# Patient Record
Sex: MALE | HISPANIC OR LATINO | Employment: OTHER | URBAN - METROPOLITAN AREA
[De-identification: names, ages, dates, MRNs, and addresses within clinical notes are randomized per-mention and may not be internally consistent; named-entity substitution may affect disease eponyms.]

---

## 2017-04-28 ENCOUNTER — COMMUNICATION - HEALTHEAST (OUTPATIENT)
Dept: FAMILY MEDICINE | Facility: CLINIC | Age: 57
End: 2017-04-28

## 2017-05-01 ENCOUNTER — COMMUNICATION - HEALTHEAST (OUTPATIENT)
Dept: FAMILY MEDICINE | Facility: CLINIC | Age: 57
End: 2017-05-01

## 2017-05-01 DIAGNOSIS — I10 UNSPECIFIED ESSENTIAL HYPERTENSION: ICD-10-CM

## 2017-05-01 DIAGNOSIS — I25.10 CORONARY ATHEROSCLEROSIS: ICD-10-CM

## 2017-05-15 ENCOUNTER — OFFICE VISIT - HEALTHEAST (OUTPATIENT)
Dept: FAMILY MEDICINE | Facility: CLINIC | Age: 57
End: 2017-05-15

## 2017-05-15 ENCOUNTER — COMMUNICATION - HEALTHEAST (OUTPATIENT)
Dept: TELEHEALTH | Facility: CLINIC | Age: 57
End: 2017-05-15

## 2017-05-15 ENCOUNTER — COMMUNICATION - HEALTHEAST (OUTPATIENT)
Dept: FAMILY MEDICINE | Facility: CLINIC | Age: 57
End: 2017-05-15

## 2017-05-15 DIAGNOSIS — J45.901 ASTHMA WITH EXACERBATION: ICD-10-CM

## 2017-05-15 DIAGNOSIS — I10 ESSENTIAL HYPERTENSION: ICD-10-CM

## 2017-05-15 DIAGNOSIS — Z91.09 OTHER ALLERGY, OTHER THAN TO MEDICINAL AGENTS: ICD-10-CM

## 2017-05-15 DIAGNOSIS — R74.8 ELEVATED LIVER ENZYMES: ICD-10-CM

## 2017-05-15 DIAGNOSIS — E78.00 PURE HYPERCHOLESTEROLEMIA: ICD-10-CM

## 2017-05-18 ENCOUNTER — AMBULATORY - HEALTHEAST (OUTPATIENT)
Dept: LAB | Facility: CLINIC | Age: 57
End: 2017-05-18

## 2017-05-18 DIAGNOSIS — R74.8 ELEVATED LIVER ENZYMES: ICD-10-CM

## 2017-05-18 DIAGNOSIS — E78.00 PURE HYPERCHOLESTEROLEMIA: ICD-10-CM

## 2017-05-18 DIAGNOSIS — I10 ESSENTIAL HYPERTENSION: ICD-10-CM

## 2017-05-18 DIAGNOSIS — R97.20 ELEVATED PSA: ICD-10-CM

## 2017-05-18 LAB
CHOLEST SERPL-MCNC: 123 MG/DL
FASTING STATUS PATIENT QL REPORTED: YES
HDLC SERPL-MCNC: 38 MG/DL
LDLC SERPL CALC-MCNC: 71 MG/DL
PSA SERPL-MCNC: 1.9 NG/ML (ref 0–3.5)
TRIGL SERPL-MCNC: 70 MG/DL

## 2017-05-18 RX ORDER — MONTELUKAST SODIUM 10 MG/1
TABLET ORAL
Qty: 90 TABLET | Refills: 0 | Status: SHIPPED | OUTPATIENT
Start: 2017-05-18 | End: 2022-04-06

## 2017-05-19 LAB
ANA SER QL: 0.4 U
HAV IGM SERPL QL IA: NEGATIVE
HBV CORE IGM SERPL QL IA: NEGATIVE
HBV SURFACE AG SERPL QL IA: NEGATIVE
HCV AB SERPL QL IA: NEGATIVE

## 2017-05-21 ENCOUNTER — COMMUNICATION - HEALTHEAST (OUTPATIENT)
Dept: FAMILY MEDICINE | Facility: CLINIC | Age: 57
End: 2017-05-21

## 2017-05-21 ENCOUNTER — AMBULATORY - HEALTHEAST (OUTPATIENT)
Dept: FAMILY MEDICINE | Facility: CLINIC | Age: 57
End: 2017-05-21

## 2017-05-21 DIAGNOSIS — R74.8 ELEVATED LIVER ENZYMES: ICD-10-CM

## 2017-07-05 ENCOUNTER — COMMUNICATION - HEALTHEAST (OUTPATIENT)
Dept: FAMILY MEDICINE | Facility: CLINIC | Age: 57
End: 2017-07-05

## 2017-07-05 DIAGNOSIS — I25.10 CORONARY ATHEROSCLEROSIS: ICD-10-CM

## 2017-07-05 DIAGNOSIS — I10 ESSENTIAL HYPERTENSION: ICD-10-CM

## 2017-07-05 RX ORDER — LISINOPRIL 10 MG/1
TABLET ORAL
Qty: 90 TABLET | Refills: 2 | Status: SHIPPED | OUTPATIENT
Start: 2017-07-05 | End: 2022-04-06

## 2017-07-05 RX ORDER — ATORVASTATIN CALCIUM 20 MG/1
TABLET, FILM COATED ORAL
Qty: 90 TABLET | Refills: 3 | Status: SHIPPED | OUTPATIENT
Start: 2017-07-05 | End: 2022-04-06

## 2017-07-07 ENCOUNTER — COMMUNICATION - HEALTHEAST (OUTPATIENT)
Dept: FAMILY MEDICINE | Facility: CLINIC | Age: 57
End: 2017-07-07

## 2017-07-07 DIAGNOSIS — J45.901 ASTHMA WITH EXACERBATION: ICD-10-CM

## 2017-07-17 ENCOUNTER — COMMUNICATION - HEALTHEAST (OUTPATIENT)
Dept: FAMILY MEDICINE | Facility: CLINIC | Age: 57
End: 2017-07-17

## 2017-10-27 ENCOUNTER — COMMUNICATION - HEALTHEAST (OUTPATIENT)
Dept: FAMILY MEDICINE | Facility: CLINIC | Age: 57
End: 2017-10-27

## 2017-10-27 ENCOUNTER — AMBULATORY - HEALTHEAST (OUTPATIENT)
Dept: FAMILY MEDICINE | Facility: CLINIC | Age: 57
End: 2017-10-27

## 2017-10-27 DIAGNOSIS — J45.901 ASTHMA WITH EXACERBATION: ICD-10-CM

## 2017-10-27 RX ORDER — ALBUTEROL SULFATE 90 UG/1
2 AEROSOL, METERED RESPIRATORY (INHALATION) EVERY 6 HOURS PRN
Qty: 1 EACH | Refills: 1 | Status: SHIPPED | OUTPATIENT
Start: 2017-10-27 | End: 2023-07-14

## 2018-02-04 ENCOUNTER — COMMUNICATION - HEALTHEAST (OUTPATIENT)
Dept: FAMILY MEDICINE | Facility: CLINIC | Age: 58
End: 2018-02-04

## 2018-02-04 DIAGNOSIS — I10 ESSENTIAL HYPERTENSION: ICD-10-CM

## 2018-02-04 DIAGNOSIS — I25.10 CORONARY ATHEROSCLEROSIS: ICD-10-CM

## 2018-02-06 RX ORDER — LISINOPRIL 10 MG/1
TABLET ORAL
Qty: 90 TABLET | Refills: 1 | Status: SHIPPED | OUTPATIENT
Start: 2018-02-06 | End: 2022-04-06

## 2018-02-06 RX ORDER — ATENOLOL 25 MG/1
TABLET ORAL
Qty: 90 TABLET | Refills: 1 | Status: SHIPPED | OUTPATIENT
Start: 2018-02-06 | End: 2022-05-15

## 2018-03-05 ENCOUNTER — RECORDS - HEALTHEAST (OUTPATIENT)
Dept: GENERAL RADIOLOGY | Facility: CLINIC | Age: 58
End: 2018-03-05

## 2018-03-05 ENCOUNTER — COMMUNICATION - HEALTHEAST (OUTPATIENT)
Dept: SCHEDULING | Facility: CLINIC | Age: 58
End: 2018-03-05

## 2018-03-05 ENCOUNTER — OFFICE VISIT - HEALTHEAST (OUTPATIENT)
Dept: FAMILY MEDICINE | Facility: CLINIC | Age: 58
End: 2018-03-05

## 2018-03-05 DIAGNOSIS — R07.89 CHEST PAIN, NON-CARDIAC: ICD-10-CM

## 2018-03-05 DIAGNOSIS — R07.9 CHEST PAIN: ICD-10-CM

## 2018-03-05 DIAGNOSIS — R07.9 CHEST PAIN, UNSPECIFIED: ICD-10-CM

## 2019-12-03 ENCOUNTER — OFFICE VISIT - HEALTHEAST (OUTPATIENT)
Dept: FAMILY MEDICINE | Facility: CLINIC | Age: 59
End: 2019-12-03

## 2019-12-03 DIAGNOSIS — J06.9 VIRAL URI: ICD-10-CM

## 2019-12-03 RX ORDER — GUAIFENESIN 600 MG/1
600 TABLET, EXTENDED RELEASE ORAL
Status: SHIPPED | COMMUNITY
Start: 2010-05-23 | End: 2022-04-06

## 2019-12-03 RX ORDER — DOXYCYCLINE 100 MG/1
CAPSULE ORAL
Refills: 0 | Status: SHIPPED | COMMUNITY
Start: 2019-05-02 | End: 2022-04-06

## 2020-06-15 ENCOUNTER — OFFICE VISIT - HEALTHEAST (OUTPATIENT)
Dept: FAMILY MEDICINE | Facility: CLINIC | Age: 60
End: 2020-06-15

## 2020-06-15 DIAGNOSIS — S91.209A AVULSION OF TOENAIL, INITIAL ENCOUNTER: ICD-10-CM

## 2020-06-15 DIAGNOSIS — L03.031 CELLULITIS OF TOE OF RIGHT FOOT: ICD-10-CM

## 2020-06-15 DIAGNOSIS — M79.674 PAIN OF TOE OF RIGHT FOOT: ICD-10-CM

## 2021-05-30 ENCOUNTER — RECORDS - HEALTHEAST (OUTPATIENT)
Dept: ADMINISTRATIVE | Facility: CLINIC | Age: 61
End: 2021-05-30

## 2021-05-30 VITALS — WEIGHT: 217 LBS | BODY MASS INDEX: 30.7 KG/M2

## 2021-05-31 VITALS — BODY MASS INDEX: 31.47 KG/M2 | WEIGHT: 222.5 LBS

## 2021-06-01 ENCOUNTER — RECORDS - HEALTHEAST (OUTPATIENT)
Dept: ADMINISTRATIVE | Facility: CLINIC | Age: 61
End: 2021-06-01

## 2021-06-03 VITALS
WEIGHT: 224.1 LBS | SYSTOLIC BLOOD PRESSURE: 158 MMHG | BODY MASS INDEX: 31.7 KG/M2 | HEART RATE: 60 BPM | TEMPERATURE: 98 F | DIASTOLIC BLOOD PRESSURE: 90 MMHG | RESPIRATION RATE: 16 BRPM | OXYGEN SATURATION: 96 %

## 2021-06-03 NOTE — PATIENT INSTRUCTIONS - HE
You were seen today for a cough. This is likely due to a virus and will improve over the next 1-2 weeks on its own.    Symptom management:  - Drink plenty of non-caffeinated fluids  - Avoid smoke exposure  - May use tylenol or ibuprofen for discomfort  - Drink a warm non-caffeinated tea with honey  - Place a warm humidifier in your bedroom at night  - Suresh's VaporRub    Reasons to return for re-evaluation:  - Develop a fever 100.4 or higher, current fever worsens, or fever does not improve in 72 hours  - Difficulty breathing or shortness of breath  - Cough continues to worsen including coughing up blood or coughing up thick, colored phlegm  - Unable to tolerate fluids    Otherwise, if symptoms have not improved in 7 days, follow-up with your primary care provider.

## 2021-06-03 NOTE — PROGRESS NOTES
Assessment:       1. Viral URI       Medical Decision Making  Patient presents with cough for 2 to 3 days most consistent with a viral upper respiratory infection.  On exam he shows no signs of bacterial sinusitis, otitis media, or bacterial pneumonia.      Plan:       Honey and Vicks VapoRub as needed for cough.  Over-the-counter analgesics discussed.  Over-the-counter Flonase and oral decongestants discussed.  Discussed signs of worsening symptoms and when to follow-up with PCP if no symptom improvement.      Patient Instructions   You were seen today for a cough. This is likely due to a virus and will improve over the next 1-2 weeks on its own.    Symptom management:  - Drink plenty of non-caffeinated fluids  - Avoid smoke exposure  - May use tylenol or ibuprofen for discomfort  - Drink a warm non-caffeinated tea with honey  - Place a warm humidifier in your bedroom at night  - Suresh's VaporRub    Reasons to return for re-evaluation:  - Develop a fever 100.4 or higher, current fever worsens, or fever does not improve in 72 hours  - Difficulty breathing or shortness of breath  - Cough continues to worsen including coughing up blood or coughing up thick, colored phlegm  - Unable to tolerate fluids    Otherwise, if symptoms have not improved in 7 days, follow-up with your primary care provider.      Subjective:       Mir Treviño . is a 59 y.o. male here for evaluation of cough.  Onset of symptoms was 2 to 3 days ago.  Associated symptoms include sore throat, rhinorrhea, facial pressure, and green phlegm.  Patient denies fevers and shortness of breath.  The cough is worse at nighttime.  Patient expresses concern for his symptoms as he previously had a pneumonia in May of this year.    The following portions of the patient's history were reviewed and updated as appropriate: allergies, current medications and problem list.    Review of Systems  Pertinent items are noted in HPI.     Allergies  No Known  "Allergies    Family History   Problem Relation Age of Onset     No Medical Problems Mother      No Medical Problems Father      No Medical Problems Sister      No Medical Problems Brother      No Medical Problems Daughter      No Medical Problems Son      No Medical Problems Brother      No Medical Problems Son      No Medical Problems Son        Social History     Socioeconomic History     Marital status:      Spouse name: None     Number of children: None     Years of education: None     Highest education level: None   Occupational History     None   Social Needs     Financial resource strain: None     Food insecurity:     Worry: None     Inability: None     Transportation needs:     Medical: None     Non-medical: None   Tobacco Use     Smoking status: Never Smoker     Smokeless tobacco: Never Used   Substance and Sexual Activity     Alcohol use: Yes     Comment: \"Once a month, maybe.\"     Drug use: No     Sexual activity: None   Lifestyle     Physical activity:     Days per week: None     Minutes per session: None     Stress: None   Relationships     Social connections:     Talks on phone: None     Gets together: None     Attends [employer] service: None     Active member of club or organization: None     Attends meetings of clubs or organizations: None     Relationship status: None     Intimate partner violence:     Fear of current or ex partner: None     Emotionally abused: None     Physically abused: None     Forced sexual activity: None   Other Topics Concern     None   Social History Narrative    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    Marisa Diego         Objective:       /90   Pulse 60   Temp 98  F (36.7  C) (Oral)   Resp 16   Wt (!) 224 lb 1.6 oz (101.7 kg)   SpO2 96%   BMI 31.70 kg/m    General appearance: alert, appears stated age, cooperative, no distress and non-toxic  Head: Normocephalic, without obvious abnormality, atraumatic, " sinuses nontender to percussion  Ears: TMs with serous fluid and bulging, no erythema  Nose: no discharge  Throat: lips, mucosa, and tongue normal; teeth and gums normal  Neck: no adenopathy and supple, symmetrical, trachea midline  Lungs: clear to auscultation bilaterally and No rhonchi, rales, or wheezing  Heart: regular rate and rhythm, S1, S2 normal, no murmur, click, rub or gallop

## 2021-06-04 VITALS
DIASTOLIC BLOOD PRESSURE: 99 MMHG | RESPIRATION RATE: 18 BRPM | SYSTOLIC BLOOD PRESSURE: 166 MMHG | OXYGEN SATURATION: 97 % | WEIGHT: 222.7 LBS | TEMPERATURE: 98.1 F | BODY MASS INDEX: 31.5 KG/M2 | HEART RATE: 64 BPM

## 2021-06-10 NOTE — PROGRESS NOTES
HPI:  BP follow up.   Back on atenolol for two weeks (he had run out for 3 weeks). [employer].      Current Outpatient Prescriptions on File Prior to Visit   Medication Sig Dispense Refill     albuterol (ACCUNEB) 1.25 mg/3 mL nebulizer solution Take 1 ampule by nebulization every 6 (six) hours as needed for wheezing.       aspirin 81 MG EC tablet Take 1 tablet (81 mg total) by mouth daily. 81 MG PO DAILY 100 tablet 3     atenolol (TENORMIN) 25 MG tablet TAKE 1 TABLET BY MOUTH EVERY DAY 90 tablet 0     atorvastatin (LIPITOR) 20 MG tablet TAKE 1 TABLET BY MOUTH EVERY DAY 90 tablet 3     lisinopril (PRINIVIL,ZESTRIL) 10 MG tablet TAKE 1 TABLET BY MOUTH DAILY 90 tablet 2     montelukast (SINGULAIR) 10 mg tablet TAKE 1 TABLET BY MOUTH EVERY NIGHT AT BEDTIME 90 tablet 3     No current facility-administered medications on file prior to visit.        Pmh: reviewed  Psh: reviewed  Allergy:  reviewed      EXAM:    /82  Pulse (!) 54  Temp 98.7  F (37.1  C) (Oral)   Resp 16  Wt 217 lb (98.4 kg)  BMI 30.7 kg/m2  GEN:   ALERT, NAD, ORIENTED TIMES THREE  NECK: SUPPLE WITHOUT ADENOPATHY OR THYROMEGALY  LUNGS: CTA  COR: RRR WITHOUT MURMUR  EXT: WITHOUT EDEMA OR SWELLING    ACT:  16    No results found for this or any previous visit (from the past 168 hour(s)).    DIAGNOSIS:  1. Essential hypertension  Basic Metabolic Panel   2. Elevated liver enzymes     3. Asthma with exacerbation  albuterol (PROAIR HFA;PROVENTIL HFA;VENTOLIN HFA) 90 mcg/actuation inhaler    montelukast (SINGULAIR) 10 mg tablet   4. Allergies  montelukast (SINGULAIR) 10 mg tablet   5. Hypercholesterolemia  Lipid Profile       PLAN:  Patient Instructions   May take benadryl 25-50 mg at bedtime    Also could try loratidine 10 mg in the morning    Follow up for fasting labs this week  Make sure you have 3 days of sexual abstinence.    Albuterol inhaler as needed    Start singulair 10 mg daily for 1-2 months.

## 2021-06-16 NOTE — PROGRESS NOTES
Assessment:     1. Chest pain  XR Ribs Right W PA Chest   2. Chest pain, non-cardiac       Xray personally reviewed by me and I note the discontinuity in the outline of the seventh rib.   I will await for the official read, however the management remains for symptom care and this was discussed with the patient.  Chest pain, likely secondary to rib fracture.      Plan:     1. Chest pain  XR Ribs Right W PA Chest   2. Chest pain, non-cardiac          The diagnosis was discussed with the patient and evaluation and treatment plans outlined.  See orders for lab and imaging studies.     Subjective:      Mir Treviño Sr. is a  male who presents for evaluation of   Chief Complaint   Patient presents with     Fall     1 week ago at work     Chest Pain     with deep breaths, cough, and lifting      Fell back wards on the edge of side walk. Right shoukder took most of the brunt and right lower back.      Onset was 1 week ago. Symptoms have been unchanged. The pain is described as sharp, and is 7/10 in intensity. Pain is located in the right chest with radiation to back.  Aggravating factors: activity.  Alleviating factors: recumbency. Associated symptoms: none. The patient denies arthralagias, chills, nausea and vomiting.  This happened at work.  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    He has a history of cardiac stent but denies any left-sided chest pain.    The following portions of the patient's history were reviewed and updated as appropriate: allergies, current medications, past family history, past medical history, past social history, past surgical history and problem list.    No Known Allergies    Current Outpatient Prescriptions on File Prior to Visit   Medication Sig Dispense Refill     aspirin 81 MG EC tablet Take 1 tablet (81 mg total) by mouth daily. 81 MG PO DAILY 100 tablet 3     atenolol (TENORMIN) 25 MG tablet TAKE 1 TABLET BY MOUTH EVERY DAY 90 tablet 1      atorvastatin (LIPITOR) 20 MG tablet TAKE 1 TABLET BY MOUTH EVERY DAY 90 tablet 3     lisinopril (PRINIVIL,ZESTRIL) 10 MG tablet TAKE 1 TABLET BY MOUTH DAILY 90 tablet 2     albuterol (ACCUNEB) 1.25 mg/3 mL nebulizer solution Take 1 ampule by nebulization every 6 (six) hours as needed for wheezing.       albuterol (PROAIR HFA;PROVENTIL HFA;VENTOLIN HFA) 90 mcg/actuation inhaler Inhale 2 puffs every 6 (six) hours as needed for wheezing. 1 each 1     lisinopril (PRINIVIL,ZESTRIL) 10 MG tablet TAKE 1 TABLET BY MOUTH DAILY 90 tablet 1     montelukast (SINGULAIR) 10 mg tablet TAKE 1 TABLET BY MOUTH EVERY NIGHT AT BEDTIME 90 tablet 3     montelukast (SINGULAIR) 10 mg tablet TAKE 1 TABLET(10 MG) BY MOUTH DAILY 90 tablet 0     No current facility-administered medications on file prior to visit.        Patient Active Problem List   Diagnosis     Hypercholesterolemia     Hypertension     Coronary Artery Disease     Mild Intermittent Asthma     Tuberculin PPD Induration Positive Interpretation     Allergies     Asthma With Acute Exacerbation     Elevated liver enzymes     Obesity       Past Medical History:   Diagnosis Date     Coronary Artery Disease     Created by [facility] Annotation: Jul 16 2010  5:35PM - Nik Boyd: [place] 7/10  Replacement Utility updated for latest IMO load     Hypercholesterolemia     Created by Conversion      Hypertension     Created by Conversion  Replacement Utility updated for latest IMO load       Past Surgical History:   Procedure Laterality Date     CORONARY STENTS      Two coronary artery stents in 2010       Family History   Problem Relation Age of Onset     No Medical Problems Mother      No Medical Problems Father      No Medical Problems Sister      No Medical Problems Brother      No Medical Problems Daughter      No Medical Problems Son      No Medical Problems Brother      No Medical Problems Son      No Medical Problems Son        Social History     Social History     Marital  "status:      Spouse name: N/A     Number of children: N/A     Years of education: N/A     Social History Main Topics     Smoking status: Never Smoker     Smokeless tobacco: Never Used     Alcohol use Yes      Comment: \"Once a month, maybe.\"     Drug use: No     Sexual activity: Not Asked     Other Topics Concern     None     Social History Narrative    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    Marisa Diego           Review of Systems  Constitutional: negative  Respiratory: negative  Cardiovascular: negative  Integument/breast: negative  Hematologic/lymphatic: negative       Objective:     GENERAL APPEARANCE:  Appearing stated age, smiling, alert, cooperative, and in no acute distress.   HEENT: Pupils equal, regular, react to light and accommodation. Extraocular muscles intact, fundi benign. Ear canals and tympanic membranes are normal. Lips, mouth, and throat are unremarkable.   NECK: Neck supple without adenopathy, thyromegaly or masses.   LYMPH: No anterior cervical or supraclavicular LN enlargement   PULMONARY: Normal respiratory effort. Chest is clear.   CARDIOVASCULAR: Heart auscultation: rhythm regular, heart sounds normal S1 and S2, bruit carotid artery absent.   SKIN: Warm and well perfused..   ABDOMEN: Abdomen soft, non-tender. BS normal. No masses or organomegaly.   MUSCULOSKELETAL: No obvious joint swelling, deformity or limitation in range of motion, full range of motion of the back and neck without pain, he has tenderness on the anterior ribs mostly in the  right lateral side of 5- ribs.    EXTREMITIES: Peripheral pulses are full. Extremities with no edema.   MENTAL STATUS: Alert, oriented and thought content appropriate   NEUROLOGIC: Station and gait normal, strength and movement normal, reflexes are normal and symmetric         "

## 2021-06-18 NOTE — PATIENT INSTRUCTIONS - HE
Patient Instructions by Gila Germain CNP at 6/15/2020  1:30 PM     Author: Gila Germain CNP Service: -- Author Type: Nurse Practitioner    Filed: 6/15/2020  2:43 PM Encounter Date: 6/15/2020 Status: Addendum    : Gila Germain CNP (Nurse Practitioner)    Related Notes: Original Note by Gila Germain CNP (Nurse Practitioner) filed at 6/15/2020  2:42 PM       Soak foot and swish it around in warm, epsom salt foot bath 5-10 minutes each time twice daily until healed over.      Can continue to apply topical antibiotic over open area.    OK to trim nail back if necessary.      Cephalexin in case of early skin infection 4x daily for 7 days.      Keep open area of toenail covered with adhesive bandage/bandaid.  Change after nail soaks.      Patient Education     Detached Fingernail or Toenail  A detached nail is when the nail becomes  from the area underneath it (the nail bed). This often means losing all or part of the nail. An injury to your finger or toe is often the cause. It can also be caused by an infection around or under the nail.  Sometimes there is a cut in the nail bed or a bone fracture under the nail. In most cases, the nail will grow back from the area under the cuticle (the matrix). A fingernail takes about 4 to 6 months to grow back. A toenail takes about 12 months to grow back. If the nail bed or matrix was damaged, the nail may grow back with a rough or abnormal shape. In some cases the nail may not grow back at all.    There may be damage or a cut to the nail bed. This may need to be repaired. Often this is done with stitches. If the nail is still in good condition, it might be cleaned and trimmed, then put back in place. This is done to help and protect the new nail as it grows back. It also prevents the nail bed from drying out.  Home care    Keep the injured part raised to reduce pain and swelling. This is important, especially in the first 48 hours.    Apply an ice  pack for up to 20 minutes. Do this every 3 to 6 hours during the first 24 to 48 hours. Keep using ice packs to ease pain and swelling as needed. To make an ice pack, put ice cubes in a plastic bag that seals at the top. Wrap the bag in a clean, thin towel or cloth. Never put ice or an ice pack directly on the skin. The ice pack can be put right on a wrap, cast, or splint. As the ice melts, be careful not to get any wrap, cast, or splint wet.    The nail bed is moist, soft, and sensitive. It needs to be protected from injury for the first 7 to 10 days until it dries out and becomes hard. Keep it covered with a nonstick dressing or a bandage without adhesive.    When a dressing is placed on an exposed nail bed, it may stick and be hard to remove if left in place more than 24 hours. Unless you were told otherwise, change dressings every 24 hours. If needed, soak the dressing off while holding it under warm running water. Then lightly pat the wound dry. Apply a layer of antibiotic ointment before putting on the new dressing or bandage. This will help keep it from sticking. Use a nonstick dressing with the antibiotic ointment under the outer dressing.    If an X-ray showed that you have a fracture, it will take about 4 weeks for this to heal. The injured part should be protected with a splint or tape while it is healing.    If you were given antibiotics to prevent infection, take them as directed until they are all gone.  Medicine    You can take over-the-counter medicine for pain, unless you were given a different pain medicine to use. Talk with your provider before using these medicines if you have chronic liver or kidney disease. Also talk with your provider if you ever had a stomach ulcer or GI bleeding, or are taking blood thinner medicines.    If you were given antibiotics, take them until they are done. It is important to finish the antibiotics even if the wound looks better. This is to make sure the infection has  cleared.  Follow-up care  Follow up with your healthcare provider, or as advised. If X-rays were taken, you will be told of any new findings that may affect your care.  When to seek medical advice  Call your healthcare provider right away if any of these occur:    Pain or swelling increases    Redness around the nail    Pus (creamy white or yellow fluid) draining from the nail    Fever of 100.4 F (38 C) or higher, or as directed by your provider  Date Last Reviewed: 8/1/2016 2000-2017 [employer]. [address] [place] [id number]. All rights reserved. This information is not intended as a substitute for professional medical care. Always follow your healthcare professional's instructions.

## 2021-06-20 NOTE — LETTER
Letter by Gila Gemrain CNP at      Author: Gila Germain CNP Service: -- Author Type: --    Filed:  Encounter Date: 6/15/2020 Status: (Other)         Brigitte 15, 2020     Patient: Mir Treviño Sr.   YOB: 1960   Date of Visit: 6/15/2020       To Whom It May Concern:    It is my medical opinion that Mir Treviño may return to light duty immediately with the following restrictions: may wear open toed shoe on right foot for 1 week if can't tolerate boots.  May need to adjust work duties for safety if boots are required.  .    If you have any questions or concerns, please don't hesitate to call.    Sincerely,        Electronically signed by Gila Germain CNP

## 2021-06-29 NOTE — PROGRESS NOTES
Progress Notes by Gila Germain CNP at 6/15/2020  1:30 PM     Author: Gila Germain CNP Service: -- Author Type: Nurse Practitioner    Filed: 6/25/2020  5:05 PM Encounter Date: 6/15/2020 Status: Signed    : Gila Germain CNP (Nurse Practitioner)       Chief Complaint   Patient presents with   ? Nail Problem     Right bg toe, right second toe, and right fifth toe was injured while moving a trash can x4 days ago, right bod toe nail is lifting up       ASSESSMENT & PLAN:   Diagnoses and all orders for this visit:    Avulsion of toenail, initial encounter  -     cephalexin 500 mg tablet; Take 500 mg by mouth 4 (four) times a day for 7 days.  Dispense: 28 tablet; Refill: 0    Pain of toe of right foot  -     Cancel: XR Foot Right 3 or More VWS    Cellulitis of toe of right foot    Other orders  -     Td, Preservative Free (green label)        MDM:  Patient with lacerations and toenail avulsions on right foot.  Patient denies direct trauma to area, just cut from garbage can/.  He does not believe there are any other injuries other than cuts and the toenail avulsions.    Patient with clean appearing right fifth toe toenail avulsion.  Right great toe with tissue avulsion near nail bed.  Patient states [facility] and he actually did a good job.  We will leave in place.  Patient informed the nail will eventually grow back from the base.    Right great toe is generally mildly erythematous and more swollen compared to left.  Discussed with Dr. Pagan in the urgent care and he recommended cephalexin for the great toe as well as epsom foot soaks.  Recommended against removal of toenail to further investigate.    Patient informed of this.  Should wear open toed shoes if boots are uncomfortable.  Patient can see how he is feeling when he is transferred to [employer] base as planned for training.  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    Supportive care discussed.  See discharge instructions below for specific recommendations given.    At the end of the encounter, I discussed results, diagnosis, medications. Discussed red flags for immediate return to clinic/ER, as well as indications for follow up if no improvement. Patient and/or caregiver understood and agreed to plan. Patient was stable for discharge.    SUBJECTIVE    HPI:  HPI  Mir Treviño  presents to the walk-in clinic with   Chief Complaint   Patient presents with   ? Nail Problem     Right bg toe, right second toe, and right fifth toe was injured while moving a trash can x4 days ago, right bod toe nail is lifting up     Patient states he was sliding garbage can and accidentally slid sharp part of the bottom of garbage can over multiple  toenails.  Fifth toe toenail was injured and already fell off.  Partial nail avulsion noted of the great toe earlier at the base.  Patient states he put this back in place to protect (under eponychium).  Healing laceration at upper lateral edge of nail.  Is in  and has to wear boots when training at [employer] coming up and and doesn't know if he can tolerate this with injury.  Toe is mildly erythematous and swollen.      Symptoms started: 4 days ago     Last TD 2015.    Denies: direct trauma/crush injury potential, only cuts as noted     See ROS for additional symptoms and/or pertinent negatives.       History obtained from the patient.    Past Medical History:   Diagnosis Date   ? Coronary Artery Disease     Created by [facility] Annotation: Jul 16 2010  5:35PM - Nik Boyd: MI 7/10  Replacement Utility updated for latest IMO load   ? Hypercholesterolemia     Created by Conversion    ? Hypertension     Created by Conversion  Replacement Utility updated for latest IMO load       Active Ambulatory (Non-Hospital) Problems    Diagnosis   ? Obesity   ? Elevated liver enzymes   ? Asthma With Acute Exacerbation   ?  "Hypercholesterolemia   ? Hypertension   ? Coronary Artery Disease   ? Asthma   ? Tuberculin PPD Induration Positive Interpretation   ? Allergies       Family History   Problem Relation Age of Onset   ? No Medical Problems Mother    ? No Medical Problems Father    ? No Medical Problems Sister    ? No Medical Problems Brother    ? No Medical Problems Daughter    ? No Medical Problems Son    ? No Medical Problems Brother    ? No Medical Problems Son    ? No Medical Problems Son        Social History     Tobacco Use   ? Smoking status: Never Smoker   ? Smokeless tobacco: Never Used   Substance Use Topics   ? Alcohol use: Yes     Comment: \"Once a month, maybe.\"       Review of Systems   Constitutional: Negative for fever.   All other systems reviewed and are negative.      OBJECTIVE    Vitals:    06/15/20 1329   BP: (!) 166/99   Pulse: 64   Resp: 18   Temp: 98.1  F (36.7  C)   TempSrc: Oral   SpO2: 97%   Weight: (!) 222 lb 11.2 oz (101 kg)       Physical Exam  Constitutional:       General: He is not in acute distress.     Appearance: He is well-developed.   HENT:      Right Ear: External ear normal.      Left Ear: External ear normal.   Eyes:      General:         Right eye: No discharge.         Left eye: No discharge.      Conjunctiva/sclera: Conjunctivae normal.   Cardiovascular:      Pulses: Normal pulses.   Pulmonary:      Effort: Pulmonary effort is normal.   Musculoskeletal: Normal range of motion.   Skin:     General: Skin is warm and dry.      Capillary Refill: Capillary refill takes less than 2 seconds.      Comments: Toenail absent 5th toe rt foot.  No s/s infection/wounds.      Area of skin avulsion rt great toe at upper lateral edge.  Toenail firmly in place.  Some mild generalized toe swelling  And light pink erythema compared to opposite.     Neurological:      Mental Status: He is alert and oriented to person, place, and time.   Psychiatric:         Mood and Affect: Mood normal.         Behavior: " Behavior normal.         Thought Content: Thought content normal.         Judgment: Judgment normal.         Labs:  No results found for this or any previous visit (from the past 240 hour(s)).      Radiology:    No results found.    PATIENT INSTRUCTIONS:   Patient Instructions   Soak foot and swish it around in warm, epsom salt foot bath 5-10 minutes each time twice daily until healed over.      Can continue to apply topical antibiotic over open area.    OK to trim nail back if necessary.      Cephalexin in case of early skin infection 4x daily for 7 days.      Keep open area of toenail covered with adhesive bandage/bandaid.  Change after nail soaks.      Patient Education     Detached Fingernail or Toenail  A detached nail is when the nail becomes  from the area underneath it (the nail bed). This often means losing all or part of the nail. An injury to your finger or toe is often the cause. It can also be caused by an infection around or under the nail.  Sometimes there is a cut in the nail bed or a bone fracture under the nail. In most cases, the nail will grow back from the area under the cuticle (the matrix). A fingernail takes about 4 to 6 months to grow back. A toenail takes about 12 months to grow back. If the nail bed or matrix was damaged, the nail may grow back with a rough or abnormal shape. In some cases the nail may not grow back at all.    There may be damage or a cut to the nail bed. This may need to be repaired. Often this is done with stitches. If the nail is still in good condition, it might be cleaned and trimmed, then put back in place. This is done to help and protect the new nail as it grows back. It also prevents the nail bed from drying out.  Home care    Keep the injured part raised to reduce pain and swelling. This is important, especially in the first 48 hours.    Apply an ice pack for up to 20 minutes. Do this every 3 to 6 hours during the first 24 to 48 hours. Keep using ice  packs to ease pain and swelling as needed. To make an ice pack, put ice cubes in a plastic bag that seals at the top. Wrap the bag in a clean, thin towel or cloth. Never put ice or an ice pack directly on the skin. The ice pack can be put right on a wrap, cast, or splint. As the ice melts, be careful not to get any wrap, cast, or splint wet.    The nail bed is moist, soft, and sensitive. It needs to be protected from injury for the first 7 to 10 days until it dries out and becomes hard. Keep it covered with a nonstick dressing or a bandage without adhesive.    When a dressing is placed on an exposed nail bed, it may stick and be hard to remove if left in place more than 24 hours. Unless you were told otherwise, change dressings every 24 hours. If needed, soak the dressing off while holding it under warm running water. Then lightly pat the wound dry. Apply a layer of antibiotic ointment before putting on the new dressing or bandage. This will help keep it from sticking. Use a nonstick dressing with the antibiotic ointment under the outer dressing.    If an X-ray showed that you have a fracture, it will take about 4 weeks for this to heal. The injured part should be protected with a splint or tape while it is healing.    If you were given antibiotics to prevent infection, take them as directed until they are all gone.  Medicine    You can take over-the-counter medicine for pain, unless you were given a different pain medicine to use. Talk with your provider before using these medicines if you have chronic liver or kidney disease. Also talk with your provider if you ever had a stomach ulcer or GI bleeding, or are taking blood thinner medicines.    If you were given antibiotics, take them until they are done. It is important to finish the antibiotics even if the wound looks better. This is to make sure the infection has cleared.  Follow-up care  Follow up with your healthcare provider, or as advised. If X-rays were  taken, you will be told of any new findings that may affect your care.  When to seek medical advice  Call your healthcare provider right away if any of these occur:    Pain or swelling increases    Redness around the nail    Pus (creamy white or yellow fluid) draining from the nail    Fever of 100.4 F (38 C) or higher, or as directed by your provider  Date Last Reviewed: 8/1/2016 2000-2017 [employer]. [address] [place] [address], [place] [id number]. All rights reserved. This information is not intended as a substitute for professional medical care. Always follow your healthcare professional's instructions.

## 2021-07-24 ENCOUNTER — HEALTH MAINTENANCE LETTER (OUTPATIENT)
Age: 61
End: 2021-07-24

## 2021-09-18 ENCOUNTER — HEALTH MAINTENANCE LETTER (OUTPATIENT)
Age: 61
End: 2021-09-18

## 2022-04-06 ENCOUNTER — DOCUMENTATION ONLY (OUTPATIENT)
Dept: CARDIOLOGY | Facility: CLINIC | Age: 62
End: 2022-04-06

## 2022-04-06 ENCOUNTER — OFFICE VISIT (OUTPATIENT)
Dept: CARDIOLOGY | Facility: CLINIC | Age: 62
End: 2022-04-06
Payer: COMMERCIAL

## 2022-04-06 ENCOUNTER — PREP FOR PROCEDURE (OUTPATIENT)
Dept: CARDIOLOGY | Facility: CLINIC | Age: 62
End: 2022-04-06

## 2022-04-06 ENCOUNTER — APPOINTMENT (OUTPATIENT)
Dept: CARDIOLOGY | Facility: CLINIC | Age: 62
End: 2022-04-06
Payer: COMMERCIAL

## 2022-04-06 VITALS
HEIGHT: 70 IN | DIASTOLIC BLOOD PRESSURE: 80 MMHG | WEIGHT: 230 LBS | SYSTOLIC BLOOD PRESSURE: 132 MMHG | RESPIRATION RATE: 16 BRPM | HEART RATE: 56 BPM | BODY MASS INDEX: 32.93 KG/M2

## 2022-04-06 DIAGNOSIS — I25.119 CORONARY ARTERY DISEASE INVOLVING NATIVE CORONARY ARTERY OF NATIVE HEART WITH ANGINA PECTORIS (H): Primary | ICD-10-CM

## 2022-04-06 DIAGNOSIS — I20.0 CRESCENDO ANGINA (H): ICD-10-CM

## 2022-04-06 DIAGNOSIS — E78.5 HYPERLIPIDEMIA LDL GOAL <70: ICD-10-CM

## 2022-04-06 PROCEDURE — U0003 INFECTIOUS AGENT DETECTION BY NUCLEIC ACID (DNA OR RNA); SEVERE ACUTE RESPIRATORY SYNDROME CORONAVIRUS 2 (SARS-COV-2) (CORONAVIRUS DISEASE [COVID-19]), AMPLIFIED PROBE TECHNIQUE, MAKING USE OF HIGH THROUGHPUT TECHNOLOGIES AS DESCRIBED BY CMS-2020-01-R: HCPCS

## 2022-04-06 PROCEDURE — 99204 OFFICE O/P NEW MOD 45 MIN: CPT | Performed by: INTERNAL MEDICINE

## 2022-04-06 PROCEDURE — U0005 INFEC AGEN DETEC AMPLI PROBE: HCPCS

## 2022-04-06 RX ORDER — LISINOPRIL 40 MG/1
20 TABLET ORAL DAILY
Status: ON HOLD | COMMUNITY
Start: 2022-01-28 | End: 2022-05-15

## 2022-04-06 RX ORDER — NITROGLYCERIN 0.4 MG/1
TABLET SUBLINGUAL
Qty: 45 TABLET | Refills: 11 | Status: SHIPPED | OUTPATIENT
Start: 2022-04-06 | End: 2024-05-28

## 2022-04-06 RX ORDER — FENTANYL CITRATE 50 UG/ML
25 INJECTION, SOLUTION INTRAMUSCULAR; INTRAVENOUS
Status: CANCELLED | OUTPATIENT
Start: 2022-04-08

## 2022-04-06 RX ORDER — DEXTROSE MONOHYDRATE 25 G/50ML
25-50 INJECTION, SOLUTION INTRAVENOUS
Status: CANCELLED | OUTPATIENT
Start: 2022-04-08

## 2022-04-06 RX ORDER — DIAZEPAM 10 MG
10 TABLET ORAL
Status: CANCELLED | OUTPATIENT
Start: 2022-04-08

## 2022-04-06 RX ORDER — ROSUVASTATIN CALCIUM 20 MG/1
20 TABLET, COATED ORAL DAILY
Qty: 90 TABLET | Refills: 11 | Status: SHIPPED | OUTPATIENT
Start: 2022-04-06 | End: 2022-10-18

## 2022-04-06 RX ORDER — HYDROCHLOROTHIAZIDE 25 MG/1
12.5 TABLET ORAL DAILY
COMMUNITY
Start: 2021-07-19 | End: 2023-01-18

## 2022-04-06 RX ORDER — METFORMIN HCL 500 MG
1000 TABLET, EXTENDED RELEASE 24 HR ORAL EVERY EVENING
COMMUNITY
Start: 2022-03-10 | End: 2022-06-22

## 2022-04-06 RX ORDER — ASPIRIN 325 MG
325 TABLET ORAL ONCE
Status: CANCELLED | OUTPATIENT
Start: 2022-04-08 | End: 2022-04-06

## 2022-04-06 RX ORDER — LIDOCAINE 40 MG/G
CREAM TOPICAL
Status: CANCELLED | OUTPATIENT
Start: 2022-04-06

## 2022-04-06 RX ORDER — SODIUM CHLORIDE 9 MG/ML
INJECTION, SOLUTION INTRAVENOUS CONTINUOUS
Status: CANCELLED | OUTPATIENT
Start: 2022-04-08

## 2022-04-06 RX ORDER — ASPIRIN 81 MG/1
243 TABLET, CHEWABLE ORAL ONCE
Status: CANCELLED | OUTPATIENT
Start: 2022-04-08

## 2022-04-06 RX ORDER — UBIDECARENONE 30 MG
2 CAPSULE ORAL DAILY
Start: 2022-04-06

## 2022-04-06 RX ORDER — NICOTINE POLACRILEX 4 MG
15-30 LOZENGE BUCCAL
Status: CANCELLED | OUTPATIENT
Start: 2022-04-08

## 2022-04-06 NOTE — PROGRESS NOTES
"  Thank you, Dr. Lira, for asking the [facility] team to see Mr. Mir Treviño Sr. to evaluate       Assessment/Recommendations   Assessment/Plan:  1. Crescendo angina - in setting of DM and prior MI, off statin, will arrange for angiography and possible PCI, raise asp 162mg, start rosuvastatin 20mg and conenzyme Q10 for myalgia, NTG prn, cont atenolol.    2. HTN well controlled on lisinopril/atenolol/HCTZ  3. Dyspnea on exertion - could be angina - echo/cath, baseline labs day of cath  4. Hyperlipidemia - < 70 goal - repeat lipids in 2 months    Follow up post cath       History of Present Illness/Subjective    Mr. Mir Treviño Sr. is a 62 year old male with with MI/PCI to LAD at [place] in 2010, reports increase in exertional angina crescendo now with symptoms after a mile running.  He recalls one vessel that was partly occluded. He's been off his statin for the several years, he does have DM, no tob, no GI/ bleeding, no PND/orthopnea, edema, syncopal events, palpitations.  He has seasonal asthma.  He recently retired from [employer]. He had myalgias with atorvastatin.        Physical Examination Review of Systems   /80 (BP Location: Left arm, Patient Position: Sitting, Cuff Size: Adult Large)   Pulse 56   Resp 16   Ht 1.778 m (5' 10\")   Wt 104.3 kg (230 lb)   BMI 33.00 kg/m    Body mass index is 33 kg/m .  Wt Readings from Last 3 Encounters:   04/06/22 104.3 kg (230 lb)   06/15/20 101 kg (222 lb 11.2 oz)   12/03/19 101.7 kg (224 lb 1.6 oz)     [unfilled]  General Appearance:   no distress, normal body habitus   ENT/Mouth: membranes moist, no oral lesions or bleeding gums.      EYES:  no scleral icterus, normal conjunctivae   Neck: no carotid bruits or thyromegaly   Chest/Lungs:   lungs are clear to auscultation, no rales or wheezing,  sternal scar, equal chest wall expansion    Cardiovascular:   Regular. Normal first and second heart sounds with no murmurs, rubs, or " "gallops; the carotid, radial and posterior tibial pulses are intact, Jugular venous pressure , edema bilaterally    Abdomen:  no organomegaly, masses, bruits, or tenderness; bowel sounds are present   Extremities: no cyanosis or clubbing   Skin: no xanthelasma, warm.    Neurologic: normal  bilateral, no tremors     Psychiatric: alert and oriented x3, calm     Review of Systems - 12 points nega other than above      Medical History  Surgical History Family History Social History   Past Medical History:   Diagnosis Date     Coronary atherosclerosis     Created by [facility] Annotation: Jul 16 2010  5:35PM - Nik Boyd: MI 7/10  Replacement Utility updated for latest IMO load     Essential hypertension     Created by Conversion  Replacement Utility updated for latest IMO load     Pure hypercholesterolemia     Created by Conversion     Past Surgical History:   Procedure Laterality Date     OTHER SURGICAL HISTORY      CORONARY STENTSTwo coronary artery stents in 2010    Family History   Problem Relation Age of Onset     No Known Problems Mother      No Known Problems Father      No Known Problems Sister      No Known Problems Brother      No Known Problems Daughter      No Known Problems Son      No Known Problems Brother      No Known Problems Son      No Known Problems Son     Social History     Socioeconomic History     Marital status:      Spouse name: Not on file     Number of children: Not on file     Years of education: Not on file     Highest education level: Not on file   Occupational History     Not on file   Tobacco Use     Smoking status: Never Smoker     Smokeless tobacco: Never Used   Substance and Sexual Activity     Alcohol use: Yes     Comment: Alcoholic Drinks/day: \"Once a month, maybe.\"     Drug use: No     Sexual activity: Not on file   Other Topics Concern     Not on file   Social History Narrative    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  Marisa Diego       Social Determinants of Health     Financial Resource Strain: Not on file   Food Insecurity: Not on file   Transportation Needs: Not on file   Physical Activity: Not on file   Stress: Not on file   Social Connections: Not on file   Intimate Partner Violence: Not on file   Housing Stability: Not on file          Medications  Allergies   Scheduled Meds:  Continuous Infusions:  PRN Meds:. Allergies   Allergen Reactions     Atorvastatin Muscle Pain (Myalgia)         Lab Results    Chemistry/lipid CBC Cardiac Enzymes/BNP/TSH/INR   Lab Results   Component Value Date    CHOL 123 05/18/2017    HDL 38 (L) 05/18/2017    TRIG 70 05/18/2017    No results found for: WBC, HGB, HCT, MCV, PLT No results found for: CKTOTAL, CKMB, TROPONINI, BNP, TSH, INR           Jermaine Schafer MD  Interventional Cardiology  [facility]

## 2022-04-06 NOTE — TELEPHONE ENCOUNTER
----- Message from Carlos Nina sent at 4/6/2022  2:45 PM CDT -----  Regarding: ANGIO - [facility] ORDERING  CORS POSS PCI WITH PTK/MY    10 AM ADMIT ON 4/8    H&P 4/6 WITH [facility]    COVID 4/6 [facility]    ALERTS - PRIOR STENTS, DIABETIC    THANKS,  CARLOS

## 2022-04-06 NOTE — PROGRESS NOTES
Mir ALVAREZ Treviño .  [address] [address]  [phone] (home) [phone] (work)    PCP:  Geoff Devine  H&P completed by:  4/6/22 Dr Schafer  Admit date 4/8/22 Arrival time:  10:00 AM  Anticoagulation:  NA  Previous PCI: Yes  Bypass Grafts: No  Renal Issues: No  Diabetic?: Yes  Device?: No    Ambulation status: independent    Reason for Visit:  Patient seen for pre-procedure education in preparation for: Coronary Angiogram with Possible PCI    Procedure Prep:  EKG results obtained, dated: To be completed on day of cath lab procedure  Hemogram results obtained: To be completed on day of cath lab procedure  Basic Metabolic Panel results obtained: To be completed on day of cath lab procedure  Pertinent cardiac test results: nothing recent, 7/15/10 PCI at [facility]  COVID-19 test results obtained: Completed within [facility]     Patient Education    1. Your arrival time is 10:00.  [place] [address] - Main Entrance of the Hospital  2. Please plan on being at the hospital all day.  3. At any time, emergencies and/or urgent cases may come up which could delay the start of your procedure.    COVID Testing Instructions  *Mandatory COVID Testing:   ALL Patients will need to complete a COVID test no sooner than 4 days prior to their procedure (regardless of vaccination status).      To schedule COVID testing Please call [phone]    If you want to complete this at an outside facility please call them to find out if they will have the results within the appropriate time frame and their fax number.  You will need to provide us with that information so we can send the order.    The facility completing the test will need to fax the results to [phone]    If you are running into and issues please call us.     Pre-procedure instructions  Take your temperature in the morning prior to coming in.  If your temperature is 100 F  please call [facility] [phone] and notify them.  If you do not have access to a thermometer at home, please come in for testing.  If you are running a temperature your procedure may be rescheduled.  Patient instructed to not Eat or Drink after midnight.  Patient instructed to shower the evening before or the morning of the procedure.  Patient instructed to arrange for transportation home following procedure from a responsible family member or friend. No driving for at least 24 hours.  Patient instructed to have a responsible adult with them for 24 hours post-procedure.  Post-procedure follow up process.  Conscious sedation discussed.      Pre-procedure medication instructions.  Continue medications as scheduled, with a small amount of water on the day of the procedure unless indicated. (NO Diabetic Medications or Blood Thinners)  Pt instructed not to consume Alcohol, Tobacco, Caffeine, or Carbonated beverages 12 hours prior to procedure.  Patient instructed to take 324 mg of Aspirin the morning of procedure: Yes  Other medication: instructed to only take atenolol and lisinopril a.m. of the procedure.              Diabetic Medication Instructions  ? DO NOT take any oral diabetic medication, short-acting diabetes medications/insulin, humalog or regular insulin the morning of your test  ? Take   dose of long-acting insulin (Lantus, Levemir) the day of your test  ? Hold Oral Diabetic on the day of the procedure and for 48 hours after IV contrast given  ? Remember to  bring your glucometer and insulin with you to take after your test if needed     Patient states understanding of procedure and agrees to proceed.    *PATIENTS RECORDS AVAILABLE IN [facility] UNLESS OTHERWISE INDICATED*      Patient Active Problem List   Diagnosis     Hypercholesterolemia     Hypertension     Coronary Artery Disease     Asthma     Tuberculin PPD Induration Positive Interpretation     Allergies     Asthma With Acute Exacerbation     Elevated  liver enzymes     Obesity       Current Outpatient Medications   Medication Sig Dispense Refill     albuterol (ACCUNEB) 1.25 mg/3 mL nebulizer solution [ALBUTEROL (ACCUNEB) 1.25 MG/3 ML NEBULIZER SOLUTION] Take 1 ampule by nebulization every 6 (six) hours as needed for wheezing.       albuterol (PROAIR HFA;PROVENTIL HFA;VENTOLIN HFA) 90 mcg/actuation inhaler [ALBUTEROL (PROAIR HFA;PROVENTIL HFA;VENTOLIN HFA) 90 MCG/ACTUATION INHALER] Inhale 2 puffs every 6 (six) hours as needed for wheezing. 1 each 1     aspirin (ASA) 81 MG EC tablet Take 2 tablets (162 mg) by mouth daily 100 tablet 3     atenolol (TENORMIN) 25 MG tablet [ATENOLOL (TENORMIN) 25 MG TABLET] TAKE 1 TABLET BY MOUTH EVERY DAY 90 tablet 1     coenzyme Q-10 capsule Take 2 capsules (200 mg) by mouth daily       fluticasone propionate (FLONASE) 50 mcg/actuation nasal spray Spray 1 spray in nostril daily as needed        hydrochlorothiazide (HYDRODIURIL) 25 MG tablet Take 12.5 mg by mouth daily       lisinopril (ZESTRIL) 40 MG tablet Take 20 mg by mouth daily       metFORMIN (GLUCOPHAGE-XR) 500 MG 24 hr tablet Take 1,000 mg by mouth daily       montelukast (SINGULAIR) 10 mg tablet [MONTELUKAST (SINGULAIR) 10 MG TABLET] TAKE 1 TABLET BY MOUTH EVERY NIGHT AT BEDTIME 90 tablet 3     nitroGLYcerin (NITROSTAT) 0.4 MG sublingual tablet For chest pain place 1 tablet under the tongue every 5 minutes for 3 doses. If symptoms persist 5 minutes after 1st dose call 911. 45 tablet 11     rosuvastatin (CRESTOR) 20 MG tablet Take 1 tablet (20 mg) by mouth daily 90 tablet 11       Allergies   Allergen Reactions     Atorvastatin Muscle Pain (Myalgia)       Pavithra Mcdermott RN on 4/6/2022 at 3:53 PM

## 2022-04-06 NOTE — LETTER
"4/6/2022    Geoff Devine MD  [address]  [facility] [address]    RE: Mir Treviño Sr.       Dear Colleague,     I had the pleasure of seeing Mir Treviño Sr. in the [facility].    Thank you, Dr. Lira, for asking the [facility] team to see Mr. Mir Treviño Sr. to evaluate       Assessment/Recommendations   Assessment/Plan:  1. Crescendo angina - in setting of DM and prior MI, off statin, will arrange for angiography and possible PCI, raise asp 162mg, start rosuvastatin 20mg and conenzyme Q10 for myalgia, NTG prn, cont atenolol.    2. HTN well controlled on lisinopril/atenolol/HCTZ  3. Dyspnea on exertion - could be angina - echo/cath, baseline labs day of cath  4. Hyperlipidemia - < 70 goal - repeat lipids in 2 months    Follow up post cath       History of Present Illness/Subjective    Mr. Mir Treviño Sr. is a 62 year old male with with MI/PCI to LAD at [place] in 2010, reports increase in exertional angina crescendo now with symptoms after a mile running.  He recalls one vessel that was partly occluded. He's been off his statin for the several years, he does have DM, no tob, no GI/ bleeding, no PND/orthopnea, edema, syncopal events, palpitations.  He has seasonal asthma.  He recently retired from [employer]. He had myalgias with atorvastatin.        Physical Examination Review of Systems   /80 (BP Location: Left arm, Patient Position: Sitting, Cuff Size: Adult Large)   Pulse 56   Resp 16   Ht 1.778 m (5' 10\")   Wt 104.3 kg (230 lb)   BMI 33.00 kg/m    Body mass index is 33 kg/m .  Wt Readings from Last 3 Encounters:   04/06/22 104.3 kg (230 lb)   06/15/20 101 kg (222 lb 11.2 oz)   12/03/19 101.7 kg (224 lb 1.6 oz)     [unfilled]  General Appearance:   no distress, normal body habitus   ENT/Mouth: membranes moist, no oral lesions or bleeding gums.      EYES:  no scleral icterus, normal conjunctivae   Neck: no carotid bruits or thyromegaly " "  Chest/Lungs:   lungs are clear to auscultation, no rales or wheezing,  sternal scar, equal chest wall expansion    Cardiovascular:   Regular. Normal first and second heart sounds with no murmurs, rubs, or gallops; the carotid, radial and posterior tibial pulses are intact, Jugular venous pressure , edema bilaterally    Abdomen:  no organomegaly, masses, bruits, or tenderness; bowel sounds are present   Extremities: no cyanosis or clubbing   Skin: no xanthelasma, warm.    Neurologic: normal  bilateral, no tremors     Psychiatric: alert and oriented x3, calm     Review of Systems - 12 points nega other than above      Medical History  Surgical History Family History Social History   Past Medical History:   Diagnosis Date     Coronary atherosclerosis     Created by [facility] Annotation: Jul 16 2010  5:35PM - Nik Boyd: MI 7/10  Replacement Utility updated for latest IMO load     Essential hypertension     Created by Conversion  Replacement Utility updated for latest IMO load     Pure hypercholesterolemia     Created by Conversion     Past Surgical History:   Procedure Laterality Date     OTHER SURGICAL HISTORY      CORONARY STENTSTwo coronary artery stents in 2010    Family History   Problem Relation Age of Onset     No Known Problems Mother      No Known Problems Father      No Known Problems Sister      No Known Problems Brother      No Known Problems Daughter      No Known Problems Son      No Known Problems Brother      No Known Problems Son      No Known Problems Son     Social History     Socioeconomic History     Marital status:      Spouse name: Not on file     Number of children: Not on file     Years of education: Not on file     Highest education level: Not on file   Occupational History     Not on file   Tobacco Use     Smoking status: Never Smoker     Smokeless tobacco: Never Used   Substance and Sexual Activity     Alcohol use: Yes     Comment: Alcoholic Drinks/day: \"Once a " "month, maybe.\"     Drug use: No     Sexual activity: Not on file   Other Topics Concern     Not on file   Social History Narrative    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  Marisa Diego       Social Determinants of Health     Financial Resource Strain: Not on file   Food Insecurity: Not on file   Transportation Needs: Not on file   Physical Activity: Not on file   Stress: Not on file   Social Connections: Not on file   Intimate Partner Violence: Not on file   Housing Stability: Not on file          Medications  Allergies   Scheduled Meds:  Continuous Infusions:  PRN Meds:. Allergies   Allergen Reactions     Atorvastatin Muscle Pain (Myalgia)         Lab Results    Chemistry/lipid CBC Cardiac Enzymes/BNP/TSH/INR   Lab Results   Component Value Date    CHOL 123 05/18/2017    HDL 38 (L) 05/18/2017    TRIG 70 05/18/2017    No results found for: WBC, HGB, HCT, MCV, PLT No results found for: CKTOTAL, CKMB, TROPONINI, BNP, TSH, INR           Jermaine Schafer MD  Interventional Cardiology  [facility]    Thank you for allowing me to participate in the care of your patient.      Sincerely,     Jermaine Schafer MD     [facility]  cc:   Referred Self  "

## 2022-04-06 NOTE — H&P (VIEW-ONLY)
"  Thank you, Dr. Lira, for asking the [facility] team to see Mr. Mir Treviño Sr. to evaluate       Assessment/Recommendations   Assessment/Plan:  1. Crescendo angina - in setting of DM and prior MI, off statin, will arrange for angiography and possible PCI, raise asp 162mg, start rosuvastatin 20mg and conenzyme Q10 for myalgia, NTG prn, cont atenolol.    2. HTN well controlled on lisinopril/atenolol/HCTZ  3. Dyspnea on exertion - could be angina - echo/cath, baseline labs day of cath  4. Hyperlipidemia - < 70 goal - repeat lipids in 2 months    Follow up post cath       History of Present Illness/Subjective    Mr. Mir Treviño Sr. is a 62 year old male with with MI/PCI to LAD at [place] in 2010, reports increase in exertional angina crescendo now with symptoms after a mile running.  He recalls one vessel that was partly occluded. He's been off his statin for the several years, he does have DM, no tob, no GI/ bleeding, no PND/orthopnea, edema, syncopal events, palpitations.  He has seasonal asthma.  He recently retired from [employer]. He had myalgias with atorvastatin.        Physical Examination Review of Systems   /80 (BP Location: Left arm, Patient Position: Sitting, Cuff Size: Adult Large)   Pulse 56   Resp 16   Ht 1.778 m (5' 10\")   Wt 104.3 kg (230 lb)   BMI 33.00 kg/m    Body mass index is 33 kg/m .  Wt Readings from Last 3 Encounters:   04/06/22 104.3 kg (230 lb)   06/15/20 101 kg (222 lb 11.2 oz)   12/03/19 101.7 kg (224 lb 1.6 oz)     [unfilled]  General Appearance:   no distress, normal body habitus   ENT/Mouth: membranes moist, no oral lesions or bleeding gums.      EYES:  no scleral icterus, normal conjunctivae   Neck: no carotid bruits or thyromegaly   Chest/Lungs:   lungs are clear to auscultation, no rales or wheezing,  sternal scar, equal chest wall expansion    Cardiovascular:   Regular. Normal first and second heart sounds with no murmurs, rubs, or " "gallops; the carotid, radial and posterior tibial pulses are intact, Jugular venous pressure , edema bilaterally    Abdomen:  no organomegaly, masses, bruits, or tenderness; bowel sounds are present   Extremities: no cyanosis or clubbing   Skin: no xanthelasma, warm.    Neurologic: normal  bilateral, no tremors     Psychiatric: alert and oriented x3, calm     Review of Systems - 12 points nega other than above      Medical History  Surgical History Family History Social History   Past Medical History:   Diagnosis Date     Coronary atherosclerosis     Created by [facility] Annotation: Jul 16 2010  5:35PM - Nik Boyd: MI 7/10  Replacement Utility updated for latest IMO load     Essential hypertension     Created by Conversion  Replacement Utility updated for latest IMO load     Pure hypercholesterolemia     Created by Conversion     Past Surgical History:   Procedure Laterality Date     OTHER SURGICAL HISTORY      CORONARY STENTSTwo coronary artery stents in 2010    Family History   Problem Relation Age of Onset     No Known Problems Mother      No Known Problems Father      No Known Problems Sister      No Known Problems Brother      No Known Problems Daughter      No Known Problems Son      No Known Problems Brother      No Known Problems Son      No Known Problems Son     Social History     Socioeconomic History     Marital status:      Spouse name: Not on file     Number of children: Not on file     Years of education: Not on file     Highest education level: Not on file   Occupational History     Not on file   Tobacco Use     Smoking status: Never Smoker     Smokeless tobacco: Never Used   Substance and Sexual Activity     Alcohol use: Yes     Comment: Alcoholic Drinks/day: \"Once a month, maybe.\"     Drug use: No     Sexual activity: Not on file   Other Topics Concern     Not on file   Social History Narrative    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  Marisa Diego       Social Determinants of Health     Financial Resource Strain: Not on file   Food Insecurity: Not on file   Transportation Needs: Not on file   Physical Activity: Not on file   Stress: Not on file   Social Connections: Not on file   Intimate Partner Violence: Not on file   Housing Stability: Not on file          Medications  Allergies   Scheduled Meds:  Continuous Infusions:  PRN Meds:. Allergies   Allergen Reactions     Atorvastatin Muscle Pain (Myalgia)         Lab Results    Chemistry/lipid CBC Cardiac Enzymes/BNP/TSH/INR   Lab Results   Component Value Date    CHOL 123 05/18/2017    HDL 38 (L) 05/18/2017    TRIG 70 05/18/2017    No results found for: WBC, HGB, HCT, MCV, PLT No results found for: CKTOTAL, CKMB, TROPONINI, BNP, TSH, INR           Jermaine Schafer MD  Interventional Cardiology  [facility]

## 2022-04-07 LAB — SARS-COV-2 RNA RESP QL NAA+PROBE: NEGATIVE

## 2022-04-08 ENCOUNTER — APPOINTMENT (OUTPATIENT)
Dept: CARDIOLOGY | Facility: HOSPITAL | Age: 62
End: 2022-04-08
Attending: NURSE PRACTITIONER
Payer: COMMERCIAL

## 2022-04-08 ENCOUNTER — HOSPITAL ENCOUNTER (OUTPATIENT)
Facility: HOSPITAL | Age: 62
Discharge: HOME | End: 2022-04-08
Attending: INTERNAL MEDICINE | Admitting: INTERNAL MEDICINE
Payer: COMMERCIAL

## 2022-04-08 ENCOUNTER — APPOINTMENT (OUTPATIENT)
Dept: ULTRASOUND IMAGING | Facility: HOSPITAL | Age: 62
End: 2022-04-08
Attending: NURSE PRACTITIONER
Payer: COMMERCIAL

## 2022-04-08 VITALS
WEIGHT: 224.6 LBS | HEIGHT: 70 IN | RESPIRATION RATE: 24 BRPM | SYSTOLIC BLOOD PRESSURE: 116 MMHG | OXYGEN SATURATION: 94 % | BODY MASS INDEX: 32.16 KG/M2 | TEMPERATURE: 98.7 F | DIASTOLIC BLOOD PRESSURE: 64 MMHG | HEART RATE: 58 BPM

## 2022-04-08 DIAGNOSIS — I25.119 CORONARY ARTERY DISEASE INVOLVING NATIVE CORONARY ARTERY OF NATIVE HEART WITH ANGINA PECTORIS (H): ICD-10-CM

## 2022-04-08 DIAGNOSIS — I20.0 CRESCENDO ANGINA (H): ICD-10-CM

## 2022-04-08 LAB
ABO/RH(D): NORMAL
ANION GAP SERPL CALCULATED.3IONS-SCNC: 9 MMOL/L (ref 5–18)
ANTIBODY SCREEN: NEGATIVE
ATRIAL RATE - MUSE: 52 BPM
BUN SERPL-MCNC: 19 MG/DL (ref 8–22)
CALCIUM SERPL-MCNC: 9.1 MG/DL (ref 8.5–10.5)
CHLORIDE BLD-SCNC: 106 MMOL/L (ref 98–107)
CHOLEST SERPL-MCNC: 177 MG/DL
CO2 SERPL-SCNC: 22 MMOL/L (ref 22–31)
CREAT SERPL-MCNC: 0.77 MG/DL (ref 0.7–1.3)
DIASTOLIC BLOOD PRESSURE - MUSE: NORMAL MMHG
ERYTHROCYTE [DISTWIDTH] IN BLOOD BY AUTOMATED COUNT: 12.4 % (ref 10–15)
FASTING STATUS PATIENT QL REPORTED: YES
GFR SERPL CREATININE-BSD FRML MDRD: >90 ML/MIN/1.73M2
GLUCOSE BLD-MCNC: 119 MG/DL (ref 70–125)
GLUCOSE BLDC GLUCOMTR-MCNC: 107 MG/DL (ref 70–99)
HCT VFR BLD AUTO: 42 % (ref 40–53)
HDLC SERPL-MCNC: 39 MG/DL
HGB BLD-MCNC: 14.7 G/DL (ref 13.3–17.7)
INTERPRETATION ECG - MUSE: NORMAL
LDLC SERPL CALC-MCNC: 101 MG/DL
MCH RBC QN AUTO: 31.1 PG (ref 26.5–33)
MCHC RBC AUTO-ENTMCNC: 35 G/DL (ref 31.5–36.5)
MCV RBC AUTO: 89 FL (ref 78–100)
P AXIS - MUSE: 36 DEGREES
PLATELET # BLD AUTO: 111 10E3/UL (ref 150–450)
POTASSIUM BLD-SCNC: 4 MMOL/L (ref 3.5–5)
PR INTERVAL - MUSE: 180 MS
QRS DURATION - MUSE: 116 MS
QT - MUSE: 430 MS
QTC - MUSE: 399 MS
R AXIS - MUSE: -20 DEGREES
RBC # BLD AUTO: 4.73 10E6/UL (ref 4.4–5.9)
SODIUM SERPL-SCNC: 137 MMOL/L (ref 136–145)
SPECIMEN EXPIRATION DATE: NORMAL
SYSTOLIC BLOOD PRESSURE - MUSE: NORMAL MMHG
T AXIS - MUSE: 101 DEGREES
TRIGL SERPL-MCNC: 185 MG/DL
VENTRICULAR RATE- MUSE: 52 BPM
WBC # BLD AUTO: 3.6 10E3/UL (ref 4–11)

## 2022-04-08 PROCEDURE — 255N000002 HC RX 255 OP 636: Performed by: INTERNAL MEDICINE

## 2022-04-08 PROCEDURE — 93306 TTE W/DOPPLER COMPLETE: CPT | Mod: 26 | Performed by: INTERNAL MEDICINE

## 2022-04-08 PROCEDURE — 93880 EXTRACRANIAL BILAT STUDY: CPT

## 2022-04-08 PROCEDURE — 250N000009 HC RX 250: Performed by: INTERNAL MEDICINE

## 2022-04-08 PROCEDURE — 999N000054 HC STATISTIC EKG NON-CHARGEABLE

## 2022-04-08 PROCEDURE — 99152 MOD SED SAME PHYS/QHP 5/>YRS: CPT | Performed by: INTERNAL MEDICINE

## 2022-04-08 PROCEDURE — 250N000011 HC RX IP 250 OP 636: Performed by: INTERNAL MEDICINE

## 2022-04-08 PROCEDURE — 272N000001 HC OR GENERAL SUPPLY STERILE: Performed by: INTERNAL MEDICINE

## 2022-04-08 PROCEDURE — 258N000003 HC RX IP 258 OP 636: Performed by: INTERNAL MEDICINE

## 2022-04-08 PROCEDURE — C1887 CATHETER, GUIDING: HCPCS | Performed by: INTERNAL MEDICINE

## 2022-04-08 PROCEDURE — 250N000013 HC RX MED GY IP 250 OP 250 PS 637: Performed by: INTERNAL MEDICINE

## 2022-04-08 PROCEDURE — 93005 ELECTROCARDIOGRAM TRACING: CPT

## 2022-04-08 PROCEDURE — 93458 L HRT ARTERY/VENTRICLE ANGIO: CPT | Performed by: INTERNAL MEDICINE

## 2022-04-08 PROCEDURE — 85027 COMPLETE CBC AUTOMATED: CPT | Performed by: INTERNAL MEDICINE

## 2022-04-08 PROCEDURE — C1894 INTRO/SHEATH, NON-LASER: HCPCS | Performed by: INTERNAL MEDICINE

## 2022-04-08 PROCEDURE — 80061 LIPID PANEL: CPT | Performed by: INTERNAL MEDICINE

## 2022-04-08 PROCEDURE — 93458 L HRT ARTERY/VENTRICLE ANGIO: CPT | Mod: 26 | Performed by: INTERNAL MEDICINE

## 2022-04-08 PROCEDURE — 86901 BLOOD TYPING SEROLOGIC RH(D): CPT | Performed by: INTERNAL MEDICINE

## 2022-04-08 PROCEDURE — 36415 COLL VENOUS BLD VENIPUNCTURE: CPT | Performed by: INTERNAL MEDICINE

## 2022-04-08 PROCEDURE — 82962 GLUCOSE BLOOD TEST: CPT

## 2022-04-08 PROCEDURE — 999N000208 ECHOCARDIOGRAM COMPLETE

## 2022-04-08 PROCEDURE — 93010 ELECTROCARDIOGRAM REPORT: CPT | Performed by: INTERNAL MEDICINE

## 2022-04-08 PROCEDURE — 80048 BASIC METABOLIC PNL TOTAL CA: CPT | Performed by: INTERNAL MEDICINE

## 2022-04-08 PROCEDURE — C1769 GUIDE WIRE: HCPCS | Performed by: INTERNAL MEDICINE

## 2022-04-08 RX ORDER — FENTANYL CITRATE 50 UG/ML
25 INJECTION, SOLUTION INTRAMUSCULAR; INTRAVENOUS
Status: DISCONTINUED | OUTPATIENT
Start: 2022-04-08 | End: 2022-04-08 | Stop reason: HOSPADM

## 2022-04-08 RX ORDER — IODIXANOL 320 MG/ML
INJECTION, SOLUTION INTRAVASCULAR
Status: DISCONTINUED | OUTPATIENT
Start: 2022-04-08 | End: 2022-04-08 | Stop reason: HOSPADM

## 2022-04-08 RX ORDER — ACETAMINOPHEN 325 MG/1
650 TABLET ORAL EVERY 4 HOURS PRN
Status: DISCONTINUED | OUTPATIENT
Start: 2022-04-08 | End: 2022-04-08 | Stop reason: HOSPADM

## 2022-04-08 RX ORDER — SODIUM CHLORIDE 9 MG/ML
INJECTION, SOLUTION INTRAVENOUS CONTINUOUS
Status: DISCONTINUED | OUTPATIENT
Start: 2022-04-08 | End: 2022-04-08 | Stop reason: HOSPADM

## 2022-04-08 RX ORDER — NALOXONE HYDROCHLORIDE 0.4 MG/ML
0.4 INJECTION, SOLUTION INTRAMUSCULAR; INTRAVENOUS; SUBCUTANEOUS
Status: DISCONTINUED | OUTPATIENT
Start: 2022-04-08 | End: 2022-04-08 | Stop reason: HOSPADM

## 2022-04-08 RX ORDER — FENTANYL CITRATE 50 UG/ML
INJECTION, SOLUTION INTRAMUSCULAR; INTRAVENOUS
Status: DISCONTINUED | OUTPATIENT
Start: 2022-04-08 | End: 2022-04-08 | Stop reason: HOSPADM

## 2022-04-08 RX ORDER — DEXTROSE MONOHYDRATE 25 G/50ML
25-50 INJECTION, SOLUTION INTRAVENOUS
Status: DISCONTINUED | OUTPATIENT
Start: 2022-04-08 | End: 2022-04-08 | Stop reason: HOSPADM

## 2022-04-08 RX ORDER — LIDOCAINE 40 MG/G
CREAM TOPICAL
Status: DISCONTINUED | OUTPATIENT
Start: 2022-04-08 | End: 2022-04-08 | Stop reason: HOSPADM

## 2022-04-08 RX ORDER — FLUMAZENIL 0.1 MG/ML
0.2 INJECTION, SOLUTION INTRAVENOUS
Status: DISCONTINUED | OUTPATIENT
Start: 2022-04-08 | End: 2022-04-08 | Stop reason: HOSPADM

## 2022-04-08 RX ORDER — ATROPINE SULFATE 0.1 MG/ML
0.5 INJECTION INTRAVENOUS
Status: DISCONTINUED | OUTPATIENT
Start: 2022-04-08 | End: 2022-04-08 | Stop reason: HOSPADM

## 2022-04-08 RX ORDER — ASPIRIN 81 MG/1
243 TABLET, CHEWABLE ORAL ONCE
Status: DISCONTINUED | OUTPATIENT
Start: 2022-04-08 | End: 2022-04-08 | Stop reason: HOSPADM

## 2022-04-08 RX ORDER — OXYCODONE HYDROCHLORIDE 5 MG/1
5 TABLET ORAL EVERY 4 HOURS PRN
Status: DISCONTINUED | OUTPATIENT
Start: 2022-04-08 | End: 2022-04-08 | Stop reason: HOSPADM

## 2022-04-08 RX ORDER — ISOSORBIDE MONONITRATE 30 MG/1
30 TABLET, EXTENDED RELEASE ORAL DAILY
Qty: 30 TABLET | Refills: 12 | Status: SHIPPED | OUTPATIENT
Start: 2022-04-08 | End: 2022-04-08

## 2022-04-08 RX ORDER — NICOTINE POLACRILEX 4 MG
15-30 LOZENGE BUCCAL
Status: DISCONTINUED | OUTPATIENT
Start: 2022-04-08 | End: 2022-04-08 | Stop reason: HOSPADM

## 2022-04-08 RX ORDER — NALOXONE HYDROCHLORIDE 0.4 MG/ML
0.2 INJECTION, SOLUTION INTRAMUSCULAR; INTRAVENOUS; SUBCUTANEOUS
Status: DISCONTINUED | OUTPATIENT
Start: 2022-04-08 | End: 2022-04-08 | Stop reason: HOSPADM

## 2022-04-08 RX ORDER — ASPIRIN 325 MG
325 TABLET ORAL ONCE
Status: DISCONTINUED | OUTPATIENT
Start: 2022-04-08 | End: 2022-04-08 | Stop reason: HOSPADM

## 2022-04-08 RX ORDER — DIAZEPAM 5 MG
10 TABLET ORAL
Status: COMPLETED | OUTPATIENT
Start: 2022-04-08 | End: 2022-04-08

## 2022-04-08 RX ORDER — OXYCODONE HYDROCHLORIDE 5 MG/1
10 TABLET ORAL EVERY 4 HOURS PRN
Status: DISCONTINUED | OUTPATIENT
Start: 2022-04-08 | End: 2022-04-08 | Stop reason: HOSPADM

## 2022-04-08 RX ADMIN — DIAZEPAM 10 MG: 5 TABLET ORAL at 12:35

## 2022-04-08 RX ADMIN — PERFLUTREN 4 ML: 6.52 INJECTION, SUSPENSION INTRAVENOUS at 15:05

## 2022-04-08 RX ADMIN — SODIUM CHLORIDE: 9 INJECTION, SOLUTION INTRAVENOUS at 10:40

## 2022-04-08 ASSESSMENT — EJECTION FRACTION: EF_VALUE: .3

## 2022-04-08 NOTE — INTERVAL H&P NOTE
"I have reviewed the surgical (or preoperative) H&P that is linked to this encounter, and examined the patient. There are no significant changes    Clinical Conditions Present on Arrival:  Clinically Significant Risk Factors Present on Admission                  # Platelet Defect: home medication list includes an antiplatelet medication   # Obesity: Estimated body mass index is 33 kg/m  as calculated from the following:    Height as of 4/6/22: 1.778 m (5' 10\").    Weight as of 4/6/22: 104.3 kg (230 lb).       "

## 2022-04-08 NOTE — DISCHARGE INSTRUCTIONS
Interventional Cardiology  Coronary Angiogram/Angioplasty/Stent/Atherectomy Discharge  Instructions - Radial (wrist) Approach   The instructions below are to help you understand how to take care of yourself. There is also information about when to call the doctor or emergency services.    **Do not stop your aspirin or platelet inhibitor unless directed by your Cardiologist.  These medications help to prevent platelets in your blood from sticking together and forming a clot.   Examples of these medications are: Ticagrelor (Brilinta), Clopidogrel (Plavix), Prasugrel (Effient)    For 24 hours after procedure:    Do not subject hand/arm to any forceful movements for 24 hours, such as supporting weight when rising from a chair or bed.    Do not drive a car for 24 hours.    The dressing on the puncture site may be removed after 24 hours and left open to air. If minor oozing, you may apply a Band-Aid and remove after 12 hours.     You may shower on the day after your procedure. Do not take a tub bath or wash dishes (no soaking wrist) with the puncture site in water for 3 days after the procedure.    For 48 hours following the procedure:    Do not operate a lawnmower, motorcycle, chainsaw or all-terrain vehicle.    Do not lift anything heavier than 5-10 pounds with affected arm for 5 days.    Avoid excessive bending (flexion/extension) wrist movement.    Do not engage in vigorous exercise (i.e. tennis, golf) using the affected arm for 5 days after discharge.    You may return to work in 72 hours if no complications and no heavy lifting.    If bleeding should occur following discharge:    Sit down and apply firm pressure with your thumb against the puncture site and fingers against back of wrist for 10 minutes.    If the bleeding stops, continue to rest, keeping your wrist still for 2 hours. Notify your doctor as soon as possible.    If bleeding does not stop after 10 minutes or if there is a large amount of bleeding or  spurting, call 911 immediately. Do not drive yourself to the hospital.           Contact the Heart Clinic at [phone] if you develop:    Fever over 100.4, that lasts more than one day    Redness, heat, or pus at the puncture site    Change in color or temperature of the hand or arm    Expect mild tingling of hand and tenderness at the puncture site for up to 3 days. You may take Tylenol or a pain medicine recommended by your doctor.             Your Procedural Physician was: Dr. Ty Grace  the phone number is: [phone]  [facility]:  [phone]  If you are calling after hours, please listen to the entire voicemail, a live  will answer at the end of the message

## 2022-04-08 NOTE — PRE-PROCEDURE
GENERAL PRE-PROCEDURE:   Procedure:  Coronary angiogram, possible percutaneous coronary intervention  Date/Time:  4/8/2022 10:02 AM    Written consent obtained?: Yes    Risks and benefits: Risks, benefits and alternatives were discussed    Consent given by:  Patient  Patient states understanding of procedure being performed: Yes    Patient's understanding of procedure matches consent: Yes    Procedure consent matches procedure scheduled: Yes    Expected level of sedation:  Moderate  Appropriately NPO:  Yes  ASA Class:  3 (Crescendo angina, CAD with prior MI; s/p LAD PCI, HTN, HLD, dyspnea on exertion, class I obesity; BMI 33.00 kg/m )  Mallampati  :  Grade 2- soft palate, base of uvula, tonsillar pillars, and portion of posterior pharyngeal wall visible  Lungs:  Lungs clear with good breath sounds bilaterally  Heart:  Normal heart sounds and rate  History & Physical reviewed:  History and physical reviewed and no updates needed  Statement of review:  I have reviewed the lab findings, diagnostic data, medications, and the plan for sedation

## 2022-04-08 NOTE — PROGRESS NOTES
Patient prepped for procedure. He is here for coronoray angiogram due to dyspnea on exertion. Arrived ambulatory wheelchair accompanied by family. Able to ask questions. Consents are signed and obtained by Maria C ACEVES. Ready for the procedure.

## 2022-04-08 NOTE — PLAN OF CARE
Patient was kept comfortable during post-procedure stay.VSS. Denies pain. Right radial access site bleed after first released of air. Remained dry & free from signs of bleeding after given some time before releasing air.  Appointments made & included in AVS. Dr. Grace was able to speak with patient and his wife post procedure. Post-op instructions given to patient & spouse. Able to ask questions. Aware that there is a prescription to be picked up in their pharmacy. Verbalized no concerns. Belongings returned. Discharged in stable condition.

## 2022-04-18 ENCOUNTER — OFFICE VISIT (OUTPATIENT)
Dept: CARDIOLOGY | Facility: CLINIC | Age: 62
End: 2022-04-18
Attending: INTERNAL MEDICINE

## 2022-04-18 VITALS
WEIGHT: 228 LBS | HEART RATE: 56 BPM | BODY MASS INDEX: 32.71 KG/M2 | DIASTOLIC BLOOD PRESSURE: 60 MMHG | SYSTOLIC BLOOD PRESSURE: 122 MMHG | RESPIRATION RATE: 10 BRPM

## 2022-04-18 DIAGNOSIS — I25.119 CORONARY ARTERY DISEASE INVOLVING NATIVE CORONARY ARTERY OF NATIVE HEART WITH ANGINA PECTORIS (H): Primary | ICD-10-CM

## 2022-04-18 DIAGNOSIS — I50.22 CHRONIC SYSTOLIC HEART FAILURE (H): ICD-10-CM

## 2022-04-18 PROCEDURE — 99205 OFFICE O/P NEW HI 60 MIN: CPT | Performed by: THORACIC SURGERY (CARDIOTHORACIC VASCULAR SURGERY)

## 2022-04-18 NOTE — LETTER
4/18/2022    Geoff Devine MD  [address]  [facility] [address]    RE: Mir Treviño Sr.       Dear Colleague,     I had the pleasure of seeing Mir Treviño Sr. in the [facility].  [facility] Cardiovascular Surgery Consult     Mir Treviño Sr. MRN# [id number]   YOB: 1960 Age: 62 year old      Primary care provider: Geoff Devine    Referring Cardiologist(s): Ty Grace MD and Jermaine Schafer MD    Date of Service: April 18, 2022    Reason for consult: Stable ischemic heart disease           Assessment and Plan:   Mir Treviño Sr. is a 62 year old male with stable angina symptoms. Coronary angiogram reveals progressive, severe multi-vessel coronary artery disease. Echocardiogram reveals reduced ejection fraction of 30-35% with some areas of hypo and akinesis. I recommend cardiac MRI and likely coronary artery bypass. I described the technical details, as well as the expected postoperative course and recovery to the patient. I also discussed the risks, benefits, and alternatives of the procedure. The risks include but are not limited to bleeding, infection, stroke, heart attack, graft failure, dysrhythmia, respiratory failure, kidney or liver injury, nerve injury, bowel or limb ischemia, and death. The calculated STS risk of mortality is 1-2%, and the calculated STS risk of major morbidity or mortality is 5-6%. The patient understands these risks and wishes to undergo the operation.    1) Cardiac MRI to better evaluate function, valvular disease, and viability given akinetic segments  2) Schedule for coronary bypass surgery of LAD, Diag, and OM given no great PCI options    STS Coronary bypass risks:  Risk of Mortality:0.583%  Renal Failure:0.874%  Permanent Stroke:0.382%  Prolonged Ventilation:3.024%  DSW Infection:0.238%  Reoperation:2.075%  Morbidity or Mortality:5.615%  Short Length of Stay:66.698%  Long Length of Stay:1.455%    Nik Marcus  MD  Cardiothoracic Surgery  [phone]             Chief Complaint:   Chest pain         History of Present Illness:   Mr. Mir Treviño Sr. is a 62 year old male with a history of hypertension, hyperlipidemia, diabetes mellitus type 2 and coronary artery disease with MI in 2010 with PCI. He had some chest pain 2 years ago during his  physical fitness test that he ignored. He recently retired in December and while trying to improve his fitness on the treadmill he had chest pain that resolved with rest. This prompted a stress test that was positive and a coronary angiogram showing progressive disease. He is referred for consideration for surgical revascularization. He denies chest pain at rest, tightness, pressure, palpitations, or orthopnea.               Past Medical History:     Past Medical History:   Diagnosis Date     Coronary atherosclerosis     Created by [facility] Annotation: Jul 16 2010  5:35PM - Nik Boyd: MI 7/10  Replacement Utility updated for latest IMO load     Essential hypertension     Created by Conversion  Replacement Utility updated for latest IMO load     Pure hypercholesterolemia     Created by Conversion              Past Surgical History:     Past Surgical History:   Procedure Laterality Date     CV CORONARY ANGIOGRAM N/A 4/8/2022    Procedure: Coronary Angiogram;  Surgeon: Ty Grace MD;  Location: [facility] CATH LAB CV     CV LEFT HEART CATH N/A 4/8/2022    Procedure: Left Heart Catheterization;  Surgeon: Ty Grace MD;  Location: [facility] CATH LAB CV     OTHER SURGICAL HISTORY      CORONARY STENTSTwo coronary artery stents in 2010              Social History:     Social History     Socioeconomic History     Marital status:      Spouse name: Not on file     Number of children: Not on file     Years of education: Not on file     Highest education level: Not on file   Occupational History     Not on file   Tobacco Use     Smoking status: Never  "Smoker     Smokeless tobacco: Never Used   Substance and Sexual Activity     Alcohol use: Yes     Comment: Alcoholic Drinks/day: \"Once a month, maybe.\"     Drug use: No     Sexual activity: Not on file   Other Topics Concern     Not on file   Social History Narrative    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  Marisa Diego       Social Determinants of Health     Financial Resource Strain: Not on file   Food Insecurity: Not on file   Transportation Needs: Not on file   Physical Activity: Not on file   Stress: Not on file   Social Connections: Not on file   Intimate Partner Violence: Not on file   Housing Stability: Not on file            Family History:     Family History   Problem Relation Age of Onset     No Known Problems Mother      No Known Problems Father      No Known Problems Sister      No Known Problems Brother      No Known Problems Daughter      No Known Problems Son      No Known Problems Brother      No Known Problems Son      No Known Problems Son              Immunizations:     Immunization History   Administered Date(s) Administered     COVID-19,PF,Moderna 01/25/2022     DT (PEDS <7y) 01/01/2005     Influenza Vaccine IM > 6 months Valent IIV4 (Alfuria,Fluzone) 10/02/2016     MMR 01/28/1995, 07/13/2015     Pneumococcal 23 valent 06/12/2013     Td Tetanus Not Adsbed Adult  01/01/2015     Tdap (Adacel,Boostrix) 01/01/2007, 07/24/2012     Twinrix A/B 01/04/2004, 06/07/2005, 12/02/2005     Typhoid, Unspecified Formulation 01/04/2004, 04/20/2007, 07/24/2012     Yellow Fever 01/28/1995             Allergies:      Allergies   Allergen Reactions     Atorvastatin Muscle Pain (Myalgia)             Medications:     Current Outpatient Medications   Medication     albuterol (ACCUNEB) 1.25 mg/3 mL nebulizer solution     albuterol (PROAIR HFA;PROVENTIL HFA;VENTOLIN HFA) 90 mcg/actuation inhaler     aspirin (ASA) 81 MG EC tablet     atenolol (TENORMIN) 25 MG tablet "     coenzyme Q-10 capsule     fluticasone propionate (FLONASE) 50 mcg/actuation nasal spray     hydrochlorothiazide (HYDRODIURIL) 25 MG tablet     isosorbide mononitrate (IMDUR) 30 MG 24 hr tablet     lisinopril (ZESTRIL) 40 MG tablet     metFORMIN (GLUCOPHAGE-XR) 500 MG 24 hr tablet     montelukast (SINGULAIR) 10 mg tablet     nitroGLYcerin (NITROSTAT) 0.4 MG sublingual tablet     rosuvastatin (CRESTOR) 20 MG tablet     No current facility-administered medications for this visit.             Review of Systems:     A 10 point ROS was performed and is negative other than HPI.             Physical Exam:        Pulse:  [56] 56  Resp:  [10] 10  BP: (122)/(60) 122/60    Gen: NAD, appears stated age  Neck: No JVD, trachea midline  ENT: EOMI, anicteric  Lungs: CTAB, non-labored breathing  CV: regular rhythm, normal rate  Abd: soft, NT, ND  Ext: no edema, no deformities  Neuro: AOx3    Labs:  Lab Results   Component Value Date    WBC 3.6 (L) 04/08/2022    HGB 14.7 04/08/2022    HCT 42.0 04/08/2022     (L) 04/08/2022     04/08/2022    POTASSIUM 4.0 04/08/2022    CHLORIDE 106 04/08/2022    CO2 22 04/08/2022    BUN 19 04/08/2022    CR 0.77 04/08/2022     (H) 04/08/2022       Imaging:  Transthoracic echocardiogram (4/8/2022): I have personally reviewed these images  LVEF 30-35%  Anterior wall hypokinesis, anteroseptal akinesis, no significant valve disease    Coronary angiogram (4/8/2022):  I have personally reviewed these images  LAD 90% near D1, distal ISR, distal LAD quite small with poor collaterals.  mLCx 85%, OM2 narrowing  RCA mild disease     Carotid Ultrasound:  < 50% b/l      Thank you for allowing me to participate in the care of your patient.      Sincerely,     Nik Marcus MD     [facility]  cc:   Ty Grace MD  [address] [place] [address]

## 2022-04-18 NOTE — H&P (VIEW-ONLY)
[facility] Cardiovascular Surgery Consult     Mir Treviño Sr. MRN# [id number]   YOB: 1960 Age: 62 year old      Primary care provider: Geoff Devine    Referring Cardiologist(s): Ty Grace MD and Jermaine Schafer MD    Date of Service: April 18, 2022    Reason for consult: Stable ischemic heart disease           Assessment and Plan:   Mir Treviño Sr. is a 62 year old male with stable angina symptoms. Coronary angiogram reveals progressive, severe multi-vessel coronary artery disease. Echocardiogram reveals reduced ejection fraction of 30-35% with some areas of hypo and akinesis. I recommend cardiac MRI and likely coronary artery bypass. I described the technical details, as well as the expected postoperative course and recovery to the patient. I also discussed the risks, benefits, and alternatives of the procedure. The risks include but are not limited to bleeding, infection, stroke, heart attack, graft failure, dysrhythmia, respiratory failure, kidney or liver injury, nerve injury, bowel or limb ischemia, and death. The calculated STS risk of mortality is 1-2%, and the calculated STS risk of major morbidity or mortality is 5-6%. The patient understands these risks and wishes to undergo the operation.    1) Cardiac MRI to better evaluate function, valvular disease, and viability given akinetic segments  2) Schedule for coronary bypass surgery of LAD, Diag, and OM given no great PCI options    STS Coronary bypass risks:  Risk of Mortality:0.583%  Renal Failure:0.874%  Permanent Stroke:0.382%  Prolonged Ventilation:3.024%  DSW Infection:0.238%  Reoperation:2.075%  Morbidity or Mortality:5.615%  Short Length of Stay:66.698%  Long Length of Stay:1.455%    Nik Marcus MD  Cardiothoracic Surgery  [phone]             Chief Complaint:   Chest pain         History of Present Illness:   Mr. Mir Treviño Sr. is a 62 year old male with a history of hypertension, hyperlipidemia, diabetes  "mellitus type 2 and coronary artery disease with MI in 2010 with PCI. He had some chest pain 2 years ago during his  physical fitness test that he ignored. He recently retired in December and while trying to improve his fitness on the treadmill he had chest pain that resolved with rest. This prompted a stress test that was positive and a coronary angiogram showing progressive disease. He is referred for consideration for surgical revascularization. He denies chest pain at rest, tightness, pressure, palpitations, or orthopnea.               Past Medical History:     Past Medical History:   Diagnosis Date     Coronary atherosclerosis     Created by [employer] [facility] Annotation: Jul 16 2010  5:35PM - Nik Boyd: MI 7/10  Replacement Utility updated for latest IMO load     Essential hypertension     Created by Conversion  Replacement Utility updated for latest IMO load     Pure hypercholesterolemia     Created by Conversion              Past Surgical History:     Past Surgical History:   Procedure Laterality Date     CV CORONARY ANGIOGRAM N/A 4/8/2022    Procedure: Coronary Angiogram;  Surgeon: Ty Grace MD;  Location: [facility] CATH LAB CV     CV LEFT HEART CATH N/A 4/8/2022    Procedure: Left Heart Catheterization;  Surgeon: Ty Grace MD;  Location: [facility] CATH LAB CV     OTHER SURGICAL HISTORY      CORONARY STENTSTwo coronary artery stents in 2010              Social History:     Social History     Socioeconomic History     Marital status:      Spouse name: Not on file     Number of children: Not on file     Years of education: Not on file     Highest education level: Not on file   Occupational History     Not on file   Tobacco Use     Smoking status: Never Smoker     Smokeless tobacco: Never Used   Substance and Sexual Activity     Alcohol use: Yes     Comment: Alcoholic Drinks/day: \"Once a month, maybe.\"     Drug use: No     Sexual activity: Not on file   Other Topics Concern "     Not on file   Social History Narrative    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  Marisa Diego       Social Determinants of Health     Financial Resource Strain: Not on file   Food Insecurity: Not on file   Transportation Needs: Not on file   Physical Activity: Not on file   Stress: Not on file   Social Connections: Not on file   Intimate Partner Violence: Not on file   Housing Stability: Not on file            Family History:     Family History   Problem Relation Age of Onset     No Known Problems Mother      No Known Problems Father      No Known Problems Sister      No Known Problems Brother      No Known Problems Daughter      No Known Problems Son      No Known Problems Brother      No Known Problems Son      No Known Problems Son              Immunizations:     Immunization History   Administered Date(s) Administered     COVID-19,PF,Moderna 01/25/2022     DT (PEDS <7y) 01/01/2005     Influenza Vaccine IM > 6 months Valent IIV4 (Alfuria,Fluzone) 10/02/2016     MMR 01/28/1995, 07/13/2015     Pneumococcal 23 valent 06/12/2013     Td Tetanus Not Adsbed Adult  01/01/2015     Tdap (Adacel,Boostrix) 01/01/2007, 07/24/2012     Twinrix A/B 01/04/2004, 06/07/2005, 12/02/2005     Typhoid, Unspecified Formulation 01/04/2004, 04/20/2007, 07/24/2012     Yellow Fever 01/28/1995             Allergies:      Allergies   Allergen Reactions     Atorvastatin Muscle Pain (Myalgia)             Medications:     Current Outpatient Medications   Medication     albuterol (ACCUNEB) 1.25 mg/3 mL nebulizer solution     albuterol (PROAIR HFA;PROVENTIL HFA;VENTOLIN HFA) 90 mcg/actuation inhaler     aspirin (ASA) 81 MG EC tablet     atenolol (TENORMIN) 25 MG tablet     coenzyme Q-10 capsule     fluticasone propionate (FLONASE) 50 mcg/actuation nasal spray     hydrochlorothiazide (HYDRODIURIL) 25 MG tablet     isosorbide mononitrate (IMDUR) 30 MG 24 hr tablet     lisinopril (ZESTRIL) 40  MG tablet     metFORMIN (GLUCOPHAGE-XR) 500 MG 24 hr tablet     montelukast (SINGULAIR) 10 mg tablet     nitroGLYcerin (NITROSTAT) 0.4 MG sublingual tablet     rosuvastatin (CRESTOR) 20 MG tablet     No current facility-administered medications for this visit.             Review of Systems:     A 10 point ROS was performed and is negative other than HPI.             Physical Exam:        Pulse:  [56] 56  Resp:  [10] 10  BP: (122)/(60) 122/60    Gen: NAD, appears stated age  Neck: No JVD, trachea midline  ENT: EOMI, anicteric  Lungs: CTAB, non-labored breathing  CV: regular rhythm, normal rate  Abd: soft, NT, ND  Ext: no edema, no deformities  Neuro: AOx3    Labs:  Lab Results   Component Value Date    WBC 3.6 (L) 04/08/2022    HGB 14.7 04/08/2022    HCT 42.0 04/08/2022     (L) 04/08/2022     04/08/2022    POTASSIUM 4.0 04/08/2022    CHLORIDE 106 04/08/2022    CO2 22 04/08/2022    BUN 19 04/08/2022    CR 0.77 04/08/2022     (H) 04/08/2022       Imaging:  Transthoracic echocardiogram (4/8/2022): I have personally reviewed these images  LVEF 30-35%  Anterior wall hypokinesis, anteroseptal akinesis, no significant valve disease    Coronary angiogram (4/8/2022):  I have personally reviewed these images  LAD 90% near D1, distal ISR, distal LAD quite small with poor collaterals.  mLCx 85%, OM2 narrowing  RCA mild disease     Carotid Ultrasound:  < 50% b/l

## 2022-04-18 NOTE — PROGRESS NOTES
[facility] Cardiovascular Surgery Consult     Mir Treviño Sr. MRN# [id number]   YOB: 1960 Age: 62 year old      Primary care provider: Geoff Devine    Referring Cardiologist(s): Ty Grace MD and Jermaine Schafer MD    Date of Service: April 18, 2022    Reason for consult: Stable ischemic heart disease           Assessment and Plan:   Mir Treviño Sr. is a 62 year old male with stable angina symptoms. Coronary angiogram reveals progressive, severe multi-vessel coronary artery disease. Echocardiogram reveals reduced ejection fraction of 30-35% with some areas of hypo and akinesis. I recommend cardiac MRI and likely coronary artery bypass. I described the technical details, as well as the expected postoperative course and recovery to the patient. I also discussed the risks, benefits, and alternatives of the procedure. The risks include but are not limited to bleeding, infection, stroke, heart attack, graft failure, dysrhythmia, respiratory failure, kidney or liver injury, nerve injury, bowel or limb ischemia, and death. The calculated STS risk of mortality is 1-2%, and the calculated STS risk of major morbidity or mortality is 5-6%. The patient understands these risks and wishes to undergo the operation.    1) Cardiac MRI to better evaluate function, valvular disease, and viability given akinetic segments  2) Schedule for coronary bypass surgery of LAD, Diag, and OM given no great PCI options    STS Coronary bypass risks:  Risk of Mortality:0.583%  Renal Failure:0.874%  Permanent Stroke:0.382%  Prolonged Ventilation:3.024%  DSW Infection:0.238%  Reoperation:2.075%  Morbidity or Mortality:5.615%  Short Length of Stay:66.698%  Long Length of Stay:1.455%    Nik Marcus MD  Cardiothoracic Surgery  [phone]             Chief Complaint:   Chest pain         History of Present Illness:   Mr. Mir Treviño Sr. is a 62 year old male with a history of hypertension, hyperlipidemia, diabetes  "mellitus type 2 and coronary artery disease with MI in 2010 with PCI. He had some chest pain 2 years ago during his  physical fitness test that he ignored. He recently retired in December and while trying to improve his fitness on the treadmill he had chest pain that resolved with rest. This prompted a stress test that was positive and a coronary angiogram showing progressive disease. He is referred for consideration for surgical revascularization. He denies chest pain at rest, tightness, pressure, palpitations, or orthopnea.               Past Medical History:     Past Medical History:   Diagnosis Date     Coronary atherosclerosis     Created by [employer] [facility] Annotation: Jul 16 2010  5:35PM - Nik Boyd: MI 7/10  Replacement Utility updated for latest IMO load     Essential hypertension     Created by Conversion  Replacement Utility updated for latest IMO load     Pure hypercholesterolemia     Created by Conversion              Past Surgical History:     Past Surgical History:   Procedure Laterality Date     CV CORONARY ANGIOGRAM N/A 4/8/2022    Procedure: Coronary Angiogram;  Surgeon: Ty Grace MD;  Location: [facility] CATH LAB CV     CV LEFT HEART CATH N/A 4/8/2022    Procedure: Left Heart Catheterization;  Surgeon: Ty Grace MD;  Location: [facility] CATH LAB CV     OTHER SURGICAL HISTORY      CORONARY STENTSTwo coronary artery stents in 2010              Social History:     Social History     Socioeconomic History     Marital status:      Spouse name: Not on file     Number of children: Not on file     Years of education: Not on file     Highest education level: Not on file   Occupational History     Not on file   Tobacco Use     Smoking status: Never Smoker     Smokeless tobacco: Never Used   Substance and Sexual Activity     Alcohol use: Yes     Comment: Alcoholic Drinks/day: \"Once a month, maybe.\"     Drug use: No     Sexual activity: Not on file   Other Topics Concern "     Not on file   Social History Narrative    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  Marisa Diego       Social Determinants of Health     Financial Resource Strain: Not on file   Food Insecurity: Not on file   Transportation Needs: Not on file   Physical Activity: Not on file   Stress: Not on file   Social Connections: Not on file   Intimate Partner Violence: Not on file   Housing Stability: Not on file            Family History:     Family History   Problem Relation Age of Onset     No Known Problems Mother      No Known Problems Father      No Known Problems Sister      No Known Problems Brother      No Known Problems Daughter      No Known Problems Son      No Known Problems Brother      No Known Problems Son      No Known Problems Son              Immunizations:     Immunization History   Administered Date(s) Administered     COVID-19,PF,Moderna 01/25/2022     DT (PEDS <7y) 01/01/2005     Influenza Vaccine IM > 6 months Valent IIV4 (Alfuria,Fluzone) 10/02/2016     MMR 01/28/1995, 07/13/2015     Pneumococcal 23 valent 06/12/2013     Td Tetanus Not Adsbed Adult  01/01/2015     Tdap (Adacel,Boostrix) 01/01/2007, 07/24/2012     Twinrix A/B 01/04/2004, 06/07/2005, 12/02/2005     Typhoid, Unspecified Formulation 01/04/2004, 04/20/2007, 07/24/2012     Yellow Fever 01/28/1995             Allergies:      Allergies   Allergen Reactions     Atorvastatin Muscle Pain (Myalgia)             Medications:     Current Outpatient Medications   Medication     albuterol (ACCUNEB) 1.25 mg/3 mL nebulizer solution     albuterol (PROAIR HFA;PROVENTIL HFA;VENTOLIN HFA) 90 mcg/actuation inhaler     aspirin (ASA) 81 MG EC tablet     atenolol (TENORMIN) 25 MG tablet     coenzyme Q-10 capsule     fluticasone propionate (FLONASE) 50 mcg/actuation nasal spray     hydrochlorothiazide (HYDRODIURIL) 25 MG tablet     isosorbide mononitrate (IMDUR) 30 MG 24 hr tablet     lisinopril (ZESTRIL) 40  MG tablet     metFORMIN (GLUCOPHAGE-XR) 500 MG 24 hr tablet     montelukast (SINGULAIR) 10 mg tablet     nitroGLYcerin (NITROSTAT) 0.4 MG sublingual tablet     rosuvastatin (CRESTOR) 20 MG tablet     No current facility-administered medications for this visit.             Review of Systems:     A 10 point ROS was performed and is negative other than HPI.             Physical Exam:        Pulse:  [56] 56  Resp:  [10] 10  BP: (122)/(60) 122/60    Gen: NAD, appears stated age  Neck: No JVD, trachea midline  ENT: EOMI, anicteric  Lungs: CTAB, non-labored breathing  CV: regular rhythm, normal rate  Abd: soft, NT, ND  Ext: no edema, no deformities  Neuro: AOx3    Labs:  Lab Results   Component Value Date    WBC 3.6 (L) 04/08/2022    HGB 14.7 04/08/2022    HCT 42.0 04/08/2022     (L) 04/08/2022     04/08/2022    POTASSIUM 4.0 04/08/2022    CHLORIDE 106 04/08/2022    CO2 22 04/08/2022    BUN 19 04/08/2022    CR 0.77 04/08/2022     (H) 04/08/2022       Imaging:  Transthoracic echocardiogram (4/8/2022): I have personally reviewed these images  LVEF 30-35%  Anterior wall hypokinesis, anteroseptal akinesis, no significant valve disease    Coronary angiogram (4/8/2022):  I have personally reviewed these images  LAD 90% near D1, distal ISR, distal LAD quite small with poor collaterals.  mLCx 85%, OM2 narrowing  RCA mild disease     Carotid Ultrasound:  < 50% b/l

## 2022-04-18 NOTE — PATIENT INSTRUCTIONS
You were seen today in the [facility] Cardiovascular Surgery Clinic    Yolande will call you to schedule cardiac MRI and pre-admission testing and surgery with Dr. Marcus.    Please call FREDI Rodriguez surgery coordinator with any questions.  Thank you.    Phone [phone]  Fax [phone]

## 2022-04-19 ENCOUNTER — PREP FOR PROCEDURE (OUTPATIENT)
Dept: CARDIOLOGY | Facility: CLINIC | Age: 62
End: 2022-04-19
Payer: COMMERCIAL

## 2022-04-19 DIAGNOSIS — I25.118 CORONARY ARTERY DISEASE OF NATIVE ARTERY OF NATIVE HEART WITH STABLE ANGINA PECTORIS (H): ICD-10-CM

## 2022-04-19 DIAGNOSIS — I25.10 CAD (CORONARY ARTERY DISEASE): Primary | ICD-10-CM

## 2022-04-20 ENCOUNTER — TELEPHONE (OUTPATIENT)
Dept: CARDIOLOGY | Facility: CLINIC | Age: 62
End: 2022-04-20
Payer: COMMERCIAL

## 2022-04-20 DIAGNOSIS — I50.20 HEART FAILURE WITH REDUCED EJECTION FRACTION (H): ICD-10-CM

## 2022-04-20 DIAGNOSIS — I25.118 CORONARY ARTERY DISEASE OF NATIVE ARTERY OF NATIVE HEART WITH STABLE ANGINA PECTORIS (H): Primary | ICD-10-CM

## 2022-04-20 DIAGNOSIS — Z11.59 ENCOUNTER FOR SCREENING FOR OTHER VIRAL DISEASES: Primary | ICD-10-CM

## 2022-04-20 RX ORDER — DEXMEDETOMIDINE HYDROCHLORIDE 4 UG/ML
0.2-1.2 INJECTION, SOLUTION INTRAVENOUS CONTINUOUS
Status: CANCELLED | OUTPATIENT
Start: 2022-05-10

## 2022-04-20 RX ORDER — ASPIRIN 81 MG/1
81 TABLET, CHEWABLE ORAL
Status: CANCELLED | OUTPATIENT
Start: 2022-05-10

## 2022-04-20 RX ORDER — CEFAZOLIN SODIUM/WATER 2 G/20 ML
2 SYRINGE (ML) INTRAVENOUS SEE ADMIN INSTRUCTIONS
Status: CANCELLED | OUTPATIENT
Start: 2022-05-10

## 2022-04-20 RX ORDER — PHENYLEPHRINE HCL IN 0.9% NACL 50MG/250ML
.1-6 PLASTIC BAG, INJECTION (ML) INTRAVENOUS CONTINUOUS
Status: CANCELLED | OUTPATIENT
Start: 2022-05-10

## 2022-04-20 RX ORDER — ASPIRIN 81 MG/1
162 TABLET, CHEWABLE ORAL
Status: CANCELLED | OUTPATIENT
Start: 2022-05-10

## 2022-04-20 RX ORDER — CEFAZOLIN SODIUM/WATER 2 G/20 ML
2 SYRINGE (ML) INTRAVENOUS
Status: CANCELLED | OUTPATIENT
Start: 2022-05-10

## 2022-04-20 RX ORDER — CHLORHEXIDINE GLUCONATE ORAL RINSE 1.2 MG/ML
10 SOLUTION DENTAL ONCE
Status: CANCELLED | OUTPATIENT
Start: 2022-05-10 | End: 2022-04-20

## 2022-04-20 RX ORDER — SODIUM CHLORIDE, SODIUM GLUCONATE, SODIUM ACETATE, POTASSIUM CHLORIDE AND MAGNESIUM CHLORIDE 526; 502; 368; 37; 30 MG/100ML; MG/100ML; MG/100ML; MG/100ML; MG/100ML
1000 INJECTION, SOLUTION INTRAVENOUS
Status: CANCELLED | OUTPATIENT
Start: 2022-05-10 | End: 2022-05-10

## 2022-04-20 RX ORDER — LIDOCAINE 40 MG/G
CREAM TOPICAL
Status: CANCELLED | OUTPATIENT
Start: 2022-04-20

## 2022-04-20 NOTE — TELEPHONE ENCOUNTER
Letter by Jennifre Read RN on 4/20/2022       Cardiovascular Surgery     [facility]       CARDIOVASCULAR SURGERY PRE-OP INSTRUCTIONS     Your Heart Surgery is scheduled with Dr. Marcus on Friday, 5/6.  Please arrive at 5:00 AM for your surgery scheduled at 7:10 AM.     Here are instructions for your upcoming surgery and clinic visit if scheduled.     Report to the information desk at the main entrance of [facility] at [address] [place] [address]. When you walk in the main entrance of the hospital, it is directly in front of you. Ask for an escort or the  can help direct you. You will then be escorted or directed to the Surgical Admission Unit (NEIL) on the second floor for your surgery preparation. You have been pre-registered.      Family/friends will be called from the OR with updates 1-2 times during the surgery.  After the surgery is completed, the surgeon will speak to your family members or friends face to face or by calling them.       At this time due to COVID-19, only two visitors a day will be allowed per adult patient for the patient's hospital admission.  Surgical patients can have one visitor only during the preoperative phase, and one person may escort an adult patient to their outpatient clinic appointments.  All visitors will be screened, each time they visit, and must pass screening before entry.  Individuals who are sick and not seeking care or have known exposure to COVID-19 are not allowed to visit.  Visitors under the age of 18 are not allowed to enter. Hospital visiting hours are 8:00 am to 8:30 pm. Visiting policies will be strictly enforced.  This policy is subject to change as the COVID virus continues to evolve.      You will spend approximately 5 - 7 days in the hospital, depending on how quickly you recover.       COVID-19 TESTING  You will be required to undergo COVID-19 testing prior to your surgery.  Please call [phone] to schedule an  appointment. You must schedule it WITHIN 4 DAYS of your surgery. A COVID-19 test performed more than 4 days prior will not be counted as your pre-op COVID-19 test.      After the COVID test, please protect yourself by following the CDC recommendations for disease prevention.  Wash your hands regularly with soap and water and use hand  if soap and water is not available, wear a face mask. Separate yourself from others by 6 feet and keep your hands away from your face.      INSTRUCTIONS PRIOR TO SURGERY      MEDICATIONS:     ASPIRIN is okay to take on the day of your surgery if you are having bypass surgery. Do not take your aspirin on your day of surgery if you are scheduled for valve or aortic surgery. If you do not take aspirin, do not start aspirin unless instructed otherwise. Take your other medications as you normally would until the day of surgery unless instructed otherwise.       IF you are taking a blood thinner, (Coumadin, Warfarin, Plavix, Pradaxa, Effient, Brilinta, Pletal, Eliquis, Xarelto or other antiplatelet), please inform your surgery team as soon as possible as  these medications may need to be stopped for several days (3-7) prior to your surgery.     STOP TAKING these medications. STOP ALL ANTIINFLAMMATORY MEDICATIONS: (Ibuprofen, Aleve, Advil, Celebrex, Votaren, Ketoprofen, and Naproxen).  Stop ALL SUPPLEMENTS 10 days prior to your surgery. This includes stopping Co-Q 10, vitamin E and all fish oil supplements.   Please check the labels on any OTC eye vitamins you may be taking.  They often contain vitamin E and should be stopped 10 days prior to surgery.      MEDICATIONS THE MORNING OF SURGERY:  Take your ATENOLOL and ASPIRIN the morning of your surgery with a drink of clear liquid.  Please tell your anesthesiologist what medication you took and at what time.     DIABETIC  INSTRUCTIONS:  If your primary care has given you instructions please follow those.  Otherwise  No oral diabetic  medication the morning of surgery.  If you take long acting insulin in the evening, only take half of your dose. We will check your glucose upon arrival.     DO NOT EAT ANY SOLID FOODS AFTER MIDNIGHT or the morning of your surgery. NO MILK, MILK PRODUCTS, SMOOTHIES 8 HOURS PRIOR TO SURGERY.      DO NOT EAT ANYTHING, however you may have any clear liquids; black coffee (sugar okay), clear tea, water, sprite, ginger ale, apple juice, Gatorade or any clear liquid up to two hours before your surgery time. (No beni tea) No milk, or milk products, no smoothies or juice with pulp.        HISTORY AND PHYSICAL:  You do NOT need to see your primary care physician prior to surgery if you have seen your surgeon within 30 days of your surgery date.  LABS and IMAGING  All blood work, tests, and procedures must be within 30 days of your surgery date.  You do NOT need to fast for the blood work.       You have a Pre-Admission Testing (PAT) appointment beginning at 8:00 AM to 10:00 AM on Friday, 4/29 in the Surgical Admission Unit (NEIL) at [facility], [address] [place] [address]. Please check in at the information desk in main entrance lobby.  You will be directed to the second floor.  Radiology is located downstairs in the lower level of the hospital.      Your schedule for 4/29 is as follows:     PAT appointment at 8:00 AM  Chest xray at 10:25 AM     Cardiac MRI scheduled on Wednesday, 4/27 at [address], [address], [place] [address]     BELONGINGS  Do not bring personal belongings, jewelry, money, valuables, toiletries or medications to the hospital the morning of your surgery. You may pack a bag and give it to a family member or friend to bring the following day or when needed.  Things you may want to pack or have brought to you later may include slippers/shoes with a sole that are easy to take on/off, and comfy pajama bottoms to wear while walking the halls of the hospital.  Please remove all jewelry,  including body piercings. Cautery instruments are used during surgery that may pose a risk of burns if a body piercing is left in during surgery.      Do bring a photo ID and insurance card.  A copy of your health care directives, if you have one.  Glasses and hearing aids (bring cases).  You cannot wear contact lenses during the surgery.  Inhaler(s) and eye drops if you use them, tell the staff about them when you arrive. Bring your CPAP machine or breathing device, if you use them.  If you have a pacemaker or ICD (cardiac defibrillator) bring the ID card.  If you have an implanted stimulator, bring the remote control.  If you are a legal guardian bring a copy of the certified (court-stamped) guardianship papers.      Call Yolande our surgical scheduler, with any questions or concerns about scheduling. She can be reached by phone at [phone] between 8:00 AM and 4:00 PM Monday through Friday.      If you have questions about your medications, test results or have a change in your health status, call your surgery coordinator FREDI Rodriguez during regular business hours.  Call Jennifer or the afterhours number ([phone]) if you develop any of the following symptoms; fever, cough, shortness of breath, sore throat, runny or stuffy nose, muscle or body aches, headaches, fatigue, vomiting or diarrhea.       On weekends or after 4:00 PM, please call [phone] and ask the  to page the Cardiovascular Surgery staff on call.      PLEASE NOTE, there are times elective surgery (like yours) needs to be rescheduled due to unplanned emergency, transplant, or urgent surgeries. Your surgery may also be postponed or cancelled if there are no ICU beds available in the hospital.  If that should happen, your surgery will be rescheduled as quickly as possible. Our surgery team appreciates your understanding.      Please call me with any questions.  Thank you,  Jennifer Read RN  Cardiovascular Surgery Coordinator  [phone]   Direct Phone  [phone]  Fax

## 2022-04-26 ENCOUNTER — LAB (OUTPATIENT)
Dept: LAB | Facility: CLINIC | Age: 62
End: 2022-04-26
Payer: COMMERCIAL

## 2022-04-26 DIAGNOSIS — Z11.59 ENCOUNTER FOR SCREENING FOR OTHER VIRAL DISEASES: ICD-10-CM

## 2022-04-26 PROCEDURE — U0003 INFECTIOUS AGENT DETECTION BY NUCLEIC ACID (DNA OR RNA); SEVERE ACUTE RESPIRATORY SYNDROME CORONAVIRUS 2 (SARS-COV-2) (CORONAVIRUS DISEASE [COVID-19]), AMPLIFIED PROBE TECHNIQUE, MAKING USE OF HIGH THROUGHPUT TECHNOLOGIES AS DESCRIBED BY CMS-2020-01-R: HCPCS

## 2022-04-26 PROCEDURE — U0005 INFEC AGEN DETEC AMPLI PROBE: HCPCS

## 2022-04-27 ENCOUNTER — HOSPITAL ENCOUNTER (OUTPATIENT)
Dept: CARDIOLOGY | Facility: CLINIC | Age: 62
Discharge: HOME | End: 2022-04-27
Attending: THORACIC SURGERY (CARDIOTHORACIC VASCULAR SURGERY) | Admitting: THORACIC SURGERY (CARDIOTHORACIC VASCULAR SURGERY)
Payer: COMMERCIAL

## 2022-04-27 DIAGNOSIS — I25.119 CORONARY ARTERY DISEASE INVOLVING NATIVE CORONARY ARTERY OF NATIVE HEART WITH ANGINA PECTORIS (H): ICD-10-CM

## 2022-04-27 LAB — SARS-COV-2 RNA RESP QL NAA+PROBE: NEGATIVE

## 2022-04-27 PROCEDURE — 75561 CARDIAC MRI FOR MORPH W/DYE: CPT | Mod: 26 | Performed by: INTERNAL MEDICINE

## 2022-04-27 PROCEDURE — 255N000002 HC RX 255 OP 636: Performed by: THORACIC SURGERY (CARDIOTHORACIC VASCULAR SURGERY)

## 2022-04-27 PROCEDURE — 75561 CARDIAC MRI FOR MORPH W/DYE: CPT

## 2022-04-27 PROCEDURE — 75565 CARD MRI VELOC FLOW MAPPING: CPT | Mod: 26 | Performed by: INTERNAL MEDICINE

## 2022-04-27 PROCEDURE — A9585 GADOBUTROL INJECTION: HCPCS | Performed by: THORACIC SURGERY (CARDIOTHORACIC VASCULAR SURGERY)

## 2022-04-27 RX ORDER — GADOBUTROL 604.72 MG/ML
13 INJECTION INTRAVENOUS ONCE
Status: COMPLETED | OUTPATIENT
Start: 2022-04-27 | End: 2022-04-27

## 2022-04-27 RX ADMIN — GADOBUTROL 13 ML: 604.72 INJECTION INTRAVENOUS at 14:30

## 2022-04-27 NOTE — PROGRESS NOTES
Cardiac Cardiomyopathy Core protocol  Flows- LVOT, STJ, Asc Ao, PA  Contrast administered per weight based protocol.  Per Dr Roa

## 2022-04-29 ENCOUNTER — HOSPITAL ENCOUNTER (OUTPATIENT)
Dept: SURGERY | Facility: HOSPITAL | Age: 62
Discharge: HOME | End: 2022-04-29
Payer: COMMERCIAL

## 2022-04-29 ENCOUNTER — HOSPITAL ENCOUNTER (OUTPATIENT)
Dept: RADIOLOGY | Facility: HOSPITAL | Age: 62
Discharge: HOME | End: 2022-04-29
Attending: THORACIC SURGERY (CARDIOTHORACIC VASCULAR SURGERY) | Admitting: THORACIC SURGERY (CARDIOTHORACIC VASCULAR SURGERY)
Payer: COMMERCIAL

## 2022-04-29 ENCOUNTER — ANESTHESIA EVENT (OUTPATIENT)
Dept: SURGERY | Facility: HOSPITAL | Age: 62
End: 2022-04-29
Payer: COMMERCIAL

## 2022-04-29 VITALS
HEIGHT: 70 IN | BODY MASS INDEX: 32.64 KG/M2 | WEIGHT: 228 LBS | TEMPERATURE: 98.5 F | OXYGEN SATURATION: 99 % | DIASTOLIC BLOOD PRESSURE: 77 MMHG | SYSTOLIC BLOOD PRESSURE: 160 MMHG | HEART RATE: 59 BPM | RESPIRATION RATE: 16 BRPM

## 2022-04-29 DIAGNOSIS — I25.119 CORONARY ARTERY DISEASE INVOLVING NATIVE CORONARY ARTERY OF NATIVE HEART WITH ANGINA PECTORIS (H): Primary | ICD-10-CM

## 2022-04-29 DIAGNOSIS — I25.118 CORONARY ARTERY DISEASE OF NATIVE ARTERY OF NATIVE HEART WITH STABLE ANGINA PECTORIS (H): ICD-10-CM

## 2022-04-29 DIAGNOSIS — I25.119 CORONARY ARTERY DISEASE INVOLVING NATIVE CORONARY ARTERY OF NATIVE HEART WITH ANGINA PECTORIS (H): ICD-10-CM

## 2022-04-29 DIAGNOSIS — I50.20 HEART FAILURE WITH REDUCED EJECTION FRACTION (H): ICD-10-CM

## 2022-04-29 LAB
ABO/RH(D): NORMAL
ALBUMIN UR-MCNC: NEGATIVE MG/DL
ANTIBODY SCREEN: NEGATIVE
APPEARANCE UR: CLEAR
APTT PPP: 30 SECONDS (ref 22–38)
BILIRUB UR QL STRIP: NEGATIVE
COLOR UR AUTO: ABNORMAL
GLUCOSE UR STRIP-MCNC: NEGATIVE MG/DL
HBA1C MFR BLD: 7.8 %
HGB UR QL STRIP: NEGATIVE
INR PPP: 1.12 (ref 0.85–1.15)
KETONES UR STRIP-MCNC: NEGATIVE MG/DL
LEUKOCYTE ESTERASE UR QL STRIP: NEGATIVE
MAGNESIUM SERPL-MCNC: 1.8 MG/DL (ref 1.8–2.6)
MRSA DNA SPEC QL NAA+PROBE: NEGATIVE
MUCOUS THREADS #/AREA URNS LPF: PRESENT /LPF
NITRATE UR QL: NEGATIVE
PH UR STRIP: 5.5 [PH] (ref 5–7)
PREALB SERPL IA-MCNC: 16 MG/DL (ref 19–38)
RBC URINE: 1 /HPF
SA TARGET DNA: NEGATIVE
SP GR UR STRIP: 1.02 (ref 1–1.03)
SPECIMEN EXPIRATION DATE: NORMAL
UROBILINOGEN UR STRIP-MCNC: <2 MG/DL
WBC URINE: 1 /HPF

## 2022-04-29 PROCEDURE — 87641 MR-STAPH DNA AMP PROBE: CPT | Performed by: THORACIC SURGERY (CARDIOTHORACIC VASCULAR SURGERY)

## 2022-04-29 PROCEDURE — 84134 ASSAY OF PREALBUMIN: CPT | Performed by: THORACIC SURGERY (CARDIOTHORACIC VASCULAR SURGERY)

## 2022-04-29 PROCEDURE — 83036 HEMOGLOBIN GLYCOSYLATED A1C: CPT | Performed by: THORACIC SURGERY (CARDIOTHORACIC VASCULAR SURGERY)

## 2022-04-29 PROCEDURE — 81001 URINALYSIS AUTO W/SCOPE: CPT | Performed by: THORACIC SURGERY (CARDIOTHORACIC VASCULAR SURGERY)

## 2022-04-29 PROCEDURE — 86901 BLOOD TYPING SEROLOGIC RH(D): CPT | Performed by: THORACIC SURGERY (CARDIOTHORACIC VASCULAR SURGERY)

## 2022-04-29 PROCEDURE — 86923 COMPATIBILITY TEST ELECTRIC: CPT | Performed by: THORACIC SURGERY (CARDIOTHORACIC VASCULAR SURGERY)

## 2022-04-29 PROCEDURE — 71046 X-RAY EXAM CHEST 2 VIEWS: CPT

## 2022-04-29 PROCEDURE — 85610 PROTHROMBIN TIME: CPT | Performed by: THORACIC SURGERY (CARDIOTHORACIC VASCULAR SURGERY)

## 2022-04-29 PROCEDURE — 36415 COLL VENOUS BLD VENIPUNCTURE: CPT | Performed by: THORACIC SURGERY (CARDIOTHORACIC VASCULAR SURGERY)

## 2022-04-29 PROCEDURE — 86850 RBC ANTIBODY SCREEN: CPT | Performed by: THORACIC SURGERY (CARDIOTHORACIC VASCULAR SURGERY)

## 2022-04-29 PROCEDURE — 85730 THROMBOPLASTIN TIME PARTIAL: CPT | Performed by: THORACIC SURGERY (CARDIOTHORACIC VASCULAR SURGERY)

## 2022-04-29 PROCEDURE — 83735 ASSAY OF MAGNESIUM: CPT | Performed by: THORACIC SURGERY (CARDIOTHORACIC VASCULAR SURGERY)

## 2022-04-29 RX ORDER — SODIUM CHLORIDE, SODIUM LACTATE, POTASSIUM CHLORIDE, CALCIUM CHLORIDE 600; 310; 30; 20 MG/100ML; MG/100ML; MG/100ML; MG/100ML
INJECTION, SOLUTION INTRAVENOUS CONTINUOUS
Status: DISCONTINUED | OUTPATIENT
Start: 2022-04-29 | End: 2022-04-30 | Stop reason: HOSPADM

## 2022-04-29 RX ORDER — METHADONE HYDROCHLORIDE 10 MG/ML
0.3 INJECTION, SOLUTION INTRAMUSCULAR; INTRAVENOUS; SUBCUTANEOUS ONCE
Status: DISCONTINUED | OUTPATIENT
Start: 2022-04-29 | End: 2022-04-30 | Stop reason: HOSPADM

## 2022-04-29 RX ORDER — MAGNESIUM SULFATE 4 G/50ML
4 INJECTION INTRAVENOUS ONCE
Status: DISCONTINUED | OUTPATIENT
Start: 2022-04-29 | End: 2022-04-30 | Stop reason: HOSPADM

## 2022-04-29 ASSESSMENT — ENCOUNTER SYMPTOMS
DYSRHYTHMIAS: 0
ORTHOPNEA: 0

## 2022-04-29 NOTE — PHARMACY-ADMISSION MEDICATION HISTORY
Pharmacy Note - Admission Medication History    Pertinent Provider Information: Pharmacist will fill in last doses of medications on date of surgery.   ______________________________________________________________________    Prior To Admission (PTA) med list completed and updated in EMR.       PTA Med List   Medication Sig Note Last Dose     albuterol (PROAIR HFA;PROVENTIL HFA;VENTOLIN HFA) 90 mcg/actuation inhaler [ALBUTEROL (PROAIR HFA;PROVENTIL HFA;VENTOLIN HFA) 90 MCG/ACTUATION INHALER] Inhale 2 puffs every 6 (six) hours as needed for wheezing. 4/29/2022: For seasonal allergies More than a month     aspirin (ASA) 81 MG EC tablet Take 1 tablet (81 mg) by mouth daily Start tomorrow morning.       atenolol (TENORMIN) 25 MG tablet [ATENOLOL (TENORMIN) 25 MG TABLET] TAKE 1 TABLET BY MOUTH EVERY DAY       coenzyme Q-10 capsule Take 2 capsules (200 mg) by mouth daily       fluticasone propionate (FLONASE) 50 mcg/actuation nasal spray Spray 1 spray in nostril daily as needed        hydrochlorothiazide (HYDRODIURIL) 25 MG tablet Take 12.5 mg by mouth daily       isosorbide mononitrate (IMDUR) 30 MG 24 hr tablet TAKE 1 TABLET(30 MG) BY MOUTH DAILY       lisinopril (ZESTRIL) 40 MG tablet Take 20 mg by mouth daily       metFORMIN (GLUCOPHAGE-XR) 500 MG 24 hr tablet Take 1,000 mg by mouth daily       montelukast (SINGULAIR) 10 mg tablet [MONTELUKAST (SINGULAIR) 10 MG TABLET] TAKE 1 TABLET BY MOUTH EVERY NIGHT AT BEDTIME (Patient taking differently: Take 10 mg by mouth daily as needed)  More than a month     nitroGLYcerin (NITROSTAT) 0.4 MG sublingual tablet For chest pain place 1 tablet under the tongue every 5 minutes for 3 doses. If symptoms persist 5 minutes after 1st dose call 911.       rosuvastatin (CRESTOR) 20 MG tablet Take 1 tablet (20 mg) by mouth daily         Information source(s): Patient, Clinic records and [facility]    Method of interview communication: in-person    Patient was asked about  OTC/herbal products specifically.  PTA med list reflects this.    Based on the pharmacist's assessment, the PTA med list information appears reliable    Allergies were reviewed, assessed, and updated with the patient.      Patient does not anticipate needing any multi-use medications during admission.     Thank you for the opportunity to participate in the care of this patient.      Nathan Mathews [facility]     4/29/2022     9:59 AM

## 2022-04-29 NOTE — ANESTHESIA PREPROCEDURE EVALUATION
"Anesthesia Pre-Procedure Evaluation    Patient: Mir Treviño Sr.   MRN: [id number] : 1960        Procedure : Procedure(s):  CORONARY ARTERY BYPASS GRAFT (CABG), ENDOSCOPIC VESSEL PROCUREMENT, INTERNAL MAMMARY ARTERY HARVEST, ANESTHESIA TRANSESOPHAGEAL ECHOCARDIOGRAM  Pre-Admission Testing       Past Medical History:   Diagnosis Date     Coronary atherosclerosis     Created by Conversion [facility] Annotation: 2010  5:35PM - Nik Boyd: MI 7/10  Replacement Utility updated for latest IMO load     Essential hypertension     Created by Conversion  Replacement Utility updated for latest IMO load     Pure hypercholesterolemia     Created by Conversion       Past Surgical History:   Procedure Laterality Date     CV CORONARY ANGIOGRAM N/A 2022    Procedure: Coronary Angiogram;  Surgeon: Ty Grace MD;  Location: [facility] CATH LAB CV     CV LEFT HEART CATH N/A 2022    Procedure: Left Heart Catheterization;  Surgeon: Ty Grace MD;  Location: [facility] CATH LAB CV     OTHER SURGICAL HISTORY      CORONARY STENTSTwo coronary artery stents in       Allergies   Allergen Reactions     Atorvastatin Muscle Pain (Myalgia)      Social History     Tobacco Use     Smoking status: Never Smoker     Smokeless tobacco: Never Used   Substance Use Topics     Alcohol use: Yes     Comment: Alcoholic Drinks/day: \"Once a month, maybe.\"      Wt Readings from Last 1 Encounters:   22 103.4 kg (228 lb)        Anesthesia Evaluation   Pt has had prior anesthetic.         ROS/MED HX  ENT/Pulmonary:     (+) asthma     Neurologic:  - neg neurologic ROS     Cardiovascular: Comment: EKG        Sinus bradycardia   Inferior infarct , age undetermined   Anterior infarct , age undetermined   Abnormal ECG   No previous ECGs available   Confirmed by KWAKU MACIAS MD LOC:STEFANIE ([id number]) on 2022 2:33:26 PM     Echo 22:  Interpretation Summary    1. Technically difficult study despite utilization of ultrasound " enhancing  agent.  2. The left ventricle is normal in size. Image quality does not provide for  detailed assessment of LV systolic function, but is felt to be moderately  decreased with a visually estimated ejection fraction of roughly 30-35%.  3. On selected views, the anterior wall appears hypokinetic though difficult  to quantify due to suboptimal acoustic imaging. The anteroseptal segment  appears akinetic.  4. No significant valvular heart disease is identified on this study though  the sensitivity, particularly of regurgitant lesions, is reduced due to poor  Doppler acoustics.  5. Normal right ventricular size with probable mild reduction right  ventricular systolic performance (the right ventricle is poorly visualized on  the study).  6. There is mild left atrial enlargement.    The acoustic quality of this study is very poor. If a more accurate assessment  of left ventricular systolic performance, regional wall motion, and other  morphology/function is required; would recommend cardiac MRI or other  alternative imaging modality for further evaluation.    Cardiac cath 4/8/22:  Findings:  LM:no obstruction  LAD:90% narrowing prior to the stent and before a large D1 take off, several severe lesions throughout the vessel including distal stent ISR, as well as de murray lesions  Lcx:mid-vessel 85% narrowing prior to take off of an OM2  RCA:dominant, mildly irregular    LVEDP:18    Carotid US 4/8/22:  FINDINGS:    RIGHT: Mild plaque at the bifurcation. The highest peak systolic velocity in the  cm/sec, consistent with less than 50% stenosis. Normal velocities in the ECA. Antegrade flow within the vertebral artery. Normal multiphasic subclavian artery   waveforms.    LEFT: Mild plaque at the bifurcation. The highest peak systolic velocity in the ICA is 93 cm/sec, consistent with less than 50% stenosis. Normal velocities in the ECA. Antegrade flow within the vertebral artery. Normal multiphasic subclavian  artery   waveforms.      (+) hypertension--CAD angina-past MI -stent-2010. CHF Last EF: 35 MONACO. Irregular Heartbeat/Palpitations,  (-) taking anticoagulants/antiplatelets, orthopnea/PND, syncope, pacemaker, arrhythmias, valvular problems/murmurs, pacemaker, ICD and CABG   METS/Exercise Tolerance: >4 METS    Hematologic:  - neg hematologic  ROS     Musculoskeletal:  - neg musculoskeletal ROS     GI/Hepatic:  - neg GI/hepatic ROS     Renal/Genitourinary:  - neg Renal ROS     Endo:     (+) Obesity,     Psychiatric/Substance Use:  - neg psychiatric ROS     Infectious Disease:  - neg infectious disease ROS     Malignancy:  - neg malignancy ROS     Other:  - neg other ROS          Physical Exam    Airway  airway exam normal      Mallampati: II   TM distance: > 3 FB   Neck ROM: limited   Mouth opening: > 3 cm    Respiratory Devices and Support         Dental  no notable dental history         Cardiovascular   cardiovascular exam normal          Pulmonary   pulmonary exam normal                OUTSIDE LABS:  CBC:   Lab Results   Component Value Date    WBC 3.6 (L) 04/08/2022    HGB 14.7 04/08/2022    HCT 42.0 04/08/2022     (L) 04/08/2022     BMP:   Lab Results   Component Value Date     04/08/2022    POTASSIUM 4.0 04/08/2022    CHLORIDE 106 04/08/2022    CO2 22 04/08/2022    BUN 19 04/08/2022    CR 0.77 04/08/2022     (H) 04/08/2022     04/08/2022     COAGS: No results found for: PTT, INR, FIBR  POC: No results found for: BGM, HCG, HCGS  HEPATIC: No results found for: ALBUMIN, PROTTOTAL, ALT, AST, GGT, ALKPHOS, BILITOTAL, BILIDIRECT, MAHESH  OTHER:   Lab Results   Component Value Date    JAMIE 9.1 04/08/2022       Anesthesia Plan    ASA Status:  4   NPO Status:  NPO Appropriate    Anesthesia Type: General.     - Airway: ETT   Induction: Intravenous, Propofol.   Maintenance: Balanced.   Techniques and Equipment:     - Airway: Video-Laryngoscope     - Lines/Monitors: 2nd IV, Arterial Line, Central  Line, PAC, CVP, NIRS, VENESSA            VENESSA Absolute Contra-indication: NONE            VENESSA Relative Contra-indication: NONE     - Blood: Blood in Room     Consents    Anesthesia Plan(s) and associated risks, benefits, and realistic alternatives discussed. Questions answered and patient/representative(s) expressed understanding.     - Discussed: Risks, Benefits and Alternatives for BOTH SEDATION and the PROCEDURE were discussed     - Discussed with:  Patient      - Patient is DNR/DNI Status: No    Use of blood products discussed: Yes.     - Discussed with: Patient.     - Consented: consented to blood products            Reason for refusal: other.     Postoperative Care    Pain management: IV analgesics, Multi-modal analgesia.   PONV prophylaxis: Ondansetron (or other 5HT-3), Dexamethasone or Solumedrol     Comments:    Other Comments: Discussed the risks and benefits of anesthesia with the patient.  Plan for a pre-induction arterial line.  Post-induction CVC w/ PA Catheter.  Epinephrine, phenylephrine and nicardipine gtt in line with norepinephrine and vasopressin readily available.  Dexmedetomidine 0.5 mcg/kg/min infusion and methadone 0.3 mg/kg IV bolus with induction.  VENESSA placement and monitoring.  Discussed potential needs for blood product transfusion.  Discussed plan to transport postoperatively to ICU intubated and sedated.             Pasquale Parnell MD

## 2022-04-29 NOTE — CONSULTS
"SPIRITUAL HEALTH SERVICES NOTE  [facility], [facility]/Pre-op    SPIRITUAL ASSESSMENT    Positive/optimistic overall    Family history of heart disease (heart attack in 2010)    Good family support around him    Trusting God with what he cannot control    Wife may benefit from support as well    CARE PROVIDED  Empathic listening and presence  Explored/identified sources of strength  Affirmed his trusting/positive outlook  Encouraged him to be patient with his body as it heals  Prayer shared    SPIRITUAL CARE NOTE  Met with Mir for a pre-surgical Spiritual Care Consult. Mir had a heart attack in 2010 and has had multiple stents placed since then. He shares that he knew surgery was a possibility, both due to his history, and to the symptoms he has experienced. Mir notes that he has an extensive family history of heart disease. He describes himself as a very positive person and denies any concerns about surgery, choosing instead to see this as an opportunity. He talked briefly about various \"pivot points\" in his life, when things happened that he didn't expect or plan, but were meant to be. These experiences have taught him to trust God with the unexpected changes in life. Mir lives with his wife, children, and four grandchildren and states that he feels comfortable asking for help. Mir is in the  and says that his wife Sofia is concerned about this surgery and describes this as \"the hardest deployment\". She has notified their friends and neighbors and their non-[employer] community. Prayer shared.     Visit Length: 20 minutes    Plan of Care: Will remain available for further support as patient/family needs/desires.    Jennifer Gayle M.Div.      Office: [phone] (for non-urgent requests)  Please Vocera or page through [facility] for time-sensitive requests        "

## 2022-05-02 ENCOUNTER — TELEPHONE (OUTPATIENT)
Dept: CARDIOLOGY | Facility: CLINIC | Age: 62
End: 2022-05-02
Payer: COMMERCIAL

## 2022-05-02 NOTE — TELEPHONE ENCOUNTER
----- Message from Nik Marcus MD sent at 4/29/2022  2:49 PM CDT -----  Looks good. Okay to proceed with CAB. Nik Grider  ----- Message -----  From: Jennifer Read RN  Sent: 4/29/2022   9:17 AM CDT  To: Nik Marcus MD    He had his MRI completed if you'd like to review that - make sure we're good to go.    Lucía Grider  ----- Message -----  From: Jennifer Read, RN  Sent: 4/27/2022  12:00 AM CDT  To: Jennifer Reda RN    CARD MRI 4/27

## 2022-05-03 ENCOUNTER — TELEPHONE (OUTPATIENT)
Dept: ADMINISTRATIVE | Facility: CLINIC | Age: 62
End: 2022-05-03
Payer: COMMERCIAL

## 2022-05-03 DIAGNOSIS — Z11.59 ENCOUNTER FOR SCREENING FOR OTHER VIRAL DISEASES: Primary | ICD-10-CM

## 2022-05-03 PROCEDURE — U0003 INFECTIOUS AGENT DETECTION BY NUCLEIC ACID (DNA OR RNA); SEVERE ACUTE RESPIRATORY SYNDROME CORONAVIRUS 2 (SARS-COV-2) (CORONAVIRUS DISEASE [COVID-19]), AMPLIFIED PROBE TECHNIQUE, MAKING USE OF HIGH THROUGHPUT TECHNOLOGIES AS DESCRIBED BY CMS-2020-01-R: HCPCS

## 2022-05-03 PROCEDURE — U0005 INFEC AGEN DETEC AMPLI PROBE: HCPCS

## 2022-05-03 NOTE — TELEPHONE ENCOUNTER
I spoke with Mr Treviño to move date of surgery from 5/6/22 to 5/10/22 due to urgent inpatient. Also contacted Keely, charge RN in [facility] today to let her know about the change in admission date.  Will need to repeat a COVID test.

## 2022-05-04 LAB — SARS-COV-2 RNA RESP QL NAA+PROBE: NEGATIVE

## 2022-05-06 ENCOUNTER — LAB (OUTPATIENT)
Dept: LAB | Facility: CLINIC | Age: 62
End: 2022-05-06
Attending: THORACIC SURGERY (CARDIOTHORACIC VASCULAR SURGERY)
Payer: COMMERCIAL

## 2022-05-06 DIAGNOSIS — Z11.59 ENCOUNTER FOR SCREENING FOR OTHER VIRAL DISEASES: ICD-10-CM

## 2022-05-06 PROCEDURE — U0005 INFEC AGEN DETEC AMPLI PROBE: HCPCS

## 2022-05-06 PROCEDURE — U0003 INFECTIOUS AGENT DETECTION BY NUCLEIC ACID (DNA OR RNA); SEVERE ACUTE RESPIRATORY SYNDROME CORONAVIRUS 2 (SARS-COV-2) (CORONAVIRUS DISEASE [COVID-19]), AMPLIFIED PROBE TECHNIQUE, MAKING USE OF HIGH THROUGHPUT TECHNOLOGIES AS DESCRIBED BY CMS-2020-01-R: HCPCS

## 2022-05-07 LAB — SARS-COV-2 RNA RESP QL NAA+PROBE: NEGATIVE

## 2022-05-09 ENCOUNTER — TELEPHONE (OUTPATIENT)
Dept: CARDIOLOGY | Facility: CLINIC | Age: 62
End: 2022-05-09
Payer: COMMERCIAL

## 2022-05-09 NOTE — TELEPHONE ENCOUNTER
Confirmed arrival time with patient of 5 AM tomorrow for surgery, COVID negative. All questions/concerns addressed.

## 2022-05-10 ENCOUNTER — HOSPITAL ENCOUNTER (INPATIENT)
Facility: HOSPITAL | Age: 62
LOS: 5 days | Discharge: HOME | End: 2022-05-15
Attending: THORACIC SURGERY (CARDIOTHORACIC VASCULAR SURGERY) | Admitting: THORACIC SURGERY (CARDIOTHORACIC VASCULAR SURGERY)
Payer: COMMERCIAL

## 2022-05-10 ENCOUNTER — APPOINTMENT (OUTPATIENT)
Dept: RADIOLOGY | Facility: HOSPITAL | Age: 62
End: 2022-05-10
Attending: PHYSICIAN ASSISTANT
Payer: COMMERCIAL

## 2022-05-10 ENCOUNTER — ANESTHESIA (OUTPATIENT)
Dept: SURGERY | Facility: HOSPITAL | Age: 62
End: 2022-05-10
Payer: COMMERCIAL

## 2022-05-10 DIAGNOSIS — I25.118 CORONARY ARTERY DISEASE OF NATIVE ARTERY OF NATIVE HEART WITH STABLE ANGINA PECTORIS (H): ICD-10-CM

## 2022-05-10 DIAGNOSIS — Z95.1 S/P CABG (CORONARY ARTERY BYPASS GRAFT): Primary | ICD-10-CM

## 2022-05-10 DIAGNOSIS — I10 ESSENTIAL HYPERTENSION: ICD-10-CM

## 2022-05-10 DIAGNOSIS — I50.20 HEART FAILURE WITH REDUCED EJECTION FRACTION (H): ICD-10-CM

## 2022-05-10 LAB
ALBUMIN SERPL-MCNC: 3.3 G/DL (ref 3.5–5)
ALP SERPL-CCNC: 48 U/L (ref 45–120)
ALT SERPL W P-5'-P-CCNC: 49 U/L (ref 0–45)
ANION GAP SERPL CALCULATED.3IONS-SCNC: 9 MMOL/L (ref 5–18)
APTT PPP: 33 SECONDS (ref 22–38)
APTT PPP: 37 SECONDS (ref 22–38)
AST SERPL W P-5'-P-CCNC: 70 U/L (ref 0–40)
ATRIAL RATE - MUSE: 78 BPM
BASE EXCESS BLDA CALC-SCNC: 0.3 MMOL/L
BILIRUB SERPL-MCNC: 1.4 MG/DL (ref 0–1)
BLD PROD TYP BPU: NORMAL
BLD PROD TYP BPU: NORMAL
BLOOD COMPONENT TYPE: NORMAL
BLOOD COMPONENT TYPE: NORMAL
BUN SERPL-MCNC: 17 MG/DL (ref 8–22)
CALCIUM SERPL-MCNC: 8.9 MG/DL (ref 8.5–10.5)
CALCIUM, IONIZED MEASURED: 1.2 MMOL/L (ref 1.11–1.3)
CHLORIDE BLD-SCNC: 109 MMOL/L (ref 98–107)
CO2 SERPL-SCNC: 23 MMOL/L (ref 22–31)
CODING SYSTEM: NORMAL
CODING SYSTEM: NORMAL
COHGB MFR BLD: 98.9 % (ref 96–97)
CREAT SERPL-MCNC: 0.97 MG/DL (ref 0.7–1.3)
CROSSMATCH: NORMAL
CROSSMATCH: NORMAL
DIASTOLIC BLOOD PRESSURE - MUSE: NORMAL MMHG
ERYTHROCYTE [DISTWIDTH] IN BLOOD BY AUTOMATED COUNT: 12.1 % (ref 10–15)
ERYTHROCYTE [DISTWIDTH] IN BLOOD BY AUTOMATED COUNT: 12.2 % (ref 10–15)
FIBRINOGEN PPP-MCNC: 189 MG/DL (ref 170–490)
GFR SERPL CREATININE-BSD FRML MDRD: 89 ML/MIN/1.73M2
GLUCOSE BLD-MCNC: 142 MG/DL (ref 70–125)
GLUCOSE BLDC GLUCOMTR-MCNC: 120 MG/DL (ref 70–99)
GLUCOSE BLDC GLUCOMTR-MCNC: 132 MG/DL (ref 70–99)
GLUCOSE BLDC GLUCOMTR-MCNC: 137 MG/DL (ref 70–99)
GLUCOSE BLDC GLUCOMTR-MCNC: 146 MG/DL (ref 70–99)
GLUCOSE BLDC GLUCOMTR-MCNC: 147 MG/DL (ref 70–99)
GLUCOSE BLDC GLUCOMTR-MCNC: 156 MG/DL (ref 70–99)
GLUCOSE BLDC GLUCOMTR-MCNC: 158 MG/DL (ref 70–99)
GLUCOSE BLDC GLUCOMTR-MCNC: 159 MG/DL (ref 70–99)
GLUCOSE BLDC GLUCOMTR-MCNC: 164 MG/DL (ref 70–99)
GLUCOSE BLDC GLUCOMTR-MCNC: 164 MG/DL (ref 70–99)
GLUCOSE BLDC GLUCOMTR-MCNC: 176 MG/DL (ref 70–99)
GLUCOSE BLDC GLUCOMTR-MCNC: 182 MG/DL (ref 70–99)
HCO3 BLD-SCNC: 25 MMOL/L (ref 23–29)
HCT VFR BLD AUTO: 31.3 % (ref 40–53)
HCT VFR BLD AUTO: 40.4 % (ref 40–53)
HGB BLD-MCNC: 10.9 G/DL (ref 13.3–17.7)
HGB BLD-MCNC: 14.3 G/DL (ref 13.3–17.7)
INR PPP: 1.45 (ref 0.9–1.15)
INR PPP: 1.75 (ref 0.9–1.15)
INTERPRETATION ECG - MUSE: NORMAL
ION CA PH 7.4: 1.2 MMOL/L (ref 1.11–1.3)
ISSUE DATE AND TIME: NORMAL
ISSUE DATE AND TIME: NORMAL
LACTATE SERPL-SCNC: 2.1 MMOL/L (ref 0.7–2)
LACTATE SERPL-SCNC: 3.6 MMOL/L (ref 0.7–2)
MAGNESIUM SERPL-MCNC: 3.6 MG/DL (ref 1.8–2.6)
MCH RBC QN AUTO: 31.1 PG (ref 26.5–33)
MCH RBC QN AUTO: 31.1 PG (ref 26.5–33)
MCHC RBC AUTO-ENTMCNC: 34.8 G/DL (ref 31.5–36.5)
MCHC RBC AUTO-ENTMCNC: 35.4 G/DL (ref 31.5–36.5)
MCV RBC AUTO: 88 FL (ref 78–100)
MCV RBC AUTO: 89 FL (ref 78–100)
OXYHGB MFR BLD: 97.5 % (ref 96–97)
P AXIS - MUSE: 23 DEGREES
PCO2 BLD: 40 MM HG (ref 35–45)
PH BLD: 7.41 [PH] (ref 7.37–7.44)
PH: 7.39 (ref 7.35–7.45)
PHOSPHATE SERPL-MCNC: 3.6 MG/DL (ref 2.5–4.5)
PLATELET # BLD AUTO: 102 10E3/UL (ref 150–450)
PLATELET # BLD AUTO: 81 10E3/UL (ref 150–450)
PO2 BLD: 110 MM HG (ref 80–90)
POTASSIUM BLD-SCNC: 4 MMOL/L (ref 3.5–5)
POTASSIUM BLD-SCNC: 4 MMOL/L (ref 3.5–5)
PR INTERVAL - MUSE: 188 MS
PROT SERPL-MCNC: 6 G/DL (ref 6–8)
QRS DURATION - MUSE: 96 MS
QT - MUSE: 368 MS
QTC - MUSE: 419 MS
R AXIS - MUSE: -29 DEGREES
RBC # BLD AUTO: 3.5 10E6/UL (ref 4.4–5.9)
RBC # BLD AUTO: 4.6 10E6/UL (ref 4.4–5.9)
SODIUM SERPL-SCNC: 141 MMOL/L (ref 136–145)
SYSTOLIC BLOOD PRESSURE - MUSE: NORMAL MMHG
T AXIS - MUSE: 77 DEGREES
TEMPERATURE: 37 DEGREES C
UNIT ABO/RH: NORMAL
UNIT ABO/RH: NORMAL
UNIT NUMBER: NORMAL
UNIT NUMBER: NORMAL
UNIT STATUS: NORMAL
UNIT STATUS: NORMAL
UNIT TYPE ISBT: 6200
UNIT TYPE ISBT: 6200
VENTRICULAR RATE- MUSE: 78 BPM
WBC # BLD AUTO: 10.7 10E3/UL (ref 4–11)
WBC # BLD AUTO: 9.1 10E3/UL (ref 4–11)

## 2022-05-10 PROCEDURE — 93010 ELECTROCARDIOGRAM REPORT: CPT | Performed by: INTERNAL MEDICINE

## 2022-05-10 PROCEDURE — 82805 BLOOD GASES W/O2 SATURATION: CPT | Performed by: THORACIC SURGERY (CARDIOTHORACIC VASCULAR SURGERY)

## 2022-05-10 PROCEDURE — 99291 CRITICAL CARE FIRST HOUR: CPT | Mod: FT | Performed by: INTERNAL MEDICINE

## 2022-05-10 PROCEDURE — 258N000003 HC RX IP 258 OP 636: Performed by: ANESTHESIOLOGY

## 2022-05-10 PROCEDURE — 250N000011 HC RX IP 250 OP 636: Performed by: ANESTHESIOLOGY

## 2022-05-10 PROCEDURE — 410N000003 HC PER-PERFUSION 1ST 30 MIN: Performed by: THORACIC SURGERY (CARDIOTHORACIC VASCULAR SURGERY)

## 2022-05-10 PROCEDURE — 33518 CABG ARTERY-VEIN TWO: CPT | Performed by: THORACIC SURGERY (CARDIOTHORACIC VASCULAR SURGERY)

## 2022-05-10 PROCEDURE — 83605 ASSAY OF LACTIC ACID: CPT | Performed by: INTERNAL MEDICINE

## 2022-05-10 PROCEDURE — 250N000011 HC RX IP 250 OP 636

## 2022-05-10 PROCEDURE — 84100 ASSAY OF PHOSPHORUS: CPT | Performed by: PHYSICIAN ASSISTANT

## 2022-05-10 PROCEDURE — 83605 ASSAY OF LACTIC ACID: CPT | Performed by: PHYSICIAN ASSISTANT

## 2022-05-10 PROCEDURE — 85730 THROMBOPLASTIN TIME PARTIAL: CPT

## 2022-05-10 PROCEDURE — 410N000004: Performed by: THORACIC SURGERY (CARDIOTHORACIC VASCULAR SURGERY)

## 2022-05-10 PROCEDURE — 250N000011 HC RX IP 250 OP 636: Performed by: THORACIC SURGERY (CARDIOTHORACIC VASCULAR SURGERY)

## 2022-05-10 PROCEDURE — 250N000013 HC RX MED GY IP 250 OP 250 PS 637: Performed by: PHYSICIAN ASSISTANT

## 2022-05-10 PROCEDURE — 80053 COMPREHEN METABOLIC PANEL: CPT | Performed by: PHYSICIAN ASSISTANT

## 2022-05-10 PROCEDURE — P9016 RBC LEUKOCYTES REDUCED: HCPCS | Performed by: THORACIC SURGERY (CARDIOTHORACIC VASCULAR SURGERY)

## 2022-05-10 PROCEDURE — 272N000001 HC OR GENERAL SUPPLY STERILE: Performed by: THORACIC SURGERY (CARDIOTHORACIC VASCULAR SURGERY)

## 2022-05-10 PROCEDURE — 250N000024 HC ISOFLURANE, PER MIN: Performed by: THORACIC SURGERY (CARDIOTHORACIC VASCULAR SURGERY)

## 2022-05-10 PROCEDURE — 258N000003 HC RX IP 258 OP 636: Performed by: THORACIC SURGERY (CARDIOTHORACIC VASCULAR SURGERY)

## 2022-05-10 PROCEDURE — 06BQ4ZZ EXCISION OF LEFT SAPHENOUS VEIN, PERCUTANEOUS ENDOSCOPIC APPROACH: ICD-10-PCS | Performed by: THORACIC SURGERY (CARDIOTHORACIC VASCULAR SURGERY)

## 2022-05-10 PROCEDURE — 93005 ELECTROCARDIOGRAM TRACING: CPT | Performed by: PHYSICIAN ASSISTANT

## 2022-05-10 PROCEDURE — 258N000003 HC RX IP 258 OP 636

## 2022-05-10 PROCEDURE — 02100Z9 BYPASS CORONARY ARTERY, ONE ARTERY FROM LEFT INTERNAL MAMMARY, OPEN APPROACH: ICD-10-PCS | Performed by: THORACIC SURGERY (CARDIOTHORACIC VASCULAR SURGERY)

## 2022-05-10 PROCEDURE — 85384 FIBRINOGEN ACTIVITY: CPT

## 2022-05-10 PROCEDURE — 85027 COMPLETE CBC AUTOMATED: CPT | Performed by: PHYSICIAN ASSISTANT

## 2022-05-10 PROCEDURE — 250N000025 HC SEVOFLURANE, PER MIN: Performed by: THORACIC SURGERY (CARDIOTHORACIC VASCULAR SURGERY)

## 2022-05-10 PROCEDURE — 85610 PROTHROMBIN TIME: CPT | Performed by: PHYSICIAN ASSISTANT

## 2022-05-10 PROCEDURE — 250N000009 HC RX 250: Performed by: THORACIC SURGERY (CARDIOTHORACIC VASCULAR SURGERY)

## 2022-05-10 PROCEDURE — 370N000017 HC ANESTHESIA TECHNICAL FEE, PER MIN: Performed by: THORACIC SURGERY (CARDIOTHORACIC VASCULAR SURGERY)

## 2022-05-10 PROCEDURE — 272N000004 HC RX 272: Performed by: THORACIC SURGERY (CARDIOTHORACIC VASCULAR SURGERY)

## 2022-05-10 PROCEDURE — 5A1221Z PERFORMANCE OF CARDIAC OUTPUT, CONTINUOUS: ICD-10-PCS | Performed by: THORACIC SURGERY (CARDIOTHORACIC VASCULAR SURGERY)

## 2022-05-10 PROCEDURE — 33533 CABG ARTERIAL SINGLE: CPT | Performed by: THORACIC SURGERY (CARDIOTHORACIC VASCULAR SURGERY)

## 2022-05-10 PROCEDURE — 85610 PROTHROMBIN TIME: CPT

## 2022-05-10 PROCEDURE — 999N000065 XR CHEST PORT 1 VIEW

## 2022-05-10 PROCEDURE — C1713 ANCHOR/SCREW BN/BN,TIS/BN: HCPCS | Performed by: THORACIC SURGERY (CARDIOTHORACIC VASCULAR SURGERY)

## 2022-05-10 PROCEDURE — 85730 THROMBOPLASTIN TIME PARTIAL: CPT | Performed by: PHYSICIAN ASSISTANT

## 2022-05-10 PROCEDURE — C1898 LEAD, PMKR, OTHER THAN TRANS: HCPCS | Performed by: THORACIC SURGERY (CARDIOTHORACIC VASCULAR SURGERY)

## 2022-05-10 PROCEDURE — C1760 CLOSURE DEV, VASC: HCPCS | Performed by: THORACIC SURGERY (CARDIOTHORACIC VASCULAR SURGERY)

## 2022-05-10 PROCEDURE — 999N000157 HC STATISTIC RCP TIME EA 10 MIN

## 2022-05-10 PROCEDURE — 250N000012 HC RX MED GY IP 250 OP 636 PS 637: Performed by: THORACIC SURGERY (CARDIOTHORACIC VASCULAR SURGERY)

## 2022-05-10 PROCEDURE — 250N000009 HC RX 250

## 2022-05-10 PROCEDURE — 360N000079 HC SURGERY LEVEL 6, PER MIN: Performed by: THORACIC SURGERY (CARDIOTHORACIC VASCULAR SURGERY)

## 2022-05-10 PROCEDURE — 250N000013 HC RX MED GY IP 250 OP 250 PS 637: Performed by: THORACIC SURGERY (CARDIOTHORACIC VASCULAR SURGERY)

## 2022-05-10 PROCEDURE — 85027 COMPLETE CBC AUTOMATED: CPT

## 2022-05-10 PROCEDURE — 999N000253 HC STATISTIC WEANING TRIALS

## 2022-05-10 PROCEDURE — 250N000011 HC RX IP 250 OP 636: Performed by: PHYSICIAN ASSISTANT

## 2022-05-10 PROCEDURE — 3E043XZ INTRODUCTION OF VASOPRESSOR INTO CENTRAL VEIN, PERCUTANEOUS APPROACH: ICD-10-PCS | Performed by: THORACIC SURGERY (CARDIOTHORACIC VASCULAR SURGERY)

## 2022-05-10 PROCEDURE — 83735 ASSAY OF MAGNESIUM: CPT | Performed by: PHYSICIAN ASSISTANT

## 2022-05-10 PROCEDURE — 93005 ELECTROCARDIOGRAM TRACING: CPT

## 2022-05-10 PROCEDURE — 94002 VENT MGMT INPAT INIT DAY: CPT

## 2022-05-10 PROCEDURE — 999N000259 HC STATISTIC EXTUBATION

## 2022-05-10 PROCEDURE — 250N000009 HC RX 250: Performed by: ANESTHESIOLOGY

## 2022-05-10 PROCEDURE — 82330 ASSAY OF CALCIUM: CPT | Performed by: PHYSICIAN ASSISTANT

## 2022-05-10 PROCEDURE — 021109W BYPASS CORONARY ARTERY, TWO ARTERIES FROM AORTA WITH AUTOLOGOUS VENOUS TISSUE, OPEN APPROACH: ICD-10-PCS | Performed by: THORACIC SURGERY (CARDIOTHORACIC VASCULAR SURGERY)

## 2022-05-10 PROCEDURE — 999N000141 HC STATISTIC PRE-PROCEDURE NURSING ASSESSMENT: Performed by: THORACIC SURGERY (CARDIOTHORACIC VASCULAR SURGERY)

## 2022-05-10 PROCEDURE — 200N000001 HC R&B ICU

## 2022-05-10 DEVICE — SS SUTURE, 3 PER SLEEVE
Type: IMPLANTABLE DEVICE | Site: CHEST | Status: FUNCTIONAL
Brand: MYO/WIRE II

## 2022-05-10 DEVICE — SU STERNAL WIRE SINGLE #6 046-032: Type: IMPLANTABLE DEVICE | Site: CHEST | Status: FUNCTIONAL

## 2022-05-10 RX ORDER — CHLORHEXIDINE GLUCONATE ORAL RINSE 1.2 MG/ML
10 SOLUTION DENTAL ONCE
Status: COMPLETED | OUTPATIENT
Start: 2022-05-10 | End: 2022-05-10

## 2022-05-10 RX ORDER — PROPOFOL 10 MG/ML
INJECTION, EMULSION INTRAVENOUS PRN
Status: DISCONTINUED | OUTPATIENT
Start: 2022-05-10 | End: 2022-05-10

## 2022-05-10 RX ORDER — PHENYLEPHRINE HCL IN 0.9% NACL 50MG/250ML
.1-6 PLASTIC BAG, INJECTION (ML) INTRAVENOUS CONTINUOUS
Status: DISCONTINUED | OUTPATIENT
Start: 2022-05-10 | End: 2022-05-10 | Stop reason: HOSPADM

## 2022-05-10 RX ORDER — LIDOCAINE HYDROCHLORIDE 20 MG/ML
INJECTION, SOLUTION INFILTRATION; PERINEURAL PRN
Status: DISCONTINUED | OUTPATIENT
Start: 2022-05-10 | End: 2022-05-10

## 2022-05-10 RX ORDER — HYDROMORPHONE HCL IN WATER/PF 6 MG/30 ML
0.2 PATIENT CONTROLLED ANALGESIA SYRINGE INTRAVENOUS
Status: DISCONTINUED | OUTPATIENT
Start: 2022-05-10 | End: 2022-05-15 | Stop reason: HOSPADM

## 2022-05-10 RX ORDER — ROSUVASTATIN CALCIUM 10 MG/1
20 TABLET, COATED ORAL DAILY
Status: DISCONTINUED | OUTPATIENT
Start: 2022-05-10 | End: 2022-05-15 | Stop reason: HOSPADM

## 2022-05-10 RX ORDER — ASPIRIN 81 MG/1
162 TABLET, CHEWABLE ORAL
Status: COMPLETED | OUTPATIENT
Start: 2022-05-10 | End: 2022-05-10

## 2022-05-10 RX ORDER — SODIUM CHLORIDE 9 MG/ML
25 INJECTION, SOLUTION INTRAVENOUS CONTINUOUS
Status: DISCONTINUED | OUTPATIENT
Start: 2022-05-10 | End: 2022-05-13 | Stop reason: CLARIF

## 2022-05-10 RX ORDER — HEPARIN SODIUM 1000 [USP'U]/ML
INJECTION, SOLUTION INTRAVENOUS; SUBCUTANEOUS PRN
Status: DISCONTINUED | OUTPATIENT
Start: 2022-05-10 | End: 2022-05-10

## 2022-05-10 RX ORDER — CALCIUM CHLORIDE 100 MG/ML
INJECTION INTRAVENOUS; INTRAVENTRICULAR PRN
Status: DISCONTINUED | OUTPATIENT
Start: 2022-05-10 | End: 2022-05-10

## 2022-05-10 RX ORDER — PHENYLEPHRINE HCL IN 0.9% NACL 50MG/250ML
.1-4 PLASTIC BAG, INJECTION (ML) INTRAVENOUS CONTINUOUS
Status: DISCONTINUED | OUTPATIENT
Start: 2022-05-10 | End: 2022-05-12

## 2022-05-10 RX ORDER — VASOPRESSIN 20 U/ML
INJECTION PARENTERAL
Status: DISCONTINUED
Start: 2022-05-10 | End: 2022-05-10 | Stop reason: HOSPADM

## 2022-05-10 RX ORDER — CEFAZOLIN SODIUM 2 G/100ML
2 INJECTION, SOLUTION INTRAVENOUS EVERY 8 HOURS
Status: COMPLETED | OUTPATIENT
Start: 2022-05-10 | End: 2022-05-11

## 2022-05-10 RX ORDER — NALOXONE HYDROCHLORIDE 0.4 MG/ML
0.4 INJECTION, SOLUTION INTRAMUSCULAR; INTRAVENOUS; SUBCUTANEOUS
Status: DISCONTINUED | OUTPATIENT
Start: 2022-05-10 | End: 2022-05-15 | Stop reason: HOSPADM

## 2022-05-10 RX ORDER — NITROGLYCERIN 5 MG/ML
VIAL (ML) INTRAVENOUS
Status: COMPLETED
Start: 2022-05-10 | End: 2022-05-10

## 2022-05-10 RX ORDER — MONTELUKAST SODIUM 10 MG/1
10 TABLET ORAL DAILY PRN
Status: DISCONTINUED | OUTPATIENT
Start: 2022-05-10 | End: 2022-05-15 | Stop reason: HOSPADM

## 2022-05-10 RX ORDER — MUPIROCIN 20 MG/G
0.5 OINTMENT TOPICAL 2 TIMES DAILY
Status: DISCONTINUED | OUTPATIENT
Start: 2022-05-10 | End: 2022-05-10

## 2022-05-10 RX ORDER — METHADONE HYDROCHLORIDE 10 MG/ML
0.3 INJECTION, SOLUTION INTRAMUSCULAR; INTRAVENOUS; SUBCUTANEOUS ONCE
Status: COMPLETED | OUTPATIENT
Start: 2022-05-10 | End: 2022-05-10

## 2022-05-10 RX ORDER — DEXTROSE MONOHYDRATE 25 G/50ML
25-50 INJECTION, SOLUTION INTRAVENOUS
Status: DISCONTINUED | OUTPATIENT
Start: 2022-05-10 | End: 2022-05-15 | Stop reason: HOSPADM

## 2022-05-10 RX ORDER — LIDOCAINE 40 MG/G
CREAM TOPICAL
Status: DISCONTINUED | OUTPATIENT
Start: 2022-05-10 | End: 2022-05-15 | Stop reason: HOSPADM

## 2022-05-10 RX ORDER — NALOXONE HYDROCHLORIDE 0.4 MG/ML
0.2 INJECTION, SOLUTION INTRAMUSCULAR; INTRAVENOUS; SUBCUTANEOUS
Status: DISCONTINUED | OUTPATIENT
Start: 2022-05-10 | End: 2022-05-15 | Stop reason: HOSPADM

## 2022-05-10 RX ORDER — ASPIRIN 81 MG/1
81 TABLET, CHEWABLE ORAL
Status: COMPLETED | OUTPATIENT
Start: 2022-05-10 | End: 2022-05-10

## 2022-05-10 RX ORDER — HYDROMORPHONE HCL IN WATER/PF 6 MG/30 ML
0.4 PATIENT CONTROLLED ANALGESIA SYRINGE INTRAVENOUS
Status: DISCONTINUED | OUTPATIENT
Start: 2022-05-10 | End: 2022-05-15 | Stop reason: HOSPADM

## 2022-05-10 RX ORDER — POLYETHYLENE GLYCOL 3350 17 G/17G
17 POWDER, FOR SOLUTION ORAL DAILY
Status: DISCONTINUED | OUTPATIENT
Start: 2022-05-11 | End: 2022-05-15 | Stop reason: HOSPADM

## 2022-05-10 RX ORDER — BISACODYL 10 MG
10 SUPPOSITORY, RECTAL RECTAL DAILY PRN
Status: DISCONTINUED | OUTPATIENT
Start: 2022-05-10 | End: 2022-05-15 | Stop reason: HOSPADM

## 2022-05-10 RX ORDER — CALCIUM GLUCONATE 20 MG/ML
1 INJECTION, SOLUTION INTRAVENOUS
Status: DISPENSED | OUTPATIENT
Start: 2022-05-10 | End: 2022-05-13

## 2022-05-10 RX ORDER — PROTAMINE SULFATE 10 MG/ML
INJECTION, SOLUTION INTRAVENOUS PRN
Status: DISCONTINUED | OUTPATIENT
Start: 2022-05-10 | End: 2022-05-10

## 2022-05-10 RX ORDER — ACETAMINOPHEN 325 MG/1
975 TABLET ORAL EVERY 8 HOURS
Status: DISPENSED | OUTPATIENT
Start: 2022-05-10 | End: 2022-05-13

## 2022-05-10 RX ORDER — MAGNESIUM SULFATE 4 G/50ML
4 INJECTION INTRAVENOUS
Status: COMPLETED | OUTPATIENT
Start: 2022-05-10 | End: 2022-05-10

## 2022-05-10 RX ORDER — DEXMEDETOMIDINE HYDROCHLORIDE 4 UG/ML
.2-.7 INJECTION, SOLUTION INTRAVENOUS CONTINUOUS
Status: DISCONTINUED | OUTPATIENT
Start: 2022-05-10 | End: 2022-05-10 | Stop reason: CLARIF

## 2022-05-10 RX ORDER — OXYCODONE HYDROCHLORIDE 5 MG/1
10 TABLET ORAL EVERY 4 HOURS PRN
Status: DISCONTINUED | OUTPATIENT
Start: 2022-05-10 | End: 2022-05-15 | Stop reason: HOSPADM

## 2022-05-10 RX ORDER — KETAMINE HYDROCHLORIDE 10 MG/ML
INJECTION INTRAMUSCULAR; INTRAVENOUS PRN
Status: DISCONTINUED | OUTPATIENT
Start: 2022-05-10 | End: 2022-05-10

## 2022-05-10 RX ORDER — DEXMEDETOMIDINE HYDROCHLORIDE 4 UG/ML
0.2-1.2 INJECTION, SOLUTION INTRAVENOUS CONTINUOUS
Status: DISCONTINUED | OUTPATIENT
Start: 2022-05-10 | End: 2022-05-10 | Stop reason: HOSPADM

## 2022-05-10 RX ORDER — FENTANYL CITRATE 50 UG/ML
INJECTION, SOLUTION INTRAMUSCULAR; INTRAVENOUS PRN
Status: DISCONTINUED | OUTPATIENT
Start: 2022-05-10 | End: 2022-05-10

## 2022-05-10 RX ORDER — DEXMEDETOMIDINE HYDROCHLORIDE 4 UG/ML
INJECTION, SOLUTION INTRAVENOUS
Status: COMPLETED
Start: 2022-05-10 | End: 2022-05-10

## 2022-05-10 RX ORDER — ALBUMIN, HUMAN INJ 5% 5 %
SOLUTION INTRAVENOUS
Status: DISCONTINUED
Start: 2022-05-10 | End: 2022-05-10 | Stop reason: HOSPADM

## 2022-05-10 RX ORDER — VANCOMYCIN HYDROCHLORIDE 1 G/20ML
INJECTION, POWDER, LYOPHILIZED, FOR SOLUTION INTRAVENOUS PRN
Status: DISCONTINUED | OUTPATIENT
Start: 2022-05-10 | End: 2022-05-10 | Stop reason: HOSPADM

## 2022-05-10 RX ORDER — PANTOPRAZOLE SODIUM 40 MG/1
40 TABLET, DELAYED RELEASE ORAL DAILY
Status: DISCONTINUED | OUTPATIENT
Start: 2022-05-10 | End: 2022-05-15 | Stop reason: HOSPADM

## 2022-05-10 RX ORDER — PROCHLORPERAZINE MALEATE 10 MG
10 TABLET ORAL EVERY 6 HOURS PRN
Status: DISCONTINUED | OUTPATIENT
Start: 2022-05-10 | End: 2022-05-15 | Stop reason: HOSPADM

## 2022-05-10 RX ORDER — SODIUM CHLORIDE, SODIUM LACTATE, POTASSIUM CHLORIDE, CALCIUM CHLORIDE 600; 310; 30; 20 MG/100ML; MG/100ML; MG/100ML; MG/100ML
INJECTION, SOLUTION INTRAVENOUS CONTINUOUS
Status: DISCONTINUED | OUTPATIENT
Start: 2022-05-10 | End: 2022-05-10 | Stop reason: HOSPADM

## 2022-05-10 RX ORDER — NEOSTIGMINE METHYLSULFATE 1 MG/ML
VIAL (ML) INJECTION PRN
Status: DISCONTINUED | OUTPATIENT
Start: 2022-05-10 | End: 2022-05-10

## 2022-05-10 RX ORDER — HEPARIN SODIUM 5000 [USP'U]/.5ML
5000 INJECTION, SOLUTION INTRAVENOUS; SUBCUTANEOUS EVERY 8 HOURS
Status: DISCONTINUED | OUTPATIENT
Start: 2022-05-11 | End: 2022-05-15 | Stop reason: HOSPADM

## 2022-05-10 RX ORDER — EPHEDRINE SULFATE 50 MG/ML
INJECTION, SOLUTION INTRAMUSCULAR; INTRAVENOUS; SUBCUTANEOUS PRN
Status: DISCONTINUED | OUTPATIENT
Start: 2022-05-10 | End: 2022-05-10

## 2022-05-10 RX ORDER — LIDOCAINE 40 MG/G
CREAM TOPICAL
Status: DISCONTINUED | OUTPATIENT
Start: 2022-05-10 | End: 2022-05-10 | Stop reason: HOSPADM

## 2022-05-10 RX ORDER — SODIUM CHLORIDE 9 MG/ML
INJECTION, SOLUTION INTRAVENOUS CONTINUOUS PRN
Status: DISCONTINUED | OUTPATIENT
Start: 2022-05-10 | End: 2022-05-10

## 2022-05-10 RX ORDER — CEFAZOLIN SODIUM/WATER 2 G/20 ML
2 SYRINGE (ML) INTRAVENOUS SEE ADMIN INSTRUCTIONS
Status: DISCONTINUED | OUTPATIENT
Start: 2022-05-10 | End: 2022-05-10 | Stop reason: HOSPADM

## 2022-05-10 RX ORDER — AMOXICILLIN 250 MG
1 CAPSULE ORAL 2 TIMES DAILY
Status: DISCONTINUED | OUTPATIENT
Start: 2022-05-10 | End: 2022-05-15 | Stop reason: HOSPADM

## 2022-05-10 RX ORDER — NICOTINE POLACRILEX 4 MG
15-30 LOZENGE BUCCAL
Status: DISCONTINUED | OUTPATIENT
Start: 2022-05-10 | End: 2022-05-15 | Stop reason: HOSPADM

## 2022-05-10 RX ORDER — ASPIRIN 81 MG/1
162 TABLET, CHEWABLE ORAL DAILY
Status: DISCONTINUED | OUTPATIENT
Start: 2022-05-11 | End: 2022-05-15 | Stop reason: HOSPADM

## 2022-05-10 RX ORDER — ONDANSETRON 2 MG/ML
INJECTION INTRAMUSCULAR; INTRAVENOUS PRN
Status: DISCONTINUED | OUTPATIENT
Start: 2022-05-10 | End: 2022-05-10

## 2022-05-10 RX ORDER — ONDANSETRON 2 MG/ML
4 INJECTION INTRAMUSCULAR; INTRAVENOUS EVERY 6 HOURS PRN
Status: DISCONTINUED | OUTPATIENT
Start: 2022-05-10 | End: 2022-05-15 | Stop reason: HOSPADM

## 2022-05-10 RX ORDER — METHADONE HYDROCHLORIDE 10 MG/ML
INJECTION, SOLUTION INTRAMUSCULAR; INTRAVENOUS; SUBCUTANEOUS
Status: DISCONTINUED
Start: 2022-05-10 | End: 2022-05-10 | Stop reason: HOSPADM

## 2022-05-10 RX ORDER — GLYCOPYRROLATE 0.2 MG/ML
INJECTION, SOLUTION INTRAMUSCULAR; INTRAVENOUS PRN
Status: DISCONTINUED | OUTPATIENT
Start: 2022-05-10 | End: 2022-05-10

## 2022-05-10 RX ORDER — ONDANSETRON 4 MG/1
4 TABLET, ORALLY DISINTEGRATING ORAL EVERY 6 HOURS PRN
Status: DISCONTINUED | OUTPATIENT
Start: 2022-05-10 | End: 2022-05-15 | Stop reason: HOSPADM

## 2022-05-10 RX ORDER — SODIUM CHLORIDE, SODIUM GLUCONATE, SODIUM ACETATE, POTASSIUM CHLORIDE AND MAGNESIUM CHLORIDE 526; 502; 368; 37; 30 MG/100ML; MG/100ML; MG/100ML; MG/100ML; MG/100ML
1000 INJECTION, SOLUTION INTRAVENOUS
Status: DISCONTINUED | OUTPATIENT
Start: 2022-05-10 | End: 2022-05-10

## 2022-05-10 RX ORDER — NITROGLYCERIN 10 MG/100ML
INJECTION INTRAVENOUS PRN
Status: DISCONTINUED | OUTPATIENT
Start: 2022-05-10 | End: 2022-05-10

## 2022-05-10 RX ORDER — CALCIUM GLUCONATE 20 MG/ML
2 INJECTION, SOLUTION INTRAVENOUS
Status: ACTIVE | OUTPATIENT
Start: 2022-05-10 | End: 2022-05-13

## 2022-05-10 RX ORDER — ACETAMINOPHEN 325 MG/1
650 TABLET ORAL EVERY 4 HOURS PRN
Status: DISCONTINUED | OUTPATIENT
Start: 2022-05-13 | End: 2022-05-15 | Stop reason: HOSPADM

## 2022-05-10 RX ORDER — HYDRALAZINE HYDROCHLORIDE 20 MG/ML
10 INJECTION INTRAMUSCULAR; INTRAVENOUS EVERY 30 MIN PRN
Status: DISCONTINUED | OUTPATIENT
Start: 2022-05-10 | End: 2022-05-15 | Stop reason: HOSPADM

## 2022-05-10 RX ORDER — OXYCODONE HYDROCHLORIDE 5 MG/1
5 TABLET ORAL EVERY 4 HOURS PRN
Status: DISCONTINUED | OUTPATIENT
Start: 2022-05-10 | End: 2022-05-10 | Stop reason: HOSPADM

## 2022-05-10 RX ORDER — OXYCODONE HYDROCHLORIDE 5 MG/1
5 TABLET ORAL EVERY 4 HOURS PRN
Status: DISCONTINUED | OUTPATIENT
Start: 2022-05-10 | End: 2022-05-15 | Stop reason: HOSPADM

## 2022-05-10 RX ORDER — LIDOCAINE HYDROCHLORIDE 10 MG/ML
INJECTION, SOLUTION INFILTRATION; PERINEURAL
Status: COMPLETED | OUTPATIENT
Start: 2022-05-10 | End: 2022-05-10

## 2022-05-10 RX ORDER — PHENYLEPHRINE HCL IN 0.9% NACL 50MG/250ML
.1-4 PLASTIC BAG, INJECTION (ML) INTRAVENOUS CONTINUOUS PRN
Status: DISCONTINUED | OUTPATIENT
Start: 2022-05-10 | End: 2022-05-10

## 2022-05-10 RX ORDER — CEFAZOLIN SODIUM/WATER 2 G/20 ML
2 SYRINGE (ML) INTRAVENOUS
Status: COMPLETED | OUTPATIENT
Start: 2022-05-10 | End: 2022-05-10

## 2022-05-10 RX ADMIN — ONDANSETRON 4 MG: 2 INJECTION INTRAMUSCULAR; INTRAVENOUS at 12:52

## 2022-05-10 RX ADMIN — CALCIUM CHLORIDE 500 MG: 100 INJECTION, SOLUTION INTRAVENOUS at 11:55

## 2022-05-10 RX ADMIN — NITROGLYCERIN 20 MCG: 10 INJECTION INTRAVENOUS at 09:44

## 2022-05-10 RX ADMIN — GLYCOPYRROLATE 0.8 MG: 0.2 INJECTION, SOLUTION INTRAMUSCULAR; INTRAVENOUS at 12:52

## 2022-05-10 RX ADMIN — NICARDIPINE HYDROCHLORIDE 5 MG/HR: 0.2 INJECTION, SOLUTION INTRAVENOUS at 08:41

## 2022-05-10 RX ADMIN — LIDOCAINE HYDROCHLORIDE 2 ML: 10 INJECTION, SOLUTION INFILTRATION; PERINEURAL at 07:14

## 2022-05-10 RX ADMIN — METOPROLOL TARTRATE 12.5 MG: 25 TABLET, FILM COATED ORAL at 05:58

## 2022-05-10 RX ADMIN — SENNOSIDES AND DOCUSATE SODIUM 1 TABLET: 8.6; 5 TABLET ORAL at 20:09

## 2022-05-10 RX ADMIN — FENTANYL CITRATE 50 MCG: 50 INJECTION, SOLUTION INTRAMUSCULAR; INTRAVENOUS at 12:49

## 2022-05-10 RX ADMIN — INSULIN HUMAN 3 UNITS/HR: 1 INJECTION, SOLUTION INTRAVENOUS at 08:41

## 2022-05-10 RX ADMIN — Medication 0.3 MCG/KG/MIN: at 07:24

## 2022-05-10 RX ADMIN — HYDROMORPHONE HYDROCHLORIDE 0.2 MG: 0.2 INJECTION, SOLUTION INTRAMUSCULAR; INTRAVENOUS; SUBCUTANEOUS at 14:02

## 2022-05-10 RX ADMIN — Medication 5 MG: at 07:55

## 2022-05-10 RX ADMIN — DESMOPRESSIN ACETATE 31.04 MCG: 4 SOLUTION INTRAVENOUS at 11:53

## 2022-05-10 RX ADMIN — PROTAMINE SULFATE 250 MG: 10 INJECTION, SOLUTION INTRAVENOUS at 11:37

## 2022-05-10 RX ADMIN — Medication 2 G: at 07:45

## 2022-05-10 RX ADMIN — AMINOCAPROIC ACID 7.5 G: 250 INJECTION, SOLUTION INTRAVENOUS at 08:29

## 2022-05-10 RX ADMIN — Medication 5 MG: at 08:06

## 2022-05-10 RX ADMIN — PROPOFOL 50 MG: 10 INJECTION, EMULSION INTRAVENOUS at 07:24

## 2022-05-10 RX ADMIN — ROCURONIUM BROMIDE 50 MG: 50 INJECTION, SOLUTION INTRAVENOUS at 09:42

## 2022-05-10 RX ADMIN — NITROGLYCERIN 20 MCG: 10 INJECTION INTRAVENOUS at 09:09

## 2022-05-10 RX ADMIN — FENTANYL CITRATE 100 MCG: 50 INJECTION, SOLUTION INTRAMUSCULAR; INTRAVENOUS at 08:44

## 2022-05-10 RX ADMIN — SODIUM CHLORIDE: 9 INJECTION, SOLUTION INTRAVENOUS at 07:43

## 2022-05-10 RX ADMIN — DEXMEDETOMIDINE HYDROCHLORIDE 0.5 MCG/KG/HR: 400 INJECTION INTRAVENOUS at 08:29

## 2022-05-10 RX ADMIN — FENTANYL CITRATE 50 MCG: 50 INJECTION, SOLUTION INTRAMUSCULAR; INTRAVENOUS at 07:24

## 2022-05-10 RX ADMIN — ASPIRIN 81 MG CHEWABLE TABLET 162 MG: 81 TABLET CHEWABLE at 05:58

## 2022-05-10 RX ADMIN — LIDOCAINE HYDROCHLORIDE 3 ML: 20 INJECTION, SOLUTION INFILTRATION; PERINEURAL at 07:24

## 2022-05-10 RX ADMIN — OXYCODONE HYDROCHLORIDE 10 MG: 5 TABLET ORAL at 19:50

## 2022-05-10 RX ADMIN — Medication 2 G: at 11:25

## 2022-05-10 RX ADMIN — HYDROMORPHONE HYDROCHLORIDE 0.2 MG: 0.2 INJECTION, SOLUTION INTRAMUSCULAR; INTRAVENOUS; SUBCUTANEOUS at 19:50

## 2022-05-10 RX ADMIN — Medication 5 MG: at 09:25

## 2022-05-10 RX ADMIN — EPINEPHRINE 0.05 MCG/KG/MIN: 1 INJECTION INTRAMUSCULAR; INTRAVENOUS; SUBCUTANEOUS at 11:19

## 2022-05-10 RX ADMIN — Medication 5 MG: at 08:27

## 2022-05-10 RX ADMIN — HYDROMORPHONE HYDROCHLORIDE 0.2 MG: 0.2 INJECTION, SOLUTION INTRAMUSCULAR; INTRAVENOUS; SUBCUTANEOUS at 22:58

## 2022-05-10 RX ADMIN — CHLORHEXIDINE GLUCONATE 10 ML: 1.2 SOLUTION ORAL at 06:05

## 2022-05-10 RX ADMIN — KETAMINE HYDROCHLORIDE 50 MG: 10 INJECTION, SOLUTION INTRAMUSCULAR; INTRAVENOUS at 07:24

## 2022-05-10 RX ADMIN — HEPARIN SODIUM 33000 UNITS: 1000 INJECTION INTRAVENOUS; SUBCUTANEOUS at 09:23

## 2022-05-10 RX ADMIN — NEOSTIGMINE METHYLSULFATE 5 MG: 1 INJECTION INTRAVENOUS at 12:52

## 2022-05-10 RX ADMIN — NITROGLYCERIN 20 MCG: 10 INJECTION INTRAVENOUS at 09:34

## 2022-05-10 RX ADMIN — SODIUM CHLORIDE, POTASSIUM CHLORIDE, SODIUM LACTATE AND CALCIUM CHLORIDE: 600; 310; 30; 20 INJECTION, SOLUTION INTRAVENOUS at 06:23

## 2022-05-10 RX ADMIN — MAGNESIUM SULFATE HEPTAHYDRATE 4 G: 80 INJECTION, SOLUTION INTRAVENOUS at 06:30

## 2022-05-10 RX ADMIN — NITROGLYCERIN 20 MCG: 10 INJECTION INTRAVENOUS at 08:42

## 2022-05-10 RX ADMIN — SODIUM CHLORIDE, POTASSIUM CHLORIDE, SODIUM LACTATE AND CALCIUM CHLORIDE: 600; 310; 30; 20 INJECTION, SOLUTION INTRAVENOUS at 12:06

## 2022-05-10 RX ADMIN — AMINOCAPROIC ACID 1 G/HR: 250 INJECTION, SOLUTION INTRAVENOUS at 10:35

## 2022-05-10 RX ADMIN — HYDROMORPHONE HYDROCHLORIDE 0.2 MG: 0.2 INJECTION, SOLUTION INTRAMUSCULAR; INTRAVENOUS; SUBCUTANEOUS at 20:59

## 2022-05-10 RX ADMIN — CEFAZOLIN SODIUM 2 G: 2 INJECTION, SOLUTION INTRAVENOUS at 19:51

## 2022-05-10 RX ADMIN — FENTANYL CITRATE 50 MCG: 50 INJECTION, SOLUTION INTRAMUSCULAR; INTRAVENOUS at 07:17

## 2022-05-10 RX ADMIN — ONDANSETRON 4 MG: 2 INJECTION INTRAMUSCULAR; INTRAVENOUS at 19:49

## 2022-05-10 RX ADMIN — PHENYLEPHRINE HYDROCHLORIDE 100 MCG: 10 INJECTION INTRAVENOUS at 11:51

## 2022-05-10 RX ADMIN — MIDAZOLAM 1 MG: 1 INJECTION INTRAMUSCULAR; INTRAVENOUS at 07:17

## 2022-05-10 RX ADMIN — Medication 5 MG: at 07:49

## 2022-05-10 RX ADMIN — ROCURONIUM BROMIDE 30 MG: 50 INJECTION, SOLUTION INTRAVENOUS at 08:34

## 2022-05-10 RX ADMIN — Medication 20 MG: at 07:24

## 2022-05-10 RX ADMIN — PHENYLEPHRINE HYDROCHLORIDE 100 MCG: 10 INJECTION INTRAVENOUS at 11:56

## 2022-05-10 RX ADMIN — ROCURONIUM BROMIDE 70 MG: 50 INJECTION, SOLUTION INTRAVENOUS at 07:24

## 2022-05-10 RX ADMIN — CALCIUM CHLORIDE 500 MG: 100 INJECTION, SOLUTION INTRAVENOUS at 11:51

## 2022-05-10 RX ADMIN — MIDAZOLAM 1 MG: 1 INJECTION INTRAMUSCULAR; INTRAVENOUS at 07:24

## 2022-05-10 ASSESSMENT — ACTIVITIES OF DAILY LIVING (ADL)
ADLS_ACUITY_SCORE: 28
ADLS_ACUITY_SCORE: 22
ADLS_ACUITY_SCORE: 28
ADLS_ACUITY_SCORE: 22
ADLS_ACUITY_SCORE: 28
ADLS_ACUITY_SCORE: 22
ADLS_ACUITY_SCORE: 28
ADLS_ACUITY_SCORE: 22
ADLS_ACUITY_SCORE: 22
ADLS_ACUITY_SCORE: 28
ADLS_ACUITY_SCORE: 28
ADLS_ACUITY_SCORE: 35
ADLS_ACUITY_SCORE: 28
ADLS_ACUITY_SCORE: 22

## 2022-05-10 NOTE — PROGRESS NOTES
Progress Note  Restraint Application    I recognize that restraints are physical and/or chemical interventions intended to restrict a person's movements. Restraints are currently needed to ensure the safety of this patient and/or others. My clinical rationale appears below.    Category/Type of Restraint  Non Violent:  Soft limb restraint x 2  --  Behavior  Pulling at tubes/lines  --  Root Cause of the Behavior  Sedation/intubation  --  Less-Restrictive Measures that Failed  Non Violent Measures:  Close Observation  --  Response to the Restraint  Patient unable to pull at tubes/lines  --  Criteria for Release from the Restraint  Patient calm and off sedation

## 2022-05-10 NOTE — CONSULTS
CRITICAL CARE CONSULT:    Assessment/Plan:  62M w/ hx of CAD, HTN, HLD, ICM EF 35%, DM2, underwent v CABG with Dr. Marcus today.      RESP:  Acute respiratory failure, post-operative, with hypoxemia requiring ventilator support post-op. No reported pulmonary disease. Possible SUSAN, not on CPAP.     Cont LPV, Vt ~8cc/kg IBW OK for now    Wake up and wean    Follow up ABG    Encourage OOB, PT/OT, push IS when able.     Chest tube mgmt per CV surgery    Plan to fast track for extubation    CV:  CAD, HTN, HLD. S/p 3v CABG on 5/10 with Dr. Marcus. Mild lactic acidosis post op to 3.6. echo from last month: poor quality study. EF 30-35%, hypokinetic walls seen, no valve issues, normal RV size with ?mild reduction in RV function. Hypotensive and hypertensive intra-op requiring epi and nicardipine. Hypotension likely due to vasoplegia in the setting of sedation and post-cardiac surgery.    MAP >65 or per CV surgery    Vasoactive drips per CV surgery    Recheck lactate at 4pm    Cont ASA, statin    Holding atenolol, lisinopril, Imdur for now.     NEURO:  Requiring therapeutic sedation while on vent. rec'd methadone for pain control intra-op, also got reversal agent after arrival to ICU.     Tylenol prn pain    Wean sedation for SBT, RASS goal 0 to -1    Avoid benzo's    Cautious use of narcotics.    GI:  No issues. Mild transaminitis of unclear etiology.    NPO for now    Bowel regimen prn    PPI per CV surg order set    RENAL:  No issues, normal renal function    Avoid nephrotoxins    Follow lytes and replace prn    Maintain milner    ID:  No issues    Monitor off abx    Follow up culture data    HEMATOLOGIC:  Thrombocytopenia, ?unclear etiology. May be stress related. Chronically appears to have low plt in low 100K range.    Follow counts    hgb >7 or per CV surgery goals    ENDOCRINE:  Poorly controlled DM with A1C of 7.8    FSBG checks, insulin sliding scale/drip per ICU protocol    Hold PTA metformin    ICU  PROPHYLAXIS:    HSQ    PPI    Lines/Drains/Tubes:  PIV  RIJ cordis sheath + SG catheter 5/10  Art line radial, 5/10  Connor 5/10  Chest tube x3 5/10  ETT 8mm 5/10    CODE STATUS, DISPOSITION, FAMILY COMMUNICATION: full code    Jorden Joy MD (Avi)  [facility]monary [facility]  Pager [phone]  Clinic [phone]  Fax [phone]     Restraints  Progress Note  Restraint Application    I recognize that restraints are physical and/or chemical interventions intended to restrict a person's movements. Restraints are currently needed to ensure the safety of this patient and/or others. My clinical rationale appears below.    Category/Type of Restraint  Non Violent:  Soft limb restraint x 2  --  Behavior  Pulling at tubes/lines  --  Root Cause of the Behavior  Sedation/intubation  --  Less-Restrictive Measures that Failed  Non Violent Measures:  Close Observation  --  Response to the Restraint  Patient unable to pull at tubes/lines  --  Criteria for Release from the Restraint  Patient calm and off sedation        CCx: s/p CABG    HPI: 62M w/ hx of CAD, HTN, HLD, ICM EF 35%, DM2, underwent v CABG with Dr. Marcus today.  Case was uncomplicated. He came off pump easily. Received cell saver.  Airway was easy. Post-op cardiac function showed some improvement.  Required nicardipine for elevated BP.     Admitted to ICU around 1pm on full vent support, with insulin drip running with pacer running.   Unable to obtain additional history from the patient    Received verbal signout from [facility].     Past Medical History:  Past Medical History:   Diagnosis Date     Coronary atherosclerosis     Created by [employer] [facility] Annotation: Jul 16 2010  5:35PM - Nik Boyd: MI 7/10  Replacement Utility updated for latest IMO load     Diabetes (H)      Essential hypertension     Created by Conversion  Replacement Utility updated for latest IMO load     Pure hypercholesterolemia     Created by Conversion   "    Uncomplicated asthma        Past Surgical History:  Past Surgical History:   Procedure Laterality Date     CV CORONARY ANGIOGRAM N/A 4/8/2022    Procedure: Coronary Angiogram;  Surgeon: Ty Grace MD;  Location: [facility] CATH LAB CV     CV LEFT HEART CATH N/A 4/8/2022    Procedure: Left Heart Catheterization;  Surgeon: Ty Grace MD;  Location: [facility] CATH LAB CV     OTHER SURGICAL HISTORY      CORONARY STENTSTwo coronary artery stents in 2010       Social History:  Social History     Socioeconomic History     Marital status:      Spouse name: Not on file     Number of children: Not on file     Years of education: Not on file     Highest education level: Not on file   Occupational History     Not on file   Tobacco Use     Smoking status: Never Smoker     Smokeless tobacco: Never Used   Substance and Sexual Activity     Alcohol use: Yes     Comment: Alcoholic Drinks/day: \"Once a month, maybe.\"     Drug use: No     Sexual activity: Not on file   Other Topics Concern     Not on file   Social History Narrative    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  Marisa Diego       Social Determinants of Health     Financial Resource Strain: Not on file   Food Insecurity: Not on file   Transportation Needs: Not on file   Physical Activity: Not on file   Stress: Not on file   Social Connections: Not on file   Intimate Partner Violence: Not on file   Housing Stability: Not on file       Family History:  Family History   Problem Relation Age of Onset     No Known Problems Mother      No Known Problems Father      No Known Problems Sister      No Known Problems Brother      No Known Problems Daughter      No Known Problems Son      No Known Problems Brother      No Known Problems Son      No Known Problems Son        Allergies:  Allergies   Allergen Reactions     Atorvastatin Muscle Pain (Myalgia)       MAR Reviewed      Physical Exam:  Vent settings for " "last 24 hours:     BP (!) 155/78 (BP Location: Left arm)   Pulse 71   Temp 98.6  F (37  C) (Oral)   Resp 16   Ht 1.778 m (5' 10\")   Wt 101.1 kg (222 lb 14.4 oz)   SpO2 99%   BMI 31.98 kg/m      Intake/output data:    Intake/Output Summary (Last 24 hours) at 5/10/2022 1326  Last data filed at 5/10/2022 1252  Gross per 24 hour   Intake 2744.97 ml   Output 1050 ml   Net 1694.97 ml       Physical Exam  Gen: intubated, sedated  HEENT: NT, no LAMAR  CV: RRR, no m/g/r  Resp: clear ant no w/r/r   Abd: soft, nontender, BS+  Skin: no rashes or lesions  Ext: no edema  Neuro: PERRL, nonfocal exam    LAB:  @24HOURRESULTS@    IMAGING:  [unfilled]    Critical care attestation: 45 minutes spent managing the following issues: acute post-operative respiratory insufficiency requiring intubation/IMV, circulatory shock requiring continuous vasopressor infusions, vasoplegia secondary to sedation and post-cardiac surgery. High risk for organ deterioration and death requiring ICU level care.            "

## 2022-05-10 NOTE — ANESTHESIA CARE TRANSFER NOTE
Patient: Mir Treivño Sr.    Procedure: Procedure(s):  CORONARY ARTERY BYPASS GRAFT X3 ,  LEFT LEG ENDOSCOPIC VESSEL PROCUREMENT, LEFT INTERNAL MAMMARY ARTERY HARVEST, ANESTHESIA TRANSESOPHAGEAL ECHOCARDIOGRAM. EPIAORTIC ULTRASOUND.       Diagnosis: Coronary artery disease of native artery of native heart with stable angina pectoris (H) [I25.118]  Diagnosis Additional Information: No value filed.    Anesthesia Type:   General     Note:    Oropharynx: endotracheal tube in place and ventilatory support  Level of Consciousness: drowsy    Level of Supplemental Oxygen (L/min / FiO2): 15  Independent Airway: airway patency not satisfactory and stable  Dentition: dentition unchanged  Vital Signs Stable: post-procedure vital signs reviewed and stable  Report to RN Given: handoff report given  Patient transferred to: ICU  Comments: Pt transported to ICU with CV surgery team, anesthesiologist, OR staff, and CRNA at bedside. Pt vitals remained stable throughout transport. Report given to ICU team at pt bedside. Pt vitals remained stable when anesthesia left ICU.  ICU Handoff: Call for PAUSE to initiate/utilize ICU HANDOFF, Identified Patient, Identified Responsible Provider, Reviewed the Pertinent Medical History, Discussed Surgical Course, Reviewed Intra-OP Anesthesia Management and Issues during Anesthesia, Set Expectations for Post Procedure Period and Allowed Opportunity for Questions and Acknowledgement of Understanding      Vitals:  Vitals Value Taken Time   /73 05/10/22 1305   Temp 36.5 05/10/22 1305   Pulse 85 05/10/22 1306   Resp 18 05/10/22 1306   SpO2 99 % 05/10/22 1306   Vitals shown include unvalidated device data.    Electronically Signed By: JASON Andrews CRNA  May 10, 2022  1:08 PM

## 2022-05-10 NOTE — ANESTHESIA PROCEDURE NOTES
Arterial Line Procedure Note    Pre-Procedure   Staff -        Anesthesiologist:  Mercedes Gamlbe MD       Performed By: anesthesiologist       Location: OR       Pre-Anesthestic Checklist: patient identified, IV checked, risks and benefits discussed, informed consent, monitors and equipment checked, pre-op evaluation and at physician/surgeon's request  Timeout:       Correct Patient: Yes        Correct Procedure: Yes        Correct Site: Yes        Correct Position: Yes   Line Placement:   This line was placed Pre Induction starting at 5/10/2022 7:14 AM and ending at 5/10/2022 7:19 AM  Procedure   Procedure: arterial line       Laterality: right       Insertion Site: radial.  Sterile Prep        Standard elements of sterile barrier followed       Skin prep: Chloraprep  Insertion/Injection        Technique: ultrasound guided, Seldinger Technique and Alek's test completed        1. Ultrasound was used to evaluate the access site.       2. Artery evaluated via ultrasound for patency/adequacy.       3. Using real-time ultrasound the needle/catheter was observed entering the artery/vein.       4. Permanent image was captured and entered into the patient's record.       5. The visualized structures were anatomically normal.       6. There were no apparent abnormal pathologic findings.       Catheter Type/Size: 20 G, 12 cm  Narrative         Secured by: suture       Tegaderm and Biopatch dressing used.       Complications: None apparent,        Arterial waveform: Yes

## 2022-05-10 NOTE — INTERVAL H&P NOTE
"I have reviewed the surgical (or preoperative) H&P that is linked to this encounter, and examined the patient. There are no significant changes    Stable angina, ICM, and HFrEF presents for coronary artery bypass. Risks, benefits, and alternatives previously discussed.     Nik Marcus MD  Cardiothoracic Surgery  [phone]      Clinical Conditions Present on Arrival:  Clinically Significant Risk Factors Present on Admission                  # Platelet Defect: home medication list includes an antiplatelet medication  # Diabetes, type II: last A1C 7.8 % (Ref range: <=5.6 %)  # Obesity: Estimated body mass index is 31.98 kg/m  as calculated from the following:    Height as of this encounter: 1.778 m (5' 10\").    Weight as of this encounter: 101.1 kg (222 lb 14.4 oz).       "

## 2022-05-10 NOTE — ANESTHESIA POSTPROCEDURE EVALUATION
Patient: Mir Treviño Sr.    Procedure: Procedure(s):  CORONARY ARTERY BYPASS GRAFT X3 ,  LEFT LEG ENDOSCOPIC VESSEL PROCUREMENT, LEFT INTERNAL MAMMARY ARTERY HARVEST, ANESTHESIA TRANSESOPHAGEAL ECHOCARDIOGRAM. EPIAORTIC ULTRASOUND.       Anesthesia Type:  General    Note:  Disposition: Inpatient; ICU            ICU Sign Out: Anesthesiologist/ICU physician sign out WAS performed   Postop Pain Control: Uneventful            Sign Out: Well controlled pain   PONV: No   Neuro/Psych: Uneventful            Sign Out: Acceptable/Baseline neuro status   Airway/Respiratory:             Sign Out: O2 supplementation   CV/Hemodynamics:             Sign Out: Detailed CV status               Blood Pressure: Normal; Pressors               Rate/Rhythm: Normal HR               Perfusion:  Adequate perfusion indices   Other NRE: NONE   DID A NON-ROUTINE EVENT OCCUR? No    Event details/Postop Comments:  Pt has been extubated, is a awake and doing well.  Denies pain and appears comfortable.             Last vitals:  Vitals Value Taken Time   /65 05/10/22 1315   Temp 37.8  C (100  F) 05/10/22 1800   Pulse 86 05/10/22 1840   Resp 14 05/10/22 1600   SpO2 96 % 05/10/22 1840   Vitals shown include unvalidated device data.    Electronically Signed By: Mercedes Gamble MD  May 10, 2022  6:41 PM  
No

## 2022-05-10 NOTE — ANESTHESIA PROCEDURE NOTES
Perioperative VENESSA Procedure Note    Staff -        Anesthesiologist:  Mercedes Gamble MD       Performed By: anesthesiologist  Preanesthesia Checklist:  Patient identified, IV assessed, risks and benefits discussed, monitors and equipment assessed, procedure being performed at surgeon's request and anesthesia consent obtained.    VENESSA Probe Insertion    Probe Status PRE Insertion: NO obvious damage  Probe type:  Adult 3D  Bite block used:   Soft  Insertion Technique: Easy, no oropharyngeal manipulation  Insertion complications: None obvious  Billing Report:A VENESSA report is NOT being generated.  Probe Status POST Removal: NO obvious damage

## 2022-05-10 NOTE — ANESTHESIA PROCEDURE NOTES
Airway       Patient location during procedure: OR       Procedure Start/Stop Times: 5/10/2022 7:30 AM  Staff -        Anesthesiologist:  Mercedes Gamble MD       CRNA: Ajith Hampton APRN CRNA       Performed By: CRNA  Consent for Airway        Urgency: elective  Indications and Patient Condition       Indications for airway management: woodrow-procedural       Induction type:intravenous       Mask difficulty assessment: 2 - vent by mask + OA or adjuvant +/- NMBA    Final Airway Details       Final airway type: endotracheal airway       Successful airway: Single subglottic suction  Endotracheal Airway Details        ETT size (mm): 8.0       Cuffed: yes       Cuff volume (mL): 8       Successful intubation technique: video laryngoscopy       VL Blade Size: Glidescope 4       Grade View of Cords: 1       Adjucts: stylet       Position: Right       Measured from: lips       Secured at (cm): 22       Bite block used: None    Post intubation assessment        Placement verified by: capnometry, equal breath sounds and chest rise        Number of attempts at approach: 1       Number of other approaches attempted: 0       Secured with: silk tape       Ease of procedure: easy       Dentition: Intact and Unchanged    Medication(s) Administered   Medication Administration Time: 5/10/2022 7:30 AM

## 2022-05-10 NOTE — PHARMACY-ADMISSION MEDICATION HISTORY
Pharmacy Note - Admission Medication History   ______________________________________________________________________    Prior To Admission (PTA) med list completed and updated in EMR.       PTA Med List   Medication Sig Note Last Dose     albuterol (PROAIR HFA;PROVENTIL HFA;VENTOLIN HFA) 90 mcg/actuation inhaler [ALBUTEROL (PROAIR HFA;PROVENTIL HFA;VENTOLIN HFA) 90 MCG/ACTUATION INHALER] Inhale 2 puffs every 6 (six) hours as needed for wheezing. 4/29/2022: For seasonal allergies More than a month     aspirin (ASA) 81 MG EC tablet Take 1 tablet (81 mg) by mouth daily Start tomorrow morning.  5/9/2022     atenolol (TENORMIN) 25 MG tablet [ATENOLOL (TENORMIN) 25 MG TABLET] TAKE 1 TABLET BY MOUTH EVERY DAY  5/9/2022     coenzyme Q-10 capsule Take 2 capsules (200 mg) by mouth daily  Past Month     fluticasone propionate (FLONASE) 50 mcg/actuation nasal spray Spray 1 spray in nostril daily as needed   More than a month     hydrochlorothiazide (HYDRODIURIL) 25 MG tablet Take 12.5 mg by mouth daily  5/9/2022     isosorbide mononitrate (IMDUR) 30 MG 24 hr tablet TAKE 1 TABLET(30 MG) BY MOUTH DAILY  5/9/2022     lisinopril (ZESTRIL) 40 MG tablet Take 20 mg by mouth daily  5/9/2022     metFORMIN (GLUCOPHAGE-XR) 500 MG 24 hr tablet Take 1,000 mg by mouth daily  5/8/2022     montelukast (SINGULAIR) 10 mg tablet [MONTELUKAST (SINGULAIR) 10 MG TABLET] TAKE 1 TABLET BY MOUTH EVERY NIGHT AT BEDTIME (Patient taking differently: Take 10 mg by mouth daily as needed)  More than a month     nitroGLYcerin (NITROSTAT) 0.4 MG sublingual tablet For chest pain place 1 tablet under the tongue every 5 minutes for 3 doses. If symptoms persist 5 minutes after 1st dose call 911.       rosuvastatin (CRESTOR) 20 MG tablet Take 1 tablet (20 mg) by mouth daily  5/8/2022       Information source(s): Patient, Clinic records and CareEverywhere/SureScripts    Method of interview communication: in-person    Patient was asked about OTC/herbal products  specifically.  PTA med list reflects this.    Based on the pharmacist's assessment, the PTA med list information appears reliable    Allergies were reviewed, assessed, and updated with the patient.      Patient does not anticipate needing any multi-use medications during admission.     Thank you for the opportunity to participate in the care of this patient.      Nathan Mathews [facility]     5/10/2022     6:18 AM

## 2022-05-10 NOTE — BRIEF OP NOTE
[facility]    Brief Operative Note    Pre-operative diagnosis: Coronary artery disease of native artery of native heart with stable angina pectoris (H) [I25.118]  Post-operative diagnosis Same as pre-operative diagnosis    Procedure: Procedure(s):  CORONARY ARTERY BYPASS GRAFT X3 ,  LEFT LEG ENDOSCOPIC VESSEL PROCUREMENT, LEFT INTERNAL MAMMARY ARTERY HARVEST, ANESTHESIA TRANSESOPHAGEAL ECHOCARDIOGRAM. EPIAORTIC ULTRASOUND.  Surgeon: Surgeon(s) and Role:     * Nik Marcus MD - Primary     * Julio Glover PA-C - Assisting      * Rosa Kent ST/KENNY- First Assistant     * Azael Ramesh MD - Fellow  Anesthesia: General   Estimated Blood Loss: 500 ml  Drains: 2 med chest tubes, 1 left pleural chest tube  Specimens: * No specimens in log *  Findings:   See op note  Complications: None.  Implants:   Implant Name Type Inv. Item Serial No.  Lot No. LRB No. Used Action   [employer] STERNAL WIRE SINGLE #6 046-032 - SNA Wire [employer] STERNAL WIRE SINGLE #6 046-032 [employer] [id number] N/A 1 Implanted   [employer] MYOSTERNAL WIRE [id number] - SNA Wire [employer] MY[employer] [id number] [employer] [id number] N/A 1 Implanted

## 2022-05-10 NOTE — PROGRESS NOTES
RT PROGRESS NOTE    VENT DAY# 1    CURRENT SETTINGS:   Vent Mode: CMV/AC  (Continuous Mandatory Ventilation/ Assist Control)  FiO2 (%): 50 %  Resp Rate (Set): 16 breaths/min  Tidal Volume (Set, mL): 570 mL  PEEP (cm H2O): 5 cmH2O  Resp: 16      PATIENT PARAMETERS:  PIP 22  Pplat:  14  Pmean:  9.2  Compliance: 39  SBT: Yes     Wean time: 81min   Secretions:  Small amount of white secretions   02 Sats:  99%  BS: clear    ETT SIZE 8.0 Secured at 23 cm at teeth/gums    Respiratory Medications: none     NOTE / SHIFT SUMMARY:     12:50- Pt. arrived from OR, intubated, CAB x3. ETT 8.0, 23@ teeth  VC-AC 16/570/+5/ 60%    13:55- FIO2 decreased to 50% sats 98%    15:33- FIO2 decreased to 40% sats 98%, Pt. placed on CPAP 5/PS 5, long periods of apnea RR 5, low VT    15:59- Pt. Placed on CPAP 5/ PS 5 40%    17:20- Pt. extubated without complications, BS clear/ diminished, 4L NC sats 99%, RR 18-20. Pt. extubated within 4.30 hrs upon arrival to the ICU.        Paola Amaya, RT

## 2022-05-10 NOTE — OP NOTE
"DATE OF SERVICE: 05/10/22      PREOPERATIVE DIAGNOSES:  1. Severe, multivessel coronary artery disease.  2. Ischemic cardiomyopathy  3. Chronic systolic heart failure  4. Diabetes mellitus type 2  5. Hyperlipidemia     POSTOPERATIVE DIAGNOSES:  1. Severe, multivessel coronary artery disease.  2. Ischemic cardiomyopathy  3. Chronic systolic heart failure  4. Diabetes mellitus type 2  5. Hyperlipidemia     PROCEDURE PERFORMED:  1. Coronary artery bypass grafting x 3   A. Reversed saphenous vein graft to the obtuse marginal branch of the left circumflex coronary artery  B. Reversed saphenous vein graft to the diagonal branch of the left anterior descending coronary artery  C. Pedicled left internal mammary artery to left anterior descending coronary artery  2. Endoscopic vein harvest of the greater saphenous vein from the left lower extremity.     SURGEON: Nik Marcus MD     RESIDENT SURGEON: Azael Ramesh MD.     FIRST ASSISTANT: Rosa Kent CST/KENNY, Lew Glover PA-C, and Jared Lozada PA-C     ANESTHESIA: General endotracheal anesthesia.     ANESTHESIOLOGIST: Mercedes Gamble MD     ESTIMATED BLOOD LOSS: 500 mL     SPECIMEN: None.    CARDIOPULMONARY BYPASS TIME: 105\"    AORTIC CROSS CLAMP TIME: 80\"+13\"; total 93\"     INDICATIONS FOR PROCEDURE: Mr. Mir Treviño Sr. is a 62 year old year-old male who presents with stable angina. Coronary angiography demonstrates severe multivessel coronary artery disease. Echocardiography demonstrates reduced left ventricular function of 35% and no significant valvular abnormality. This was confirmed on cardiac MRI which also showed viability in the LAD distributionThe patient understands the risks and benefits of the procedure and wishes to undergo the operation.     OPERATIVE FINDINGS: Epiaortic ultrasound showed the ascending aorta was normal. The left internal mammary artery was 1.8 mm in diameter and had excellent, pulsatile flow. The greater saphenous vein from the left " lower extremity was 4-5 mm in diameter and suitable for conduit. The obtuse marginal branch of the left circumflex coronary artery was 2 mm in diameter and free of disease at the site of anastomosis. The diagonal branch of the left anterior descending coronary artery was 2 mm in diameter and free of disease at the site anastomosis. The left anterior descending coronary artery 1.8 mm in diameter and free of disease at the site of anastomosis. After reperfusion and defibrillation, sinus rhythm resumed. Left ventricular function was increased after bypass on low-dose inotropic support.     OPERATIVE DESCRIPTION IN DETAIL:    After obtaining informed consent, the patient was brought to the operating room and placed in the supine position on the operating room table. Appropriate lines and devices for monitoring were placed by the anesthesia team. The patient underwent smooth induction of general anesthesia, and the trachea was intubated orally. Invasive monitoring lines were placed by anesthesia. The patient was prepped and draped, and a timeout was performed to confirm the correct patient identity,as well as the procedure to be performed. A median sternotomy was performed and the left internal mammary artery was harvested in a pedicled fashion. The greater saphenous vein was harvested from the left lower extremity using endoscopic vein harvesting by the physician assistant, Lew Glover PA-C and Jared Lozada PA-C.     The patient was given 400 units/Kg of heparin and a final ACT was greater than 480 seconds. Epiaortic ultrasound was performed and the findings were as above. Cannulation of the distal ascending aorta and the right atrium was performed through pursestrings and cardiopulmonary bypass was commenced. Antegrade and retrograde cardioplegia cannulae were placed, and the heart was arrested with 1 liter of cold antegrade blood cardioplegia. Subsequent maintenance doses of 300 mL of cold retrograde blood  cardioplegia were given approximately every 20 minutes or after each distal anastomosis. Topical cold saline slush was applied for additional protection. A myocardial temperature probe was used in the interventricular septum to ensure myocardial cooling to 10 degrees Centigrade with each cardioplegia dosing.     The following grafts were constructed in end-to-side fashion using running 7-0 Prolene: A reversed saphenous vein graft was sewn to the mid obtuse marginal branch of the left circumflex coronary artery, and a reversed saphenous vein graft was sewn to the mid diagonal branch of the left anterior descending coronary artery. The pedicled left internal mammary artery was sewn to the distal left anterior descending coronary artery, almost near the apex, in end-to-side fashion using running 8-0 Prolene. The proximal anastomoses of the 2 vein grafts were constructed on the ascending aorta in end-to-side fashion using running 6-0 Prolene under a single crossclamp. The crossclamp was released, and the heart was resuscitated.       All bleeding points were controlled. A bipolar ventricular pacing lead was placed and brought out through a separate stab incision. A bipolar right atrial pacing lead was placed and brought out through a separate stab incision. The patient weaned from cardiopulmonary bypass successfully, was given protamine and decannulated. A 28-Surinamese angled chest tube was placed in the left pleural space and brought out through a separate stab incision. Two 32-Surinamese chest tubes were placed in the anterior mediastinum and brought out through a separate stab incision. The sternal edges were coated with a vancomycin paste then reapposed with 3 interrupted #6 stainless steel sternal wires in the manubrium, 3 double wires in the body of the sternum and 1 additional #6 stainless steel sternal wire at the lower aspect of the sternum. The muscle, fascia, and skin were closed in layers with absorbable Stratafix  suture. Surgical skin glue was applied. All sponge, needle and instrument counts were correct at the end of the case.      Nik Marcus MD  Cardiothoracic Surgery  Pager [phone]

## 2022-05-10 NOTE — ANESTHESIA PROCEDURE NOTES
Central Line/PA Catheter Placement    Pre-Procedure   Staff -        Anesthesiologist:  Mercedes Gamble MD       Performed By: anesthesiologist       Location: OR       Pre-Anesthestic Checklist: patient identified, IV checked, site marked, risks and benefits discussed, informed consent, monitors and equipment checked, pre-op evaluation and at physician/surgeon's request  Timeout:       Correct Patient: Yes        Correct Procedure: Yes        Correct Site: Yes        Correct Position: Yes        Correct Laterality: Yes   Line Placement:   This line was placed Post Induction starting at 5/10/2022 7:43 AM and ending at 5/10/2022 8:04 AM    Procedure   Procedure: central line       Laterality: right       Insertion Site: internal jugular.       Patient Position: Trendelenburg  Sterile Prep        All elements of maximal sterile barrier technique followed       Patient Prep/Sterile Barriers: draped, hand hygiene, gloves , hat , mask , draped, gown, sterile gel and probe cover       Skin prep: Chloraprep  Insertion/Injection        Technique: ultrasound guided and Seldinger Technique        1. Ultrasound was used to evaluate the access site.       2. Vein evaluated via ultrasound for patency/adequacy.       3. Using real-time ultrasound the needle/catheter was observed entering the artery/vein.       4. Permanent image was captured and entered into the patient's record.       5. The visualized structures were anatomically normal.       6. There were no apparent abnormal pathologic findings.       Introducer Type: 9 Fr, 2-lumen MAC        Type: PA/CVC with Introducer       Number of Lumens: double lumen       PA Catheter Type: CCO         Appropriate RV, RA and PA waveforms noted:  Yes            Withdrawn and Locked at cm: 52  Narrative         Secured by: suture       Tegaderm and Biopatch dressing used.       Complications: None apparent,        blood aspirated from all lumens,        All lumens flushed: Yes        Verification method: VENESSA, Placement to be verified post-op and Ultrasound       Tip termination: other location

## 2022-05-11 ENCOUNTER — APPOINTMENT (OUTPATIENT)
Dept: OCCUPATIONAL THERAPY | Facility: HOSPITAL | Age: 62
End: 2022-05-11
Attending: THORACIC SURGERY (CARDIOTHORACIC VASCULAR SURGERY)
Payer: COMMERCIAL

## 2022-05-11 ENCOUNTER — APPOINTMENT (OUTPATIENT)
Dept: RADIOLOGY | Facility: HOSPITAL | Age: 62
End: 2022-05-11
Attending: PHYSICIAN ASSISTANT
Payer: COMMERCIAL

## 2022-05-11 LAB
ALBUMIN SERPL-MCNC: 3 G/DL (ref 3.5–5)
ALP SERPL-CCNC: 40 U/L (ref 45–120)
ALT SERPL W P-5'-P-CCNC: 40 U/L (ref 0–45)
ANION GAP SERPL CALCULATED.3IONS-SCNC: 9 MMOL/L (ref 5–18)
AST SERPL W P-5'-P-CCNC: 77 U/L (ref 0–40)
ATRIAL RATE - MUSE: 77 BPM
BASE EXCESS BLDV CALC-SCNC: 2.1 MMOL/L
BILIRUB SERPL-MCNC: 0.8 MG/DL (ref 0–1)
BUN SERPL-MCNC: 19 MG/DL (ref 8–22)
CALCIUM SERPL-MCNC: 7.6 MG/DL (ref 8.5–10.5)
CALCIUM, IONIZED MEASURED: 1.03 MMOL/L (ref 1.11–1.3)
CALCIUM, IONIZED MEASURED: 1.05 MMOL/L (ref 1.11–1.3)
CALCIUM, IONIZED MEASURED: 1.05 MMOL/L (ref 1.11–1.3)
CALCIUM, IONIZED MEASURED: 1.08 MMOL/L (ref 1.11–1.3)
CHLORIDE BLD-SCNC: 110 MMOL/L (ref 98–107)
CO2 SERPL-SCNC: 22 MMOL/L (ref 22–31)
CREAT SERPL-MCNC: 0.87 MG/DL (ref 0.7–1.3)
DIASTOLIC BLOOD PRESSURE - MUSE: NORMAL MMHG
ERYTHROCYTE [DISTWIDTH] IN BLOOD BY AUTOMATED COUNT: 12.8 % (ref 10–15)
GFR SERPL CREATININE-BSD FRML MDRD: >90 ML/MIN/1.73M2
GLUCOSE BLD-MCNC: 145 MG/DL (ref 70–125)
GLUCOSE BLDC GLUCOMTR-MCNC: 116 MG/DL (ref 70–99)
GLUCOSE BLDC GLUCOMTR-MCNC: 124 MG/DL (ref 70–99)
GLUCOSE BLDC GLUCOMTR-MCNC: 125 MG/DL (ref 70–99)
GLUCOSE BLDC GLUCOMTR-MCNC: 130 MG/DL (ref 70–99)
GLUCOSE BLDC GLUCOMTR-MCNC: 144 MG/DL (ref 70–99)
GLUCOSE BLDC GLUCOMTR-MCNC: 145 MG/DL (ref 70–99)
GLUCOSE BLDC GLUCOMTR-MCNC: 201 MG/DL (ref 70–99)
GLUCOSE BLDC GLUCOMTR-MCNC: 248 MG/DL (ref 70–99)
GLUCOSE BLDC GLUCOMTR-MCNC: 278 MG/DL (ref 70–99)
GLUCOSE BLDC GLUCOMTR-MCNC: 317 MG/DL (ref 70–99)
HCO3 BLDV-SCNC: 25 MMOL/L (ref 24–30)
HCT VFR BLD AUTO: 38.7 % (ref 40–53)
HGB BLD-MCNC: 13.4 G/DL (ref 13.3–17.7)
INTERPRETATION ECG - MUSE: NORMAL
ION CA PH 7.4: 1.03 MMOL/L (ref 1.11–1.3)
ION CA PH 7.4: 1.08 MMOL/L (ref 1.11–1.3)
MAGNESIUM SERPL-MCNC: 1.9 MG/DL (ref 1.8–2.6)
MCH RBC QN AUTO: 31 PG (ref 26.5–33)
MCHC RBC AUTO-ENTMCNC: 34.6 G/DL (ref 31.5–36.5)
MCV RBC AUTO: 90 FL (ref 78–100)
OXYHGB MFR BLDV: 66.3 % (ref 70–75)
P AXIS - MUSE: 14 DEGREES
PCO2 BLDV: 49 MM HG (ref 35–50)
PH BLDV: 7.36 [PH] (ref 7.35–7.45)
PH: 7.37 (ref 7.35–7.45)
PH: 7.38 (ref 7.35–7.45)
PH: 7.4 (ref 7.35–7.45)
PH: 7.4 (ref 7.35–7.45)
PHOSPHATE SERPL-MCNC: 3.9 MG/DL (ref 2.5–4.5)
PLATELET # BLD AUTO: 90 10E3/UL (ref 150–450)
PO2 BLDV: 36 MM HG (ref 25–47)
POTASSIUM BLD-SCNC: 4.1 MMOL/L (ref 3.5–5)
PR INTERVAL - MUSE: 154 MS
PROT SERPL-MCNC: 5.8 G/DL (ref 6–8)
QRS DURATION - MUSE: 104 MS
QT - MUSE: 388 MS
QTC - MUSE: 439 MS
R AXIS - MUSE: -9 DEGREES
RBC # BLD AUTO: 4.32 10E6/UL (ref 4.4–5.9)
SAO2 % BLDV: 67.4 % (ref 70–75)
SODIUM SERPL-SCNC: 141 MMOL/L (ref 136–145)
SYSTOLIC BLOOD PRESSURE - MUSE: NORMAL MMHG
T AXIS - MUSE: 65 DEGREES
VENTRICULAR RATE- MUSE: 77 BPM
WBC # BLD AUTO: 7.7 10E3/UL (ref 4–11)

## 2022-05-11 PROCEDURE — 36415 COLL VENOUS BLD VENIPUNCTURE: CPT | Performed by: THORACIC SURGERY (CARDIOTHORACIC VASCULAR SURGERY)

## 2022-05-11 PROCEDURE — 82330 ASSAY OF CALCIUM: CPT | Performed by: PHYSICIAN ASSISTANT

## 2022-05-11 PROCEDURE — 272N000202 HC AEROBIKA WITH MANOMETER

## 2022-05-11 PROCEDURE — 82330 ASSAY OF CALCIUM: CPT | Performed by: THORACIC SURGERY (CARDIOTHORACIC VASCULAR SURGERY)

## 2022-05-11 PROCEDURE — 93010 ELECTROCARDIOGRAM REPORT: CPT | Performed by: INTERNAL MEDICINE

## 2022-05-11 PROCEDURE — 97110 THERAPEUTIC EXERCISES: CPT | Mod: GO

## 2022-05-11 PROCEDURE — 250N000009 HC RX 250: Performed by: PHYSICIAN ASSISTANT

## 2022-05-11 PROCEDURE — 999N000157 HC STATISTIC RCP TIME EA 10 MIN

## 2022-05-11 PROCEDURE — 250N000011 HC RX IP 250 OP 636: Performed by: PHYSICIAN ASSISTANT

## 2022-05-11 PROCEDURE — 250N000013 HC RX MED GY IP 250 OP 250 PS 637: Performed by: PHYSICIAN ASSISTANT

## 2022-05-11 PROCEDURE — 85027 COMPLETE CBC AUTOMATED: CPT | Performed by: PHYSICIAN ASSISTANT

## 2022-05-11 PROCEDURE — 250N000012 HC RX MED GY IP 250 OP 636 PS 637: Performed by: PHYSICIAN ASSISTANT

## 2022-05-11 PROCEDURE — 250N000011 HC RX IP 250 OP 636: Performed by: THORACIC SURGERY (CARDIOTHORACIC VASCULAR SURGERY)

## 2022-05-11 PROCEDURE — 80053 COMPREHEN METABOLIC PANEL: CPT | Performed by: PHYSICIAN ASSISTANT

## 2022-05-11 PROCEDURE — 200N000001 HC R&B ICU

## 2022-05-11 PROCEDURE — 83735 ASSAY OF MAGNESIUM: CPT | Performed by: PHYSICIAN ASSISTANT

## 2022-05-11 PROCEDURE — G0463 HOSPITAL OUTPT CLINIC VISIT: HCPCS

## 2022-05-11 PROCEDURE — 71045 X-RAY EXAM CHEST 1 VIEW: CPT

## 2022-05-11 PROCEDURE — 84100 ASSAY OF PHOSPHORUS: CPT | Performed by: PHYSICIAN ASSISTANT

## 2022-05-11 PROCEDURE — 97166 OT EVAL MOD COMPLEX 45 MIN: CPT | Mod: GO

## 2022-05-11 PROCEDURE — 93005 ELECTROCARDIOGRAM TRACING: CPT

## 2022-05-11 PROCEDURE — 82805 BLOOD GASES W/O2 SATURATION: CPT | Performed by: THORACIC SURGERY (CARDIOTHORACIC VASCULAR SURGERY)

## 2022-05-11 RX ORDER — BUMETANIDE 2 MG/1
4 TABLET ORAL ONCE
Status: COMPLETED | OUTPATIENT
Start: 2022-05-11 | End: 2022-05-11

## 2022-05-11 RX ORDER — MAGNESIUM SULFATE HEPTAHYDRATE 40 MG/ML
2 INJECTION, SOLUTION INTRAVENOUS ONCE
Status: COMPLETED | OUTPATIENT
Start: 2022-05-11 | End: 2022-05-11

## 2022-05-11 RX ORDER — FUROSEMIDE 10 MG/ML
40 INJECTION INTRAMUSCULAR; INTRAVENOUS 3 TIMES DAILY
Status: DISCONTINUED | OUTPATIENT
Start: 2022-05-11 | End: 2022-05-12

## 2022-05-11 RX ORDER — METOPROLOL TARTRATE 25 MG/1
25 TABLET, FILM COATED ORAL 2 TIMES DAILY
Status: DISCONTINUED | OUTPATIENT
Start: 2022-05-11 | End: 2022-05-12

## 2022-05-11 RX ORDER — HYDROMORPHONE HCL IN WATER/PF 6 MG/30 ML
0.4 PATIENT CONTROLLED ANALGESIA SYRINGE INTRAVENOUS EVERY 5 MIN PRN
Status: DISCONTINUED | OUTPATIENT
Start: 2022-05-11 | End: 2022-05-12

## 2022-05-11 RX ORDER — SODIUM CHLORIDE, SODIUM LACTATE, POTASSIUM CHLORIDE, CALCIUM CHLORIDE 600; 310; 30; 20 MG/100ML; MG/100ML; MG/100ML; MG/100ML
INJECTION, SOLUTION INTRAVENOUS CONTINUOUS
Status: DISCONTINUED | OUTPATIENT
Start: 2022-05-11 | End: 2022-05-13 | Stop reason: CLARIF

## 2022-05-11 RX ORDER — KETOROLAC TROMETHAMINE 30 MG/ML
30 INJECTION, SOLUTION INTRAMUSCULAR; INTRAVENOUS EVERY 6 HOURS PRN
Status: DISCONTINUED | OUTPATIENT
Start: 2022-05-11 | End: 2022-05-15 | Stop reason: HOSPADM

## 2022-05-11 RX ORDER — HALOPERIDOL 5 MG/ML
1 INJECTION INTRAMUSCULAR
Status: DISCONTINUED | OUTPATIENT
Start: 2022-05-11 | End: 2022-05-12

## 2022-05-11 RX ORDER — FENTANYL CITRATE 50 UG/ML
50 INJECTION, SOLUTION INTRAMUSCULAR; INTRAVENOUS EVERY 5 MIN PRN
Status: DISCONTINUED | OUTPATIENT
Start: 2022-05-11 | End: 2022-05-12

## 2022-05-11 RX ORDER — ONDANSETRON 4 MG/1
4 TABLET, ORALLY DISINTEGRATING ORAL EVERY 30 MIN PRN
Status: DISCONTINUED | OUTPATIENT
Start: 2022-05-11 | End: 2022-05-15 | Stop reason: HOSPADM

## 2022-05-11 RX ORDER — LIDOCAINE 4 G/G
2 PATCH TOPICAL
Status: DISCONTINUED | OUTPATIENT
Start: 2022-05-11 | End: 2022-05-15 | Stop reason: HOSPADM

## 2022-05-11 RX ORDER — BUMETANIDE 0.25 MG/ML
2 INJECTION INTRAMUSCULAR; INTRAVENOUS EVERY 12 HOURS
Status: DISCONTINUED | OUTPATIENT
Start: 2022-05-11 | End: 2022-05-12

## 2022-05-11 RX ORDER — ONDANSETRON 2 MG/ML
4 INJECTION INTRAMUSCULAR; INTRAVENOUS EVERY 30 MIN PRN
Status: DISCONTINUED | OUTPATIENT
Start: 2022-05-11 | End: 2022-05-15 | Stop reason: HOSPADM

## 2022-05-11 RX ADMIN — CALCIUM GLUCONATE 1 G: 20 INJECTION, SOLUTION INTRAVENOUS at 11:28

## 2022-05-11 RX ADMIN — POLYETHYLENE GLYCOL 3350 17 G: 17 POWDER, FOR SOLUTION ORAL at 08:33

## 2022-05-11 RX ADMIN — ONDANSETRON 4 MG: 2 INJECTION INTRAMUSCULAR; INTRAVENOUS at 08:42

## 2022-05-11 RX ADMIN — INSULIN ASPART 2 UNITS: 100 INJECTION, SOLUTION INTRAVENOUS; SUBCUTANEOUS at 08:38

## 2022-05-11 RX ADMIN — ROSUVASTATIN CALCIUM 20 MG: 10 TABLET, FILM COATED ORAL at 08:32

## 2022-05-11 RX ADMIN — CEFAZOLIN SODIUM 2 G: 2 INJECTION, SOLUTION INTRAVENOUS at 03:13

## 2022-05-11 RX ADMIN — INSULIN ASPART 3 UNITS: 100 INJECTION, SOLUTION INTRAVENOUS; SUBCUTANEOUS at 12:24

## 2022-05-11 RX ADMIN — OXYCODONE HYDROCHLORIDE 10 MG: 5 TABLET ORAL at 03:24

## 2022-05-11 RX ADMIN — INSULIN HUMAN 4 UNITS/HR: 1 INJECTION, SOLUTION INTRAVENOUS at 06:33

## 2022-05-11 RX ADMIN — OXYCODONE HYDROCHLORIDE 5 MG: 5 TABLET ORAL at 16:10

## 2022-05-11 RX ADMIN — HYDROMORPHONE HYDROCHLORIDE 0.4 MG: 0.2 INJECTION, SOLUTION INTRAMUSCULAR; INTRAVENOUS; SUBCUTANEOUS at 06:42

## 2022-05-11 RX ADMIN — BUMETANIDE 4 MG: 2 TABLET ORAL at 14:10

## 2022-05-11 RX ADMIN — HEPARIN SODIUM 5000 UNITS: 10000 INJECTION, SOLUTION INTRAVENOUS; SUBCUTANEOUS at 11:28

## 2022-05-11 RX ADMIN — INSULIN GLARGINE 25 UNITS: 100 INJECTION, SOLUTION SUBCUTANEOUS at 08:38

## 2022-05-11 RX ADMIN — HEPARIN SODIUM 5000 UNITS: 10000 INJECTION, SOLUTION INTRAVENOUS; SUBCUTANEOUS at 20:04

## 2022-05-11 RX ADMIN — ACETAMINOPHEN 975 MG: 325 TABLET ORAL at 00:00

## 2022-05-11 RX ADMIN — MAGNESIUM SULFATE IN WATER 2 G: 40 INJECTION, SOLUTION INTRAVENOUS at 06:14

## 2022-05-11 RX ADMIN — SENNOSIDES AND DOCUSATE SODIUM 1 TABLET: 8.6; 5 TABLET ORAL at 08:32

## 2022-05-11 RX ADMIN — FUROSEMIDE 40 MG: 10 INJECTION, SOLUTION INTRAMUSCULAR; INTRAVENOUS at 08:37

## 2022-05-11 RX ADMIN — CALCIUM GLUCONATE 1 G: 20 INJECTION, SOLUTION INTRAVENOUS at 05:09

## 2022-05-11 RX ADMIN — ASPIRIN 81 MG CHEWABLE TABLET 162 MG: 81 TABLET CHEWABLE at 08:32

## 2022-05-11 RX ADMIN — ACETAMINOPHEN 975 MG: 325 TABLET ORAL at 16:07

## 2022-05-11 RX ADMIN — CALCIUM GLUCONATE 1 G: 20 INJECTION, SOLUTION INTRAVENOUS at 17:39

## 2022-05-11 RX ADMIN — HYDROMORPHONE HYDROCHLORIDE 0.4 MG: 0.2 INJECTION, SOLUTION INTRAMUSCULAR; INTRAVENOUS; SUBCUTANEOUS at 04:38

## 2022-05-11 RX ADMIN — KETOROLAC TROMETHAMINE 30 MG: 30 INJECTION, SOLUTION INTRAMUSCULAR at 09:05

## 2022-05-11 RX ADMIN — METOPROLOL TARTRATE 25 MG: 25 TABLET, FILM COATED ORAL at 08:33

## 2022-05-11 RX ADMIN — METOPROLOL TARTRATE 12.5 MG: 25 TABLET, FILM COATED ORAL at 14:10

## 2022-05-11 RX ADMIN — ACETAMINOPHEN 975 MG: 325 TABLET ORAL at 08:31

## 2022-05-11 RX ADMIN — PANTOPRAZOLE SODIUM 40 MG: 40 TABLET, DELAYED RELEASE ORAL at 08:32

## 2022-05-11 RX ADMIN — NICARDIPINE HYDROCHLORIDE 15 MG/HR: 0.2 INJECTION, SOLUTION INTRAVENOUS at 07:23

## 2022-05-11 RX ADMIN — OXYCODONE HYDROCHLORIDE 5 MG: 5 TABLET ORAL at 11:28

## 2022-05-11 RX ADMIN — LIDOCAINE 2 PATCH: 246 PATCH TOPICAL at 08:38

## 2022-05-11 RX ADMIN — OXYCODONE HYDROCHLORIDE 5 MG: 5 TABLET ORAL at 00:00

## 2022-05-11 RX ADMIN — ACETAMINOPHEN 975 MG: 325 TABLET ORAL at 23:53

## 2022-05-11 RX ADMIN — METOPROLOL TARTRATE 25 MG: 25 TABLET, FILM COATED ORAL at 20:04

## 2022-05-11 RX ADMIN — CEFAZOLIN SODIUM 2 G: 2 INJECTION, SOLUTION INTRAVENOUS at 12:23

## 2022-05-11 RX ADMIN — SENNOSIDES AND DOCUSATE SODIUM 1 TABLET: 8.6; 5 TABLET ORAL at 20:05

## 2022-05-11 RX ADMIN — INSULIN ASPART 3 UNITS: 100 INJECTION, SOLUTION INTRAVENOUS; SUBCUTANEOUS at 17:22

## 2022-05-11 ASSESSMENT — ACTIVITIES OF DAILY LIVING (ADL)
ADLS_ACUITY_SCORE: 28
DEPENDENT_IADLS:: INDEPENDENT
ADLS_ACUITY_SCORE: 28

## 2022-05-11 NOTE — PROGRESS NOTES
SPIRITUAL HEALTH SERVICES NOTE  [facility], ICU    SPIRITUAL CARE NOTE    Follow-up visit with Mir. He was laying flat on his back and was quiet, but engaged in brief conversation. Mir says that he is pleased with how surgery went. He feels that that the pre-op video prepared him well. He notes that it was unpleasant waking up with a tube in his throat, but is otherwise doing well today. He has asked his children to be extra supportive of his wife while he recovers, knowing that this is hard for her. Mir is hoping to walk later today and welcomed my offer of prayer.     Visit Length: 10 minutes    Plan of Care: Will remain available for further support as patient/family needs/desires.    Jennifer Gayle M.Div.      Office: [phone] (for non-urgent requests)  Please Vocera or page through [facility] for time-sensitive requests

## 2022-05-11 NOTE — PROGRESS NOTES
Intensivist brief update    Pt extubated yesterday, doing well.  On some nicardipine.  He was up with PT this morning    Management per CV surgery  Pulmonary/critical care team will sign off    Please call with additional questions.    Jorden (Shaun) MD Ruf[place]  [facility]monary [facility]  Clinic [phone]  Fax [phone]

## 2022-05-11 NOTE — PLAN OF CARE
Problem: Fluid and Electrolyte Imbalance (Cardiovascular Surgery)  Goal: Fluid and Electrolyte Balance (Cardiovascular Surgery)  Outcome: Ongoing, Progressing   Goal Outcome Evaluation:             Replaced Ionized calcium and Mag per protocol.        Problem: Pain (Cardiovascular Surgery)  Goal: Acceptable Pain Control  Outcome: Ongoing, Progressing  Intervention: Prevent or Manage Pain  Recent Flowsheet Documentation  Taken 5/11/2022 0438 by Racheal Caro, RN  Pain Management Interventions:   medication (see MAR)   emotional support   pillow support provided   relaxation techniques promoted  Taken 5/11/2022 0324 by Racheal Caro RN  Pain Management Interventions:   medication (see MAR)   emotional support   pillow support provided   quiet environment facilitated   repositioned   therapeutic presence  Taken 5/11/2022 0000 by Racheal Caro RN  Pain Management Interventions:   medication (see MAR)   emotional support   pillow support provided        Very afraid to take a deep breath due to incisional pain. Shallow breathing, increased Oxygen need up to 6lpm/NC.Follow up PCXR done . Needed a lot of education and encouragement to breath effectively after controlling pain.Can only do 500-750 ml on his Incentive Spirometer.Systolic Blood Pressure increased  in the 140's during pain and anxiety getting up. Started Nicardipine gtt when confirmed no longer related to pain. Currently at 15 mg/hr. SBP now in the 120's.

## 2022-05-11 NOTE — PLAN OF CARE
Problem: Respiratory Compromise (Cardiovascular Surgery)  Goal: Effective Oxygenation and Ventilation (Cardiovascular Surgery)  Outcome: Ongoing, Progressing  Intervention: Promote Airway Secretion Clearance  Recent Flowsheet Documentation  Taken 5/11/2022 1106 by Paola Amaya, RT  Administration (IS): instruction provided, follow-up  Level Incentive Spirometer (mL): 1500  Cough And Deep Breathing: done independently per patient  Number of Repetitions (IS): 10  Patient Tolerance (IS): good  Taken 5/11/2022 0740 by Paola Amaya, RT  Administration (IS): instruction provided, follow-up  Level Incentive Spirometer (mL): 1250  Cough And Deep Breathing: done with encouragement  Number of Repetitions (IS): 10  Patient Tolerance (IS): good  Paola Amaya, RT

## 2022-05-11 NOTE — PROGRESS NOTES
O2 2 lpm/ NC, SpO2 96 %, BS clear bilat, flutter valve performed x 6 min, good technique. Strong dry npc. RT to assist with flutter valve until pt performing routinely on his own.

## 2022-05-11 NOTE — PLAN OF CARE
Goal Outcome Evaluation:   Pt is improving.  Pain is manageable with scheduled and PRN's. Pt up in chair for meals and walked around the unit today with PT, VSS. Lung sounds have some crackles in the bases, encouraging deep breathing and IS. Drips d/c'd and lines removed. CT's in place with serosanguinous output. Connor in place, strict I/O's. Pt tolerating a clear liquid diet with some intermittent nausea.

## 2022-05-11 NOTE — CONSULTS
Care Management Initial Consult    General Information  Assessment completed with: Mir Loja  Type of CM/SW Visit: Initial Assessment    Primary Care Provider verified and updated as needed: Yes   Readmission within the last 30 days: no previous admission in last 30 days      Reason for Consult: discharge planning  Advance Care Planning: Advance Care Planning Reviewed: no concerns identified          Communication Assessment  Patient's communication style: spoken language (English or Bilingual)    Hearing Difficulty or Deaf: yes (has hearing aide for left ear, is at home, states able to hear OK without it)   Wear Glasses or Blind: yes    Cognitive  Cognitive/Neuro/Behavioral: WDL  Level of Consciousness: alert  Arousal Level: opens eyes spontaneously  Orientation: oriented x 4  Mood/Behavior: calm, cooperative  Best Language: 0 - No aphasia  Speech: hoarse    Living Environment:   People in home: spouse  Sofia  Current living Arrangements: house      Able to return to prior arrangements: yes       Family/Social Support:  Care provided by: self  Provides care for: no one  Marital Status:   Wife  Sofia       Description of Support System: Supportive, Involved    Support Assessment: Adequate family and caregiver support    Current Resources:   Patient receiving home care services: No     Community Resources: None  Equipment currently used at home: none  Supplies currently used at home:      Employment/Financial:  Employment Status: retired     Employment/ Comments: Was in   Financial Concerns: No concerns identified   Referral to Financial Worker: No       Lifestyle & Psychosocial Needs:  Social Determinants of Health     Tobacco Use: Low Risk      Smoking Tobacco Use: Never Smoker     Smokeless Tobacco Use: Never Used   Alcohol Use: Not on file   Financial Resource Strain: Not on file   Food Insecurity: Not on file   Transportation Needs: Not on file   Physical Activity: Not on file    Stress: Not on file   Social Connections: Not on file   Intimate Partner Violence: Not on file   Depression: Not at risk     PHQ-2 Score: 0   Housing Stability: Not on file       Functional Status:  Prior to admission patient needed assistance:   Dependent ADLs:: Independent  Dependent IADLs:: Independent       Mental Health Status:  Mental Health Status: No Current Concerns       Chemical Dependency Status:  Chemical Dependency Status: No Current Concerns             Values/Beliefs:  Spiritual, Cultural Beliefs, Religion Practices, Values that affect care: no               Additional Information:  Met with patient in room to introduce care manager role and discuss discharge planning. From home with spouse; independent at baseline, is retired. Goal to return home at discharge. Spouse can likely transport. Care manager to follow.     Louisa Robbins RN

## 2022-05-11 NOTE — PROGRESS NOTES
CVTS Daily Progress Note   POD#1 s/p CABGx3  Attending: Amrit  LOS: 1    SUBJECTIVE/INTERVAL EVENTS:    Patient arrived to ICU from OR yesterday afternoon. He was subsequently extubated and weaned from pressors. On nicardipine this AM for HTN; normotensive. No acute events overnight. Patient progressing well. Maintaining oxygen saturations on nasal cannula. Up to chair this AM. Pain well controlled although does endorse some surgical pain. - BM / flatus. Tolerating diet without nausea. UOP adequate. Chest tube output appropriate. Hgb 13.4. Patient denies new chest pain, shortness of breath, abdominal pain, calf pain, nausea. Patient has no questions today.    OBJECTIVE:  Temp:  [96.4  F (35.8  C)-101.1  F (38.4  C)] 98.1  F (36.7  C)  Pulse:  [] 74  Resp:  [14-20] 16  BP: (120-171)/(65-73) 120/68  MAP:  [62 mmHg-93 mmHg] 71 mmHg  Arterial Line BP: ()/(49-71) 123/51  FiO2 (%):  [40 %-60 %] 40 %  SpO2:  [88 %-100 %] 93 %  Vent Mode: (S) CPAP/PS  (Continuous positive airway pressure with Pressure Support)  FiO2 (%): 40 %  Resp Rate (Set): 16 breaths/min  Tidal Volume (Set, mL): 570 mL  PEEP (cm H2O): 5 cmH2O  Pressure Support (cm H2O): (S) 5 cmH2O  Resp: 16    Vitals:    05/10/22 0626 05/11/22 0545   Weight: 101.1 kg (222 lb 14.4 oz) 104.4 kg (230 lb 1.6 oz)       Current Medications:    Scheduled Meds:    acetaminophen  975 mg Oral Q8H     aspirin  162 mg Oral or NG Tube Daily     ceFAZolin  2 g Intravenous Q8H     furosemide  40 mg Intravenous TID     heparin ANTICOAGULANT  5,000 Units Subcutaneous Q8H     insulin aspart  1-7 Units Subcutaneous TID AC     insulin aspart  1-5 Units Subcutaneous At Bedtime     insulin glargine  25 Units Subcutaneous Once     metoprolol tartrate  12.5 mg Oral TID     metoprolol tartrate  25 mg Oral BID     pantoprazole  40 mg Oral or NG Tube Daily    Or     pantoprazole  40 mg Oral Daily     polyethylene glycol  17 g Oral Daily     rosuvastatin  20 mg Oral Daily      senna-docusate  1 tablet Oral BID     sodium chloride (PF)  3 mL Intracatheter Q8H     Continuous Infusions:    EPINEPHrine Stopped (05/10/22 2015)     niCARdipine 15 mg/hr (05/11/22 0723)     phenylephrine Stopped (05/11/22 0020)     sodium chloride 25 mL/hr (05/11/22 0400)     vasopressin       PRN Meds:.[START ON 5/13/2022] acetaminophen, bisacodyl, calcium gluconate, calcium gluconate, calcium gluconate, glucose **OR** dextrose **OR** glucagon, hydrALAZINE, HYDROmorphone **OR** HYDROmorphone, lactated ringers, lidocaine 4%, lidocaine (buffered or not buffered), magnesium hydroxide, montelukast, naloxone **OR** naloxone **OR** naloxone **OR** naloxone, niCARdipine, ondansetron **OR** ondansetron, oxyCODONE **OR** oxyCODONE, prochlorperazine **OR** prochlorperazine, sodium chloride (PF), vasopressin    Cardiographics:    Telemetry monitoring demonstrates NSR with rates in the 70s per my personal review.    Imaging:  Results for orders placed or performed during the hospital encounter of 05/10/22   XR Chest Port 1 View    Impression    IMPRESSION: Sternotomy. Tracheostomy tube 3.5 cm from the artur. [place]-Rusty catheter in the right pulmonary outflow tract. Mediastinal drain and left chest tube. Mild pneumomediastinum. Minimal atelectasis left lower lobe. Lungs otherwise expanded and   clear. No pneumothorax.   XR Chest Port 1 View    Impression    IMPRESSION: Since comparative chest radiograph the ETT has been removed, otherwise the chest is stable with the remaining appliances in good position with no pneumothorax. There is some slight atelectasis seen in the left lung base laterally.       Labs, personally reviewed.  Hemoglobin   Date Value Ref Range Status   05/11/2022 13.4 13.3 - 17.7 g/dL Final   05/10/2022 14.3 13.3 - 17.7 g/dL Final   05/10/2022 10.9 (L) 13.3 - 17.7 g/dL Final     WBC Count   Date Value Ref Range Status   05/11/2022 7.7 4.0 - 11.0 10e3/uL Final   05/10/2022 9.1 4.0 - 11.0 10e3/uL Final    05/10/2022 10.7 4.0 - 11.0 10e3/uL Final     Platelet Count   Date Value Ref Range Status   05/11/2022 90 (L) 150 - 450 10e3/uL Final   05/10/2022 81 (L) 150 - 450 10e3/uL Final   05/10/2022 102 (L) 150 - 450 10e3/uL Final     Creatinine   Date Value Ref Range Status   05/11/2022 0.87 0.70 - 1.30 mg/dL Final   05/10/2022 0.97 0.70 - 1.30 mg/dL Final   04/08/2022 0.77 0.70 - 1.30 mg/dL Final     Potassium   Date Value Ref Range Status   05/11/2022 4.1 3.5 - 5.0 mmol/L Final   05/10/2022 4.0 3.5 - 5.0 mmol/L Final   05/10/2022 4.0 3.5 - 5.0 mmol/L Final     Magnesium   Date Value Ref Range Status   05/11/2022 1.9 1.8 - 2.6 mg/dL Final   05/10/2022 3.6 (H) 1.8 - 2.6 mg/dL Final   04/29/2022 1.8 1.8 - 2.6 mg/dL Final          I/O:  I/O last 3 completed shifts:  In: 5689.74 [P.O.:960; I.V.:3284.74; Other:695; IV Piggyback:750]  Out: 4030 [Urine:2880; Blood:400; Chest Tube:750]       Physical Exam:    General: Patient seen up in chair. NAD. Conversant. Pleasant.   CV: RRR on monitor. 2+ peripheral pulses in all extremities. Mild edema.   Pulm: Non-labored effort on nasal cannula. Chest tubes in place, serosanguinous output, no air leak. Incision C/D/I.  Abd: Soft, NT, ND  : Connor with pj urine  Ext: Mild pedal edema, SCDs in place, warm, distal pulses intact  Neuro: CNs grossly intact.      ASSESSMENT/PLAN:    Mir Treviño Sr. is a 62 year old male with a history of CAD who is POD#1 s/p CABGx3.    Active Problems:    Heart failure with reduced ejection fraction (H)    Coronary artery disease of native artery of native heart with stable angina pectoris (H)        NEURO:   - Scheduled Tylenol and PRN Toradol/lidocaine patches/oxycodone/dilaudid for pain    CV:   - Normotensive on nicardipine  - Metoprolol 25mg two times a day with extra doses available  - ASA 162mg  - Rosuvastatin 20mg daily  - Chest tubes to remain today    PULM:   - Maintaining oxygen saturations on nasal cannula  - Encourage pulmonary toilet  -  PRN Singulair    FEN/GI:  - Continue electrolyte replacement protocol  - Diet: Cardiac, ADAT   - Bowel regimen    RENAL:  - Adequate UOP/hr. Continue to monitor closely via milner.   - Cr 0.87  - Milner to remain in for close monitoring of I/O and during period of diuresis/relative immobility.  - Diuresis with 40mg IV Lasix three times a day     HEME:  - Acute blood loss anemia post-op.   - Hgb stable, no bleeding concerns. Hep SQ, ASA    ID:  - Brittani op ppx complete, afebrile. No concerns for infection    ENDO:   - Transition to SSI    PPx:   - DVT: SCDs, SQ heparin TID, ambulation   - GI: Protonix 40mg PO daily    DISPO:   - Transfer to general telemetry unit when nicardipine off.      Patient discussed with Dr. Marcus.        _______  COLLEEN SmithC  Cardiothoracic Surgery  [phone]

## 2022-05-11 NOTE — TREATMENT PLAN
RCAT Treatment Plan    Patient Score: 10  Patient Acuity: 4    Clinical Indication for Therapy: prevent atelectasis    Therapy Ordered: Flutter Valve QID, IS Q1 w/a    Assessment Summary: RCAT done per protocol. BS diminished, 6L NC sats 93%, LK=6175-1791, flutter valve given as scheduled, titrate oxygen as tolerated, encourage pt. to take deep breaths and cough. RCAT re-eval 5/12.      Paola Amaya, RT  5/11/2022

## 2022-05-12 ENCOUNTER — APPOINTMENT (OUTPATIENT)
Dept: OCCUPATIONAL THERAPY | Facility: HOSPITAL | Age: 62
End: 2022-05-12
Attending: THORACIC SURGERY (CARDIOTHORACIC VASCULAR SURGERY)
Payer: COMMERCIAL

## 2022-05-12 ENCOUNTER — TELEPHONE (OUTPATIENT)
Dept: CARDIOLOGY | Facility: CLINIC | Age: 62
End: 2022-05-12

## 2022-05-12 LAB
ANION GAP SERPL CALCULATED.3IONS-SCNC: 5 MMOL/L (ref 5–18)
BUN SERPL-MCNC: 27 MG/DL (ref 8–22)
CALCIUM SERPL-MCNC: 8.4 MG/DL (ref 8.5–10.5)
CALCIUM, IONIZED MEASURED: 1.03 MMOL/L (ref 1.11–1.3)
CALCIUM, IONIZED MEASURED: 1.07 MMOL/L (ref 1.11–1.3)
CALCIUM, IONIZED MEASURED: 1.11 MMOL/L (ref 1.11–1.3)
CHLORIDE BLD-SCNC: 103 MMOL/L (ref 98–107)
CO2 SERPL-SCNC: 28 MMOL/L (ref 22–31)
CREAT SERPL-MCNC: 0.96 MG/DL (ref 0.7–1.3)
GFR SERPL CREATININE-BSD FRML MDRD: 90 ML/MIN/1.73M2
GLUCOSE BLD-MCNC: 158 MG/DL (ref 70–125)
GLUCOSE BLDC GLUCOMTR-MCNC: 166 MG/DL (ref 70–99)
GLUCOSE BLDC GLUCOMTR-MCNC: 198 MG/DL (ref 70–99)
GLUCOSE BLDC GLUCOMTR-MCNC: 201 MG/DL (ref 70–99)
GLUCOSE BLDC GLUCOMTR-MCNC: 307 MG/DL (ref 70–99)
HOLD SPECIMEN: NORMAL
ION CA PH 7.4: 1.04 MMOL/L (ref 1.11–1.3)
ION CA PH 7.4: 1.09 MMOL/L (ref 1.11–1.3)
ION CA PH 7.4: 1.12 MMOL/L (ref 1.11–1.3)
MAGNESIUM SERPL-MCNC: 2.1 MG/DL (ref 1.8–2.6)
MAGNESIUM SERPL-MCNC: 2.1 MG/DL (ref 1.8–2.6)
PH: 7.42 (ref 7.35–7.45)
PH: 7.42 (ref 7.35–7.45)
PH: 7.43 (ref 7.35–7.45)
PHOSPHATE SERPL-MCNC: 2.3 MG/DL (ref 2.5–4.5)
POTASSIUM BLD-SCNC: 4 MMOL/L (ref 3.5–5)
SODIUM SERPL-SCNC: 136 MMOL/L (ref 136–145)

## 2022-05-12 PROCEDURE — 36415 COLL VENOUS BLD VENIPUNCTURE: CPT | Performed by: PHYSICIAN ASSISTANT

## 2022-05-12 PROCEDURE — 36415 COLL VENOUS BLD VENIPUNCTURE: CPT | Performed by: THORACIC SURGERY (CARDIOTHORACIC VASCULAR SURGERY)

## 2022-05-12 PROCEDURE — 82330 ASSAY OF CALCIUM: CPT | Performed by: THORACIC SURGERY (CARDIOTHORACIC VASCULAR SURGERY)

## 2022-05-12 PROCEDURE — 83735 ASSAY OF MAGNESIUM: CPT | Performed by: PHYSICIAN ASSISTANT

## 2022-05-12 PROCEDURE — 250N000013 HC RX MED GY IP 250 OP 250 PS 637: Performed by: THORACIC SURGERY (CARDIOTHORACIC VASCULAR SURGERY)

## 2022-05-12 PROCEDURE — 999N000157 HC STATISTIC RCP TIME EA 10 MIN

## 2022-05-12 PROCEDURE — 250N000013 HC RX MED GY IP 250 OP 250 PS 637: Performed by: PHYSICIAN ASSISTANT

## 2022-05-12 PROCEDURE — 82330 ASSAY OF CALCIUM: CPT | Performed by: PHYSICIAN ASSISTANT

## 2022-05-12 PROCEDURE — 80048 BASIC METABOLIC PNL TOTAL CA: CPT | Performed by: PHYSICIAN ASSISTANT

## 2022-05-12 PROCEDURE — 97110 THERAPEUTIC EXERCISES: CPT | Mod: GO

## 2022-05-12 PROCEDURE — 83735 ASSAY OF MAGNESIUM: CPT | Performed by: THORACIC SURGERY (CARDIOTHORACIC VASCULAR SURGERY)

## 2022-05-12 PROCEDURE — 210N000001 HC R&B IMCU HEART CARE

## 2022-05-12 PROCEDURE — 250N000011 HC RX IP 250 OP 636: Performed by: PHYSICIAN ASSISTANT

## 2022-05-12 PROCEDURE — 84100 ASSAY OF PHOSPHORUS: CPT | Performed by: PHYSICIAN ASSISTANT

## 2022-05-12 RX ORDER — METOPROLOL TARTRATE 25 MG/1
25 TABLET, FILM COATED ORAL ONCE
Status: COMPLETED | OUTPATIENT
Start: 2022-05-12 | End: 2022-05-12

## 2022-05-12 RX ORDER — BUMETANIDE 2 MG/1
4 TABLET ORAL DAILY
Status: DISCONTINUED | OUTPATIENT
Start: 2022-05-12 | End: 2022-05-12

## 2022-05-12 RX ORDER — BUMETANIDE 2 MG/1
4 TABLET ORAL DAILY
Status: DISCONTINUED | OUTPATIENT
Start: 2022-05-12 | End: 2022-05-15 | Stop reason: HOSPADM

## 2022-05-12 RX ORDER — METOPROLOL TARTRATE 25 MG/1
50 TABLET, FILM COATED ORAL 2 TIMES DAILY
Status: DISCONTINUED | OUTPATIENT
Start: 2022-05-12 | End: 2022-05-13

## 2022-05-12 RX ORDER — BUMETANIDE 2 MG/1
4 TABLET ORAL ONCE
Status: DISCONTINUED | OUTPATIENT
Start: 2022-05-12 | End: 2022-05-12

## 2022-05-12 RX ADMIN — SENNOSIDES AND DOCUSATE SODIUM 1 TABLET: 8.6; 5 TABLET ORAL at 20:29

## 2022-05-12 RX ADMIN — INSULIN ASPART 1 UNITS: 100 INJECTION, SOLUTION INTRAVENOUS; SUBCUTANEOUS at 08:26

## 2022-05-12 RX ADMIN — ACETAMINOPHEN 975 MG: 325 TABLET ORAL at 16:14

## 2022-05-12 RX ADMIN — POTASSIUM & SODIUM PHOSPHATES POWDER PACK 280-160-250 MG 1 PACKET: 280-160-250 PACK at 11:17

## 2022-05-12 RX ADMIN — INSULIN ASPART 2 UNITS: 100 INJECTION, SOLUTION INTRAVENOUS; SUBCUTANEOUS at 12:42

## 2022-05-12 RX ADMIN — METOPROLOL TARTRATE 25 MG: 25 TABLET, FILM COATED ORAL at 08:20

## 2022-05-12 RX ADMIN — HEPARIN SODIUM 5000 UNITS: 10000 INJECTION, SOLUTION INTRAVENOUS; SUBCUTANEOUS at 20:29

## 2022-05-12 RX ADMIN — METOPROLOL TARTRATE 50 MG: 25 TABLET, FILM COATED ORAL at 20:29

## 2022-05-12 RX ADMIN — PANTOPRAZOLE SODIUM 40 MG: 40 TABLET, DELAYED RELEASE ORAL at 08:20

## 2022-05-12 RX ADMIN — LIDOCAINE 1 PATCH: 246 PATCH TOPICAL at 08:30

## 2022-05-12 RX ADMIN — POLYETHYLENE GLYCOL 3350 17 G: 17 POWDER, FOR SOLUTION ORAL at 08:21

## 2022-05-12 RX ADMIN — CALCIUM GLUCONATE 1 G: 20 INJECTION, SOLUTION INTRAVENOUS at 03:30

## 2022-05-12 RX ADMIN — METOPROLOL TARTRATE 25 MG: 25 TABLET, FILM COATED ORAL at 11:16

## 2022-05-12 RX ADMIN — METOPROLOL TARTRATE 12.5 MG: 25 TABLET, FILM COATED ORAL at 12:44

## 2022-05-12 RX ADMIN — INSULIN ASPART 4 UNITS: 100 INJECTION, SOLUTION INTRAVENOUS; SUBCUTANEOUS at 18:23

## 2022-05-12 RX ADMIN — POTASSIUM & SODIUM PHOSPHATES POWDER PACK 280-160-250 MG 1 PACKET: 280-160-250 PACK at 14:31

## 2022-05-12 RX ADMIN — SENNOSIDES AND DOCUSATE SODIUM 1 TABLET: 8.6; 5 TABLET ORAL at 08:20

## 2022-05-12 RX ADMIN — POTASSIUM & SODIUM PHOSPHATES POWDER PACK 280-160-250 MG 1 PACKET: 280-160-250 PACK at 20:29

## 2022-05-12 RX ADMIN — ACETAMINOPHEN 975 MG: 325 TABLET ORAL at 08:20

## 2022-05-12 RX ADMIN — ASPIRIN 81 MG CHEWABLE TABLET 162 MG: 81 TABLET CHEWABLE at 08:20

## 2022-05-12 RX ADMIN — BUMETANIDE 4 MG: 2 TABLET ORAL at 11:17

## 2022-05-12 RX ADMIN — OXYCODONE HYDROCHLORIDE 10 MG: 5 TABLET ORAL at 20:29

## 2022-05-12 RX ADMIN — METOPROLOL TARTRATE 12.5 MG: 25 TABLET, FILM COATED ORAL at 18:25

## 2022-05-12 RX ADMIN — HEPARIN SODIUM 5000 UNITS: 10000 INJECTION, SOLUTION INTRAVENOUS; SUBCUTANEOUS at 12:43

## 2022-05-12 RX ADMIN — OXYCODONE HYDROCHLORIDE 10 MG: 5 TABLET ORAL at 03:19

## 2022-05-12 RX ADMIN — HEPARIN SODIUM 5000 UNITS: 10000 INJECTION, SOLUTION INTRAVENOUS; SUBCUTANEOUS at 03:20

## 2022-05-12 RX ADMIN — ROSUVASTATIN CALCIUM 20 MG: 10 TABLET, FILM COATED ORAL at 08:20

## 2022-05-12 ASSESSMENT — ACTIVITIES OF DAILY LIVING (ADL)
ADLS_ACUITY_SCORE: 34
ADLS_ACUITY_SCORE: 28
ADLS_ACUITY_SCORE: 34

## 2022-05-12 NOTE — TELEPHONE ENCOUNTER
LM For patient to return my call to assist him in obtaining a f/u appt with Dr. Ari Salas RN BSN, CHFN

## 2022-05-12 NOTE — PROGRESS NOTES
"SPIRITUAL HEALTH SERVICES NOTE  [facility], ICU    SPIRITUAL ASSESSMENT  In good spirits  Encouraged by the progress he has made  Grateful for the little things in life     SPIRITUAL CARE NOTE  Follow-up visit with Mir. He was quiet, but engaged in brief conversation. He shares his wife was here early this morning and that he is hoping to have solid food for lunch. Mir said \"I would never have imagined before that Jello could taste so good.\" He also shared that he is encouraged by the fact that he can hear his heartbeat - something he has not experienced before. Mir anticipates a discharge on Sunday and says that feels achievable at this time. I encouraged Mir to be patient with himself as his body heals.     Visit Length: 10 minutes    Plan of Care: Will remain available for further support as patient/family needs/desires.    Jennifer Gayle M.Div.      Office: [phone] (for non-urgent requests)  Please Vocera or page through [facility] for time-sensitive requests    "

## 2022-05-12 NOTE — PROGRESS NOTES
RCAT Treatment Plan    Patient Score: 8  Patient Acuity: 4    Clinical Indication for Therapy: prevent atelectasis    Therapy Ordered: Flutter Valve, IS    Assessment Summary: Patient has independently performing flutter valve and IS therapy well. Patient has shown proper technique.       Zoe Son, RT  5/12/2022

## 2022-05-12 NOTE — PROGRESS NOTES
Care Management Follow Up    Length of Stay (days): 2    Expected Discharge Date: 05/15/2022     Concerns to be Addressed:     Cardiothoracic surgery clearance-Post operative day 2 CABG x3, Chest tubes,   Patient plan of care discussed at interdisciplinary rounds: Yes    Anticipated Discharge Disposition:  Goal home with outpatient cardiac rehab     Anticipated Discharge Services:  Therapy recommending outpatient cardiac rehab  Anticipated Discharge DME:  To be determined    Patient/family educated on Medicare website which has current facility and service quality ratings:  Not at this time  Education Provided on the Discharge Plan:  Yes, per team  Patient/Family in Agreement with the Plan:  Yes    Referrals Placed by CM/SW:  None at this time  Private pay costs discussed: Not applicable    Additional Information:  Chart Reviewed. Step down from ICU level of care. From home with spouse and independent at baseline. Therapy recommending outpatient cardiac rehab. Spouse to transport. Care manager to follow as needed.      Louisa Robbins RN

## 2022-05-12 NOTE — PROGRESS NOTES
CVTS Daily Progress Note   POD#1 s/p CABGx3  Attending: Amrit  LOS: 2    SUBJECTIVE/INTERVAL EVENTS:    No acute events overnight. Borderline HTN overnight. Patient progressing well. Maintaining oxygen saturations on room air this AM. Ambulating with therapy. Pain well controlled. - BM / flatus. Tolerating diet without nausea. UOP adequate. Chest tube output appropriate although possible tiny intermittent air leak. Patient denies new chest pain, shortness of breath, abdominal pain, calf pain, nausea. Patient has no questions today.    OBJECTIVE:  Temp:  [98  F (36.7  C)-99.1  F (37.3  C)] 98  F (36.7  C)  Pulse:  [61-82] 77  Resp:  [14-21] 16  BP: (115-143)/(57-73) 141/73  MAP:  [66 mmHg-79 mmHg] 72 mmHg  Arterial Line BP: (109-132)/(48-56) 124/51  SpO2:  [91 %-99 %] 92 %  Resp: 16    Vitals:    05/10/22 0626 05/11/22 0545 05/12/22 0511   Weight: 101.1 kg (222 lb 14.4 oz) 104.4 kg (230 lb 1.6 oz) 104.6 kg (230 lb 11.2 oz)       Current Medications:    Scheduled Meds:    acetaminophen  975 mg Oral Q8H     aspirin  162 mg Oral or NG Tube Daily     bumetanide  4 mg Oral Daily     heparin ANTICOAGULANT  5,000 Units Subcutaneous Q8H     insulin aspart  1-7 Units Subcutaneous TID AC     insulin aspart  1-5 Units Subcutaneous At Bedtime     lidocaine  2 patch Transdermal Q24H     lidocaine   Transdermal Q8H     metoprolol tartrate  12.5 mg Oral TID     metoprolol tartrate  25 mg Oral Once     metoprolol tartrate  50 mg Oral BID     pantoprazole  40 mg Oral or NG Tube Daily    Or     pantoprazole  40 mg Oral Daily     polyethylene glycol  17 g Oral Daily     potassium & sodium phosphates  1 packet Oral TID     rosuvastatin  20 mg Oral Daily     senna-docusate  1 tablet Oral BID     sodium chloride (PF)  3 mL Intracatheter Q8H     Continuous Infusions:    lactated ringers       sodium chloride 25 mL/hr (05/11/22 0400)     PRN Meds:.[START ON 5/13/2022] acetaminophen, bisacodyl, calcium gluconate, calcium gluconate, calcium  gluconate, glucose **OR** dextrose **OR** glucagon, hydrALAZINE, HYDROmorphone **OR** HYDROmorphone, ketorolac, lactated ringers, lidocaine 4%, lidocaine (buffered or not buffered), magnesium hydroxide, montelukast, naloxone **OR** naloxone **OR** naloxone **OR** naloxone, ondansetron **OR** ondansetron, ondansetron **OR** ondansetron, oxyCODONE **OR** oxyCODONE, prochlorperazine **OR** prochlorperazine, sodium chloride (PF)    Cardiographics:    Telemetry monitoring demonstrates NSR with rates in the 80s per my personal review.    Imaging:  Results for orders placed or performed during the hospital encounter of 05/10/22   XR Chest Port 1 View    Impression    IMPRESSION: Sternotomy. Tracheostomy tube 3.5 cm from the artur. [place]-Rusty catheter in the right pulmonary outflow tract. Mediastinal drain and left chest tube. Mild pneumomediastinum. Minimal atelectasis left lower lobe. Lungs otherwise expanded and   clear. No pneumothorax.   XR Chest Port 1 View    Impression    IMPRESSION: Since comparative chest radiograph the ETT has been removed, otherwise the chest is stable with the remaining appliances in good position with no pneumothorax. There is some slight atelectasis seen in the left lung base laterally.       Labs, personally reviewed.  Hemoglobin   Date Value Ref Range Status   05/11/2022 13.4 13.3 - 17.7 g/dL Final   05/10/2022 14.3 13.3 - 17.7 g/dL Final   05/10/2022 10.9 (L) 13.3 - 17.7 g/dL Final     WBC Count   Date Value Ref Range Status   05/11/2022 7.7 4.0 - 11.0 10e3/uL Final   05/10/2022 9.1 4.0 - 11.0 10e3/uL Final   05/10/2022 10.7 4.0 - 11.0 10e3/uL Final     Platelet Count   Date Value Ref Range Status   05/11/2022 90 (L) 150 - 450 10e3/uL Final   05/10/2022 81 (L) 150 - 450 10e3/uL Final   05/10/2022 102 (L) 150 - 450 10e3/uL Final     Creatinine   Date Value Ref Range Status   05/12/2022 0.96 0.70 - 1.30 mg/dL Final   05/11/2022 0.87 0.70 - 1.30 mg/dL Final   05/10/2022 0.97 0.70 - 1.30 mg/dL  Final     Potassium   Date Value Ref Range Status   05/12/2022 4.0 3.5 - 5.0 mmol/L Final   05/11/2022 4.1 3.5 - 5.0 mmol/L Final   05/10/2022 4.0 3.5 - 5.0 mmol/L Final   05/10/2022 4.0 3.5 - 5.0 mmol/L Final     Magnesium   Date Value Ref Range Status   05/12/2022 2.1 1.8 - 2.6 mg/dL Final   05/12/2022 2.1 1.8 - 2.6 mg/dL Final   05/11/2022 1.9 1.8 - 2.6 mg/dL Final          I/O:  I/O last 3 completed shifts:  In: 1472.3 [P.O.:840; I.V.:632.3]  Out: 2610 [Urine:2300; Chest Tube:310]       Physical Exam:    General: Patient seen in bed after cardiac rehab. NAD. Conversant. Pleasant.   CV: RRR on monitor. 2+ peripheral pulses in all extremities. Mild edema.   Pulm: Non-labored effort on room air. Chest tubes in place, serosanguinous output, +/- small air leak. Incision C/D/I.  Abd: Soft, NT, ND  : Krishna with pj urine  Ext: Mild pedal edema, SCDs in place, warm, distal pulses intact  Neuro: CNs grossly intact.      ASSESSMENT/PLAN:    Mir Treviño Sr. is a 62 year old male with a history of CAD who is POD#2 s/p CABGx3.    Active Problems:    Heart failure with reduced ejection fraction (H)    Coronary artery disease of native artery of native heart with stable angina pectoris (H)        NEURO:   - Scheduled Tylenol and PRN Toradol/lidocaine patches/oxycodone/dilaudid for pain    CV:   - Normotensive/borderline HTN  - Metoprolol 25mg two times a day increased to 50mg bid  - ASA 162mg  - Rosuvastatin 20mg daily  - Chest tubes to remain today  - Will need HF regimen and follow-up (scheduled)  - Repeat echo before discharge    PULM:   - Maintaining oxygen saturations on room air  - Encourage pulmonary toilet  - PRN Singulair    FEN/GI:  - Continue electrolyte replacement protocol  - Diet: Cardiac, ADAT   - Bowel regimen    RENAL:  - Adequate UOP/hr. Continue to monitor closely.  - Discontinue Krishna   - Cr 0.96  - Diuresis with Bumex 4mg daily (Lasix non-responder)     HEME:  - Acute blood loss anemia post-op.   -  No bleeding concerns. Hep SQ, ASA    ID:  - Brittani op ppx complete, afebrile. No concerns for infection    ENDO:   - sliding scale insulin  - Restart PTA Metformin when able.    PPx:   - DVT: SCDs, SQ heparin TID, ambulation   - GI: Protonix 40mg PO daily    DISPO:   -  General telemetry status.      Patient discussed with Dr. Marcus.        _______  COLLEEN SmithC  Cardiothoracic Surgery  [phone]

## 2022-05-12 NOTE — PLAN OF CARE
Goal Outcome Evaluation:        1600 to 1930.   Pt transferred over from ICU. Pt had a very busy day. Pt is exhausted. Took about an 1 to 1.5 hour nap. Tried to get pt to sit up in chair for supper. Pt was too tired. Will report off to next nurse to encourage pt to sit up in chair.  for supper time. Pt ate late supper. Otherwise no pain.

## 2022-05-12 NOTE — TELEPHONE ENCOUNTER
----- Message from Sari Chapman MD sent at 5/12/2022 10:52 AM CDT -----  Regarding: Outpatient follow-up  Marlene;  Can you help to schedule Mr. Treviño with me next week. OK to place in a morning spot.    Thanks;  ~ Nhung    ----- Message -----  From: Virgen Hoff PA-C  Sent: 5/12/2022  10:35 AM CDT  To: Sari Chapman MD    This is the bro for outpatient follow-up.    THANKS!

## 2022-05-13 ENCOUNTER — APPOINTMENT (OUTPATIENT)
Dept: OCCUPATIONAL THERAPY | Facility: HOSPITAL | Age: 62
End: 2022-05-13
Attending: THORACIC SURGERY (CARDIOTHORACIC VASCULAR SURGERY)
Payer: COMMERCIAL

## 2022-05-13 ENCOUNTER — APPOINTMENT (OUTPATIENT)
Dept: CARDIOLOGY | Facility: HOSPITAL | Age: 62
End: 2022-05-13
Attending: PHYSICIAN ASSISTANT
Payer: COMMERCIAL

## 2022-05-13 LAB
CALCIUM, IONIZED MEASURED: 1 MMOL/L (ref 1.11–1.3)
CALCIUM, IONIZED MEASURED: 1.01 MMOL/L (ref 1.11–1.3)
GLUCOSE BLDC GLUCOMTR-MCNC: 121 MG/DL (ref 70–99)
GLUCOSE BLDC GLUCOMTR-MCNC: 125 MG/DL (ref 70–99)
GLUCOSE BLDC GLUCOMTR-MCNC: 127 MG/DL (ref 70–99)
GLUCOSE BLDC GLUCOMTR-MCNC: 150 MG/DL (ref 70–99)
HOLD SPECIMEN: NORMAL
ION CA PH 7.4: 0.98 MMOL/L (ref 1.11–1.3)
ION CA PH 7.4: 1.05 MMOL/L (ref 1.11–1.3)
LVEF ECHO: NORMAL
MAGNESIUM SERPL-MCNC: 1.9 MG/DL (ref 1.8–2.6)
PH: 7.34 (ref 7.35–7.45)
PH: 7.48 (ref 7.35–7.45)
PHOSPHATE SERPL-MCNC: 1.9 MG/DL (ref 2.5–4.5)
POTASSIUM BLD-SCNC: 4.3 MMOL/L (ref 3.5–5)

## 2022-05-13 PROCEDURE — 999N000208 ECHOCARDIOGRAM COMPLETE

## 2022-05-13 PROCEDURE — 82330 ASSAY OF CALCIUM: CPT | Performed by: PHYSICIAN ASSISTANT

## 2022-05-13 PROCEDURE — 84100 ASSAY OF PHOSPHORUS: CPT | Performed by: PHYSICIAN ASSISTANT

## 2022-05-13 PROCEDURE — 210N000001 HC R&B IMCU HEART CARE

## 2022-05-13 PROCEDURE — 250N000013 HC RX MED GY IP 250 OP 250 PS 637: Performed by: PHYSICIAN ASSISTANT

## 2022-05-13 PROCEDURE — 250N000011 HC RX IP 250 OP 636: Performed by: PHYSICIAN ASSISTANT

## 2022-05-13 PROCEDURE — 84132 ASSAY OF SERUM POTASSIUM: CPT | Performed by: THORACIC SURGERY (CARDIOTHORACIC VASCULAR SURGERY)

## 2022-05-13 PROCEDURE — 97110 THERAPEUTIC EXERCISES: CPT | Mod: GO

## 2022-05-13 PROCEDURE — 250N000013 HC RX MED GY IP 250 OP 250 PS 637: Performed by: THORACIC SURGERY (CARDIOTHORACIC VASCULAR SURGERY)

## 2022-05-13 PROCEDURE — 255N000002 HC RX 255 OP 636: Performed by: THORACIC SURGERY (CARDIOTHORACIC VASCULAR SURGERY)

## 2022-05-13 PROCEDURE — 36415 COLL VENOUS BLD VENIPUNCTURE: CPT | Performed by: PHYSICIAN ASSISTANT

## 2022-05-13 PROCEDURE — 93306 TTE W/DOPPLER COMPLETE: CPT | Mod: 26 | Performed by: INTERNAL MEDICINE

## 2022-05-13 PROCEDURE — 83735 ASSAY OF MAGNESIUM: CPT | Performed by: THORACIC SURGERY (CARDIOTHORACIC VASCULAR SURGERY)

## 2022-05-13 RX ORDER — CALCIUM GLUCONATE 20 MG/ML
1 INJECTION, SOLUTION INTRAVENOUS ONCE
Status: COMPLETED | OUTPATIENT
Start: 2022-05-13 | End: 2022-05-13

## 2022-05-13 RX ORDER — MAGNESIUM OXIDE 400 MG/1
400 TABLET ORAL 2 TIMES DAILY
Status: COMPLETED | OUTPATIENT
Start: 2022-05-13 | End: 2022-05-14

## 2022-05-13 RX ORDER — LISINOPRIL 5 MG/1
5 TABLET ORAL DAILY
Status: DISCONTINUED | OUTPATIENT
Start: 2022-05-13 | End: 2022-05-15 | Stop reason: HOSPADM

## 2022-05-13 RX ORDER — METOPROLOL TARTRATE 25 MG/1
75 TABLET, FILM COATED ORAL 2 TIMES DAILY
Status: DISCONTINUED | OUTPATIENT
Start: 2022-05-13 | End: 2022-05-13

## 2022-05-13 RX ORDER — METOPROLOL TARTRATE 25 MG/1
25 TABLET, FILM COATED ORAL 3 TIMES DAILY
Status: DISCONTINUED | OUTPATIENT
Start: 2022-05-13 | End: 2022-05-13

## 2022-05-13 RX ORDER — METOPROLOL TARTRATE 25 MG/1
50 TABLET, FILM COATED ORAL 2 TIMES DAILY
Status: DISCONTINUED | OUTPATIENT
Start: 2022-05-13 | End: 2022-05-15 | Stop reason: HOSPADM

## 2022-05-13 RX ADMIN — ASPIRIN 81 MG CHEWABLE TABLET 162 MG: 81 TABLET CHEWABLE at 08:00

## 2022-05-13 RX ADMIN — POTASSIUM & SODIUM PHOSPHATES POWDER PACK 280-160-250 MG 2 PACKET: 280-160-250 PACK at 21:25

## 2022-05-13 RX ADMIN — ACETAMINOPHEN 975 MG: 325 TABLET ORAL at 08:00

## 2022-05-13 RX ADMIN — LISINOPRIL 5 MG: 5 TABLET ORAL at 10:35

## 2022-05-13 RX ADMIN — HEPARIN SODIUM 5000 UNITS: 10000 INJECTION, SOLUTION INTRAVENOUS; SUBCUTANEOUS at 19:32

## 2022-05-13 RX ADMIN — PANTOPRAZOLE SODIUM 40 MG: 40 TABLET, DELAYED RELEASE ORAL at 08:00

## 2022-05-13 RX ADMIN — CALCIUM GLUCONATE 1 G: 20 INJECTION, SOLUTION INTRAVENOUS at 01:09

## 2022-05-13 RX ADMIN — PERFLUTREN 3 ML: 6.52 INJECTION, SUSPENSION INTRAVENOUS at 15:52

## 2022-05-13 RX ADMIN — POTASSIUM & SODIUM PHOSPHATES POWDER PACK 280-160-250 MG 2 PACKET: 280-160-250 PACK at 10:35

## 2022-05-13 RX ADMIN — Medication 400 MG: at 21:24

## 2022-05-13 RX ADMIN — CALCIUM GLUCONATE 1 G: 20 INJECTION, SOLUTION INTRAVENOUS at 06:50

## 2022-05-13 RX ADMIN — LIDOCAINE 2 PATCH: 246 PATCH TOPICAL at 07:59

## 2022-05-13 RX ADMIN — ACETAMINOPHEN 975 MG: 325 TABLET ORAL at 01:10

## 2022-05-13 RX ADMIN — POLYETHYLENE GLYCOL 3350 17 G: 17 POWDER, FOR SOLUTION ORAL at 08:01

## 2022-05-13 RX ADMIN — CALCIUM GLUCONATE 1 G: 20 INJECTION, SOLUTION INTRAVENOUS at 15:18

## 2022-05-13 RX ADMIN — BUMETANIDE 4 MG: 2 TABLET ORAL at 08:00

## 2022-05-13 RX ADMIN — Medication 400 MG: at 08:00

## 2022-05-13 RX ADMIN — METOPROLOL TARTRATE 50 MG: 25 TABLET, FILM COATED ORAL at 21:25

## 2022-05-13 RX ADMIN — HEPARIN SODIUM 5000 UNITS: 10000 INJECTION, SOLUTION INTRAVENOUS; SUBCUTANEOUS at 12:06

## 2022-05-13 RX ADMIN — METOPROLOL TARTRATE 50 MG: 25 TABLET, FILM COATED ORAL at 08:01

## 2022-05-13 RX ADMIN — HEPARIN SODIUM 5000 UNITS: 10000 INJECTION, SOLUTION INTRAVENOUS; SUBCUTANEOUS at 05:12

## 2022-05-13 RX ADMIN — SENNOSIDES AND DOCUSATE SODIUM 1 TABLET: 8.6; 5 TABLET ORAL at 08:01

## 2022-05-13 RX ADMIN — ROSUVASTATIN CALCIUM 20 MG: 10 TABLET, FILM COATED ORAL at 08:00

## 2022-05-13 RX ADMIN — POTASSIUM & SODIUM PHOSPHATES POWDER PACK 280-160-250 MG 2 PACKET: 280-160-250 PACK at 15:18

## 2022-05-13 RX ADMIN — SENNOSIDES AND DOCUSATE SODIUM 1 TABLET: 8.6; 5 TABLET ORAL at 21:24

## 2022-05-13 ASSESSMENT — ACTIVITIES OF DAILY LIVING (ADL)
ADLS_ACUITY_SCORE: 27
ADLS_ACUITY_SCORE: 34
ADLS_ACUITY_SCORE: 27
ADLS_ACUITY_SCORE: 34
ADLS_ACUITY_SCORE: 27
ADLS_ACUITY_SCORE: 34
ADLS_ACUITY_SCORE: 27

## 2022-05-13 NOTE — PROGRESS NOTES
SPIRITUAL HEALTH SERVICES NOTE  [facility], P3    SPIRITUAL ASSESSMENT  Presents as tired but hopeful/optimistic during this visit  Waiting for his voice to fully come back  Feeling more emotional than before surgery    CARE PROVIDED  Empathic listening and presence   Helped patient in processing of emotions   Normalized/validated his feelings of vulnerability and gratitude  Encouraged allowing family to observe/be part of his care  Prayer shared     SPIRITUAL CARE NOTE  Follow-up visit with Mir. He was sitting up in his bedside chair and said that his wife Sofia had just left. Mir notes that yesterday was pretty tiring, but he is feeling a little more energetic this morning and is looking forward to doing therapy again today. He also plans on getting in a nap. He is hoping to shower tomorrow.     Mir notes that he wasn't able to talk with his grandkids as much last night because his voice is not at full strength yet. He noted that he is feeling a little more emotional since surgery. He is quick to say that he experiences this in a good way - feeling gratitude and appreciation for life - and that he isn't afraid to express how he feels. I validated this experience and encouraged him to be aware if he experiences multiple feelings of depression. Prayer shared.     Visit Length: 15 minutes    Plan of Care: Will remain available for further support as patient/family needs/desires.    Jennifer Gayle M.Div.      Office: [phone] (for non-urgent requests)  Please Vocera or page through [facility] for time-sensitive requests

## 2022-05-13 NOTE — PLAN OF CARE
Patient Name: Mir Treviño Sr.   MRN: [id number]   Date of Admission: 5/10/2022    Procedure: Procedure(s):  CORONARY ARTERY BYPASS GRAFT X3 ,  LEFT LEG ENDOSCOPIC VESSEL PROCUREMENT, LEFT INTERNAL MAMMARY ARTERY HARVEST, ANESTHESIA TRANSESOPHAGEAL ECHOCARDIOGRAM. EPIAORTIC ULTRASOUND.    Post Op day #:2    Subjective (Patient focus/Primary Problem for shift):           Pain Goal-0 Pain Rating-0           Pain Medication/ Regime effective to reduce patient pain- managed with scheduled tylenol    Objective (Physical assessment):           Rhythm: normal sinus rhythm, rare PVCs            Bowel Activity: no if Yes indicate when:           Bowel Medications: yes            Incision: healing well          Incentive Spirometry Q 1-2 hour when awake:  yes Volume: 1000          Epicardial Pacing Wires:  no            Patient Activity:           Up to chair for meals: yes          Ambulation with RN x2 (Not including CR): yes            Is patient in home clothes:no             Chest Tubes- removed today   Pleural: no Draining: not applicable               Suction: not applicable              Mediastinal: no Draining: not applicable               Suction: not applicable   Dressing Change Daily:yes If No, why?                     Urinary Catheter: no- removed today, voiding well            Preventative WOC consult (need MD order): no       Assessment (Nursing primary shift focus): Denies incisional pain, c/o intermittent gas pain. Ambulating well. Ionized calcium replaced this AM. Electrolytes replaced per protocol.     Plan (Patient Care Plan/focus): Plan to shower tomorrow      Alysha Pinto RN   5/13/2022   6:24 PM

## 2022-05-13 NOTE — DISCHARGE SUMMARY
Cardiovascular Surgery Discharge Summary    Primary Care Physician:  Geoff Devine  Discharge Provider: Julio Glover PA-C  Admission Date: 5/10/2022  Admission Diagnoses: Coronary artery disease of native artery of native heart with stable angina pectoris (H) [I25.118]  Discharge Date: 5/15/2022   Disposition: Home  Condition at Discharge: Good  Code Status: Full Code     Principal Diagnosis:   Coronary artery disease s/p CABGx3    Discharge Diagnoses:    Active Problems:      Patient Active Problem List   Diagnosis     Hypercholesterolemia     Hypertension     Coronary artery disease involving native coronary artery of native heart with angina pectoris (H)     Asthma     Tuberculin PPD Induration Positive Interpretation     Allergies     Asthma With Acute Exacerbation     Elevated liver enzymes     Obesity     Heart failure with reduced ejection fraction (H)     Coronary artery disease of native artery of native heart with stable angina pectoris (H)         Consult/s: Dietary, critical care medicine    Surgery:   5/10/2022 with Dr. Marcus  1. Coronary artery bypass grafting x 3   A. Reversed saphenous vein graft to the obtuse marginal branch of the left circumflex coronary artery  B. Reversed saphenous vein graft to the diagonal branch of the left anterior descending coronary artery  C. Pedicled left internal mammary artery to left anterior descending coronary artery  2. Endoscopic vein harvest of the greater saphenous vein from the left lower extremity.      Discharge Medications:      Review of your medicines      START taking      Dose / Directions   acetaminophen 325 MG tablet  Commonly known as: TYLENOL  Used for: S/P CABG (coronary artery bypass graft)      Dose: 650 mg  Take 2 tablets (650 mg) by mouth every 4 hours as needed for other, headaches or fever (For optimal non-opioid multimodal pain management to improve pain control.)  Quantity: 30 tablet  Refills: 0     aspirin 81 MG chewable  tablet  Commonly known as: ASA  Used for: S/P CABG (coronary artery bypass graft)  Replaces: aspirin 81 MG EC tablet      Dose: 162 mg  Start taking on: May 16, 2022  2 tablets (162 mg) by Oral or NG Tube route daily  Quantity: 60 tablet  Refills: 3     metoprolol tartrate 50 MG tablet  Commonly known as: LOPRESSOR  Used for: S/P CABG (coronary artery bypass graft), Hypertension      Dose: 50 mg  Take 1 tablet (50 mg) by mouth 2 times daily  Quantity: 60 tablet  Refills: 3     oxyCODONE 5 MG tablet  Commonly known as: ROXICODONE  Used for: S/P CABG (coronary artery bypass graft)      Dose: 5 mg  Take 1 tablet (5 mg) by mouth every 4 hours as needed for pain  Quantity: 20 tablet  Refills: 0     pantoprazole 40 MG EC tablet  Commonly known as: PROTONIX  Used for: S/P CABG (coronary artery bypass graft)      Dose: 40 mg  Start taking on: May 16, 2022  Take 1 tablet (40 mg) by mouth daily  Quantity: 30 tablet  Refills: 0        CONTINUE these medicines which may have CHANGED, or have new prescriptions. If we are uncertain of the size of tablets/capsules you have at home, strength may be listed as something that might have changed.      Dose / Directions   lisinopril 5 MG tablet  Commonly known as: ZESTRIL  This may have changed:     medication strength    how much to take  Used for: S/P CABG (coronary artery bypass graft), Hypertension      Dose: 5 mg  Start taking on: May 16, 2022  Take 1 tablet (5 mg) by mouth daily  Quantity: 30 tablet  Refills: 3     montelukast 10 MG tablet  Commonly known as: SINGULAIR  This may have changed: See the new instructions.  Used for: Other allergy, other than to medicinal agents      [MONTELUKAST (SINGULAIR) 10 MG TABLET] TAKE 1 TABLET BY MOUTH EVERY NIGHT AT BEDTIME  Quantity: 90 tablet  Refills: 3        CONTINUE these medicines which have NOT CHANGED      Dose / Directions   albuterol 108 (90 Base) MCG/ACT inhaler  Commonly known as: PROAIR HFA/PROVENTIL HFA/VENTOLIN HFA  Used for:  Asthma with exacerbation      Dose: 2 puff  [ALBUTEROL (PROAIR HFA;PROVENTIL HFA;VENTOLIN HFA) 90 MCG/ACTUATION INHALER] Inhale 2 puffs every 6 (six) hours as needed for wheezing.  Quantity: 1 each  Refills: 1     coenzyme Q-10 capsule  Used for: Coronary artery disease involving native coronary artery of native heart with angina pectoris (H)      Dose: 2 capsule  Take 2 capsules (200 mg) by mouth daily  Refills: 0     FLUTICASONE PROPIONATE (NASAL) 50 MCG/ACT Susp      Dose: 1 spray  Spray 1 spray in nostril daily as needed  Refills: 0     hydrochlorothiazide 25 MG tablet  Commonly known as: HYDRODIURIL      Dose: 12.5 mg  Take 12.5 mg by mouth daily  Refills: 0     metFORMIN 500 MG 24 hr tablet  Commonly known as: GLUCOPHAGE XR      Dose: 1,000 mg  Take 1,000 mg by mouth daily  Refills: 0     nitroGLYcerin 0.4 MG sublingual tablet  Commonly known as: NITROSTAT  Used for: Coronary artery disease involving native coronary artery of native heart with angina pectoris (H)      For chest pain place 1 tablet under the tongue every 5 minutes for 3 doses. If symptoms persist 5 minutes after 1st dose call 911.  Quantity: 45 tablet  Refills: 11     rosuvastatin 20 MG tablet  Commonly known as: CRESTOR  Used for: Hyperlipidemia LDL goal <70      Dose: 20 mg  Take 1 tablet (20 mg) by mouth daily  Quantity: 90 tablet  Refills: 11        STOP taking    aspirin 81 MG EC tablet  Commonly known as: ASA  Replaced by: aspirin 81 MG chewable tablet        atenolol 25 MG tablet  Commonly known as: TENORMIN        isosorbide mononitrate 30 MG 24 hr tablet  Commonly known as: IMDUR              Where to get your medicines      These medications were sent to [id number] - [place], [place] - [address] AT [facility] [address]  [address], [place] [address]    Phone: [phone]     acetaminophen 325 MG tablet    aspirin 81 MG chewable tablet    lisinopril 5 MG tablet    metoprolol tartrate 50  "MG tablet    oxyCODONE 5 MG tablet    pantoprazole 40 MG EC tablet         Discharge Instructions:    Follow up appointment with Primary Care Physician: Geoff Devine within 7 days of discharge..  Follow up appointment with Specialist:   Follow-up with Dr. Nik Marcus MD's SHASTA on 06/14/2022 at 11:30 AM at the [facility]  Follow-up with Dr. Jermaine Schafer MD on 06/27/2022 at 1:30 PM at the  [facility]    Diet: Cardiac    Activity/Restrictions: As tolerated with sternal precautions in mind (see below). No driving for 4 weeks or while on pain medication.     - Shower and wash your incisions daily with soap and water. No tub baths/hot tubs for 4 weeks. An antibacterial soap such as Dial or Safeguard is recommended.    - Check your incisions every day. If you notice any redness, drainage, or anything unusual, please call the surgeons office.    - No driving for 4 weeks after surgery or while on pain medication     - Do not lift anything more than 10 pounds for 6 weeks after surgery. After 6 weeks, advance lifting is tolerated.    - You may have watery drainage from your chest tube site for 2-3 weeks after surgery. Your may cover with a Band-Aid to protect your clothing. Remove the Band-Aid every day and wash the site.    - If you have a leg lesion, you may have swelling for 2-3 months. Elevate your leg any time you are not walking.    - If you feel any \"popping\" or \"clicking\" sensations in your chest, your arms are out too far or you are putting too much weight into arm movements. Do not reach over your head or out to the side to pull something. Do not do any arm exercises or use any exercise equipment that involves arm movement. If you feel your sternum moving, call the surgeon's office.    - Increase your daily activity as explained by Cardiac Rehab. You are encouraged to enroll in an Outpatient Cardiac Rehab Program.    - No active sports " "using your upper arms for 3 months. This includes fishing, hunting, bowling, swimming, tennis or golf.    - No physical activity such as cutting the grass, raking, vacuuming, changing sheets on your bed, snow shoveling, or using a  for 3 months.    - Use incentive spirometer 6-8 times per day for 2 weeks.       Hospital Summary:   Mir Treviño Sr. is a 62 year old male who was admitted to [facility] on 5/10/2022 following an abnormal coronary angiogram demonstrating severe multi-vessel coronary artery disease. He was referred to CV surgery for evaluation for possible elective coronary artery revascularization.     Patient was deemed a candidate for coronary artery bypass surgery, and was taken to the operating room on 5/10/2022 where patient underwent three vessel coronary artery bypass and endoscopic vein harvest from the left leg. Surgery was uneventful and patient was brought to the ICU post-operatively. He was extubated on POD#0 and weaned from pressors. Patient was awake and alert, afebrile, and with stable vitals. Insulin drip was discontinued and he was transitioned to a sliding scale. He was transferred to the general telemetry floor on POD#1 where patient has had return of bowel function, is maintaining oxygen saturations on room air, had his chest tubes removed, and has no complaints of chest pain or shortness of breath. On 05/15/22, patient was stable enough to be discharged to home.    Of note, patient pre-op EF was 30-35%. Repeat echo demonstrated EF 40 to 45% %. He has appropriate follow-up with heart failure clinic in place.     Vital signs:  Temp: 97.3  F (36.3  C) Temp src: Oral BP: 133/73 Pulse: 82   Resp: 18 SpO2: 98 % O2 Device: None (Room air) Oxygen Delivery: 2 LPM Height: 177.8 cm (5' 10\") Weight: 100.2 kg (220 lb 12.8 oz)  Estimated body mass index is 31.68 kg/m  as calculated from the following:    Height as of this encounter: 1.778 m (5' 10\").    Weight as of this " encounter: 100.2 kg (220 lb 12.8 oz).          Physical Exam:    Pertinent exam findings on day of discharge include:  Gen: Seen up in chair. NAD. Pleasant and conversant.   CV: RRR on monitor. No edema.  Pulm: Non-labored breathing on room air.  Abd: Soft, non-tender, non-distended  Neuro: CNs grossly intact  Inc: C/D/I    Julio Glover PA-C  Cardiothoracic Surgery  Pager [phone]

## 2022-05-13 NOTE — PROGRESS NOTES
Nutrition Therapy  Brief Note    Provided pt with meal plate placemat that indicates serving size for carbohydrate foods.  Also provided menu listing carbohydrate content for use while inpatient.     Clarified diet order with Julio Glover.  Pt to be on low fat, <2400 g sodium diet.  (was on limited potassium diet though not indicated.)    yes

## 2022-05-13 NOTE — PROGRESS NOTES
sc  Patient Name: Mir Treviño .   MRN: [id number]   Date of Admission: 5/10/2022    Procedure: Procedure(s):  CORONARY ARTERY BYPASS GRAFT X3 ,  LEFT LEG ENDOSCOPIC VESSEL PROCUREMENT, LEFT INTERNAL MAMMARY ARTERY HARVEST, ANESTHESIA TRANSESOPHAGEAL ECHOCARDIOGRAM. EPIAORTIC ULTRASOUND.    Post Op day #:2 going to day 3    Subjective (Patient focus/Primary Problem for shift):   Pain management, chest tubes management, Bowel movement management, infection prevention, Encourage ambulation, and the use of incentive spirometer.           Pain Goal: is 2-3   Pain Rating Between 5- 10 worsen with movement and activity.            Pain Medication/ Regime effective to reduce patient pain: Oxycodone, tylenol.    Objective (Physical assessment):           Rhythm: normal sinus rhythm            Bowel Activity: NO Passing Gaz.           Bowel Medications: yes: Senna            Incision: opened          Incentive Spirometry Q 1-2 hour when awake:  yes Volume: 94535          Epicardial Pacing Wires:  yes            Patient Activity:           Up to chair for meals: yes          Ambulation with RN x2 (Not including CR): no only once.            Is patient in home clothes:no             Chest Tubes   Pleural: yes Draining: yes               Suction: yes              Mediastinal: yes Draining: yes               Suction: yes   Dressing Change Daily:no If No, why? It is clean dry and intact.                      Urinary Catheter: yes           Preventative WOC consult (need MD order): no       Assessment (Nursing primary shift focus): pain focus, incisions, lung sounds, Bowel sounds, uses of incentive spirometer, Ambulation.     Plan (Patient Care Plan/focus): pain management, infection prevention, prevent pneumonia.       Aldo Canales RN   5/13/2022   1:30 AM

## 2022-05-13 NOTE — PROGRESS NOTES
CVTS Daily Progress Note   POD#3 s/p CABGx3  Attending: Dr. Nik Marcus MD  LOS: 3    SUBJECTIVE/INTERVAL EVENTS:  Awake and alert, out of bed to chair, with no apparent distress  Hypertensive, /79, will start lisinopril 5 mg daily  No acute events overnight. Borderline HTN overnight. Patient progressing well. Maintaining oxygen saturations on room air this AM. Ambulating with therapy. Pain well controlled. - BM but + flatus. Tolerating diet without nausea. UOP adequate. Chest tube output appropriate. Patient denies new chest pain, shortness of breath, abdominal pain, calf pain, nausea. Patient has no questions today.    OBJECTIVE:  Temp:  [97.8  F (36.6  C)-98.9  F (37.2  C)] 98.3  F (36.8  C)  Pulse:  [61-76] 74  Resp:  [16-20] 18  BP: (116-157)/(59-79) 157/79  SpO2:  [92 %-97 %] 97 %  Resp: 18    Vitals:    05/10/22 0626 05/11/22 0545 05/12/22 0511 05/13/22 0642   Weight: 101.1 kg (222 lb 14.4 oz) 104.4 kg (230 lb 1.6 oz) 104.6 kg (230 lb 11.2 oz) 102.8 kg (226 lb 9.6 oz)       Current Medications:    Scheduled Meds:    acetaminophen  975 mg Oral Q8H     aspirin  162 mg Oral or NG Tube Daily     bumetanide  4 mg Oral Daily     calcium gluconate  1 g Intravenous Once     heparin ANTICOAGULANT  5,000 Units Subcutaneous Q8H     insulin aspart  1-7 Units Subcutaneous TID AC     insulin aspart  1-5 Units Subcutaneous At Bedtime     lidocaine  2 patch Transdermal Q24H     lidocaine   Transdermal Q8H     lisinopril  5 mg Oral Daily     magnesium oxide  400 mg Oral BID     metoprolol tartrate  50 mg Oral BID     pantoprazole  40 mg Oral or NG Tube Daily    Or     pantoprazole  40 mg Oral Daily     polyethylene glycol  17 g Oral Daily     potassium & sodium phosphates  2 packet Oral or Feeding Tube TID     rosuvastatin  20 mg Oral Daily     senna-docusate  1 tablet Oral BID     sodium chloride (PF)  3 mL Intracatheter Q8H     Continuous Infusions:    lactated ringers       sodium chloride 25 mL/hr (05/11/22 0400)      PRN Meds:.acetaminophen, bisacodyl, calcium gluconate, calcium gluconate, calcium gluconate, glucose **OR** dextrose **OR** glucagon, hydrALAZINE, HYDROmorphone **OR** HYDROmorphone, ketorolac, lactated ringers, lidocaine 4%, lidocaine (buffered or not buffered), magnesium hydroxide, montelukast, naloxone **OR** naloxone **OR** naloxone **OR** naloxone, ondansetron **OR** ondansetron, ondansetron **OR** ondansetron, oxyCODONE **OR** oxyCODONE, prochlorperazine **OR** prochlorperazine, sodium chloride (PF)    Cardiographics:    Telemetry monitoring demonstrates NSR with rates in the 70s per my personal review.    Imaging:  All available imaging reviewed, no acute concerns    Labs, personally reviewed.  BMP  Recent Labs   Lab 05/13/22  0651 05/13/22  0550 05/12/22  2054 05/12/22  1753 05/12/22  1235 05/12/22  0813 05/12/22  0356 05/11/22  0605 05/11/22  0417 05/10/22  1407 05/10/22  1303   NA  --   --   --   --   --   --  136  --  141  --  141   POTASSIUM  --  4.3  --   --   --   --  4.0  --  4.1  --  4.0  4.0   CHLORIDE  --   --   --   --   --   --  103  --  110*  --  109*   JAMIE  --   --   --   --   --   --  8.4*  --  7.6*  --  8.9   CO2  --   --   --   --   --   --  28  --  22  --  23   BUN  --   --   --   --   --   --  27*  --  19  --  17   CR  --   --   --   --   --   --  0.96  --  0.87  --  0.97   *  --  201* 307* 198*   < > 158*   < > 145*   < > 142*    < > = values in this interval not displayed.     CBC  Recent Labs   Lab 05/11/22  0417 05/10/22  1303 05/10/22  1149   WBC 7.7 9.1 10.7   RBC 4.32* 4.60 3.50*   HGB 13.4 14.3 10.9*   HCT 38.7* 40.4 31.3*   MCV 90 88 89   MCH 31.0 31.1 31.1   MCHC 34.6 35.4 34.8   RDW 12.8 12.1 12.2   PLT 90* 81* 102*     INR  Recent Labs   Lab 05/10/22  1303 05/10/22  1149   INR 1.45* 1.75*      Hepatic Panel  Recent Labs   Lab 05/11/22  0417 05/10/22  1303   AST 77* 70*   ALT 40 49*   ALKPHOS 40* 48   BILITOTAL 0.8 1.4*   ALBUMIN 3.0* 3.3*       Magnesium   Date  "Value Ref Range Status   05/13/2022 1.9 1.8 - 2.6 mg/dL Final   05/12/2022 2.1 1.8 - 2.6 mg/dL Final   05/12/2022 2.1 1.8 - 2.6 mg/dL Final        I/O:  I/O last 3 completed shifts:  In: 916 [P.O.:910; I.V.:6]  Out: 3170 [Urine:2900; Chest Tube:270]       Blood pressure (!) 157/79, pulse 74, temperature 98.3  F (36.8  C), temperature source Oral, resp. rate 18, height 1.778 m (5' 10\"), weight 102.8 kg (226 lb 9.6 oz), SpO2 97 %.  Vitals:    05/11/22 0545 05/12/22 0511 05/13/22 0642   Weight: 104.4 kg (230 lb 1.6 oz) 104.6 kg (230 lb 11.2 oz) 102.8 kg (226 lb 9.6 oz)        Physical Exam:  Weight 1 or 2.8 kg from 104.6 kg yesterday, preop weight 101.1 kg and trending down.   24 hr Fluid status; net loss -2254 mL. UOP 2900 mL  MAPs: 110    Gen: A&Ox4, out of bed to chair, with NAD  Neuro: no focal deficits   CV: RRR, normal S1 S2, no murmurs, rubs or gallops.  No JVD  Pulm: CTA, no wheezing or rhonchi, normal breathing on RA  Abd: nondistended, normal BS, soft, nontender  Ext: Mild peripheral edema appreciated  Incision: clean, dry, intact, no erythema, sternum stable  Tubes/drain sites: dressing clean and dry, serosanguinous output, no air leak. 24 hr output 40/270 mL.     ASSESSMENT/PLAN:  Mir Camaraquivel Sr. is a 62 year old male with a history of hypercholesterolemia and hypertension who is POD#3 s/p CABG X 3  Awake and alert, out of bed to chair with no apparent distress  AVSS, normal sinus rhythm per monitor  Postop respiratory sufficiency resolved, now on room air, saturation 96%  Hypertensive, /79, start lisinopril 5 mg daily  Adequate, I-S 1500 cc  Chest tube drainage 40/270 cc  Chest tubes and pacer wires removed 05/13/2022  Hypocalcemia, ionized calcium 1.0, replace with calcium gluconate 1 g IVPB  Continue pulmonary toilet and a day mobilize and ambulate 3-4 times daily  Otherwise doing well and on path  Discharge disposition is home in 24 to 48 hours          NEURO:   - Scheduled Tylenol and PRN " Toradol/lidocaine patches/oxycodone for pain    CV:   - Normotensive/borderline HTN  - Metoprolol 50mg two times a day  and lisinopril 5 mg daily  - ASA 162mg  - Rosuvastatin 20mg daily  - Chest tubes to remain today  - Will need HF regimen and follow-up (scheduled)  - Repeat echo before discharge    PULM:   - Maintaining oxygen saturations on room air  - Encourage pulmonary toilet  - PRN Singulair    FEN/GI:  - Continue electrolyte replacement protocol  - Diet: Cardiac, ADAT   - Bowel regimen    RENAL:  - Adequate UOP/hr. Continue to monitor closely.  - Connor catheter out 05/13/2022  - Cr 0.96  - Diuresis with Bumex 4mg daily (Lasix non-responder)     HEME:  - Acute blood loss anemia post-op.   - No bleeding concerns. Hep SQ, ASA    ID:  - Brittani op ppx complete, afebrile. No concerns for infection    ENDO:   - sliding scale insulin  - Restart PTA Metformin when able.     PPx:   - DVT: SCDs, SQ heparin TID, ambulation   - GI: Protonix 40mg PO daily    DISPO:   -  General telemetry status.        Patient discussed with Dr. Amrit MD.    Julio Glover PA-C  Cardiothoracic Surgery  Pager [phone]    9:38 AM   May 13, 2022

## 2022-05-14 ENCOUNTER — APPOINTMENT (OUTPATIENT)
Dept: RADIOLOGY | Facility: HOSPITAL | Age: 62
End: 2022-05-14
Attending: PHYSICIAN ASSISTANT
Payer: COMMERCIAL

## 2022-05-14 ENCOUNTER — APPOINTMENT (OUTPATIENT)
Dept: OCCUPATIONAL THERAPY | Facility: HOSPITAL | Age: 62
End: 2022-05-14
Attending: THORACIC SURGERY (CARDIOTHORACIC VASCULAR SURGERY)
Payer: COMMERCIAL

## 2022-05-14 LAB
GLUCOSE BLDC GLUCOMTR-MCNC: 139 MG/DL (ref 70–99)
GLUCOSE BLDC GLUCOMTR-MCNC: 160 MG/DL (ref 70–99)
GLUCOSE BLDC GLUCOMTR-MCNC: 164 MG/DL (ref 70–99)
GLUCOSE BLDC GLUCOMTR-MCNC: 166 MG/DL (ref 70–99)
PHOSPHATE SERPL-MCNC: 2.2 MG/DL (ref 2.5–4.5)
PLATELET # BLD AUTO: 116 10E3/UL (ref 150–450)
POTASSIUM BLD-SCNC: 4 MMOL/L (ref 3.5–5)

## 2022-05-14 PROCEDURE — 84132 ASSAY OF SERUM POTASSIUM: CPT | Performed by: THORACIC SURGERY (CARDIOTHORACIC VASCULAR SURGERY)

## 2022-05-14 PROCEDURE — 250N000013 HC RX MED GY IP 250 OP 250 PS 637: Performed by: PHYSICIAN ASSISTANT

## 2022-05-14 PROCEDURE — 250N000011 HC RX IP 250 OP 636: Performed by: PHYSICIAN ASSISTANT

## 2022-05-14 PROCEDURE — 210N000001 HC R&B IMCU HEART CARE

## 2022-05-14 PROCEDURE — 71046 X-RAY EXAM CHEST 2 VIEWS: CPT

## 2022-05-14 PROCEDURE — 36415 COLL VENOUS BLD VENIPUNCTURE: CPT | Performed by: THORACIC SURGERY (CARDIOTHORACIC VASCULAR SURGERY)

## 2022-05-14 PROCEDURE — 250N000012 HC RX MED GY IP 250 OP 636 PS 637: Performed by: PHYSICIAN ASSISTANT

## 2022-05-14 PROCEDURE — 97110 THERAPEUTIC EXERCISES: CPT | Mod: GO

## 2022-05-14 PROCEDURE — 84100 ASSAY OF PHOSPHORUS: CPT | Performed by: THORACIC SURGERY (CARDIOTHORACIC VASCULAR SURGERY)

## 2022-05-14 PROCEDURE — 85049 AUTOMATED PLATELET COUNT: CPT | Performed by: PHYSICIAN ASSISTANT

## 2022-05-14 PROCEDURE — 250N000013 HC RX MED GY IP 250 OP 250 PS 637: Performed by: THORACIC SURGERY (CARDIOTHORACIC VASCULAR SURGERY)

## 2022-05-14 RX ADMIN — Medication 400 MG: at 21:20

## 2022-05-14 RX ADMIN — POTASSIUM & SODIUM PHOSPHATES POWDER PACK 280-160-250 MG 1 PACKET: 280-160-250 PACK at 21:19

## 2022-05-14 RX ADMIN — POLYETHYLENE GLYCOL 3350 17 G: 17 POWDER, FOR SOLUTION ORAL at 08:13

## 2022-05-14 RX ADMIN — LISINOPRIL 5 MG: 5 TABLET ORAL at 08:13

## 2022-05-14 RX ADMIN — METOPROLOL TARTRATE 50 MG: 25 TABLET, FILM COATED ORAL at 21:20

## 2022-05-14 RX ADMIN — SENNOSIDES AND DOCUSATE SODIUM 1 TABLET: 8.6; 5 TABLET ORAL at 08:13

## 2022-05-14 RX ADMIN — Medication 400 MG: at 08:13

## 2022-05-14 RX ADMIN — LIDOCAINE 2 PATCH: 246 PATCH TOPICAL at 08:16

## 2022-05-14 RX ADMIN — PANTOPRAZOLE SODIUM 40 MG: 40 TABLET, DELAYED RELEASE ORAL at 08:14

## 2022-05-14 RX ADMIN — BUMETANIDE 4 MG: 2 TABLET ORAL at 08:13

## 2022-05-14 RX ADMIN — MAGNESIUM HYDROXIDE 30 ML: 400 SUSPENSION ORAL at 17:23

## 2022-05-14 RX ADMIN — HEPARIN SODIUM 5000 UNITS: 10000 INJECTION, SOLUTION INTRAVENOUS; SUBCUTANEOUS at 19:32

## 2022-05-14 RX ADMIN — ASPIRIN 81 MG CHEWABLE TABLET 162 MG: 81 TABLET CHEWABLE at 08:13

## 2022-05-14 RX ADMIN — POTASSIUM & SODIUM PHOSPHATES POWDER PACK 280-160-250 MG 1 PACKET: 280-160-250 PACK at 08:14

## 2022-05-14 RX ADMIN — SENNOSIDES AND DOCUSATE SODIUM 1 TABLET: 8.6; 5 TABLET ORAL at 21:20

## 2022-05-14 RX ADMIN — HEPARIN SODIUM 5000 UNITS: 10000 INJECTION, SOLUTION INTRAVENOUS; SUBCUTANEOUS at 14:00

## 2022-05-14 RX ADMIN — INSULIN ASPART 1 UNITS: 100 INJECTION, SOLUTION INTRAVENOUS; SUBCUTANEOUS at 17:24

## 2022-05-14 RX ADMIN — POTASSIUM & SODIUM PHOSPHATES POWDER PACK 280-160-250 MG 1 PACKET: 280-160-250 PACK at 14:01

## 2022-05-14 RX ADMIN — METOPROLOL TARTRATE 50 MG: 25 TABLET, FILM COATED ORAL at 08:14

## 2022-05-14 RX ADMIN — HEPARIN SODIUM 5000 UNITS: 10000 INJECTION, SOLUTION INTRAVENOUS; SUBCUTANEOUS at 04:46

## 2022-05-14 RX ADMIN — ROSUVASTATIN CALCIUM 20 MG: 10 TABLET, FILM COATED ORAL at 08:13

## 2022-05-14 ASSESSMENT — ACTIVITIES OF DAILY LIVING (ADL)
ADLS_ACUITY_SCORE: 26
ADLS_ACUITY_SCORE: 27
ADLS_ACUITY_SCORE: 26
ADLS_ACUITY_SCORE: 27
ADLS_ACUITY_SCORE: 26
ADLS_ACUITY_SCORE: 26
ADLS_ACUITY_SCORE: 27
ADLS_ACUITY_SCORE: 27
ADLS_ACUITY_SCORE: 26

## 2022-05-14 NOTE — PLAN OF CARE
sc  Patient Name: Mir Treviño Sr.   MRN: [id number]   Date of Admission: 5/10/2022    Procedure: Procedure(s):  CORONARY ARTERY BYPASS GRAFT X3 ,  LEFT LEG ENDOSCOPIC VESSEL PROCUREMENT, LEFT INTERNAL MAMMARY ARTERY HARVEST, ANESTHESIA TRANSESOPHAGEAL ECHOCARDIOGRAM. EPIAORTIC ULTRASOUND.    Post Op day #:4    Subjective (Patient focus/Primary Problem for shift):           Pain Goal 0 Pain Rating 0           Pain Medication/ Regime effective to reduce patient pain Scheduled Tylenol    Objective (Physical assessment):           Rhythm: normal sinus rhythm            Bowel Activity: no, passing flatus only if Yes indicate when:           Bowel Medications: yes            Incision: healing well          Incentive Spirometry Q 1-2 hour when awake:  not applicable Volume: N/A          Epicardial Pacing Wires:  not applicable            Patient Activity:           Up to chair for meals: yes          Ambulation with RN x2 (Not including CR): yes            Is patient in home clothes:no             Chest Tubes   Pleural: not applicable Draining: not applicable               Suction: not applicable              Mediastinal: not applicable Draining: not applicable               Suction: not applicable   Dressing Change Daily: yes If No, why?                     Urinary Catheter: no           Preventative WOC consult (need MD order): no       Assessment (Nursing primary shift focus): Patient wanted to rest. He slept in between cares. Independent in room. Uses heart pillow. Denied pain.    Plan (Patient Care Plan/focus):   Shower  Continue IS  Ambulation      Luciano Ramires RN   5/14/2022   5:48 AM

## 2022-05-14 NOTE — PROGRESS NOTES
CVTS Daily Progress Note   POD#4 s/p CABGx3  Attending: Dr. Nik Marcus MD  LOS: 4    SUBJECTIVE/INTERVAL EVENTS:  Awake and alert, out of bed to chair, with no apparent distress  AVSS, normal sinus rhythm per monitor  No acute events overnight. Patient progressing well. Maintaining oxygen saturations on room air.   Ambulating with therapy and tolerating well. Pain well controlled. - BM but + flatus. Tolerating diet without nausea. UOP adequate. Chest tube removed 05/13/2022. Patient denies new chest pain, shortness of breath, abdominal pain, calf pain, nausea. Patient has no questions today.  .    OBJECTIVE:  Temp:  [97.6  F (36.4  C)-99.3  F (37.4  C)] 97.9  F (36.6  C)  Pulse:  [70-85] 76  Resp:  [16-20] 18  BP: (119-137)/(62-74) 137/66  SpO2:  [95 %-99 %] 96 %  Resp: 18    Vitals:    05/10/22 0626 05/11/22 0545 05/12/22 0511 05/13/22 0642   Weight: 101.1 kg (222 lb 14.4 oz) 104.4 kg (230 lb 1.6 oz) 104.6 kg (230 lb 11.2 oz) 102.8 kg (226 lb 9.6 oz)    05/14/22 0443   Weight: 101.9 kg (224 lb 9.6 oz)       Current Medications:    Scheduled Meds:    aspirin  162 mg Oral or NG Tube Daily     bumetanide  4 mg Oral Daily     heparin ANTICOAGULANT  5,000 Units Subcutaneous Q8H     insulin aspart  1-7 Units Subcutaneous TID AC     insulin aspart  1-5 Units Subcutaneous At Bedtime     lidocaine  2 patch Transdermal Q24H     lidocaine   Transdermal Q8H     lisinopril  5 mg Oral Daily     magnesium oxide  400 mg Oral BID     metoprolol tartrate  50 mg Oral BID     pantoprazole  40 mg Oral or NG Tube Daily    Or     pantoprazole  40 mg Oral Daily     polyethylene glycol  17 g Oral Daily     potassium & sodium phosphates  1 packet Oral TID     rosuvastatin  20 mg Oral Daily     senna-docusate  1 tablet Oral BID     sodium chloride (PF)  3 mL Intracatheter Q8H     Continuous Infusions:  PRN Meds:.acetaminophen, bisacodyl, glucose **OR** dextrose **OR** glucagon, hydrALAZINE, HYDROmorphone **OR** HYDROmorphone, ketorolac,  lactated ringers, lidocaine 4%, lidocaine (buffered or not buffered), magnesium hydroxide, montelukast, naloxone **OR** naloxone **OR** naloxone **OR** naloxone, ondansetron **OR** ondansetron, ondansetron **OR** ondansetron, oxyCODONE **OR** oxyCODONE, prochlorperazine **OR** prochlorperazine, sodium chloride (PF)    Cardiographics:    Telemetry monitoring demonstrates NSR with rates in the 70s per my personal review.    Imaging:  All available imaging reviewed, no acute concerns    Labs, personally reviewed.  BMP  Recent Labs   Lab 05/14/22  0733 05/14/22  0408 05/13/22  2123 05/13/22  1602 05/13/22  1141 05/13/22  0651 05/13/22  0550 05/12/22  0813 05/12/22  0356 05/11/22  0605 05/11/22  0417 05/10/22  1407 05/10/22  1303   NA  --   --   --   --   --   --   --   --  136  --  141  --  141   POTASSIUM  --  4.0  --   --   --   --  4.3  --  4.0  --  4.1  --  4.0  4.0   CHLORIDE  --   --   --   --   --   --   --   --  103  --  110*  --  109*   JAMIE  --   --   --   --   --   --   --   --  8.4*  --  7.6*  --  8.9   CO2  --   --   --   --   --   --   --   --  28  --  22  --  23   BUN  --   --   --   --   --   --   --   --  27*  --  19  --  17   CR  --   --   --   --   --   --   --   --  0.96  --  0.87  --  0.97   *  --  121* 125* 150*   < >  --    < > 158*   < > 145*   < > 142*    < > = values in this interval not displayed.     CBC  Recent Labs   Lab 05/14/22  0408 05/11/22  0417 05/10/22  1303 05/10/22  1149   WBC  --  7.7 9.1 10.7   RBC  --  4.32* 4.60 3.50*   HGB  --  13.4 14.3 10.9*   HCT  --  38.7* 40.4 31.3*   MCV  --  90 88 89   MCH  --  31.0 31.1 31.1   MCHC  --  34.6 35.4 34.8   RDW  --  12.8 12.1 12.2   * 90* 81* 102*     INR  Recent Labs   Lab 05/10/22  1303 05/10/22  1149   INR 1.45* 1.75*      Hepatic Panel  Recent Labs   Lab 05/11/22  0417 05/10/22  1303   AST 77* 70*   ALT 40 49*   ALKPHOS 40* 48   BILITOTAL 0.8 1.4*   ALBUMIN 3.0* 3.3*       Magnesium   Date Value Ref Range Status  "  05/13/2022 1.9 1.8 - 2.6 mg/dL Final   05/12/2022 2.1 1.8 - 2.6 mg/dL Final   05/12/2022 2.1 1.8 - 2.6 mg/dL Final        I/O:  I/O last 3 completed shifts:  In: 1450 [P.O.:1450]  Out: 1250 [Urine:1250]       Blood pressure 137/66, pulse 76, temperature 97.9  F (36.6  C), temperature source Oral, resp. rate 18, height 1.778 m (5' 10\"), weight 101.9 kg (224 lb 9.6 oz), SpO2 96 %.  Vitals:    05/12/22 0511 05/13/22 0642 05/14/22 0443   Weight: 104.6 kg (230 lb 11.2 oz) 102.8 kg (226 lb 9.6 oz) 101.9 kg (224 lb 9.6 oz)        Physical Exam:  Weight 101.9 kg from 102.8 kg yesterday, preop weight 101.1 kg and trending down.   24 hr Fluid status; net loss 200 mL. UOP 1250 mL  MAPs: 92    Gen: A&Ox4, laying in bed at time of evaluation, with NAD  Neuro: no focal deficits   CV: RRR, normal S1 S2, no murmurs, rubs or gallops.  No JVD  Pulm: Lung sounds clear in the bases, no wheezing or rhonchi, normal breathing on RA  Abd: nondistended, normal BS, soft, nontender  Ext: No peripheral edema appreciated  Incision: clean, dry, intact, no erythema, sternum stable  Tubes/drain sites: dressing clean and dry.     ASSESSMENT/PLAN:  Mir Camaraquivel Sr. is a 62 year old male with a history of hypercholesterolemia and hypertension who is POD#4 s/p CABG X 3  Awake and alert, out of bed to chair with no apparent distress  AVSS, normal sinus rhythm per monitor  Postop respiratory sufficiency resolved, now on room air, saturation 96%  Continue lisinopril 5 mg daily  Adequate, I-S 1500 cc  Chest tubes and pacer wires removed 05/13/2022  Continue pulmonary toilet and a day mobilize and ambulate 3-4 times daily  Otherwise doing well and on path  Discharge disposition is home in 24 to 48 hours         NEURO:   - Scheduled Tylenol and PRN Toradol/lidocaine patches/oxycodone for pain    CV:   - Normotensive/borderline HTN  - Metoprolol 50mg two times a day  and lisinopril 5 mg daily  - ASA 162mg  - Rosuvastatin 20mg daily  - Chest tubes to " remain today  - Will need HF regimen and follow-up (scheduled)  - Repeat echo before discharge    PULM:   - Maintaining oxygen saturations on room air  - Encourage pulmonary toilet  - PRN Singulair    FEN/GI:  - Continue electrolyte replacement protocol  - Diet: Cardiac, ADAT   - Bowel regimen    RENAL:  - Adequate UOP/hr. Continue to monitor closely.  - Connor catheter out 05/13/2022  - Cr 0.96  - Diuresis with Bumex 4mg daily (Lasix non-responder)     HEME:  - Acute blood loss anemia post-op.   - No bleeding concerns. Hep SQ, ASA    ID:  - Brittani op ppx complete, afebrile. No concerns for infection    ENDO:   - sliding scale insulin  - Restart PTA Metformin when able.     PPx:   - DVT: SCDs, SQ heparin TID, ambulation   - GI: Protonix 40mg PO daily    DISPO:   -  General telemetry status.    Patient discussed with Dr. Nik Marcus MD.    Julio Glover PA-C  Cardiothoracic Surgery  Pager [phone]    10:26 AM   May 14, 2022

## 2022-05-14 NOTE — PLAN OF CARE
Patient Name: Mir Treviño Sr.   MRN: [id number]   Date of Admission: 5/10/2022    Procedure: Procedure(s):  CORONARY ARTERY BYPASS GRAFT X3 ,  LEFT LEG ENDOSCOPIC VESSEL PROCUREMENT, LEFT INTERNAL MAMMARY ARTERY HARVEST, ANESTHESIA TRANSESOPHAGEAL ECHOCARDIOGRAM. EPIAORTIC ULTRASOUND.    Post Op day #:3    Subjective (Patient focus/Primary Problem for shift):           Pain Goal 0 Pain Rating denies pain, reports some soreness and muscle cramps in right calf           Pain Medication/ Regime effective to reduce patient pain no medication requested    Objective (Physical assessment):           Rhythm: normal sinus rhythm            Bowel Activity: no if Yes indicate when:           Bowel Medications: yes- gave prn milk of mag            Incision: healing well          Incentive Spirometry Q 1-2 hour when awake:  yes Volume: 1000          Epicardial Pacing Wires:  no            Patient Activity:           Up to chair for meals: yes          Ambulation with RN x2 (Not including CR): yes            Is patient in home clothes:no             Chest Tubes   Pleural: no Draining: not applicable               Suction: not applicable              Mediastinal: no Draining: not applicable               Suction: not applicable   Dressing Change Daily:yes If No, why?                     Urinary Catheter: no           Preventative WOC consult (need MD order): no       Assessment (Nursing primary shift focus): Showered this AM, tolerated well. Chest tube sites covered with bandaid.     Plan (Patient Care Plan/focus): No BM noted. Gave prn milk of mag. Motivated to get home      Alysha Pinto RN   5/14/2022   6:12 PM

## 2022-05-15 ENCOUNTER — APPOINTMENT (OUTPATIENT)
Dept: OCCUPATIONAL THERAPY | Facility: HOSPITAL | Age: 62
End: 2022-05-15
Attending: THORACIC SURGERY (CARDIOTHORACIC VASCULAR SURGERY)
Payer: COMMERCIAL

## 2022-05-15 VITALS
BODY MASS INDEX: 31.61 KG/M2 | OXYGEN SATURATION: 96 % | HEIGHT: 70 IN | WEIGHT: 220.8 LBS | DIASTOLIC BLOOD PRESSURE: 80 MMHG | SYSTOLIC BLOOD PRESSURE: 149 MMHG | RESPIRATION RATE: 20 BRPM | HEART RATE: 69 BPM | TEMPERATURE: 98 F

## 2022-05-15 LAB
GLUCOSE BLDC GLUCOMTR-MCNC: 127 MG/DL (ref 70–99)
GLUCOSE BLDC GLUCOMTR-MCNC: 156 MG/DL (ref 70–99)
HOLD SPECIMEN: NORMAL
MAGNESIUM SERPL-MCNC: 2.4 MG/DL (ref 1.8–2.6)
PHOSPHATE SERPL-MCNC: 2.6 MG/DL (ref 2.5–4.5)
POTASSIUM BLD-SCNC: 4 MMOL/L (ref 3.5–5)

## 2022-05-15 PROCEDURE — 250N000013 HC RX MED GY IP 250 OP 250 PS 637: Performed by: PHYSICIAN ASSISTANT

## 2022-05-15 PROCEDURE — 84132 ASSAY OF SERUM POTASSIUM: CPT | Performed by: THORACIC SURGERY (CARDIOTHORACIC VASCULAR SURGERY)

## 2022-05-15 PROCEDURE — 250N000013 HC RX MED GY IP 250 OP 250 PS 637: Performed by: THORACIC SURGERY (CARDIOTHORACIC VASCULAR SURGERY)

## 2022-05-15 PROCEDURE — 36415 COLL VENOUS BLD VENIPUNCTURE: CPT | Performed by: PHYSICIAN ASSISTANT

## 2022-05-15 PROCEDURE — 84100 ASSAY OF PHOSPHORUS: CPT | Performed by: PHYSICIAN ASSISTANT

## 2022-05-15 PROCEDURE — 250N000011 HC RX IP 250 OP 636: Performed by: PHYSICIAN ASSISTANT

## 2022-05-15 PROCEDURE — 83735 ASSAY OF MAGNESIUM: CPT | Performed by: PHYSICIAN ASSISTANT

## 2022-05-15 PROCEDURE — 97535 SELF CARE MNGMENT TRAINING: CPT | Mod: GO

## 2022-05-15 RX ORDER — MAGNESIUM CARB/ALUMINUM HYDROX 105-160MG
296 TABLET,CHEWABLE ORAL ONCE
Status: COMPLETED | OUTPATIENT
Start: 2022-05-15 | End: 2022-05-15

## 2022-05-15 RX ORDER — PANTOPRAZOLE SODIUM 40 MG/1
40 TABLET, DELAYED RELEASE ORAL DAILY
Qty: 30 TABLET | Refills: 0 | Status: SHIPPED | OUTPATIENT
Start: 2022-05-16 | End: 2022-06-06

## 2022-05-15 RX ORDER — METOPROLOL TARTRATE 50 MG
50 TABLET ORAL 2 TIMES DAILY
Qty: 60 TABLET | Refills: 3 | Status: SHIPPED | OUTPATIENT
Start: 2022-05-15 | End: 2022-06-27

## 2022-05-15 RX ORDER — OXYCODONE HYDROCHLORIDE 5 MG/1
5 TABLET ORAL EVERY 4 HOURS PRN
Qty: 20 TABLET | Refills: 0 | Status: SHIPPED | OUTPATIENT
Start: 2022-05-15 | End: 2022-06-06

## 2022-05-15 RX ORDER — LISINOPRIL 5 MG/1
5 TABLET ORAL DAILY
Qty: 30 TABLET | Refills: 3 | Status: SHIPPED | OUTPATIENT
Start: 2022-05-16 | End: 2022-09-13

## 2022-05-15 RX ORDER — ACETAMINOPHEN 325 MG/1
650 TABLET ORAL EVERY 4 HOURS PRN
Qty: 30 TABLET | Refills: 0 | Status: SHIPPED | OUTPATIENT
Start: 2022-05-15

## 2022-05-15 RX ORDER — ASPIRIN 81 MG/1
162 TABLET, CHEWABLE ORAL DAILY
Qty: 60 TABLET | Refills: 3 | Status: SHIPPED | OUTPATIENT
Start: 2022-05-16 | End: 2022-07-14

## 2022-05-15 RX ADMIN — POTASSIUM & SODIUM PHOSPHATES POWDER PACK 280-160-250 MG 1 PACKET: 280-160-250 PACK at 08:27

## 2022-05-15 RX ADMIN — MAGNESIUM CITRATE 296 ML: 1.75 LIQUID ORAL at 08:28

## 2022-05-15 RX ADMIN — ROSUVASTATIN CALCIUM 20 MG: 10 TABLET, FILM COATED ORAL at 08:27

## 2022-05-15 RX ADMIN — ASPIRIN 81 MG CHEWABLE TABLET 162 MG: 81 TABLET CHEWABLE at 08:27

## 2022-05-15 RX ADMIN — HEPARIN SODIUM 5000 UNITS: 10000 INJECTION, SOLUTION INTRAVENOUS; SUBCUTANEOUS at 04:24

## 2022-05-15 RX ADMIN — BISACODYL 10 MG: 10 SUPPOSITORY RECTAL at 13:36

## 2022-05-15 RX ADMIN — SENNOSIDES AND DOCUSATE SODIUM 1 TABLET: 8.6; 5 TABLET ORAL at 08:28

## 2022-05-15 RX ADMIN — POLYETHYLENE GLYCOL 3350 17 G: 17 POWDER, FOR SOLUTION ORAL at 08:28

## 2022-05-15 RX ADMIN — LISINOPRIL 5 MG: 5 TABLET ORAL at 08:27

## 2022-05-15 RX ADMIN — ACETAMINOPHEN 650 MG: 325 TABLET ORAL at 02:51

## 2022-05-15 RX ADMIN — LIDOCAINE 2 PATCH: 246 PATCH TOPICAL at 08:28

## 2022-05-15 RX ADMIN — PANTOPRAZOLE SODIUM 40 MG: 40 TABLET, DELAYED RELEASE ORAL at 08:27

## 2022-05-15 RX ADMIN — INSULIN ASPART 1 UNITS: 100 INJECTION, SOLUTION INTRAVENOUS; SUBCUTANEOUS at 12:39

## 2022-05-15 RX ADMIN — BUMETANIDE 4 MG: 2 TABLET ORAL at 08:27

## 2022-05-15 RX ADMIN — METOPROLOL TARTRATE 50 MG: 25 TABLET, FILM COATED ORAL at 08:27

## 2022-05-15 ASSESSMENT — ACTIVITIES OF DAILY LIVING (ADL)
ADLS_ACUITY_SCORE: 26
ADLS_ACUITY_SCORE: 25
ADLS_ACUITY_SCORE: 25
ADLS_ACUITY_SCORE: 26

## 2022-05-15 NOTE — DISCHARGE SUMMARY
Cardiovascular Surgery Discharge Summary    Primary Care Physician:  Geoff Devine  Discharge Provider: Julio Glover PA-C  Admission Date: 5/10/2022  Admission Diagnoses: Coronary artery disease of native artery of native heart with stable angina pectoris (H) [I25.118]  Discharge Date: 5/15/2022   Disposition: Home  Condition at Discharge: Good  Code Status: Full Code     Principal Diagnosis:   Coronary artery disease s/p CABGx3    Discharge Diagnoses:    Active Problems:      Patient Active Problem List   Diagnosis     Hypercholesterolemia     Hypertension     Coronary artery disease involving native coronary artery of native heart with angina pectoris (H)     Asthma     Tuberculin PPD Induration Positive Interpretation     Allergies     Asthma With Acute Exacerbation     Elevated liver enzymes     Obesity     Heart failure with reduced ejection fraction (H)     Coronary artery disease of native artery of native heart with stable angina pectoris (H)         Consult/s: Dietary, critical care medicine    Surgery:   5/10/2022 with Dr. Marcus  1. Coronary artery bypass grafting x 3   A. Reversed saphenous vein graft to the obtuse marginal branch of the left circumflex coronary artery  B. Reversed saphenous vein graft to the diagonal branch of the left anterior descending coronary artery  C. Pedicled left internal mammary artery to left anterior descending coronary artery  2. Endoscopic vein harvest of the greater saphenous vein from the left lower extremity.      Discharge Medications:      Review of your medicines      START taking      Dose / Directions   acetaminophen 325 MG tablet  Commonly known as: TYLENOL  Used for: S/P CABG (coronary artery bypass graft)      Dose: 650 mg  Take 2 tablets (650 mg) by mouth every 4 hours as needed for other, headaches or fever (For optimal non-opioid multimodal pain management to improve pain control.)  Quantity: 30 tablet  Refills: 0     aspirin 81 MG chewable  tablet  Commonly known as: ASA  Used for: S/P CABG (coronary artery bypass graft)  Replaces: aspirin 81 MG EC tablet      Dose: 162 mg  Start taking on: May 16, 2022  2 tablets (162 mg) by Oral or NG Tube route daily  Quantity: 60 tablet  Refills: 3     metoprolol tartrate 50 MG tablet  Commonly known as: LOPRESSOR  Used for: S/P CABG (coronary artery bypass graft), Hypertension      Dose: 50 mg  Take 1 tablet (50 mg) by mouth 2 times daily  Quantity: 60 tablet  Refills: 3     oxyCODONE 5 MG tablet  Commonly known as: ROXICODONE  Used for: S/P CABG (coronary artery bypass graft)      Dose: 5 mg  Take 1 tablet (5 mg) by mouth every 4 hours as needed for pain  Quantity: 20 tablet  Refills: 0     pantoprazole 40 MG EC tablet  Commonly known as: PROTONIX  Used for: S/P CABG (coronary artery bypass graft)      Dose: 40 mg  Start taking on: May 16, 2022  Take 1 tablet (40 mg) by mouth daily  Quantity: 30 tablet  Refills: 0        CONTINUE these medicines which may have CHANGED, or have new prescriptions. If we are uncertain of the size of tablets/capsules you have at home, strength may be listed as something that might have changed.      Dose / Directions   lisinopril 5 MG tablet  Commonly known as: ZESTRIL  This may have changed:     medication strength    how much to take  Used for: S/P CABG (coronary artery bypass graft), Hypertension      Dose: 5 mg  Start taking on: May 16, 2022  Take 1 tablet (5 mg) by mouth daily  Quantity: 30 tablet  Refills: 3     montelukast 10 MG tablet  Commonly known as: SINGULAIR  This may have changed: See the new instructions.  Used for: Other allergy, other than to medicinal agents      [MONTELUKAST (SINGULAIR) 10 MG TABLET] TAKE 1 TABLET BY MOUTH EVERY NIGHT AT BEDTIME  Quantity: 90 tablet  Refills: 3        CONTINUE these medicines which have NOT CHANGED      Dose / Directions   albuterol 108 (90 Base) MCG/ACT inhaler  Commonly known as: PROAIR HFA/PROVENTIL HFA/VENTOLIN HFA  Used for:  Asthma with exacerbation      Dose: 2 puff  [ALBUTEROL (PROAIR HFA;PROVENTIL HFA;VENTOLIN HFA) 90 MCG/ACTUATION INHALER] Inhale 2 puffs every 6 (six) hours as needed for wheezing.  Quantity: 1 each  Refills: 1     coenzyme Q-10 capsule  Used for: Coronary artery disease involving native coronary artery of native heart with angina pectoris (H)      Dose: 2 capsule  Take 2 capsules (200 mg) by mouth daily  Refills: 0     FLUTICASONE PROPIONATE (NASAL) 50 MCG/ACT Susp      Dose: 1 spray  Spray 1 spray in nostril daily as needed  Refills: 0     hydrochlorothiazide 25 MG tablet  Commonly known as: HYDRODIURIL      Dose: 12.5 mg  Take 12.5 mg by mouth daily  Refills: 0     metFORMIN 500 MG 24 hr tablet  Commonly known as: GLUCOPHAGE XR      Dose: 1,000 mg  Take 1,000 mg by mouth daily  Refills: 0     nitroGLYcerin 0.4 MG sublingual tablet  Commonly known as: NITROSTAT  Used for: Coronary artery disease involving native coronary artery of native heart with angina pectoris (H)      For chest pain place 1 tablet under the tongue every 5 minutes for 3 doses. If symptoms persist 5 minutes after 1st dose call 911.  Quantity: 45 tablet  Refills: 11     rosuvastatin 20 MG tablet  Commonly known as: CRESTOR  Used for: Hyperlipidemia LDL goal <70      Dose: 20 mg  Take 1 tablet (20 mg) by mouth daily  Quantity: 90 tablet  Refills: 11        STOP taking    aspirin 81 MG EC tablet  Commonly known as: ASA  Replaced by: aspirin 81 MG chewable tablet        atenolol 25 MG tablet  Commonly known as: TENORMIN        isosorbide mononitrate 30 MG 24 hr tablet  Commonly known as: IMDUR              Where to get your medicines      These medications were sent to [id number] - [place], [place] - [address] AT [facility] [address]  [address], [place] [address]    Phone: [phone]     acetaminophen 325 MG tablet    aspirin 81 MG chewable tablet    lisinopril 5 MG tablet    metoprolol tartrate 50  "MG tablet    oxyCODONE 5 MG tablet    pantoprazole 40 MG EC tablet         Discharge Instructions:    Follow up appointment with Primary Care Physician: Geoff Devine within 7 days of discharge..  Follow up appointment with Specialist:   Follow-up with Dr. Nik Marcus MD's SHASTA on 06/14/2022 at 11:30 AM at the [facility]  Follow-up with Dr. Jermaine Schafer MD on 06/27/2022 at 1:30 PM at the  [facility]    Diet: Cardiac    Activity/Restrictions: As tolerated with sternal precautions in mind (see below). No driving for 4 weeks or while on pain medication.     - Shower and wash your incisions daily with soap and water. No tub baths/hot tubs for 4 weeks. An antibacterial soap such as Dial or Safeguard is recommended.    - Check your incisions every day. If you notice any redness, drainage, or anything unusual, please call the surgeons office.    - No driving for 4 weeks after surgery or while on pain medication     - Do not lift anything more than 10 pounds for 6 weeks after surgery. After 6 weeks, advance lifting is tolerated.    - You may have watery drainage from your chest tube site for 2-3 weeks after surgery. Your may cover with a Band-Aid to protect your clothing. Remove the Band-Aid every day and wash the site.    - If you have a leg lesion, you may have swelling for 2-3 months. Elevate your leg any time you are not walking.    - If you feel any \"popping\" or \"clicking\" sensations in your chest, your arms are out too far or you are putting too much weight into arm movements. Do not reach over your head or out to the side to pull something. Do not do any arm exercises or use any exercise equipment that involves arm movement. If you feel your sternum moving, call the surgeon's office.    - Increase your daily activity as explained by Cardiac Rehab. You are encouraged to enroll in an Outpatient Cardiac Rehab Program.    - No active sports " "using your upper arms for 3 months. This includes fishing, hunting, bowling, swimming, tennis or golf.    - No physical activity such as cutting the grass, raking, vacuuming, changing sheets on your bed, snow shoveling, or using a  for 3 months.    - Use incentive spirometer 6-8 times per day for 2 weeks.       Hospital Summary:   Mir Treviño Sr. is a 62 year old male who was admitted to [facility] on 5/10/2022 following an abnormal coronary angiogram demonstrating severe multi-vessel coronary artery disease. He was referred to CV surgery for evaluation for possible elective coronary artery revascularization.     Patient was deemed a candidate for coronary artery bypass surgery, and was taken to the operating room on 5/10/2022 where patient underwent three vessel coronary artery bypass and endoscopic vein harvest from the left leg. Surgery was uneventful and patient was brought to the ICU post-operatively. He was extubated on POD#0 and weaned from pressors. Patient was awake and alert, afebrile, and with stable vitals. Insulin drip was discontinued and he was transitioned to a sliding scale. He was transferred to the general telemetry floor on POD#1 where patient has had return of bowel function, is maintaining oxygen saturations on room air, had his chest tubes removed, and has no complaints of chest pain or shortness of breath. On 05/15/22, patient was stable enough to be discharged to home.    Of note, patient pre-op EF was 30-35%. Repeat echo demonstrated EF 40 to 45% %. He has appropriate follow-up with heart failure clinic in place.     Vital signs:  Temp: 97.3  F (36.3  C) Temp src: Oral BP: 133/73 Pulse: 82   Resp: 18 SpO2: 98 % O2 Device: None (Room air) Oxygen Delivery: 2 LPM Height: 177.8 cm (5' 10\") Weight: 100.2 kg (220 lb 12.8 oz)  Estimated body mass index is 31.68 kg/m  as calculated from the following:    Height as of this encounter: 1.778 m (5' 10\").    Weight as of this " encounter: 100.2 kg (220 lb 12.8 oz).          Physical Exam:    Pertinent exam findings on day of discharge include:  Gen: Seen up in chair. NAD. Pleasant and conversant.   CV: RRR on monitor. No edema.  Pulm: Non-labored breathing on room air.  Abd: Soft, non-tender, non-distended  Neuro: CNs grossly intact  Inc: C/D/I    Julio Glover PA-C  Cardiothoracic Surgery  Pager [phone]

## 2022-05-15 NOTE — PROGRESS NOTES
CVTS Daily Progress Note   POD#5 s/p CABGx3  Attending: Dr. Nik Marcus MD  LOS: 5    SUBJECTIVE/INTERVAL EVENTS:  Awake and alert, out of bed to chair, with no apparent distress  AVSS, normal sinus rhythm per monitor  No acute events overnight. Patient progressing well. Maintaining oxygen saturations on room air.   Ambulating independently and tolerating well. Pain well controlled. - BM but + flatus, given 206 mm of mag citrate today. Tolerating diet without nausea. UOP adequate. Chest tube removed 05/13/2022. Patient denies new chest pain, shortness of breath, abdominal pain, calf pain, nausea. Patient has no questions today.      OBJECTIVE:  Temp:  [97.3  F (36.3  C)-99.2  F (37.3  C)] 97.3  F (36.3  C)  Pulse:  [65-87] 82  Resp:  [18-20] 18  BP: (104-138)/(67-77) 133/73  SpO2:  [94 %-98 %] 98 %  Resp: 18    Vitals:    05/11/22 0545 05/12/22 0511 05/13/22 0642 05/14/22 0443   Weight: 104.4 kg (230 lb 1.6 oz) 104.6 kg (230 lb 11.2 oz) 102.8 kg (226 lb 9.6 oz) 101.9 kg (224 lb 9.6 oz)    05/15/22 0628   Weight: 100.2 kg (220 lb 12.8 oz)       Current Medications:    Scheduled Meds:    aspirin  162 mg Oral or NG Tube Daily     bumetanide  4 mg Oral Daily     heparin ANTICOAGULANT  5,000 Units Subcutaneous Q8H     insulin aspart  1-7 Units Subcutaneous TID AC     insulin aspart  1-5 Units Subcutaneous At Bedtime     lidocaine  2 patch Transdermal Q24H     lidocaine   Transdermal Q8H     lisinopril  5 mg Oral Daily     metoprolol tartrate  50 mg Oral BID     pantoprazole  40 mg Oral or NG Tube Daily    Or     pantoprazole  40 mg Oral Daily     polyethylene glycol  17 g Oral Daily     potassium & sodium phosphates  1 packet Oral or Feeding Tube BID     rosuvastatin  20 mg Oral Daily     senna-docusate  1 tablet Oral BID     sodium chloride (PF)  3 mL Intracatheter Q8H     Continuous Infusions:  PRN Meds:.acetaminophen, bisacodyl, glucose **OR** dextrose **OR** glucagon, hydrALAZINE, HYDROmorphone **OR** HYDROmorphone,  ketorolac, lactated ringers, lidocaine 4%, lidocaine (buffered or not buffered), magnesium hydroxide, montelukast, naloxone **OR** naloxone **OR** naloxone **OR** naloxone, ondansetron **OR** ondansetron, ondansetron **OR** ondansetron, oxyCODONE **OR** oxyCODONE, prochlorperazine **OR** prochlorperazine, sodium chloride (PF)    Cardiographics:    Telemetry monitoring demonstrates NSR with rates in the 70 per my personal review.    Imaging:  All available imaging reviewed, no acute concerns    Labs, personally reviewed.  BMP  Recent Labs   Lab 05/15/22  0726 05/15/22  0511 05/14/22  2119 05/14/22  1710 05/14/22  1206 05/14/22  0733 05/14/22  0408 05/13/22  0651 05/13/22  0550 05/12/22  0813 05/12/22  0356 05/11/22  0605 05/11/22  0417 05/10/22  1407 05/10/22  1303   NA  --   --   --   --   --   --   --   --   --   --  136  --  141  --  141   POTASSIUM  --  4.0  --   --   --   --  4.0  --  4.3  --  4.0  --  4.1  --  4.0  4.0   CHLORIDE  --   --   --   --   --   --   --   --   --   --  103  --  110*  --  109*   JAMIE  --   --   --   --   --   --   --   --   --   --  8.4*  --  7.6*  --  8.9   CO2  --   --   --   --   --   --   --   --   --   --  28  --  22  --  23   BUN  --   --   --   --   --   --   --   --   --   --  27*  --  19  --  17   CR  --   --   --   --   --   --   --   --   --   --  0.96  --  0.87  --  0.97   *  --  160* 166* 139*   < >  --    < >  --    < > 158*   < > 145*   < > 142*    < > = values in this interval not displayed.     CBC  Recent Labs   Lab 05/14/22  0408 05/11/22  0417 05/10/22  1303 05/10/22  1149   WBC  --  7.7 9.1 10.7   RBC  --  4.32* 4.60 3.50*   HGB  --  13.4 14.3 10.9*   HCT  --  38.7* 40.4 31.3*   MCV  --  90 88 89   MCH  --  31.0 31.1 31.1   MCHC  --  34.6 35.4 34.8   RDW  --  12.8 12.1 12.2   * 90* 81* 102*     INR  Recent Labs   Lab 05/10/22  1303 05/10/22  1149   INR 1.45* 1.75*      Hepatic Panel  Recent Labs   Lab 05/11/22  0417 05/10/22  1303   AST 77* 70*   ALT  "40 49*   ALKPHOS 40* 48   BILITOTAL 0.8 1.4*   ALBUMIN 3.0* 3.3*       Magnesium   Date Value Ref Range Status   05/15/2022 2.4 1.8 - 2.6 mg/dL Final   05/13/2022 1.9 1.8 - 2.6 mg/dL Final   05/12/2022 2.1 1.8 - 2.6 mg/dL Final   05/12/2022 2.1 1.8 - 2.6 mg/dL Final        I/O:  I/O last 3 completed shifts:  In: 1290 [P.O.:1290]  Out: 3950 [Urine:3950]       Blood pressure 133/73, pulse 82, temperature 97.3  F (36.3  C), temperature source Oral, resp. rate 18, height 1.778 m (5' 10\"), weight 100.2 kg (220 lb 12.8 oz), SpO2 98 %.  Vitals:    05/13/22 0642 05/14/22 0443 05/15/22 0628   Weight: 102.8 kg (226 lb 9.6 oz) 101.9 kg (224 lb 9.6 oz) 100.2 kg (220 lb 12.8 oz)        Physical Exam:  Weight 100.2 kg from 101.9 kg, preop weight 101.1 kg and trending down.   24 hr Fluid status; net loss -2660 mL. UOP 3950 mL  MAPs: 98    Gen: A&Ox4, out of bed and ambulating in the hallway independently, with NAD  Neuro: no focal deficits   CV: RRR, normal S1 S2, no murmurs, rubs or gallops.  No JVD  Pulm: CTA, no wheezing or rhonchi, normal breathing on RA  Abd: nondistended, normal BS, soft, nontender  Ext: No peripheral edema appreciated  Incision: clean, dry, intact, no erythema, sternum stable  Tubes/drain sites: dressing clean and dry.     ASSESSMENT/PLAN:  Mir Camaraquivel Sr. is a 62 year old male with a history of hypercholesterolemia and hypertension who is POD#5 s/p CABG X 3  Awake and alert, out of bed to chair with no apparent distress  AVSS, normal sinus rhythm per monitor  Postop respiratory sufficiency resolved, now on room air, saturation 96%  Continue lisinopril 5 mg daily  Adequate, I-S 1500 cc  Chest tubes and pacer wires removed 05/13/2022  Continue pulmonary toilet and a day mobilize and ambulate 3-4 times daily  Otherwise doing well and on path  Discharge disposition is home today 05/15/2022  No driving for 4 weeks, no lifting, no pushing, no pulling more than 10 pounds for 6 to 8 weeks  No bathtub bathing " and no swimming pool bathing for 8 to 10 weeks  Follow-up with Dr. Devine, Geoff MCNAIR MD within 7 days of hospital discharge  Follow-up with Dr. Nik Marcus MD's SHASTA on 06/14/2022 at 11:30 AM at the [facility]  Follow-up with Dr. Jermaine Schafer MD on 06/27/2022 at 1:30 PM at the  [facility]             NEURO:   - Scheduled Tylenol and PRN Toradol/lidocaine patches/oxycodone for pain    CV:   - Normotensive/borderline HTN  - Metoprolol 50mg two times a day  and lisinopril 5 mg daily  - ASA 162mg  - Rosuvastatin 20mg daily  - Chest tubes to remain today  - Will need HF regimen and follow-up (scheduled)  - Repeat echo showed EF 40 to 45%, compared to 30 to 35% preop    PULM:   - Maintaining oxygen saturations on room air  - Encourage pulmonary toilet  - PRN Singulair    FEN/GI:  - Continue electrolyte replacement protocol  - Diet: Cardiac, ADAT   - Bowel regimen    RENAL:  - Adequate UOP/hr. Continue to monitor closely.  - Connor catheter out 05/13/2022  - Cr 0.96  - Diuresis with Bumex 4mg daily (Lasix non-responder)     HEME:  - Acute blood loss anemia post-op.   - No bleeding concerns. Hep SQ, ASA    ID:  - Brittani op ppx complete, afebrile. No concerns for infection    ENDO:   - sliding scale insulin  - Restart PTA Metformin when able.     PPx:   - DVT: SCDs, SQ heparin TID, ambulation   - GI: Protonix 40mg PO daily    DISPO:   -  General telemetry status.     Patient discussed with Dr. Nik Marcus MD.    Julio Glover PA-C  Cardiothoracic Surgery  Pager [phone]    10:26 AM   May 15, 2022

## 2022-05-15 NOTE — PLAN OF CARE
Patient Name: Mir Treviño Sr.   MRN: [id number]   Date of Admission: 5/10/2022    Procedure: Procedure(s):  CORONARY ARTERY BYPASS GRAFT X3 ,  LEFT LEG ENDOSCOPIC VESSEL PROCUREMENT, LEFT INTERNAL MAMMARY ARTERY HARVEST, ANESTHESIA TRANSESOPHAGEAL ECHOCARDIOGRAM. EPIAORTIC ULTRASOUND.    Post Op day #:4    Subjective (Patient focus/Primary Problem for shift):           Pain Goal 00 Pain Rating 0           Pain Medication/ Regime effective to reduce patient pain none    Objective (Physical assessment):           Rhythm: normal sinus rhythm            Bowel Activity: yes if Yes indicate when: 5/15 afternoon          Bowel Medications: yes-gave mag citrate and prn suppository            Incision: healing well          Incentive Spirometry Q 1-2 hour when awake:  yes Volume: 1200          Epicardial Pacing Wires:  no            Patient Activity:           Up to chair for meals: yes          Ambulation with RN x2 (Not including CR): yes            Is patient in home clothes:yes             Chest Tubes   Pleural: no Draining: not applicable               Suction: not applicable              Mediastinal: no Draining: not applicable               Suction: not applicable   Dressing Change Daily:yes If No, why?                     Urinary Catheter: no           Preventative WOC consult (need MD order): no       Assessment (Nursing primary shift focus): Ambulating independently well. Showered this AM. +BM after suppository administration. Discharging home with wife this afternoon. AVS reviewed including medication changes, follow-up appointments with CV surgery and cardiac rehab, and activity restrictions. All belongings returned. All questions/ concerns addressed at this time.       Alysha Pinto RN   5/15/2022   1:51 PM

## 2022-05-15 NOTE — PLAN OF CARE
Cardiac Rehab Discharge Summary    Reason for therapy discharge:    Discharged to home with outpatient therapy.    Progress towards therapy goal(s). See goals on Care Plan in [facility] electronic health record for goal details.  Goals met    Therapy recommendation(s):    Continued therapy is recommended.  Rationale/Recommendations:  outpatient cardiac rehab for endurance, monitoring and education.  Continue home exercise program.

## 2022-05-15 NOTE — PLAN OF CARE
sc  Patient Name: Mir Treviño Sr.   MRN: [id number]   Date of Admission: 5/10/2022    Procedure: Procedure(s):  CORONARY ARTERY BYPASS GRAFT X3 ,  LEFT LEG ENDOSCOPIC VESSEL PROCUREMENT, LEFT INTERNAL MAMMARY ARTERY HARVEST, ANESTHESIA TRANSESOPHAGEAL ECHOCARDIOGRAM. EPIAORTIC ULTRASOUND.    Post Op day #:5    Subjective (Patient focus/Primary Problem for shift):           Pain Goal 0 Pain Rating 4           Pain Medication/ Regime effective to reduce patient pain PRN Tylenol for abdominal pain overnight    Objective (Physical assessment):           Rhythm: normal sinus rhythm            Bowel Activity: no, passing flatus if Yes indicate when:           Bowel Medications: yes            Incision: healing well          Incentive Spirometry Q 1-2 hour when awake:  no Volume:           Epicardial Pacing Wires:  no            Patient Activity:           Up to chair for meals: yes          Ambulation with RN x2 (Not including CR): not applicable            Is patient in home clothes:not applicable             Chest Tubes   Pleural: not applicable Draining: not applicable               Suction: not applicable              Mediastinal: not applicable Draining: not applicable               Suction: not applicable   Dressing Change Daily: yes If No, why?                      Urinary Catheter: no           Preventative WOC consult (need MD order): no       Assessment (Nursing primary shift focus): Patient wanted to rest. He stated he had a busy day. At 0300 patient reported abdominal pain. Informed patient it may be due to Milk of magnesia given in the early evening for constipation. Patient requested PRN Tylenol. Rated pain 4/10 and patient stated medication was effective. Passing flatus, no bowel movement yet. Denied incisional/chest pain. Independent in room with RN observing patient with good use of heart pillow.    Plan (Patient Care Plan/focus):   Ambulation  IS when awake  WILFREDO Ramires RN   5/15/2022   5:22  AM

## 2022-05-16 ENCOUNTER — PATIENT OUTREACH (OUTPATIENT)
Dept: CARE COORDINATION | Facility: CLINIC | Age: 62
End: 2022-05-16
Payer: COMMERCIAL

## 2022-05-16 DIAGNOSIS — Z71.89 OTHER SPECIFIED COUNSELING: ICD-10-CM

## 2022-05-16 NOTE — PROGRESS NOTES
"Clinic Care Coordination Contact  [facility]: Post-Discharge Note  SITUATION                                                      Admission:    Admission Date: 05/10/22   Reason for Admission: Heart failure with reduced ejection fraction  Discharge:   Discharge Date: 05/15/22  Discharge Diagnosis: Cardiovascular Surgery    BACKGROUND                                                      Per hospital discharge summary and inpatient provider notes:  Mir Treviño Sr. is a 62 year old male who was admitted to [facility] on 5/10/2022 following an abnormal coronary angiogram demonstrating severe multi-vessel coronary artery disease. He was referred to CV surgery for evaluation for possible elective coronary artery revascularization.      Patient was deemed a candidate for coronary artery bypass surgery, and was taken to the operating room on 5/10/2022 where patient underwent three vessel coronary artery bypass and endoscopic vein harvest from the left leg. Surgery was uneventful and patient was brought to the ICU post-operatively. He was extubated on POD#0 and weaned from pressors. Patient was awake and alert, afebrile, and with stable vitals. Insulin drip was discontinued and he was transitioned to a sliding scale. He was transferred to the general telemetry floor on POD#1 where patient has had return of bowel function, is maintaining oxygen saturations on room air, had his chest tubes removed, and has no complaints of chest pain or shortness of breath. On 05/15/22, patient was stable enough to be discharged to home.       ASSESSMENT      Enrollment  Primary Care Care Coordination Status: Declined    Discharge Assessment  How are you doing now that you are home?: \" slowly getting better\"  How are your symptoms? (Red Flag symptoms escalate to triage hotline per guidelines): Improved  Do you feel your condition is stable enough to be safe at home until your provider visit?: Yes  Does the patient have " their discharge instructions? : Yes  Does the patient have questions regarding their discharge instructions? : No  Were you started on any new medications or were there changes to any of your previous medications? : Yes  Does the patient have all of their medications?: Yes  Do you have questions regarding any of your medications? : No  Do you have all of your needed medical supplies or equipment (DME)?  (i.e. oxygen tank, CPAP, cane, etc.): Yes  Discharge follow-up appointment scheduled within 14 calendar days? : Yes  Discharge Follow Up Appointment Date: 05/17/22  Discharge Follow Up Appointment Scheduled with?: Specialty Care Provider    Post-op ([facility] CTA Only)  If the patient had a surgery or procedure, do they have any questions for a nurse?: No             PLAN                                                      Outpatient Plan:  Follow up appointment with Primary Care Physician: Geoff Devine within 7 days of discharge..  Follow up appointment with Specialist:   Follow-up with Dr. Nik Marcus MD's SHASTA on 06/14/2022 at 11:30 AM at the [facility]  Follow-up with Dr. Jermaine Schafer MD on 06/27/2022 at 1:30 PM at the  [facility]     Diet: Cardiac     Activity/Restrictions: As tolerated with sternal precautions in mind (see below). No driving for 4 weeks or while on pain medication.      - Shower and wash your incisions daily with soap and water. No tub baths/hot tubs for 4 weeks. An antibacterial soap such as Dial or Safeguard is recommended.     - Check your incisions every day. If you notice any redness, drainage, or anything unusual, please call the surgeons office.     - No driving for 4 weeks after surgery or while on pain medication      - Do not lift anything more than 10 pounds for 6 weeks after surgery. After 6 weeks, advance lifting is tolerated.     - You may have watery drainage from your chest tube site for 2-3 weeks after  "surgery. Your may cover with a Band-Aid to protect your clothing. Remove the Band-Aid every day and wash the site.     - If you have a leg lesion, you may have swelling for 2-3 months. Elevate your leg any time you are not walking.     - If you feel any \"popping\" or \"clicking\" sensations in your chest, your arms are out too far or you are putting too much weight into arm movements. Do not reach over your head or out to the side to pull something. Do not do any arm exercises or use any exercise equipment that involves arm movement. If you feel your sternum moving, call the surgeon's office.     - Increase your daily activity as explained by Cardiac Rehab. You are encouraged to enroll in an Outpatient Cardiac Rehab Program.     - No active sports using your upper arms for 3 months. This includes fishing, hunting, bowling, swimming, tennis or golf.     - No physical activity such as cutting the grass, raking, vacuuming, changing sheets on your bed, snow shoveling, or using a  for 3 months.     - Use incentive spirometer 6-8 times per day for 2 weeks.    Future Appointments   Date Time Provider Department Center   5/20/2022  1:00 PM [facility]   6/1/2022 10:20 AM [facility]orenson [facility]   6/14/2022 11:30 [facility]   6/27/2022  1:20 PM Jermaine Schafer MD [facility]         For any urgent concerns, please contact our 24 hour nurse triage line: [phone] ([phone])         Sofia Aguila MA                "

## 2022-05-18 ENCOUNTER — TELEPHONE (OUTPATIENT)
Dept: CARDIOLOGY | Facility: CLINIC | Age: 62
End: 2022-05-18
Payer: COMMERCIAL

## 2022-05-18 NOTE — TELEPHONE ENCOUNTER
Cardiovascular Surgery  [facility]    DISCHARGE FOLLOW UP PHONE CALL      POST OP MONITORING  How is your pain on a 0-10 scale, how are you managing your pain? Pain is being managed with oxycodone occasionally.    ACTIVITY  How is your activity tolerance? Walking and tolerating it well.  Are you still doing sternal precautions? Yes.  Do you hear any clicking when you are moving or taking a deep breath? No.    Are you weighing yourself daily? Weight has been stable.    SIGNS AND SYMPTOMS OF INFECTION  1. INCREASE IN PAIN - No  2. FEVER - No  3. DRAINAGE - No If so, color: NA  4. REDNESS - No  5. SWELLING - No    ASSISTANCE  Do you have someone at home to assist you with your daily activities? Spouse is assisting pt at home.    MEDICATIONS  Is someone helping you to set up your medications? Spouse is helping him with meds.  Do you have any questions about your medications? Not at this time.    Are you on a blood thinner? No.  Who is managing your INRs? NA    FOLLOW UP  You are scheduled to see your primary care physician on 6/1.  You are scheduled to see our surgery advanced practive provider for post operative follow up on 6/14.    You are scheduled for cardiac rehab on 5/20.  You are scheduled to see your cardiologist on 6/27.    CONTACT INFORMATION  Please feel free to call us with any questions or symptoms that are concerning for you at [phone]. If it is after 4:30 in the afternoon, or a weekend please call [phone] and ask for the on call specialist. We want to do everything we can to help prevent you needing to return to the ED, so please do not hesitate to call us.    Jennifer Read, RNCC  Cardiovascular Surgery  [phone]

## 2022-05-20 ENCOUNTER — HOSPITAL ENCOUNTER (OUTPATIENT)
Dept: CARDIAC REHAB | Facility: HOSPITAL | Age: 62
Discharge: HOME | End: 2022-05-20
Attending: PHYSICIAN ASSISTANT
Payer: COMMERCIAL

## 2022-05-20 DIAGNOSIS — Z95.1 S/P CABG (CORONARY ARTERY BYPASS GRAFT): ICD-10-CM

## 2022-05-20 PROCEDURE — 93797 PHYS/QHP OP CAR RHAB WO ECG: CPT

## 2022-05-20 PROCEDURE — 93798 PHYS/QHP OP CAR RHAB W/ECG: CPT

## 2022-05-25 ENCOUNTER — HOSPITAL ENCOUNTER (OUTPATIENT)
Dept: CARDIAC REHAB | Facility: HOSPITAL | Age: 62
Discharge: HOME | End: 2022-05-25
Attending: INTERNAL MEDICINE
Payer: COMMERCIAL

## 2022-05-25 PROCEDURE — 93798 PHYS/QHP OP CAR RHAB W/ECG: CPT

## 2022-05-27 ENCOUNTER — HOSPITAL ENCOUNTER (OUTPATIENT)
Dept: CARDIAC REHAB | Facility: HOSPITAL | Age: 62
Discharge: HOME | End: 2022-05-27
Attending: INTERNAL MEDICINE
Payer: COMMERCIAL

## 2022-05-27 PROCEDURE — 93798 PHYS/QHP OP CAR RHAB W/ECG: CPT

## 2022-06-01 ENCOUNTER — HOSPITAL ENCOUNTER (OUTPATIENT)
Dept: CARDIAC REHAB | Facility: HOSPITAL | Age: 62
Discharge: HOME | End: 2022-06-01
Attending: INTERNAL MEDICINE
Payer: OTHER GOVERNMENT

## 2022-06-01 PROCEDURE — 93798 PHYS/QHP OP CAR RHAB W/ECG: CPT

## 2022-06-02 ENCOUNTER — OFFICE VISIT (OUTPATIENT)
Dept: FAMILY MEDICINE | Facility: CLINIC | Age: 62
End: 2022-06-02

## 2022-06-02 VITALS
HEIGHT: 70 IN | SYSTOLIC BLOOD PRESSURE: 124 MMHG | OXYGEN SATURATION: 98 % | HEART RATE: 62 BPM | WEIGHT: 217 LBS | RESPIRATION RATE: 18 BRPM | DIASTOLIC BLOOD PRESSURE: 76 MMHG | TEMPERATURE: 98.2 F | BODY MASS INDEX: 31.07 KG/M2

## 2022-06-02 DIAGNOSIS — I25.810 CORONARY ARTERY DISEASE INVOLVING AUTOLOGOUS ARTERY CORONARY BYPASS GRAFT WITHOUT ANGINA PECTORIS: Primary | ICD-10-CM

## 2022-06-02 PROBLEM — I11.9 HYPERTENSIVE HEART DISEASE: Status: ACTIVE | Noted: 2022-06-02

## 2022-06-02 PROBLEM — H93.19 TINNITUS: Status: ACTIVE | Noted: 2022-06-02

## 2022-06-02 PROBLEM — H90.5 SENSORINEURAL HEARING LOSS (SNHL) OF LEFT EAR: Status: ACTIVE | Noted: 2022-06-02

## 2022-06-02 PROBLEM — R79.89 ABNORMAL LIVER FUNCTION TESTS: Status: ACTIVE | Noted: 2022-06-02

## 2022-06-02 PROBLEM — I25.9 CHRONIC ISCHEMIC HEART DISEASE: Status: ACTIVE | Noted: 2022-06-02

## 2022-06-02 PROCEDURE — 99214 OFFICE O/P EST MOD 30 MIN: CPT | Performed by: FAMILY MEDICINE

## 2022-06-02 RX ORDER — ATORVASTATIN CALCIUM 20 MG/1
20 TABLET, FILM COATED ORAL
COMMUNITY
End: 2022-06-06

## 2022-06-02 RX ORDER — LISINOPRIL 40 MG/1
20 TABLET ORAL
COMMUNITY
Start: 2022-01-28 | End: 2022-06-06

## 2022-06-02 ASSESSMENT — ASTHMA QUESTIONNAIRES
ACT_TOTALSCORE: 25
QUESTION_3 LAST FOUR WEEKS HOW OFTEN DID YOUR ASTHMA SYMPTOMS (WHEEZING, COUGHING, SHORTNESS OF BREATH, CHEST TIGHTNESS OR PAIN) WAKE YOU UP AT NIGHT OR EARLIER THAN USUAL IN THE MORNING: NOT AT ALL
QUESTION_1 LAST FOUR WEEKS HOW MUCH OF THE TIME DID YOUR ASTHMA KEEP YOU FROM GETTING AS MUCH DONE AT WORK, SCHOOL OR AT HOME: NONE OF THE TIME
QUESTION_2 LAST FOUR WEEKS HOW OFTEN HAVE YOU HAD SHORTNESS OF BREATH: NOT AT ALL
QUESTION_5 LAST FOUR WEEKS HOW WOULD YOU RATE YOUR ASTHMA CONTROL: COMPLETELY CONTROLLED
ACT_TOTALSCORE: 25
QUESTION_4 LAST FOUR WEEKS HOW OFTEN HAVE YOU USED YOUR RESCUE INHALER OR NEBULIZER MEDICATION (SUCH AS ALBUTEROL): NOT AT ALL

## 2022-06-02 NOTE — PROGRESS NOTES
Hospital Follow-up Visi  Assessment and HPI and Hospital course; this is a first-time visit for 1 at this facility following a 6-day hospitalization secondary to presentation to the emergency room at [facility] for chest pain.  Patient previously had coronary artery disease with 2 stent placement in the year 2011; he was apparently lost to follow-up he did have a history of high lipids and benign essential hypertension.  He has been getting yearly physicals at the [facility] as he was a retired [employer] for 30-year career.  Currently retired.  Not on statin.  Developed chest pain was presented to [facility] seen by cardiology EKG changes consistent with myocardial disease.  Angiogram showed three-vessel disease and stent placement.  Course of action was determined to be coronary artery bypass as patient was quite acute.  Triple bypass vessel surgery successful.  Normal postop course.  Discharged on 6 state to cardiac rehab which he is attending 3 times per week.  He is currently asymptomatic his wound was inspected his donor graft sites were inspected he is using inspiratory spirometer at home and doing well.  PO2 here normal exam normal.  Patient will follow here in primary care as needed.  He has an appoint with cardiology 1 week all medications were reviewed.  Hospital record reviewed.  Very nice patient we will be happy to take care of him I did give him the name of Dr. Murphy to be his primary care physician and I will help if I and required.Hospital/Nursing Home/IP Rehab Facility: [facility]  Date of Admission: 5/11/2022  Date of Discharge: 516/2022  Reason(s) for Admission: Chest pain      Was your hospitalization related to COVID-19?   Problems taking medications regularly:    Medication changes since discharge:   Problems adhering to non-medication therapy:      Summary of hospitalization:  [facility] discharge summary  reviewed  Diagnostic Tests/Treatments reviewed.  Follow up needed:   Other Healthcare Providers Involved in Patient s Care:         None  Update since discharge: improved. Post Discharge Medication Reconciliation: discharge medications reconciled, continue medications without change.  Plan of care communicated with patient          Answers for HPI/ROS submitted by the patient on 6/2/2022  Dyspnea:: No  Edema:: No  Are you using more pillows than usual at night?: Yes  Do you cough at night?: No  Checking weight daily:: Yes  Weight change recently:: weight decrease  Heart Medication Side Effects:: None  Frequency:: None  How many servings of fruits and vegetables do you eat daily?: 0-1  On average, how many sweetened beverages do you drink each day (Examples: soda, juice, sweet tea, etc.  Do NOT count diet or artificially sweetened beverages)?: 1  How many minutes a day do you exercise enough to make your heart beat faster?: 9 or less  How many days a week do you exercise enough to make your heart beat faster?: 4  How many days per week do you miss taking your medication?: 0

## 2022-06-03 ENCOUNTER — HOSPITAL ENCOUNTER (OUTPATIENT)
Dept: CARDIAC REHAB | Facility: HOSPITAL | Age: 62
Discharge: HOME | End: 2022-06-03
Attending: INTERNAL MEDICINE
Payer: OTHER GOVERNMENT

## 2022-06-03 PROCEDURE — 93798 PHYS/QHP OP CAR RHAB W/ECG: CPT

## 2022-06-06 ENCOUNTER — ANESTHESIA EVENT (OUTPATIENT)
Dept: SURGERY | Facility: HOSPITAL | Age: 62
DRG: 418 | End: 2022-06-06
Payer: OTHER GOVERNMENT

## 2022-06-06 ENCOUNTER — HOSPITAL ENCOUNTER (INPATIENT)
Facility: HOSPITAL | Age: 62
LOS: 1 days | Discharge: HOME | DRG: 418 | End: 2022-06-07
Attending: EMERGENCY MEDICINE | Admitting: INTERNAL MEDICINE
Payer: OTHER GOVERNMENT

## 2022-06-06 ENCOUNTER — APPOINTMENT (OUTPATIENT)
Dept: ULTRASOUND IMAGING | Facility: HOSPITAL | Age: 62
DRG: 418 | End: 2022-06-06
Attending: EMERGENCY MEDICINE
Payer: OTHER GOVERNMENT

## 2022-06-06 ENCOUNTER — APPOINTMENT (OUTPATIENT)
Dept: RADIOLOGY | Facility: HOSPITAL | Age: 62
DRG: 418 | End: 2022-06-06
Attending: INTERNAL MEDICINE
Payer: OTHER GOVERNMENT

## 2022-06-06 ENCOUNTER — APPOINTMENT (OUTPATIENT)
Dept: CT IMAGING | Facility: HOSPITAL | Age: 62
DRG: 418 | End: 2022-06-06
Attending: EMERGENCY MEDICINE
Payer: OTHER GOVERNMENT

## 2022-06-06 DIAGNOSIS — K81.0 ACUTE CHOLECYSTITIS: ICD-10-CM

## 2022-06-06 PROBLEM — Z95.1 STATUS POST CORONARY ARTERY BYPASS GRAFT: Status: ACTIVE | Noted: 2022-06-06

## 2022-06-06 PROBLEM — I25.5 ISCHEMIC CARDIOMYOPATHY: Status: ACTIVE | Noted: 2022-06-06

## 2022-06-06 PROBLEM — K74.69 OTHER CIRRHOSIS OF LIVER (H): Status: ACTIVE | Noted: 2022-06-06

## 2022-06-06 LAB
ABO/RH(D): NORMAL
ALBUMIN SERPL-MCNC: 3.7 G/DL (ref 3.5–5)
ALBUMIN UR-MCNC: 20 MG/DL
ALP SERPL-CCNC: 106 U/L (ref 45–120)
ALT SERPL W P-5'-P-CCNC: 29 U/L (ref 0–45)
ANION GAP SERPL CALCULATED.3IONS-SCNC: 9 MMOL/L (ref 5–18)
ANTIBODY SCREEN: NEGATIVE
APPEARANCE UR: CLEAR
AST SERPL W P-5'-P-CCNC: 39 U/L (ref 0–40)
BASOPHILS # BLD AUTO: 0 10E3/UL (ref 0–0.2)
BASOPHILS NFR BLD AUTO: 1 %
BILIRUB SERPL-MCNC: 0.7 MG/DL (ref 0–1)
BILIRUB UR QL STRIP: NEGATIVE
BUN SERPL-MCNC: 22 MG/DL (ref 8–22)
CALCIUM SERPL-MCNC: 8.9 MG/DL (ref 8.5–10.5)
CHLORIDE BLD-SCNC: 109 MMOL/L (ref 98–107)
CO2 SERPL-SCNC: 21 MMOL/L (ref 22–31)
COLOR UR AUTO: YELLOW
CREAT SERPL-MCNC: 0.83 MG/DL (ref 0.7–1.3)
EOSINOPHIL # BLD AUTO: 0.1 10E3/UL (ref 0–0.7)
EOSINOPHIL NFR BLD AUTO: 4 %
ERYTHROCYTE [DISTWIDTH] IN BLOOD BY AUTOMATED COUNT: 12.5 % (ref 10–15)
GFR SERPL CREATININE-BSD FRML MDRD: >90 ML/MIN/1.73M2
GLUCOSE BLD-MCNC: 140 MG/DL (ref 70–125)
GLUCOSE BLDC GLUCOMTR-MCNC: 109 MG/DL (ref 70–99)
GLUCOSE BLDC GLUCOMTR-MCNC: 110 MG/DL (ref 70–99)
GLUCOSE BLDC GLUCOMTR-MCNC: 117 MG/DL (ref 70–99)
GLUCOSE UR STRIP-MCNC: NEGATIVE MG/DL
HCT VFR BLD AUTO: 36.1 % (ref 40–53)
HGB BLD-MCNC: 12.1 G/DL (ref 13.3–17.7)
HGB UR QL STRIP: NEGATIVE
HOLD SPECIMEN: NORMAL
IMM GRANULOCYTES # BLD: 0 10E3/UL
IMM GRANULOCYTES NFR BLD: 0 %
INR PPP: 1.21 (ref 0.85–1.15)
KETONES UR STRIP-MCNC: NEGATIVE MG/DL
LEUKOCYTE ESTERASE UR QL STRIP: NEGATIVE
LIPASE SERPL-CCNC: 34 U/L (ref 0–52)
LYMPHOCYTES # BLD AUTO: 1 10E3/UL (ref 0.8–5.3)
LYMPHOCYTES NFR BLD AUTO: 28 %
MCH RBC QN AUTO: 30 PG (ref 26.5–33)
MCHC RBC AUTO-ENTMCNC: 33.5 G/DL (ref 31.5–36.5)
MCV RBC AUTO: 90 FL (ref 78–100)
MONOCYTES # BLD AUTO: 0.3 10E3/UL (ref 0–1.3)
MONOCYTES NFR BLD AUTO: 8 %
MUCOUS THREADS #/AREA URNS LPF: PRESENT /LPF
NEUTROPHILS # BLD AUTO: 2.1 10E3/UL (ref 1.6–8.3)
NEUTROPHILS NFR BLD AUTO: 59 %
NITRATE UR QL: NEGATIVE
NRBC # BLD AUTO: 0 10E3/UL
NRBC BLD AUTO-RTO: 0 /100
PH UR STRIP: 7.5 [PH] (ref 5–7)
PLATELET # BLD AUTO: 140 10E3/UL (ref 150–450)
POTASSIUM BLD-SCNC: 4.1 MMOL/L (ref 3.5–5)
PROT SERPL-MCNC: 7.7 G/DL (ref 6–8)
RBC # BLD AUTO: 4.03 10E6/UL (ref 4.4–5.9)
RBC URINE: 3 /HPF
SARS-COV-2 RNA RESP QL NAA+PROBE: NEGATIVE
SODIUM SERPL-SCNC: 139 MMOL/L (ref 136–145)
SP GR UR STRIP: 1.04 (ref 1–1.03)
SPECIMEN EXPIRATION DATE: NORMAL
UROBILINOGEN UR STRIP-MCNC: <2 MG/DL
WBC # BLD AUTO: 3.5 10E3/UL (ref 4–11)
WBC URINE: <1 /HPF

## 2022-06-06 PROCEDURE — 93005 ELECTROCARDIOGRAM TRACING: CPT | Performed by: EMERGENCY MEDICINE

## 2022-06-06 PROCEDURE — 99221 1ST HOSP IP/OBS SF/LOW 40: CPT | Mod: 24 | Performed by: THORACIC SURGERY (CARDIOTHORACIC VASCULAR SURGERY)

## 2022-06-06 PROCEDURE — 258N000003 HC RX IP 258 OP 636: Performed by: EMERGENCY MEDICINE

## 2022-06-06 PROCEDURE — 86850 RBC ANTIBODY SCREEN: CPT | Performed by: EMERGENCY MEDICINE

## 2022-06-06 PROCEDURE — 96361 HYDRATE IV INFUSION ADD-ON: CPT

## 2022-06-06 PROCEDURE — 258N000003 HC RX IP 258 OP 636: Performed by: INTERNAL MEDICINE

## 2022-06-06 PROCEDURE — 36415 COLL VENOUS BLD VENIPUNCTURE: CPT | Performed by: EMERGENCY MEDICINE

## 2022-06-06 PROCEDURE — 36415 COLL VENOUS BLD VENIPUNCTURE: CPT | Performed by: STUDENT IN AN ORGANIZED HEALTH CARE EDUCATION/TRAINING PROGRAM

## 2022-06-06 PROCEDURE — 96376 TX/PRO/DX INJ SAME DRUG ADON: CPT

## 2022-06-06 PROCEDURE — 74177 CT ABD & PELVIS W/CONTRAST: CPT

## 2022-06-06 PROCEDURE — 71045 X-RAY EXAM CHEST 1 VIEW: CPT

## 2022-06-06 PROCEDURE — 76705 ECHO EXAM OF ABDOMEN: CPT

## 2022-06-06 PROCEDURE — 83690 ASSAY OF LIPASE: CPT | Performed by: EMERGENCY MEDICINE

## 2022-06-06 PROCEDURE — 82040 ASSAY OF SERUM ALBUMIN: CPT | Performed by: EMERGENCY MEDICINE

## 2022-06-06 PROCEDURE — 96374 THER/PROPH/DIAG INJ IV PUSH: CPT

## 2022-06-06 PROCEDURE — 87635 SARS-COV-2 COVID-19 AMP PRB: CPT | Performed by: EMERGENCY MEDICINE

## 2022-06-06 PROCEDURE — C9803 HOPD COVID-19 SPEC COLLECT: HCPCS

## 2022-06-06 PROCEDURE — 120N000001 HC R&B MED SURG/OB

## 2022-06-06 PROCEDURE — 85610 PROTHROMBIN TIME: CPT | Performed by: EMERGENCY MEDICINE

## 2022-06-06 PROCEDURE — 81001 URINALYSIS AUTO W/SCOPE: CPT | Performed by: EMERGENCY MEDICINE

## 2022-06-06 PROCEDURE — 250N000011 HC RX IP 250 OP 636: Performed by: INTERNAL MEDICINE

## 2022-06-06 PROCEDURE — 99285 EMERGENCY DEPT VISIT HI MDM: CPT | Mod: 25

## 2022-06-06 PROCEDURE — 250N000011 HC RX IP 250 OP 636: Performed by: EMERGENCY MEDICINE

## 2022-06-06 PROCEDURE — 99221 1ST HOSP IP/OBS SF/LOW 40: CPT | Performed by: INTERNAL MEDICINE

## 2022-06-06 PROCEDURE — 80053 COMPREHEN METABOLIC PANEL: CPT | Performed by: EMERGENCY MEDICINE

## 2022-06-06 PROCEDURE — 96375 TX/PRO/DX INJ NEW DRUG ADDON: CPT

## 2022-06-06 PROCEDURE — 250N000013 HC RX MED GY IP 250 OP 250 PS 637: Performed by: INTERNAL MEDICINE

## 2022-06-06 PROCEDURE — 99222 1ST HOSP IP/OBS MODERATE 55: CPT | Mod: 57 | Performed by: SURGERY

## 2022-06-06 PROCEDURE — 85025 COMPLETE CBC W/AUTO DIFF WBC: CPT | Performed by: EMERGENCY MEDICINE

## 2022-06-06 RX ORDER — ALBUTEROL SULFATE 90 UG/1
2 AEROSOL, METERED RESPIRATORY (INHALATION) EVERY 6 HOURS PRN
Status: DISCONTINUED | OUTPATIENT
Start: 2022-06-06 | End: 2022-06-07 | Stop reason: HOSPADM

## 2022-06-06 RX ORDER — PIPERACILLIN SODIUM, TAZOBACTAM SODIUM 3; .375 G/15ML; G/15ML
3.38 INJECTION, POWDER, LYOPHILIZED, FOR SOLUTION INTRAVENOUS EVERY 8 HOURS
Status: DISCONTINUED | OUTPATIENT
Start: 2022-06-06 | End: 2022-06-07 | Stop reason: HOSPADM

## 2022-06-06 RX ORDER — ACETAMINOPHEN 325 MG/1
650 TABLET ORAL EVERY 4 HOURS PRN
Status: DISCONTINUED | OUTPATIENT
Start: 2022-06-06 | End: 2022-06-07 | Stop reason: HOSPADM

## 2022-06-06 RX ORDER — MORPHINE SULFATE 4 MG/ML
4 INJECTION, SOLUTION INTRAMUSCULAR; INTRAVENOUS ONCE
Status: COMPLETED | OUTPATIENT
Start: 2022-06-06 | End: 2022-06-06

## 2022-06-06 RX ORDER — ONDANSETRON 2 MG/ML
4 INJECTION INTRAMUSCULAR; INTRAVENOUS EVERY 6 HOURS PRN
Status: DISCONTINUED | OUTPATIENT
Start: 2022-06-06 | End: 2022-06-07 | Stop reason: HOSPADM

## 2022-06-06 RX ORDER — PIPERACILLIN SODIUM, TAZOBACTAM SODIUM 3; .375 G/15ML; G/15ML
3.38 INJECTION, POWDER, LYOPHILIZED, FOR SOLUTION INTRAVENOUS ONCE
Status: COMPLETED | OUTPATIENT
Start: 2022-06-06 | End: 2022-06-06

## 2022-06-06 RX ORDER — ONDANSETRON 2 MG/ML
4 INJECTION INTRAMUSCULAR; INTRAVENOUS ONCE
Status: COMPLETED | OUTPATIENT
Start: 2022-06-06 | End: 2022-06-06

## 2022-06-06 RX ORDER — DEXTROSE MONOHYDRATE 25 G/50ML
25-50 INJECTION, SOLUTION INTRAVENOUS
Status: DISCONTINUED | OUTPATIENT
Start: 2022-06-06 | End: 2022-06-07 | Stop reason: HOSPADM

## 2022-06-06 RX ORDER — NICOTINE POLACRILEX 4 MG
15-30 LOZENGE BUCCAL
Status: DISCONTINUED | OUTPATIENT
Start: 2022-06-06 | End: 2022-06-07 | Stop reason: HOSPADM

## 2022-06-06 RX ORDER — AMOXICILLIN 250 MG
1 CAPSULE ORAL 2 TIMES DAILY
Status: DISCONTINUED | OUTPATIENT
Start: 2022-06-06 | End: 2022-06-07 | Stop reason: HOSPADM

## 2022-06-06 RX ORDER — MORPHINE SULFATE 4 MG/ML
4 INJECTION, SOLUTION INTRAMUSCULAR; INTRAVENOUS
Status: DISCONTINUED | OUTPATIENT
Start: 2022-06-06 | End: 2022-06-07 | Stop reason: HOSPADM

## 2022-06-06 RX ORDER — LISINOPRIL 5 MG/1
5 TABLET ORAL DAILY
Status: DISCONTINUED | OUTPATIENT
Start: 2022-06-06 | End: 2022-06-07 | Stop reason: HOSPADM

## 2022-06-06 RX ORDER — ONDANSETRON 4 MG/1
4 TABLET, ORALLY DISINTEGRATING ORAL EVERY 6 HOURS PRN
Status: DISCONTINUED | OUTPATIENT
Start: 2022-06-06 | End: 2022-06-07 | Stop reason: HOSPADM

## 2022-06-06 RX ORDER — BISACODYL 5 MG
5 TABLET, DELAYED RELEASE (ENTERIC COATED) ORAL DAILY PRN
Status: DISCONTINUED | OUTPATIENT
Start: 2022-06-06 | End: 2022-06-07 | Stop reason: HOSPADM

## 2022-06-06 RX ORDER — ROSUVASTATIN CALCIUM 10 MG/1
20 TABLET, COATED ORAL DAILY
Status: DISCONTINUED | OUTPATIENT
Start: 2022-06-06 | End: 2022-06-07 | Stop reason: HOSPADM

## 2022-06-06 RX ORDER — SODIUM CHLORIDE 9 MG/ML
INJECTION, SOLUTION INTRAVENOUS CONTINUOUS
Status: DISCONTINUED | OUTPATIENT
Start: 2022-06-06 | End: 2022-06-07 | Stop reason: HOSPADM

## 2022-06-06 RX ORDER — BISACODYL 5 MG
10 TABLET, DELAYED RELEASE (ENTERIC COATED) ORAL DAILY PRN
Status: DISCONTINUED | OUTPATIENT
Start: 2022-06-06 | End: 2022-06-07 | Stop reason: HOSPADM

## 2022-06-06 RX ORDER — AMOXICILLIN 250 MG
2 CAPSULE ORAL 2 TIMES DAILY
Status: DISCONTINUED | OUTPATIENT
Start: 2022-06-06 | End: 2022-06-07 | Stop reason: HOSPADM

## 2022-06-06 RX ORDER — HYDRALAZINE HYDROCHLORIDE 20 MG/ML
10 INJECTION INTRAMUSCULAR; INTRAVENOUS EVERY 6 HOURS PRN
Status: DISCONTINUED | OUTPATIENT
Start: 2022-06-06 | End: 2022-06-07 | Stop reason: HOSPADM

## 2022-06-06 RX ORDER — METOPROLOL TARTRATE 25 MG/1
50 TABLET, FILM COATED ORAL 2 TIMES DAILY
Status: DISCONTINUED | OUTPATIENT
Start: 2022-06-06 | End: 2022-06-07 | Stop reason: HOSPADM

## 2022-06-06 RX ORDER — IOPAMIDOL 755 MG/ML
100 INJECTION, SOLUTION INTRAVASCULAR ONCE
Status: COMPLETED | OUTPATIENT
Start: 2022-06-06 | End: 2022-06-06

## 2022-06-06 RX ORDER — NITROGLYCERIN 0.4 MG/1
0.4 TABLET SUBLINGUAL EVERY 5 MIN PRN
Status: DISCONTINUED | OUTPATIENT
Start: 2022-06-06 | End: 2022-06-07 | Stop reason: HOSPADM

## 2022-06-06 RX ORDER — OXYCODONE HYDROCHLORIDE 5 MG/1
5 TABLET ORAL EVERY 4 HOURS PRN
Status: DISCONTINUED | OUTPATIENT
Start: 2022-06-06 | End: 2022-06-07 | Stop reason: HOSPADM

## 2022-06-06 RX ADMIN — PIPERACILLIN AND TAZOBACTAM 3.38 G: 3; .375 INJECTION, POWDER, FOR SOLUTION INTRAVENOUS at 15:51

## 2022-06-06 RX ADMIN — METOPROLOL TARTRATE 50 MG: 25 TABLET, FILM COATED ORAL at 19:00

## 2022-06-06 RX ADMIN — ROSUVASTATIN CALCIUM 20 MG: 10 TABLET, FILM COATED ORAL at 14:32

## 2022-06-06 RX ADMIN — SODIUM CHLORIDE 100 ML/HR: 9 INJECTION, SOLUTION INTRAVENOUS at 14:10

## 2022-06-06 RX ADMIN — IOPAMIDOL 100 ML: 755 INJECTION, SOLUTION INTRAVENOUS at 05:44

## 2022-06-06 RX ADMIN — MORPHINE SULFATE 4 MG: 4 INJECTION, SOLUTION INTRAMUSCULAR; INTRAVENOUS at 06:54

## 2022-06-06 RX ADMIN — ONDANSETRON 4 MG: 2 INJECTION INTRAMUSCULAR; INTRAVENOUS at 06:53

## 2022-06-06 RX ADMIN — LISINOPRIL 5 MG: 5 TABLET ORAL at 14:30

## 2022-06-06 RX ADMIN — PIPERACILLIN AND TAZOBACTAM 3.38 G: 3; .375 INJECTION, POWDER, LYOPHILIZED, FOR SOLUTION INTRAVENOUS at 09:55

## 2022-06-06 RX ADMIN — SODIUM CHLORIDE 1000 ML: 9 INJECTION, SOLUTION INTRAVENOUS at 06:51

## 2022-06-06 RX ADMIN — PIPERACILLIN AND TAZOBACTAM 3.38 G: 3; .375 INJECTION, POWDER, FOR SOLUTION INTRAVENOUS at 23:32

## 2022-06-06 RX ADMIN — MORPHINE SULFATE 4 MG: 4 INJECTION, SOLUTION INTRAMUSCULAR; INTRAVENOUS at 09:44

## 2022-06-06 RX ADMIN — SENNOSIDES AND DOCUSATE SODIUM 1 TABLET: 8.6; 5 TABLET ORAL at 19:00

## 2022-06-06 ASSESSMENT — ACTIVITIES OF DAILY LIVING (ADL)
ADLS_ACUITY_SCORE: 35
DEPENDENT_IADLS:: INDEPENDENT
ADLS_ACUITY_SCORE: 35
ADLS_ACUITY_SCORE: 35

## 2022-06-06 NOTE — CONSULTS
"Care Management Initial Consult    General Information  Assessment completed with: Mir Loja  Type of CM/SW Visit: Initial Assessment    Primary Care Provider verified and updated as needed: Yes   Readmission within the last 30 days: no previous admission in last 30 days      Reason for Consult: discharge planning  Advance Care Planning: Advance Care Planning Reviewed: no concerns identified          Communication Assessment  Patient's communication style: spoken language (English or Bilingual)             Cognitive  Cognitive/Neuro/Behavioral: WDL                      Living Environment:   People in home: spouse  Sofia Hester wife  Current living Arrangements: house      Able to return to prior arrangements: yes       Family/Social Support:  Care provided by: self  Provides care for: no one  Marital Status:   Wife  Sofia       Description of Support System: Supportive, Involved    Support Assessment: Adequate family and caregiver support, Adequate social supports, Patient communicates needs well met    Current Resources:   Patient receiving home care services: No     Community Resources: None  Equipment currently used at home: none  Supplies currently used at home: Hearing Aid Batteries, Other (\"1 hearing aid for left ear; glasses\")    Employment/Financial:  Employment Status: , previous service, retired     Employment/ Comments: [facility] notification #: [id number]  Financial Concerns:     Referral to Financial Worker: No       Lifestyle & Psychosocial Needs:  Social Determinants of Health     Tobacco Use: Low Risk      Smoking Tobacco Use: Never Smoker     Smokeless Tobacco Use: Never Used   Alcohol Use: Not on file   Financial Resource Strain: Not on file   Food Insecurity: Not on file   Transportation Needs: Not on file   Physical Activity: Not on file   Stress: Not on file   Social Connections: Not on file   Intimate Partner Violence: Not on file   Depression: Not at risk     PHQ-2 " Patient returning call, can be reached at [phone]   Score: 0   Housing Stability: Not on file       Functional Status:  Prior to admission patient needed assistance:   Dependent ADLs:: Independent, Ambulation-no assistive device  Dependent IADLs:: Independent  Assesssment of Functional Status: At functional baseline    Mental Health Status:          Chemical Dependency Status:                Values/Beliefs:  Spiritual, Cultural Beliefs, Scientologist Practices, Values that affect care:                 Additional Information:  Mir lives in a house with his wife. He is independent with ADLs at baseline and drives.    Likely no discharge needs at this time.    Wife to transport at discharge.    [facility] notification #: [id number].    Brittney Spaulding RN

## 2022-06-06 NOTE — CONSULTS
General Surgery Consultation  Mir Treviño Sr. MRN# [id number]   Age/Sex: 62 year old male YOB: 1960     Reason for consult: 1. Acute cholecystitis            Requesting physician:  Carlo Dodson MD                   Assessment and Plan:   Assessment:  Right upper quadrant pain consistent with an acute cholecystitis, and imaging consistent with that as well.  Under typical circumstances we would proceed with cholecystectomy without delay.  Given his recent cardiac surgery, we should ensure he has cleared from a cardiac surgery standpoint.  I do not anticipate any problems, but will double check with them to make sure we are not screwing something up with his postsurgical recovery.  If the timing is poor, we would pursue cholecystostomy tube placement to temporize for another 6 weeks.    Plan:  1.  Cardiac surgery curbside to ensure no contraindications to pursuing laparoscopic surgery today/tomorrow  2.  Tentative plan for laparoscopic cholecystectomy          Chief Complaint:     Chief Complaint   Patient presents with     Abdominal Pain        History is obtained from the patient    HPI:   Mir Treviño Sr. is a 62 year old male who presents through the [facility] emergency department with abdominal pain since yesterday evening.  He initially thought that it was due to the food he ate for dinner, but the persistent pain was more than expected from eating jalapenos so he presented to the emergency department for further evaluation.  Here he underwent imaging studies that were suggestive for acute cholecystitis.    His surgical history is noteworthy for a recent three-vessel CABG, May 10.          Past Medical History:     Past Medical History:   Diagnosis Date     Coronary atherosclerosis     Created by [facility] Annotation: Jul 16 2010  5:35PM - Nik Boyd: MI 7/10  Replacement Utility updated for latest IMO load     Diabetes (H)      Essential hypertension     Created by  Conversion  Replacement Utility updated for latest IMO load     Pure hypercholesterolemia     Created by Conversion      Uncomplicated asthma               Past Surgical History:     Past Surgical History:   Procedure Laterality Date     BYPASS GRAFT ARTERY CORONARY N/A 5/10/2022    Procedure: CORONARY ARTERY BYPASS GRAFT X3 ,  LEFT LEG ENDOSCOPIC VESSEL PROCUREMENT, LEFT INTERNAL MAMMARY ARTERY HARVEST, ANESTHESIA TRANSESOPHAGEAL ECHOCARDIOGRAM. EPIAORTIC ULTRASOUND.;  Surgeon: Nik Marcus MD;  Location: [facility] Main OR     CV CORONARY ANGIOGRAM N/A 4/8/2022    Procedure: Coronary Angiogram;  Surgeon: Ty Grace MD;  Location: [facility] CATH LAB CV     CV LEFT HEART CATH N/A 4/8/2022    Procedure: Left Heart Catheterization;  Surgeon: Ty Grace MD;  Location: [facility] CATH LAB CV     OTHER SURGICAL HISTORY      CORONARY STENTSTwo coronary artery stents in 2010             Social History:    reports that he has never smoked. He has never used smokeless tobacco. He reports current alcohol use. He reports that he does not use drugs.           Family History:     Family History   Problem Relation Age of Onset     No Known Problems Mother      No Known Problems Father      No Known Problems Sister      No Known Problems Brother      No Known Problems Daughter      No Known Problems Son      No Known Problems Brother      No Known Problems Son      No Known Problems Son               Allergies:     Allergies   Allergen Reactions     Atorvastatin Muscle Pain (Myalgia)              Medications:     Prior to Admission medications    Medication Sig Start Date End Date Taking? Authorizing Provider   acetaminophen (TYLENOL) 325 MG tablet Take 2 tablets (650 mg) by mouth every 4 hours as needed for other, headaches or fever (For optimal non-opioid multimodal pain management to improve pain control.) 5/15/22  Yes Julio Glover PA-C   albuterol (PROAIR HFA;PROVENTIL HFA;VENTOLIN HFA) 90 mcg/actuation  inhaler [ALBUTEROL (PROAIR HFA;PROVENTIL HFA;VENTOLIN HFA) 90 MCG/ACTUATION INHALER] Inhale 2 puffs every 6 (six) hours as needed for wheezing. 10/27/17  Yes Geoff Devine MD   aspirin (ASA) 81 MG chewable tablet 2 tablets (162 mg) by Oral or NG Tube route daily 5/16/22  Yes Julio Glover PA-C   coenzyme Q-10 capsule Take 2 capsules (200 mg) by mouth daily 4/6/22  Yes Jermaine Schafer MD   fluticasone propionate (FLONASE) 50 mcg/actuation nasal spray Spray 1 spray in nostril daily as needed (seasonal) 2/2/19  Yes Provider, Historical   hydrochlorothiazide (HYDRODIURIL) 25 MG tablet Take 12.5 mg by mouth daily 7/19/21 1/29/23 Yes Reported, Patient   lisinopril (ZESTRIL) 5 MG tablet Take 1 tablet (5 mg) by mouth daily 5/16/22  Yes Julio Glover PA-C   metFORMIN (GLUCOPHAGE-XR) 500 MG 24 hr tablet Take 1,000 mg by mouth every evening 3/10/22  Yes Reported, Patient   metoprolol tartrate (LOPRESSOR) 50 MG tablet Take 1 tablet (50 mg) by mouth 2 times daily 5/15/22  Yes Julio Glover PA-C   montelukast (SINGULAIR) 10 MG tablet Take 1 tablet (10 mg) by mouth daily [MONTELUKAST (SINGULAIR) 10 MG TABLET] TAKE 1 TABLET BY MOUTH EVERY NIGHT AT BEDTIME  Patient taking differently: Take 10 mg by mouth daily as needed (seaonal allergies) [MONTELUKAST (SINGULAIR) 10 MG TABLET] TAKE 1 TABLET BY MOUTH EVERY NIGHT AT BEDTIME 5/17/22  Yes Geoff Devine MD   nitroGLYcerin (NITROSTAT) 0.4 MG sublingual tablet For chest pain place 1 tablet under the tongue every 5 minutes for 3 doses. If symptoms persist 5 minutes after 1st dose call 911. 4/6/22  Yes Jermaine Schafer MD   rosuvastatin (CRESTOR) 20 MG tablet Take 1 tablet (20 mg) by mouth daily 4/6/22  Yes Jermaine Schafer MD   atenolol (TENORMIN) 25 MG tablet [ATENOLOL (TENORMIN) 25 MG TABLET] TAKE 1 TABLET BY MOUTH EVERY DAY 2/6/18 5/15/22  Geoff Devine MD   isosorbide mononitrate (IMDUR) 30 MG 24 hr tablet TAKE 1 TABLET(30 MG) BY MOUTH  "DAILY 4/8/22 5/15/22  Ty Grace MD              Review of Systems:   The Review of Systems is negative other than noted in the HPI            Physical Exam:     Patient Vitals for the past 24 hrs:   BP Temp Temp src Pulse Resp SpO2 Height Weight   06/06/22 1030 (!) 158/81 -- -- 60 18 98 % -- --   06/06/22 0656 (!) 159/86 -- -- 60 16 99 % -- --   06/06/22 0349 (!) 170/82 98.1  F (36.7  C) Oral 60 18 96 % 1.778 m (5' 10\") 98 kg (216 lb)        No intake or output data in the 24 hours ending 06/06/22 1200   Constitutional:   awake, alert, cooperative, no apparent distress, and appears stated age       Eyes:   PERRL, conjunctiva/corneas clear, EOM's intact; no scleral edema or icterus noted        ENT:   Normocephalic, without obvious abnormality, atraumatic, Lips, mucosa, and tongue normal      Lungs:   Normal respiratory effort, no accessory muscle use       Cardiovascular:   Regular rate and rhythm       Abdomen:   Soft, tender to palpation in the right upper quadrant       Musculoskeletal:   No obvious swelling, bruising or deformity       Skin:   Skin color and texture normal for patient, no rashes or lesions              Data:        Distended gallbladder, with sludge present and borderline wall thickening on ultrasound imaging obtained earlier today.  His CT imaging obtained earlier today as well show some haziness of the gallbladder wall that is more consistent with an acute cholecystitis.  CT imaging is also suggestive for cirrhosis of the liver.    Results for orders placed or performed during the hospital encounter of 06/06/22 (from the past 24 hour(s))   [facility] Draw    Narrative    The following orders were created for panel order [facility] Draw.  Procedure                               Abnormality         Status                     ---------                               -----------         ------                     Extra Blue Top Tube[[id number]]                              Final result            "    Extra Red Top Tube[[id number]]                               Final result               Extra Green Top (Lithium...[[id number]]                      Final result               Extra Purple Top Tube[[id number]]                            Final result                 Please view results for these tests on the individual orders.   Extra Blue Top Tube   Result Value Ref Range    Hold Specimen JIC    Extra Red Top Tube   Result Value Ref Range    Hold Specimen JIC    Extra Green Top (Lithium Heparin) Tube   Result Value Ref Range    Hold Specimen JIC    Extra Purple Top Tube   Result Value Ref Range    Hold Specimen JIC    CBC with platelets + differential    Narrative    The following orders were created for panel order CBC with platelets + differential.  Procedure                               Abnormality         Status                     ---------                               -----------         ------                     CBC with platelets and d...[[id number]]  Abnormal            Final result                 Please view results for these tests on the individual orders.   Comprehensive metabolic panel   Result Value Ref Range    Sodium 139 136 - 145 mmol/L    Potassium 4.1 3.5 - 5.0 mmol/L    Chloride 109 (H) 98 - 107 mmol/L    Carbon Dioxide (CO2) 21 (L) 22 - 31 mmol/L    Anion Gap 9 5 - 18 mmol/L    Urea Nitrogen 22 8 - 22 mg/dL    Creatinine 0.83 0.70 - 1.30 mg/dL    Calcium 8.9 8.5 - 10.5 mg/dL    Glucose 140 (H) 70 - 125 mg/dL    Alkaline Phosphatase 106 45 - 120 U/L    AST 39 0 - 40 U/L    ALT 29 0 - 45 U/L    Protein Total 7.7 6.0 - 8.0 g/dL    Albumin 3.7 3.5 - 5.0 g/dL    Bilirubin Total 0.7 0.0 - 1.0 mg/dL    GFR Estimate >90 >60 mL/min/1.73m2   Lipase   Result Value Ref Range    Lipase 34 0 - 52 U/L   CBC with platelets and differential   Result Value Ref Range    WBC Count 3.5 (L) 4.0 - 11.0 10e3/uL    RBC Count 4.03 (L) 4.40 - 5.90 10e6/uL    Hemoglobin 12.1 (L) 13.3 - 17.7 g/dL    Hematocrit 36.1 (L)  40.0 - 53.0 %    MCV 90 78 - 100 fL    MCH 30.0 26.5 - 33.0 pg    MCHC 33.5 31.5 - 36.5 g/dL    RDW 12.5 10.0 - 15.0 %    Platelet Count 140 (L) 150 - 450 10e3/uL    % Neutrophils 59 %    % Lymphocytes 28 %    % Monocytes 8 %    % Eosinophils 4 %    % Basophils 1 %    % Immature Granulocytes 0 %    NRBCs per 100 WBC 0 <1 /100    Absolute Neutrophils 2.1 1.6 - 8.3 10e3/uL    Absolute Lymphocytes 1.0 0.8 - 5.3 10e3/uL    Absolute Monocytes 0.3 0.0 - 1.3 10e3/uL    Absolute Eosinophils 0.1 0.0 - 0.7 10e3/uL    Absolute Basophils 0.0 0.0 - 0.2 10e3/uL    Absolute Immature Granulocytes 0.0 <=0.4 10e3/uL    Absolute NRBCs 0.0 10e3/uL   INR   Result Value Ref Range    INR 1.21 (H) 0.85 - 1.15   CT Abdomen Pelvis w Contrast    Narrative    EXAM: CT ABDOMEN PELVIS W CONTRAST  LOCATION: [facility]  DATE/TIME: 6/6/2022 5:43 AM    INDICATION: Right lower quadrant pain.  COMPARISON: None.  TECHNIQUE: CT scan of the abdomen and pelvis was performed following injection of IV contrast. Multiplanar reformats were obtained. Dose reduction techniques were used.  CONTRAST: ISOVUE 370 100ML    FINDINGS:   LOWER CHEST: Small left pleural effusion with adjacent atelectasis.    HEPATOBILIARY: Mildly nodular liver surface contour. Gallbladder is distended with wall thickening and/or pericholecystic fluid. No calcified gallstones. No biliary ductal dilatation.    PANCREAS: Normal.    SPLEEN: Spleen measures 15 cm craniocaudal.    ADRENAL GLANDS: Normal.    KIDNEYS/BLADDER: Symmetric enhancement. No hydronephrosis. Bladder is normal.    BOWEL: No bowel obstruction. Appendix is normal. No free air.    LYMPH NODES: Normal.    VASCULATURE: Mild aortic atherosclerotic calcification. There are prominent splenorenal shunt varices.    PELVIC ORGANS: Normal.    MUSCULOSKELETAL: Evidence of recent sternotomy. Mild lumbar spine degenerative change.      Impression    IMPRESSION:   1.  Distended gallbladder with wall thickening  and/or pericholecystic fluid. Although no calcified gallstones are visualized, ultrasound recommended for further evaluation.    2.  Nodular liver surface contour consistent with cirrhosis and/or fibrosis. Mild splenomegaly.    3.  Normal appendix.   Abdomen US, limited (RUQ only)    Narrative    EXAM: US ABDOMEN LIMITED  LOCATION: [facility]  DATE/TIME: 6/6/2022 7:46 AM    INDICATION: ruq pain, abnl CT  COMPARISON: CT of the abdomen and pelvis with contrast 06/06/2022  TECHNIQUE: Limited abdominal ultrasound.    FINDINGS:    Large amount of bowel gas in the upper abdomen limits acoustic windows.    GALLBLADDER: The gallbladder is distended. Gallbladder wall is borderline thickened measuring 3-4 mm. Gallbladder sludge is present and there is mild pericholecystic fluid. No echogenic/shadowing gallstones.    BILE DUCTS: No biliary dilatation. The common duct measures 3 mm.    LIVER: Nodular contour with mild increased echogenicity of the pancreas parenchyma consistent with cirrhosis. No focal mass.    RIGHT KIDNEY: No hydronephrosis.    PANCREAS: The pancreas is largely obscured by overlying gas.    No ascites.      Impression    IMPRESSION:    1.  Constellation of findings suggests early cholecystitis including gallbladder distention, biliary sludge, borderline wall thickening and pericholecystic fluid. No well-formed gallstones or intrahepatic/extrahepatic bile duct enlargement.  2.  Nodular echogenic liver consistent with cirrhosis.       Asymptomatic COVID-19 Virus (Coronavirus) by PCR Nasopharyngeal    Specimen: Nasopharyngeal; Swab   Result Value Ref Range    SARS CoV2 PCR Negative Negative    Narrative    Testing was performed using the last  SARS-CoV-2 & Influenza A/B Assay on the alst  [facility]  System.  This test should be ordered for the detection of SARS-COV-2 in individuals who meet SARS-CoV-2 clinical and/or epidemiological criteria. Test performance is unknown in asymptomatic  patients.  This test is for in vitro diagnostic use under the FDA EUA for laboratories certified under CLIA to perform moderate and/or high complexity testing. This test has not been FDA cleared or approved.  A negative test does not rule out the presence of PCR inhibitors in the specimen or target RNA in concentration below the limit of detection for the assay. The possibility of a false negative should be considered if the patient's recent exposure or clinical presentation suggests COVID-19.  [facility] are certified under the Clinical Laboratory Improvement Amendments of 1988 (CLIA-88) as qualified to perform moderate and/or high complexity laboratory testing.   ABO/Rh type and screen    Narrative    The following orders were created for panel order ABO/Rh type and screen.  Procedure                               Abnormality         Status                     ---------                               -----------         ------                     Adult Type and Screen[[id number]]                            In process                   Please view results for these tests on the individual orders.        Franck Flowers MD

## 2022-06-06 NOTE — ED TRIAGE NOTES
Patient developed a sudden onset of right lower abdominal pain at 2000. Pain radiates to the back and is constant. Patient states that he has attempted a stool softener, Pepto, and Gas X without relief. Pain is constant but feels better when patient is sitting still.      Triage Assessment     Row Name 06/06/22 0350       Triage Assessment (Adult)    Airway WDL WDL       Respiratory WDL    Respiratory WDL WDL       Skin Circulation/Temperature WDL    Skin Circulation/Temperature WDL WDL       Cardiac WDL    Cardiac WDL WDL       Peripheral/Neurovascular WDL    Peripheral Neurovascular WDL WDL       Cognitive/Neuro/Behavioral WDL    Cognitive/Neuro/Behavioral WDL WDL

## 2022-06-06 NOTE — ED NOTES
Pt reports sudden onset of right upper quadrant pain states comes and goes. States tried not coming in but pain got worse in the evening and un tolerable. No n/v.

## 2022-06-06 NOTE — ED PROVIDER NOTES
EMERGENCY DEPARTMENT ENCOUNTER      NAME: Mir Treviño Sr.  AGE: 62 year old male  YOB: 1960  MRN: [id number]  EVALUATION DATE & TIME: No admission date for patient encounter.    PCP: Geoff Devine    ED PROVIDER: Luigi Dodson M.D.      Chief Complaint   Patient presents with     Abdominal Pain       FINAL IMPRESSION:  1. Acute cholecystitis        ED COURSE & MEDICAL DECISION MAKIN year old male presents to the Emergency Department for evaluation of abdominal pain.  He has right-sided abdominal pain since overnight.  Recent coronary artery bypass surgery a few weeks ago.  He has CT and ultrasound which are concerning for acute cholecystitis with some pericholecystic fluid, biliary dilatation, biliary sludge.  Reassuringly his lipase and liver function tests are within normal limits.  He is afebrile and systemically nontoxic.  He was started on Zosyn here.  Discussed case with general surgery Dr. Flowers who evaluated the patient here in the emergency department.  Requested admission with hospitalist and preoperative clearance given proximity to recent CABG.  Patient is n.p.o., pain controlled with morphine.  EKG shows some Q waves developing in the inferior leads with some change from prior.  Patient does not have any chest pain, I think this is all likely expected evolution of his previous STEMI.  Case was reviewed with hospitalist.    6:32 AM I met with the patient, obtained history, performed an initial exam, and discussed options and plan for diagnostics and treatment here in the ED. PPE worn including N95 mask, surgical gloves, eye protection.  9:18 AM I spoke with Dr. Flowers, general surgeon.  10:41 AM I spoke with the hospitalist Dr. Leyva who accepts the patient for admission.    At the conclusion of the encounter I discussed the results of all of the tests and the disposition. The questions were answered. The patient or family acknowledged understanding and was agreeable with  the care plan.       MEDICATIONS GIVEN IN THE EMERGENCY:  Medications   morphine (PF) injection 4 mg (has no administration in time range)   oxyCODONE (ROXICODONE) tablet 5 mg (has no administration in time range)   piperacillin-tazobactam (ZOSYN) 3.375 g vial to attach to  mL bag (has no administration in time range)   sodium chloride 0.9% infusion (has no administration in time range)   iopamidol (ISOVUE-370) solution 100 mL (100 mLs Intravenous Given 6/6/22 0544)   morphine (PF) injection 4 mg (4 mg Intravenous Given 6/6/22 0654)   ondansetron (ZOFRAN) injection 4 mg (4 mg Intravenous Given 6/6/22 0653)   0.9% sodium chloride BOLUS (0 mLs Intravenous Stopped 6/6/22 0845)   piperacillin-tazobactam (ZOSYN) 3.375 g vial to attach to  mL bag (3.375 g Intravenous Given 6/6/22 0955)   morphine (PF) injection 4 mg (4 mg Intravenous Given 6/6/22 0944)       NEW PRESCRIPTIONS STARTED AT TODAY'S ER VISIT  New Prescriptions    No medications on file          =================================================================    HPI    Patient information was obtained from: Patient    Use of : N/A        Mir ANTONIO Treviño Sr. is a 62 year old male with a pertinent history of hypertension, hyperlipidemia, CAD, STEMI, s/p CABG (05/10/22) who presents to this ED by walk in for evaluation of abdominal pain. Patient reports he started to develop sharp, right sided abdominal pain since 9 PM yesterday. Initially his pain was intermittent, but became persistent and progressively worse later on in the night. Patient attempted to take Pepto Bismol, a stool softener without any relief. Currently he rates his level of pain at 8/10.    Patient had a s/p CABG a couple of weeks ago, but denies prior abdominal surgeries. Patient notes he has been dealing with constipation recently. No other reported complaints at this time.      REVIEW OF SYSTEMS   All systems reviewed and negative except as noted in HPI.    PAST MEDICAL  HISTORY:  Past Medical History:   Diagnosis Date     Coronary atherosclerosis     Created by Conversion [facility] Annotation: Jul 16 2010  5:35PM - Nik Boyd: [place] 7/10  Replacement Utility updated for latest IMO load     Diabetes (H)      Essential hypertension     Created by Conversion  Replacement Utility updated for latest IMO load     Pure hypercholesterolemia     Created by Conversion      Uncomplicated asthma        PAST SURGICAL HISTORY:  Past Surgical History:   Procedure Laterality Date     BYPASS GRAFT ARTERY CORONARY N/A 5/10/2022    Procedure: CORONARY ARTERY BYPASS GRAFT X3 ,  LEFT LEG ENDOSCOPIC VESSEL PROCUREMENT, LEFT INTERNAL MAMMARY ARTERY HARVEST, ANESTHESIA TRANSESOPHAGEAL ECHOCARDIOGRAM. EPIAORTIC ULTRASOUND.;  Surgeon: Nik Marcus MD;  Location: [facility] Main OR     CV CORONARY ANGIOGRAM N/A 4/8/2022    Procedure: Coronary Angiogram;  Surgeon: Ty Grace MD;  Location: [facility] CATH LAB CV     CV LEFT HEART CATH N/A 4/8/2022    Procedure: Left Heart Catheterization;  Surgeon: Ty Grace MD;  Location: [facility] CATH LAB CV     OTHER SURGICAL HISTORY      CORONARY STENTSTwo coronary artery stents in 2010           CURRENT MEDICATIONS:    Current Facility-Administered Medications   Medication     morphine (PF) injection 4 mg     oxyCODONE (ROXICODONE) tablet 5 mg     piperacillin-tazobactam (ZOSYN) 3.375 g vial to attach to  mL bag     sodium chloride 0.9% infusion     Current Outpatient Medications   Medication     acetaminophen (TYLENOL) 325 MG tablet     albuterol (PROAIR HFA;PROVENTIL HFA;VENTOLIN HFA) 90 mcg/actuation inhaler     aspirin (ASA) 81 MG chewable tablet     atorvastatin (LIPITOR) 20 MG tablet     coenzyme Q-10 capsule     fluticasone propionate (FLONASE) 50 mcg/actuation nasal spray     hydrochlorothiazide (HYDRODIURIL) 25 MG tablet     lisinopril (ZESTRIL) 40 MG tablet     lisinopril (ZESTRIL) 5 MG tablet     metFORMIN (GLUCOPHAGE-XR) 500 MG 24 hr  "tablet     metoprolol tartrate (LOPRESSOR) 50 MG tablet     montelukast (SINGULAIR) 10 MG tablet     nitroGLYcerin (NITROSTAT) 0.4 MG sublingual tablet     oxyCODONE (ROXICODONE) 5 MG tablet     pantoprazole (PROTONIX) 40 MG EC tablet     rosuvastatin (CRESTOR) 20 MG tablet         ALLERGIES:  Allergies   Allergen Reactions     Atorvastatin Muscle Pain (Myalgia)       FAMILY HISTORY:  Family History   Problem Relation Age of Onset     No Known Problems Mother      No Known Problems Father      No Known Problems Sister      No Known Problems Brother      No Known Problems Daughter      No Known Problems Son      No Known Problems Brother      No Known Problems Son      No Known Problems Son        SOCIAL HISTORY:   Social History     Socioeconomic History     Marital status:    Tobacco Use     Smoking status: Never Smoker     Smokeless tobacco: Never Used   Substance and Sexual Activity     Alcohol use: Yes     Comment: Alcoholic Drinks/day: \"Once a month, maybe.\"     Drug use: No   Social History Narrative    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  Marisa Diego         VITALS:  BP (!) 158/81   Pulse 60   Temp 98.1  F (36.7  C) (Oral)   Resp 18   Ht 1.778 m (5' 10\")   Wt 98 kg (216 lb)   SpO2 98%   BMI 30.99 kg/m      PHYSICAL EXAM    Constitutional: Well developed, Well nourished, NAD.  HENT: Normocephalic, Atraumatic. Neck Supple.  Eyes: EOMI, Conjunctiva normal.  Respiratory: Breathing comfortably on room air. Speaks full sentences easily. Lungs clear to ascultation.  Cardiovascular: Normal heart rate, Regular rhythm. No peripheral edema.  Abdomen: Soft, right-sided mid abdominal tenderness without any guarding at this time.  Musculoskeletal: Good range of motion in all major joints. No major deformities noted.  Sternotomy incision and lower chest tube incisions are all clean dry and intact.  Integument: Warm, Dry.  Neurologic: Alert & awake, Normal " motor function, Normal sensory function, No focal deficits noted.   Psychiatric: Cooperative. Affect appropriate.     LAB:  All pertinent labs reviewed and interpreted.  Labs Ordered and Resulted from Time of ED Arrival to Time of ED Departure   COMPREHENSIVE METABOLIC PANEL - Abnormal       Result Value    Sodium 139      Potassium 4.1      Chloride 109 (*)     Carbon Dioxide (CO2) 21 (*)     Anion Gap 9      Urea Nitrogen 22      Creatinine 0.83      Calcium 8.9      Glucose 140 (*)     Alkaline Phosphatase 106      AST 39      ALT 29      Protein Total 7.7      Albumin 3.7      Bilirubin Total 0.7      GFR Estimate >90     CBC WITH PLATELETS AND DIFFERENTIAL - Abnormal    WBC Count 3.5 (*)     RBC Count 4.03 (*)     Hemoglobin 12.1 (*)     Hematocrit 36.1 (*)     MCV 90      MCH 30.0      MCHC 33.5      RDW 12.5      Platelet Count 140 (*)     % Neutrophils 59      % Lymphocytes 28      % Monocytes 8      % Eosinophils 4      % Basophils 1      % Immature Granulocytes 0      NRBCs per 100 WBC 0      Absolute Neutrophils 2.1      Absolute Lymphocytes 1.0      Absolute Monocytes 0.3      Absolute Eosinophils 0.1      Absolute Basophils 0.0      Absolute Immature Granulocytes 0.0      Absolute NRBCs 0.0     INR - Abnormal    INR 1.21 (*)    LIPASE - Normal    Lipase 34     ROUTINE UA WITH MICROSCOPIC REFLEX TO CULTURE   COVID-19 VIRUS (CORONAVIRUS) BY PCR   TYPE AND SCREEN, ADULT   ABO/RH TYPE AND SCREEN       RADIOLOGY:  Reviewed all pertinent imaging. Please see official radiology report.  Abdomen US, limited (RUQ only)   Final Result   IMPRESSION:      1.  Constellation of findings suggests early cholecystitis including gallbladder distention, biliary sludge, borderline wall thickening and pericholecystic fluid. No well-formed gallstones or intrahepatic/extrahepatic bile duct enlargement.   2.  Nodular echogenic liver consistent with cirrhosis.            CT Abdomen Pelvis w Contrast   Final Result   IMPRESSION:     1.  Distended gallbladder with wall thickening and/or pericholecystic fluid. Although no calcified gallstones are visualized, ultrasound recommended for further evaluation.      2.  Nodular liver surface contour consistent with cirrhosis and/or fibrosis. Mild splenomegaly.      3.  Normal appendix.      XR Chest Port 1 View    (Results Pending)        EKG:    Performed at: 1114    Impression: Sinus bradycardia, inferior infarct pattern, evolved from previous    Rate: 59  Rhythm: Sinus  Axis: Normal  IA Interval: 168  QRS Interval: 108  QTc Interval: 467  ST Changes: Nonspecific ST-T wave changes, elevation in inferior leads  Comparison: Compared to May 11, 2020, there is some increased ST elevation inferiorly and new T wave inversion in aVL, suspect evolution from old infarct.    I have independently reviewed and interpreted the EKG(s) documented above.        I, Holland Barber, am serving as a scribe to document services personally performed by Dr. Luigi Dodson, based on my observation and the provider's statements to me. I, Luigi Dodson MD attest that Holland Barber is acting in a scribe capacity, has observed my performance of the services and has documented them in accordance with my direction.    Luigi Dodson M.D.  Emergency Medicine  [facility] EMERGENCY DEPARTMENT  [facility] [facility] [id number] [phone]  Dept: [phone]     Luigi Dodson MD  06/06/22 1188

## 2022-06-06 NOTE — CONSULTS
[facility] Cardiovascular Surgery Consult     Mir Treviño Sr. MRN# [id number]   YOB: 1960 Age: 62 year old      Date of Admission: 6/6/2022    Primary care provider: Geoff Devine    Referring Provider(s): Rodrigo Leyva    Date of Service: June 6, 2022    Reason for consult: Acute cholecystitis           Assessment and Plan:   Mir Treviño Sr. is a 62 year old male s/p recent CAB on 5/10 presented with acute RUQ pain found to have acute cholecystitis. There is no cardiac surgical contraindication to treatment via laparoscopic or open cholecystectomy. His perioperative risk of myocardial infarction is greatly reduced if not eliminated after revascularization. During rehab he will continue to require sternal precautions until his 8 weeks period is completed. Please resume cardiac medications post operatively as able to. We will sign off, please call with questions if required.    Nik Marcus MD  Cardiothoracic Surgery  [phone]             Chief Complaint:   Abdominal pain         History of Present Illness:   Mr. Mir Treviño Sr. is a 62 year old male who presents with acute RUQ abdominal pain. He underwent coronary bypass for ischemic heart disease, heart failure. He states he has been feeling well since surgery and rehab has been progressing well. He denies fevers, chills, chest clicking, pain. He awoke last night with severe abdominal pain. When it failed to resolve he sought care in the emergency department.                  Past Medical History:     Past Medical History:   Diagnosis Date     Coronary atherosclerosis     Created by [employer] [facility] Annotation: Jul 16 2010  5:35PM - Nik Boyd: MI 7/10  Replacement Utility updated for latest IMO load     Diabetes (H)      Essential hypertension     Created by Conversion  Replacement Utility updated for latest IMO load     Pure hypercholesterolemia     Created by Conversion      Uncomplicated asthma              Past Surgical  "History:     Past Surgical History:   Procedure Laterality Date     BYPASS GRAFT ARTERY CORONARY N/A 5/10/2022    Procedure: CORONARY ARTERY BYPASS GRAFT X3 ,  LEFT LEG ENDOSCOPIC VESSEL PROCUREMENT, LEFT INTERNAL MAMMARY ARTERY HARVEST, ANESTHESIA TRANSESOPHAGEAL ECHOCARDIOGRAM. EPIAORTIC ULTRASOUND.;  Surgeon: Nik Marcus MD;  Location: [facility] Main OR     CV CORONARY ANGIOGRAM N/A 4/8/2022    Procedure: Coronary Angiogram;  Surgeon: Ty Grace MD;  Location: [facility] CATH LAB CV     CV LEFT HEART CATH N/A 4/8/2022    Procedure: Left Heart Catheterization;  Surgeon: Ty Grace MD;  Location: [facility] CATH LAB CV     OTHER SURGICAL HISTORY      CORONARY STENTSTwo coronary artery stents in 2010              Social History:     Social History     Socioeconomic History     Marital status:      Spouse name: Not on file     Number of children: Not on file     Years of education: Not on file     Highest education level: Not on file   Occupational History     Not on file   Tobacco Use     Smoking status: Never Smoker     Smokeless tobacco: Never Used   Substance and Sexual Activity     Alcohol use: Yes     Comment: Alcoholic Drinks/day: \"Once a month, maybe.\"     Drug use: No     Sexual activity: Not on file   Other Topics Concern     Not on file   Social History Narrative    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  Marisa Diego       Social Determinants of Health     Financial Resource Strain: Not on file   Food Insecurity: Not on file   Transportation Needs: Not on file   Physical Activity: Not on file   Stress: Not on file   Social Connections: Not on file   Intimate Partner Violence: Not on file   Housing Stability: Not on file            Family History:     Family History   Problem Relation Age of Onset     No Known Problems Mother      No Known Problems Father      No Known Problems Sister      No Known Problems Brother      No Known " Problems Daughter      No Known Problems Son      No Known Problems Brother      No Known Problems Son      No Known Problems Son              Immunizations:     Immunization History   Administered Date(s) Administered     Adeno T4 05/15/1987     Adeno T7 05/15/1987     Anthrax 04/20/2007, 05/07/2007, 06/18/2007, 11/20/2007, 07/24/2012     COVID-19,PF,Moderna 04/10/2021, 05/08/2021, 01/25/2022     DT (PEDS <7y) 01/01/2005     FLU 6-35 months 09/18/2011, 09/19/2012     Flu, Unspecified 09/09/2013, 01/01/2014, 11/01/2020, 10/01/2021     Influenza (H1N1) 02/07/2010     Influenza (IIV3) PF 10/01/2015, 10/01/2016, 10/01/2017, 09/01/2018     Influenza Intranasal Vaccine 11/20/2007     Influenza Vaccine IM > 6 months Valent IIV4 (Alfuria,Fluzone) 10/02/2016     MMR 01/28/1995, 07/13/2015     Pneumococcal 23 valent 06/12/2013     Pneumococcal, Unspecified 07/01/2012     Polio, Unspecified  05/15/1987     Rubella 05/27/1987     Small Pox 06/18/2007     Td (Adult), Adsorbed 01/28/1995, 06/07/2005, 12/02/2005     Td Tetanus Not Adsbed Adult  01/01/2015     Tdap (Adacel,Boostrix) 01/01/2007, 07/24/2012, 08/21/2021     Twinrix A/B 01/04/2004, 06/07/2005, 12/02/2005     Typhoid IM 01/04/2004, 04/20/2007, 07/24/2012     Typhoid, Unspecified Formulation 01/04/2004, 04/20/2007, 07/24/2012     Yellow Fever 01/28/1995     Zoster vaccine recombinant adjuvanted (SHINGRIX) 01/11/2021, 04/26/2021             Allergies:      Allergies   Allergen Reactions     Atorvastatin Muscle Pain (Myalgia)             Medications:     Current Facility-Administered Medications   Medication     acetaminophen (TYLENOL) tablet 650 mg     albuterol (PROVENTIL HFA/VENTOLIN HFA) inhaler     bisacodyl (DULCOLAX) EC tablet 5 mg    Or     bisacodyl (DULCOLAX) EC tablet 10 mg     glucose gel 15-30 g    Or     dextrose 50 % injection 25-50 mL    Or     glucagon injection 1 mg     hydrALAZINE (APRESOLINE) injection 10 mg     insulin aspart (NovoLOG) injection  (RAPID ACTING)     lisinopril (ZESTRIL) tablet 5 mg     melatonin tablet 1 mg     metoprolol tartrate (LOPRESSOR) tablet 50 mg     morphine (PF) injection 4 mg     nitroGLYcerin (NITROSTAT) sublingual tablet 0.4 mg     ondansetron (ZOFRAN ODT) ODT tab 4 mg    Or     ondansetron (ZOFRAN) injection 4 mg     oxyCODONE (ROXICODONE) tablet 5 mg     piperacillin-tazobactam (ZOSYN) 3.375 g vial to attach to  mL bag     rosuvastatin (CRESTOR) tablet 20 mg     senna-docusate (SENOKOT-S/PERICOLACE) 8.6-50 MG per tablet 1 tablet    Or     senna-docusate (SENOKOT-S/PERICOLACE) 8.6-50 MG per tablet 2 tablet     sodium chloride 0.9% infusion     Current Outpatient Medications   Medication     acetaminophen (TYLENOL) 325 MG tablet     albuterol (PROAIR HFA;PROVENTIL HFA;VENTOLIN HFA) 90 mcg/actuation inhaler     aspirin (ASA) 81 MG chewable tablet     coenzyme Q-10 capsule     fluticasone propionate (FLONASE) 50 mcg/actuation nasal spray     hydrochlorothiazide (HYDRODIURIL) 25 MG tablet     lisinopril (ZESTRIL) 5 MG tablet     metFORMIN (GLUCOPHAGE-XR) 500 MG 24 hr tablet     metoprolol tartrate (LOPRESSOR) 50 MG tablet     montelukast (SINGULAIR) 10 MG tablet     nitroGLYcerin (NITROSTAT) 0.4 MG sublingual tablet     rosuvastatin (CRESTOR) 20 MG tablet             Review of Systems:     A 10 point ROS was performed and is negative other than HPI.             Physical Exam:        Temp:  [98.1  F (36.7  C)] 98.1  F (36.7  C)  Pulse:  [60-68] 68  Resp:  [16-18] 18  BP: (156-194)/(79-92) 168/81  SpO2:  [96 %-99 %] 97 %    Gen: NAD, resting comfortably in bed  Neck: No JVD, trachea midline  ENT: EOMI, anicteric  Lungs: CTAB, non-labored breathing  CV: regular rhythm, normal rate, sternal incision c/d/i, no clicking with coughing  Abd: soft, nt, nd  Ext: no edema, no swelling, incisions c/d/i  Neuro: AOx3    Labs:  Lab Results   Component Value Date    WBC 3.5 (L) 06/06/2022    HGB 12.1 (L) 06/06/2022    HCT 36.1 (L) 06/06/2022      (L) 06/06/2022     06/06/2022    POTASSIUM 4.1 06/06/2022    CHLORIDE 109 (H) 06/06/2022    CO2 21 (L) 06/06/2022    BUN 22 06/06/2022    CR 0.83 06/06/2022     (H) 06/06/2022    AST 39 06/06/2022    ALT 29 06/06/2022    ALKPHOS 106 06/06/2022    BILITOTAL 0.7 06/06/2022    INR 1.21 (H) 06/06/2022

## 2022-06-06 NOTE — H&P
[facility]    History and Physical - Hospitalist Service       Date of Admission:  6/6/2022    Assessment & Plan   Chief Complaint   RUQ Abdominal pain    History is obtained from the patient and electronic health record    History of Present Illness   62 year old male s/p CABG on 5/10/2022 for multivessel coronary artery disease, NIDDM, chronic heart failure with ischemic cardiomyopathy EF=45% on recent echo, hypertension, dyslipidemia, mild intermittent asthma presents with severe RUQ abd pain since evening prior to admission, pain is persistent, intolerable, came to ED for further evaluation. Denies N&V, fever, cough, dyspnea, dysuria, hematuria, diarrhea.     Has been recovering well post-op CABG, doing well in cardiac rehab, notes mild constipation treating with stool softener.    CT abdomen/pelvis with f/u US liver consistent with early acalculous cholecystitis. CXR without effusion or infiltrate, ECG with ST changes c/w recent cardiac surgery.    Imaging suggestive of cirrhosis; INR slightly prolonged. Hx of negative Hep B and Hep C serologies.    No increased cardiopulmonary risks identified for general surgery. Consult CV surgery for any specific guidance prior to planned lap saud.    Principal Problem:    Acute cholecystitis: NPO, pain control, IVF, IV abx, consult general surgery, INR, consult CV surgery.  Active Problems:    Hypertension: continue home meds, add hydralazine prn     Coronary artery disease involving native coronary artery of native heart with angina pectoris: s/p recent CABG, no chest pain with exercise.    Heart failure with reduced ejection fraction: continue lisinopril    Dyslipidemia: continue crestor    Mild intermittent asthma: prn albuterol    Type 2 diabetes mellitus without complication, without long-term current use of insulin: prn sliding scale insulin    Other cirrhosis of liver:     Status post coronary artery bypass graft    Ischemic  cardiomyopathy    Review of Systems    The 10 point Review of Systems is negative other than noted in the HPI or here.    Code Status:  Full Code      Diet: Orders Placed This Encounter      NPO per Anesthesia Guidelines for Procedure/Surgery Except for: Meds, Ice Chips      DVT prophylaxis:    Medical:  early ambulation    Mechanical:  PCD's    Connor Catheter: Not present  Central Lines/Port-a-cath: Not present  Drains: Not present    Disposition Plan   Expected discharge: 06/08/2022   recommended to Home once adequate pain management/ tolerating PO medications.    The patient's care was discussed with the Patient and Surgery Consultant.    Rodrigo Leyva MD  [facility]  Securely message with the Vocera Web Console (learn more here)  Text page via [employer] Paging/Directory         Clinically Significant Risk Factors Present on Admission        _____________________________________________________________________    Medical History  I have reviewed this patient's medical history and updated it with pertinent information if needed.  Past Medical History:   Diagnosis Date     Coronary atherosclerosis     Created by [employer] [facility] Annotation: Jul 16 2010  5:35PM - Nik Boyd: MI 7/10  Replacement Utility updated for latest IMO load     Diabetes (H)      Essential hypertension     Created by Conversion  Replacement Utility updated for latest IMO load     Pure hypercholesterolemia     Created by Conversion      Uncomplicated asthma        Surgical History   I have reviewed this patient's surgical history and updated it with pertinent information if needed.  Past Surgical History:   Procedure Laterality Date     BYPASS GRAFT ARTERY CORONARY N/A 5/10/2022    Procedure: CORONARY ARTERY BYPASS GRAFT X3 ,  LEFT LEG ENDOSCOPIC VESSEL PROCUREMENT, LEFT INTERNAL MAMMARY ARTERY HARVEST, ANESTHESIA TRANSESOPHAGEAL ECHOCARDIOGRAM. EPIAORTIC ULTRASOUND.;  Surgeon: Nik Marcus MD;  Location: [facility]  "Main OR     CV CORONARY ANGIOGRAM N/A 4/8/2022    Procedure: Coronary Angiogram;  Surgeon: Ty Grace MD;  Location: [facility] CATH LAB CV     CV LEFT HEART CATH N/A 4/8/2022    Procedure: Left Heart Catheterization;  Surgeon: Ty Grace MD;  Location: [facility] CATH LAB CV     OTHER SURGICAL HISTORY      CORONARY STENTSTwo coronary artery stents in 2010       Social History   I have reviewed this patient's social history and updated it with pertinent information if needed.  Social History     Tobacco Use     Smoking status: Never Smoker     Smokeless tobacco: Never Used   Substance Use Topics     Alcohol use: Yes     Comment: Alcoholic Drinks/day: \"Once a month, maybe.\"     Drug use: No       Family History   I have reviewed this patient's family history and updated it with pertinent information if needed.  Family History   Problem Relation Age of Onset     No Known Problems Mother      No Known Problems Father      No Known Problems Sister      No Known Problems Brother      No Known Problems Daughter      No Known Problems Son      No Known Problems Brother      No Known Problems Son      No Known Problems Son        Prior to Admission Medications   No current facility-administered medications on file prior to encounter.  acetaminophen (TYLENOL) 325 MG tablet, Take 2 tablets (650 mg) by mouth every 4 hours as needed for other, headaches or fever (For optimal non-opioid multimodal pain management to improve pain control.)  albuterol (PROAIR HFA;PROVENTIL HFA;VENTOLIN HFA) 90 mcg/actuation inhaler, [ALBUTEROL (PROAIR HFA;PROVENTIL HFA;VENTOLIN HFA) 90 MCG/ACTUATION INHALER] Inhale 2 puffs every 6 (six) hours as needed for wheezing.  aspirin (ASA) 81 MG chewable tablet, 2 tablets (162 mg) by Oral or NG Tube route daily  coenzyme Q-10 capsule, Take 2 capsules (200 mg) by mouth daily  fluticasone propionate (FLONASE) 50 mcg/actuation nasal spray, Spray 1 spray in nostril daily as needed " "(seasonal)  hydrochlorothiazide (HYDRODIURIL) 25 MG tablet, Take 12.5 mg by mouth daily  lisinopril (ZESTRIL) 5 MG tablet, Take 1 tablet (5 mg) by mouth daily  metFORMIN (GLUCOPHAGE-XR) 500 MG 24 hr tablet, Take 1,000 mg by mouth every evening  metoprolol tartrate (LOPRESSOR) 50 MG tablet, Take 1 tablet (50 mg) by mouth 2 times daily  montelukast (SINGULAIR) 10 MG tablet, Take 1 tablet (10 mg) by mouth daily [MONTELUKAST (SINGULAIR) 10 MG TABLET] TAKE 1 TABLET BY MOUTH EVERY NIGHT AT BEDTIME (Patient taking differently: Take 10 mg by mouth daily as needed (seaonal allergies) [MONTELUKAST (SINGULAIR) 10 MG TABLET] TAKE 1 TABLET BY MOUTH EVERY NIGHT AT BEDTIME)  nitroGLYcerin (NITROSTAT) 0.4 MG sublingual tablet, For chest pain place 1 tablet under the tongue every 5 minutes for 3 doses. If symptoms persist 5 minutes after 1st dose call 911.  rosuvastatin (CRESTOR) 20 MG tablet, Take 1 tablet (20 mg) by mouth daily  [DISCONTINUED] atenolol (TENORMIN) 25 MG tablet, [ATENOLOL (TENORMIN) 25 MG TABLET] TAKE 1 TABLET BY MOUTH EVERY DAY  [DISCONTINUED] isosorbide mononitrate (IMDUR) 30 MG 24 hr tablet, TAKE 1 TABLET(30 MG) BY MOUTH DAILY        Allergies      Allergies   Allergen Reactions     Atorvastatin Muscle Pain (Myalgia)       Physical Exam   Vital signs:  Temp: 98.1  F (36.7  C) Temp src: Oral BP: (!) 158/81 Pulse: 60   Resp: 18 SpO2: 98 % O2 Device: None (Room air)   Height: 177.8 cm (5' 10\") Weight: 98 kg (216 lb)  Estimated body mass index is 30.99 kg/m  as calculated from the following:    Height as of this encounter: 1.778 m (5' 10\").    Weight as of this encounter: 98 kg (216 lb).    Constitutional: awake, alert, cooperative, no apparent distress, and appears stated age  ENT: normocepalic, without obvious abnormality, atramatic  Respiratory: No increased work of breathing, good air exchange, clear to auscultation bilaterally, no crackles or wheezing  Cardiovascular: normal apical pulses  and normal S1 and " S2  GI: hypoactive bowel sounds, soft, non-distended and tenderness noted in the right upper quadrant Cox's sign is absent  Neurologic: Awake, alert, oriented to name, place and time.  Cranial nerves II-XII are grossly intact.  Motor is 5 out of 5 bilaterally.    Data   Data reviewed today: I reviewed all medications, new labs and imaging results over the last 24 hours.  Recent Labs   Lab 06/06/22  0408   WBC 3.5*   HGB 12.1*   MCV 90   *   INR 1.21*      POTASSIUM 4.1   CHLORIDE 109*   CO2 21*   BUN 22   CR 0.83   ANIONGAP 9   JAMIE 8.9   *   ALBUMIN 3.7   PROTTOTAL 7.7   BILITOTAL 0.7   ALKPHOS 106   ALT 29   AST 39   LIPASE 34     Recent Results (from the past 24 hour(s))   CT Abdomen Pelvis w Contrast    Narrative    EXAM: CT ABDOMEN PELVIS W CONTRAST  LOCATION: [facility]  DATE/TIME: 6/6/2022 5:43 AM    INDICATION: Right lower quadrant pain.  COMPARISON: None.  TECHNIQUE: CT scan of the abdomen and pelvis was performed following injection of IV contrast. Multiplanar reformats were obtained. Dose reduction techniques were used.  CONTRAST: ISOVUE 370 100ML    FINDINGS:   LOWER CHEST: Small left pleural effusion with adjacent atelectasis.    HEPATOBILIARY: Mildly nodular liver surface contour. Gallbladder is distended with wall thickening and/or pericholecystic fluid. No calcified gallstones. No biliary ductal dilatation.    PANCREAS: Normal.    SPLEEN: Spleen measures 15 cm craniocaudal.    ADRENAL GLANDS: Normal.    KIDNEYS/BLADDER: Symmetric enhancement. No hydronephrosis. Bladder is normal.    BOWEL: No bowel obstruction. Appendix is normal. No free air.    LYMPH NODES: Normal.    VASCULATURE: Mild aortic atherosclerotic calcification. There are prominent splenorenal shunt varices.    PELVIC ORGANS: Normal.    MUSCULOSKELETAL: Evidence of recent sternotomy. Mild lumbar spine degenerative change.      Impression    IMPRESSION:   1.  Distended gallbladder with wall  thickening and/or pericholecystic fluid. Although no calcified gallstones are visualized, ultrasound recommended for further evaluation.    2.  Nodular liver surface contour consistent with cirrhosis and/or fibrosis. Mild splenomegaly.    3.  Normal appendix.   Abdomen US, limited (RUQ only)    Narrative    EXAM: US ABDOMEN LIMITED  LOCATION: [facility]  DATE/TIME: 6/6/2022 7:46 AM    INDICATION: ruq pain, abnl CT  COMPARISON: CT of the abdomen and pelvis with contrast 06/06/2022  TECHNIQUE: Limited abdominal ultrasound.    FINDINGS:    Large amount of bowel gas in the upper abdomen limits acoustic windows.    GALLBLADDER: The gallbladder is distended. Gallbladder wall is borderline thickened measuring 3-4 mm. Gallbladder sludge is present and there is mild pericholecystic fluid. No echogenic/shadowing gallstones.    BILE DUCTS: No biliary dilatation. The common duct measures 3 mm.    LIVER: Nodular contour with mild increased echogenicity of the pancreas parenchyma consistent with cirrhosis. No focal mass.    RIGHT KIDNEY: No hydronephrosis.    PANCREAS: The pancreas is largely obscured by overlying gas.    No ascites.      Impression    IMPRESSION:    1.  Constellation of findings suggests early cholecystitis including gallbladder distention, biliary sludge, borderline wall thickening and pericholecystic fluid. No well-formed gallstones or intrahepatic/extrahepatic bile duct enlargement.  2.  Nodular echogenic liver consistent with cirrhosis.       XR Chest 1 View    Narrative    EXAM: XR CHEST 1 VIEW  LOCATION: [facility]  DATE/TIME: 6/6/2022 11:50 AM    INDICATION: post CABG 4 weeks ago, pre op GB surgery  COMPARISON: Abdomen CT 06/06/2022, chest x-ray 05/14/2022      Impression    IMPRESSION: Poststernotomy changes. Lungs are clear. Interval resolution of the trace left effusion. Heart and pulmonary vascularity are normal. No signs of acute disease.

## 2022-06-06 NOTE — PHARMACY-ADMISSION MEDICATION HISTORY
Pharmacy Note - Admission Medication History    Pertinent Provider Information: none      ______________________________________________________________________    Prior To Admission (PTA) med list completed and updated in EMR.       PTA Med List   Medication Sig Last Dose     acetaminophen (TYLENOL) 325 MG tablet Take 2 tablets (650 mg) by mouth every 4 hours as needed for other, headaches or fever (For optimal non-opioid multimodal pain management to improve pain control.) Past Month at prn     albuterol (PROAIR HFA;PROVENTIL HFA;VENTOLIN HFA) 90 mcg/actuation inhaler [ALBUTEROL (PROAIR HFA;PROVENTIL HFA;VENTOLIN HFA) 90 MCG/ACTUATION INHALER] Inhale 2 puffs every 6 (six) hours as needed for wheezing. More than a month at prn     aspirin (ASA) 81 MG chewable tablet 2 tablets (162 mg) by Oral or NG Tube route daily 6/5/2022 at am     coenzyme Q-10 capsule Take 2 capsules (200 mg) by mouth daily 6/5/2022 at am     fluticasone propionate (FLONASE) 50 mcg/actuation nasal spray Spray 1 spray in nostril daily as needed (seasonal) Past Month at prn     hydrochlorothiazide (HYDRODIURIL) 25 MG tablet Take 12.5 mg by mouth daily 6/5/2022 at am     lisinopril (ZESTRIL) 5 MG tablet Take 1 tablet (5 mg) by mouth daily 6/5/2022 at am     metFORMIN (GLUCOPHAGE-XR) 500 MG 24 hr tablet Take 1,000 mg by mouth every evening 6/5/2022 at pm     metoprolol tartrate (LOPRESSOR) 50 MG tablet Take 1 tablet (50 mg) by mouth 2 times daily 6/5/2022 at pm     montelukast (SINGULAIR) 10 MG tablet Take 1 tablet (10 mg) by mouth daily [MONTELUKAST (SINGULAIR) 10 MG TABLET] TAKE 1 TABLET BY MOUTH EVERY NIGHT AT BEDTIME (Patient taking differently: Take 10 mg by mouth daily as needed (seaonal allergies) [MONTELUKAST (SINGULAIR) 10 MG TABLET] TAKE 1 TABLET BY MOUTH EVERY NIGHT AT BEDTIME) Past Month at prn     nitroGLYcerin (NITROSTAT) 0.4 MG sublingual tablet For chest pain place 1 tablet under the tongue every 5 minutes for 3 doses. If symptoms  persist 5 minutes after 1st dose call 911. More than a month at prn     rosuvastatin (CRESTOR) 20 MG tablet Take 1 tablet (20 mg) by mouth daily 6/5/2022 at am       Information source(s): Patient, Clinic records and [facility]  Method of interview communication: in-person    Summary of Changes to PTA Med List  New: -  Discontinued: oxycodone, pantoprazole, atorvastatin  Changed: lisinopril     Patient was asked about OTC/herbal products specifically.  PTA med list reflects this.    In the past week, patient estimated taking medication this percent of the time:  greater than 90%.    Allergies were reviewed, assessed, and updated with the patient.      Patient did not bring any medications to the hospital and can't retrieve from home. No multi-dose medications are available for use during hospital stay.     The information provided in this note is only as accurate as the sources available at the time of the update(s).    Thank you for the opportunity to participate in the care of this patient.    CARLOS MCCLAIN RPH  6/6/2022 11:59 AM

## 2022-06-07 ENCOUNTER — ANESTHESIA (OUTPATIENT)
Dept: SURGERY | Facility: HOSPITAL | Age: 62
DRG: 418 | End: 2022-06-07
Payer: OTHER GOVERNMENT

## 2022-06-07 VITALS
HEART RATE: 67 BPM | BODY MASS INDEX: 31.18 KG/M2 | HEIGHT: 70 IN | TEMPERATURE: 97.7 F | OXYGEN SATURATION: 97 % | DIASTOLIC BLOOD PRESSURE: 70 MMHG | RESPIRATION RATE: 20 BRPM | WEIGHT: 217.8 LBS | SYSTOLIC BLOOD PRESSURE: 134 MMHG

## 2022-06-07 LAB
ATRIAL RATE - MUSE: 59 BPM
DIASTOLIC BLOOD PRESSURE - MUSE: NORMAL MMHG
GLUCOSE BLDC GLUCOMTR-MCNC: 108 MG/DL (ref 70–99)
GLUCOSE BLDC GLUCOMTR-MCNC: 111 MG/DL (ref 70–99)
GLUCOSE BLDC GLUCOMTR-MCNC: 130 MG/DL (ref 70–99)
INTERPRETATION ECG - MUSE: NORMAL
P AXIS - MUSE: 15 DEGREES
PR INTERVAL - MUSE: 168 MS
QRS DURATION - MUSE: 108 MS
QT - MUSE: 472 MS
QTC - MUSE: 467 MS
R AXIS - MUSE: -17 DEGREES
SYSTOLIC BLOOD PRESSURE - MUSE: NORMAL MMHG
T AXIS - MUSE: 133 DEGREES
VENTRICULAR RATE- MUSE: 59 BPM

## 2022-06-07 PROCEDURE — 250N000013 HC RX MED GY IP 250 OP 250 PS 637: Performed by: INTERNAL MEDICINE

## 2022-06-07 PROCEDURE — 99239 HOSP IP/OBS DSCHRG MGMT >30: CPT | Performed by: INTERNAL MEDICINE

## 2022-06-07 PROCEDURE — 999N000141 HC STATISTIC PRE-PROCEDURE NURSING ASSESSMENT: Performed by: SURGERY

## 2022-06-07 PROCEDURE — 710N000012 HC RECOVERY PHASE 2, PER MINUTE: Performed by: SURGERY

## 2022-06-07 PROCEDURE — 250N000011 HC RX IP 250 OP 636: Performed by: STUDENT IN AN ORGANIZED HEALTH CARE EDUCATION/TRAINING PROGRAM

## 2022-06-07 PROCEDURE — 710N000009 HC RECOVERY PHASE 1, LEVEL 1, PER MIN: Performed by: SURGERY

## 2022-06-07 PROCEDURE — 250N000025 HC SEVOFLURANE, PER MIN: Performed by: SURGERY

## 2022-06-07 PROCEDURE — 250N000009 HC RX 250: Performed by: SURGERY

## 2022-06-07 PROCEDURE — 272N000001 HC OR GENERAL SUPPLY STERILE: Performed by: SURGERY

## 2022-06-07 PROCEDURE — 88304 TISSUE EXAM BY PATHOLOGIST: CPT | Mod: TC | Performed by: SURGERY

## 2022-06-07 PROCEDURE — 360N000076 HC SURGERY LEVEL 3, PER MIN: Performed by: SURGERY

## 2022-06-07 PROCEDURE — 88304 TISSUE EXAM BY PATHOLOGIST: CPT | Mod: 26 | Performed by: PATHOLOGY

## 2022-06-07 PROCEDURE — 0FT44ZZ RESECTION OF GALLBLADDER, PERCUTANEOUS ENDOSCOPIC APPROACH: ICD-10-PCS | Performed by: SURGERY

## 2022-06-07 PROCEDURE — 250N000009 HC RX 250: Performed by: STUDENT IN AN ORGANIZED HEALTH CARE EDUCATION/TRAINING PROGRAM

## 2022-06-07 PROCEDURE — 370N000017 HC ANESTHESIA TECHNICAL FEE, PER MIN: Performed by: SURGERY

## 2022-06-07 PROCEDURE — 47562 LAPAROSCOPIC CHOLECYSTECTOMY: CPT | Performed by: SURGERY

## 2022-06-07 PROCEDURE — 258N000003 HC RX IP 258 OP 636: Performed by: ANESTHESIOLOGY

## 2022-06-07 PROCEDURE — 250N000011 HC RX IP 250 OP 636: Performed by: INTERNAL MEDICINE

## 2022-06-07 PROCEDURE — 250N000011 HC RX IP 250 OP 636: Performed by: SURGERY

## 2022-06-07 RX ORDER — SODIUM CHLORIDE, SODIUM LACTATE, POTASSIUM CHLORIDE, CALCIUM CHLORIDE 600; 310; 30; 20 MG/100ML; MG/100ML; MG/100ML; MG/100ML
INJECTION, SOLUTION INTRAVENOUS CONTINUOUS
Status: DISCONTINUED | OUTPATIENT
Start: 2022-06-07 | End: 2022-06-07 | Stop reason: HOSPADM

## 2022-06-07 RX ORDER — ONDANSETRON 2 MG/ML
INJECTION INTRAMUSCULAR; INTRAVENOUS PRN
Status: DISCONTINUED | OUTPATIENT
Start: 2022-06-07 | End: 2022-06-07

## 2022-06-07 RX ORDER — MEPERIDINE HYDROCHLORIDE 25 MG/ML
12.5 INJECTION INTRAMUSCULAR; INTRAVENOUS; SUBCUTANEOUS
Status: DISCONTINUED | OUTPATIENT
Start: 2022-06-07 | End: 2022-06-07 | Stop reason: HOSPADM

## 2022-06-07 RX ORDER — CEFAZOLIN SODIUM/WATER 2 G/20 ML
2 SYRINGE (ML) INTRAVENOUS SEE ADMIN INSTRUCTIONS
Status: DISCONTINUED | OUTPATIENT
Start: 2022-06-07 | End: 2022-06-07 | Stop reason: HOSPADM

## 2022-06-07 RX ORDER — OXYCODONE HYDROCHLORIDE 5 MG/1
5 TABLET ORAL EVERY 4 HOURS PRN
Status: DISCONTINUED | OUTPATIENT
Start: 2022-06-07 | End: 2022-06-07 | Stop reason: HOSPADM

## 2022-06-07 RX ORDER — ONDANSETRON 2 MG/ML
4 INJECTION INTRAMUSCULAR; INTRAVENOUS EVERY 30 MIN PRN
Status: DISCONTINUED | OUTPATIENT
Start: 2022-06-07 | End: 2022-06-07 | Stop reason: HOSPADM

## 2022-06-07 RX ORDER — FENTANYL CITRATE 50 UG/ML
25 INJECTION, SOLUTION INTRAMUSCULAR; INTRAVENOUS EVERY 5 MIN PRN
Status: DISCONTINUED | OUTPATIENT
Start: 2022-06-07 | End: 2022-06-07 | Stop reason: HOSPADM

## 2022-06-07 RX ORDER — HYDROMORPHONE HYDROCHLORIDE 1 MG/ML
0.2 INJECTION, SOLUTION INTRAMUSCULAR; INTRAVENOUS; SUBCUTANEOUS EVERY 5 MIN PRN
Status: DISCONTINUED | OUTPATIENT
Start: 2022-06-07 | End: 2022-06-07 | Stop reason: HOSPADM

## 2022-06-07 RX ORDER — FENTANYL CITRATE 50 UG/ML
25 INJECTION, SOLUTION INTRAMUSCULAR; INTRAVENOUS
Status: DISCONTINUED | OUTPATIENT
Start: 2022-06-07 | End: 2022-06-07 | Stop reason: HOSPADM

## 2022-06-07 RX ORDER — ACETAMINOPHEN 325 MG/1
975 TABLET ORAL ONCE
Status: DISCONTINUED | OUTPATIENT
Start: 2022-06-07 | End: 2022-06-07 | Stop reason: HOSPADM

## 2022-06-07 RX ORDER — ONDANSETRON 4 MG/1
4 TABLET, ORALLY DISINTEGRATING ORAL EVERY 30 MIN PRN
Status: DISCONTINUED | OUTPATIENT
Start: 2022-06-07 | End: 2022-06-07 | Stop reason: HOSPADM

## 2022-06-07 RX ORDER — OXYCODONE HYDROCHLORIDE 5 MG/1
5 TABLET ORAL
Status: DISCONTINUED | OUTPATIENT
Start: 2022-06-07 | End: 2022-06-07 | Stop reason: HOSPADM

## 2022-06-07 RX ORDER — DEXAMETHASONE SODIUM PHOSPHATE 10 MG/ML
INJECTION, SOLUTION INTRAMUSCULAR; INTRAVENOUS PRN
Status: DISCONTINUED | OUTPATIENT
Start: 2022-06-07 | End: 2022-06-07

## 2022-06-07 RX ORDER — BUPIVACAINE HYDROCHLORIDE AND EPINEPHRINE 2.5; 5 MG/ML; UG/ML
INJECTION, SOLUTION EPIDURAL; INFILTRATION; INTRACAUDAL; PERINEURAL PRN
Status: DISCONTINUED | OUTPATIENT
Start: 2022-06-07 | End: 2022-06-07 | Stop reason: HOSPADM

## 2022-06-07 RX ORDER — FENTANYL CITRATE 50 UG/ML
INJECTION, SOLUTION INTRAMUSCULAR; INTRAVENOUS PRN
Status: DISCONTINUED | OUTPATIENT
Start: 2022-06-07 | End: 2022-06-07

## 2022-06-07 RX ORDER — LIDOCAINE HYDROCHLORIDE 10 MG/ML
INJECTION, SOLUTION INFILTRATION; PERINEURAL PRN
Status: DISCONTINUED | OUTPATIENT
Start: 2022-06-07 | End: 2022-06-07

## 2022-06-07 RX ORDER — CEFAZOLIN SODIUM/WATER 2 G/20 ML
2 SYRINGE (ML) INTRAVENOUS
Status: COMPLETED | OUTPATIENT
Start: 2022-06-07 | End: 2022-06-07

## 2022-06-07 RX ORDER — PROPOFOL 10 MG/ML
INJECTION, EMULSION INTRAVENOUS PRN
Status: DISCONTINUED | OUTPATIENT
Start: 2022-06-07 | End: 2022-06-07

## 2022-06-07 RX ORDER — LIDOCAINE 40 MG/G
CREAM TOPICAL
Status: DISCONTINUED | OUTPATIENT
Start: 2022-06-07 | End: 2022-06-07 | Stop reason: HOSPADM

## 2022-06-07 RX ORDER — KETOROLAC TROMETHAMINE 30 MG/ML
15 INJECTION, SOLUTION INTRAMUSCULAR; INTRAVENOUS EVERY 6 HOURS PRN
Status: DISCONTINUED | OUTPATIENT
Start: 2022-06-07 | End: 2022-06-07 | Stop reason: HOSPADM

## 2022-06-07 RX ORDER — TRAMADOL HYDROCHLORIDE 50 MG/1
50 TABLET ORAL EVERY 6 HOURS PRN
Qty: 10 TABLET | Refills: 0 | Status: SHIPPED | OUTPATIENT
Start: 2022-06-07 | End: 2022-06-10

## 2022-06-07 RX ADMIN — SUGAMMADEX 200 MG: 100 INJECTION, SOLUTION INTRAVENOUS at 08:22

## 2022-06-07 RX ADMIN — ACETAMINOPHEN 650 MG: 325 TABLET ORAL at 02:30

## 2022-06-07 RX ADMIN — PIPERACILLIN AND TAZOBACTAM 3.38 G: 3; .375 INJECTION, POWDER, FOR SOLUTION INTRAVENOUS at 07:55

## 2022-06-07 RX ADMIN — LIDOCAINE HYDROCHLORIDE 50 MG: 10 INJECTION, SOLUTION INFILTRATION; PERINEURAL at 07:32

## 2022-06-07 RX ADMIN — ONDANSETRON 4 MG: 2 INJECTION INTRAMUSCULAR; INTRAVENOUS at 08:18

## 2022-06-07 RX ADMIN — FENTANYL CITRATE 100 MCG: 50 INJECTION, SOLUTION INTRAMUSCULAR; INTRAVENOUS at 07:32

## 2022-06-07 RX ADMIN — SODIUM CHLORIDE, POTASSIUM CHLORIDE, SODIUM LACTATE AND CALCIUM CHLORIDE: 600; 310; 30; 20 INJECTION, SOLUTION INTRAVENOUS at 06:04

## 2022-06-07 RX ADMIN — PROPOFOL 100 MG: 10 INJECTION, EMULSION INTRAVENOUS at 07:32

## 2022-06-07 RX ADMIN — Medication 2 G: at 07:23

## 2022-06-07 RX ADMIN — MIDAZOLAM 2 MG: 1 INJECTION INTRAMUSCULAR; INTRAVENOUS at 07:23

## 2022-06-07 RX ADMIN — ROCURONIUM BROMIDE 10 MG: 50 INJECTION, SOLUTION INTRAVENOUS at 07:47

## 2022-06-07 RX ADMIN — DEXAMETHASONE SODIUM PHOSPHATE 4 MG: 10 INJECTION, SOLUTION INTRAMUSCULAR; INTRAVENOUS at 07:41

## 2022-06-07 RX ADMIN — FENTANYL CITRATE 50 MCG: 50 INJECTION, SOLUTION INTRAMUSCULAR; INTRAVENOUS at 07:59

## 2022-06-07 RX ADMIN — ROCURONIUM BROMIDE 40 MG: 50 INJECTION, SOLUTION INTRAVENOUS at 07:32

## 2022-06-07 RX ADMIN — METOPROLOL TARTRATE 50 MG: 25 TABLET, FILM COATED ORAL at 05:24

## 2022-06-07 ASSESSMENT — ACTIVITIES OF DAILY LIVING (ADL)
ADLS_ACUITY_SCORE: 35
ADLS_ACUITY_SCORE: 20
ADLS_ACUITY_SCORE: 20

## 2022-06-07 ASSESSMENT — ENCOUNTER SYMPTOMS
ORTHOPNEA: 0
DYSRHYTHMIAS: 0

## 2022-06-07 NOTE — PROGRESS NOTES
Per surgery note possible surgery today/tomorrow for Lap saud if cleared by [facility]. Patient is cleared and uncertain when surgery is, again per note possibly today/tomorrow. Keep NPO for now unless surgery recommends otherwise.   Would recommend confirming with surgery and if Ok to eat if no surgery today.

## 2022-06-07 NOTE — DISCHARGE SUMMARY
[facility]  Hospitalist Discharge Summary      Date of Admission:  6/6/2022  Date of Discharge:  6/7/2022  Discharging Provider: Rodrigo Leyva MD  Discharge Service: Hospitalist Service    Summary: 62 year old male s/p CABG on 5/10/2022 for multivessel coronary artery disease, NIDDM, chronic heart failure with ischemic cardiomyopathy EF=45% on recent echo, hypertension, dyslipidemia, mild intermittent asthma presents with severe RUQ abd pain since evening prior to admission, pain is persistent, intolerable, came to ED for further evaluation. Denies N&V, fever, cough, dyspnea, dysuria, hematuria, diarrhea.      Has been recovering well post-op CABG, doing well in cardiac rehab, notes mild constipation treating with stool softener.     CT abdomen/pelvis with f/u US liver consistent with early acalculous cholecystitis. CXR without effusion or infiltrate, ECG with ST changes c/w recent cardiac surgery.     Imaging suggestive of cirrhosis; INR slightly prolonged. Hx of negative Hep B and Hep C serologies.     Discharge Diagnoses    Acute cholecystitis: Completed lap saud today, doing well, discharged to home.   Active Problems:    Hypertension: continue home meds, add hydralazine prn     Coronary artery disease involving native coronary artery of native heart with angina pectoris: s/p recent CABG, no chest pain with exercise.    Heart failure with reduced ejection fraction: continue lisinopril    Dyslipidemia: continue crestor    Mild intermittent asthma: prn albuterol    Type 2 diabetes mellitus without complication, without long-term current use of insulin: prn sliding scale insulin    Other cirrhosis of liver: Followup primary care, consider referral to GI for further w/u if not already fully assessed.    Status post coronary artery bypass graft    Ischemic cardiomyopathy       Follow-ups Needed After Discharge   Liver cirrhosis evaluation    Discharge Disposition   Discharged to  home  Condition at discharge: Stable    Consultations This Hospital Stay   SURGERY GENERAL IP CONSULT  CARDIOVASCULAR SURGERY IP CONSULT  CARE MANAGEMENT / SOCIAL WORK IP CONSULT    Code Status   Full Code    Time Spent on this Encounter   I, Rodrigo Leyva MD, personally saw the patient today and spent greater than 30 minutes discharging this patient.       Rodrigo Leyva MD  [facility]  [address] [place] [id number]  Phone: [phone]  Fax: [phone]  ______________________________________________________________________    Physical Exam   Vital Signs: Temp: 97.6  F (36.4  C) Temp src: Temporal BP: 137/66 Pulse: 61   Resp: 23 SpO2: 99 % O2 Device: None (Room air) Oxygen Delivery: 6 LPM  Weight: 217 lbs 12.8 oz  Constitutional: awake, alert, cooperative, no apparent distress, and appears stated age  ENT: normocepalic, without obvious abnormality, atramatic  Respiratory: No increased work of breathing, good air exchange, clear to auscultation bilaterally, no crackles or wheezing  Cardiovascular: normal apical pulses  and normal S1 and S2  GI: hypoactive bowel sounds, soft and non-distended  Neurologic: Mental Status Exam:  Level of Alertness:   awake  Orientation:   person, place, time  Memory:   normal  Motor Exam:  moves all extremities well and symmetrically  Sensory:  Sensory intact       Primary Care Physician   Geoff Devine    Discharge Orders      When to call - Contact Surgeon Team    Pain is not controlled by pain medicine or suddenly increases  You develop a fever greater than 101  Foul smell or drainage from your surgery sites  A large amount of bleeding that continues despite moderate pressure  Unable to pass urine within 8-10 hours of surgery     When to call - Reach out to Urgent Care    If you are experiencing uncontrolled Nausea and Vomiting, uncontrolled pain, inability to urinate and uncomfortable, and in need of immediate care, and you are NOT able to  reach your Surgeon Team, go to an Urgent Care clinic.     When to call - Reasons to Call 911    Call 911 immediately if you experience sudden-onset chest pain, arm weakness/numbness, slurred speech, or shortness of breath     Symptoms - Fever Management    A low grade fever (<101 F) can be expected after surgery.     Symptoms - Reduced Urine Output    If it has been greater than 8 hours since you have urinated despite drinking plenty of water, call your Surgeon Team.     Weight bearing status - As tolerated     No driving or operating machinery    Do NOT drive any vehicle or operate mechanical equipment for 24 hours following the end of your surgery.  Even though you may feel normal, your reactions may be affected by Anesthesia medication you received.  Following 24 hours, you may drive as long as you are not taking narcotic medications, and can move quickly without limitations on movement due to pain.      Under no circumstances does your surgery permit you to not wear a seatbelt.     No Alcohol    Do NOT drink alcoholic beverages for 24 hours following your surgery and while taking pain medications.     Diet Instructions    Regular diet. Patients can have difficulty with constipation following surgery, due in part to the administration of narcotic medications.  If you are suffering with constipation, you should avoid foods such as hard cheeses or red meat.  Foods high in fiber are recommended.     Shower/Bathing - Restrictions: Let water run over incisions and pat dry. No tub baths until bleeding stops.    Shower/Bathing - Restrictions: You may shower 24 hours after your operation. Let water run over incisions and pat dry. No tub baths until bleeding stops. Do NOT soak in any body of water (lake, pool, bath, etc.) for 7-10 days postoperatively.     Dressing / Wound Care - Wound    You may remove your Band-aids after a period of 48 hours.  The small white strips on the incisions act like artificial scabs, and will  begin to peel at the edges at around 7-10 days.  These can then be removed.     Discharge Instructions - Comfort and Pain Management    Pain after surgery is normal and expected. You will have some amount of pain after surgery. Your pain will improve with time. There are several things you can do to help reduce your pain including: rest, ice, and using pain medications as needed. Use pain interventions and don't wait until pain level is out of control. Contact your Surgeon Team if you have pain that persists or worsens after surgery despite rest, ice, and taking your medication(s) as prescribed. You may have a dry mouth, a sore throat, muscles aches or trouble sleeping, and these symptoms should go away after 24 hours.     Discharge Instruction - Comfort and Pain Management    You may take Tylenol and Ibuprofen in their recommended over the counter strength and frequency for mild to moderate pain. Read the labels on your Over the Counter (OTC) medications carefully and with care.     Discharge Instructions - Rest    You should continue to be active at home, including ambulating frequently.  If possible try to limit the amount of time spent in bed.     Discharge Instructions - Follow-up Appointment (Weeks)    Follow up: It is our practice to have all patients follow up with us 2-3 weeks after their surgery to ensure they are recovering well.  For straightforward laparoscopic procedures, this can be done either in clinic as a scheduled follow up appointment, or over the phone.  If you would like a scheduled follow up appointment in clinic, please call us at [phone] to schedule an appointment at your convenience.  If you would prefer to follow up with us by phone please let us know so that we may contact you 2-3 weeks following your procedure.     Return to normal activity as tolerated    Return to normal activity as tolerated.  If an activity hurts, don't do it.  If it doesn't hurt, proceed cautiously for the  first two weeks.       Significant Results and Procedures ssss  Most Recent 3 CBC's:Recent Labs   Lab Test 06/06/22 0408 05/14/22  0408 05/11/22  0417 05/10/22  1303   WBC 3.5*  --  7.7 9.1   HGB 12.1*  --  13.4 14.3   MCV 90  --  90 88   * 116* 90* 81*     Most Recent 3 BMP's:Recent Labs   Lab Test 06/07/22  0841 06/07/22  0543 06/07/22  0335 06/06/22  1427 06/06/22 0408 05/15/22  0726 05/15/22  0511 05/14/22  0733 05/14/22 0408 05/12/22  0813 05/12/22  0356 05/11/22  0605 05/11/22 0417   NA  --   --   --   --  139  --   --   --   --   --  136  --  141   POTASSIUM  --   --   --   --  4.1  --  4.0  --  4.0   < > 4.0  --  4.1   CHLORIDE  --   --   --   --  109*  --   --   --   --   --  103  --  110*   CO2  --   --   --   --  21*  --   --   --   --   --  28  --  22   BUN  --   --   --   --  22  --   --   --   --   --  27*  --  19   CR  --   --   --   --  0.83  --   --   --   --   --  0.96  --  0.87   ANIONGAP  --   --   --   --  9  --   --   --   --   --  5  --  9   JAMIE  --   --   --   --  8.9  --   --   --   --   --  8.4*  --  7.6*   * 108* 111*   < > 140*   < >  --    < >  --    < > 158*   < > 145*    < > = values in this interval not displayed.     Most Recent 2 LFT's:Recent Labs   Lab Test 06/06/22 0408 05/11/22 0417   AST 39 77*   ALT 29 40   ALKPHOS 106 40*   BILITOTAL 0.7 0.8     Most Recent 3 INR's:Recent Labs   Lab Test 06/06/22  0408 05/10/22  1303 05/10/22  1149   INR 1.21* 1.45* 1.75*     Most Recent 3 Creatinines:Recent Labs   Lab Test 06/06/22  0408 05/12/22  0356 05/11/22  0417   CR 0.83 0.96 0.87     Most Recent 3 Hemoglobins:Recent Labs   Lab Test 06/06/22  0408 05/11/22  0417 05/10/22  1303   HGB 12.1* 13.4 14.3   ,   Results for orders placed or performed during the hospital encounter of 06/06/22   CT Abdomen Pelvis w Contrast    Narrative    EXAM: CT ABDOMEN PELVIS W CONTRAST  LOCATION: [facility]  DATE/TIME: 6/6/2022 5:43 AM    INDICATION: Right lower  quadrant pain.  COMPARISON: None.  TECHNIQUE: CT scan of the abdomen and pelvis was performed following injection of IV contrast. Multiplanar reformats were obtained. Dose reduction techniques were used.  CONTRAST: ISOVUE 370 100ML    FINDINGS:   LOWER CHEST: Small left pleural effusion with adjacent atelectasis.    HEPATOBILIARY: Mildly nodular liver surface contour. Gallbladder is distended with wall thickening and/or pericholecystic fluid. No calcified gallstones. No biliary ductal dilatation.    PANCREAS: Normal.    SPLEEN: Spleen measures 15 cm craniocaudal.    ADRENAL GLANDS: Normal.    KIDNEYS/BLADDER: Symmetric enhancement. No hydronephrosis. Bladder is normal.    BOWEL: No bowel obstruction. Appendix is normal. No free air.    LYMPH NODES: Normal.    VASCULATURE: Mild aortic atherosclerotic calcification. There are prominent splenorenal shunt varices.    PELVIC ORGANS: Normal.    MUSCULOSKELETAL: Evidence of recent sternotomy. Mild lumbar spine degenerative change.      Impression    IMPRESSION:   1.  Distended gallbladder with wall thickening and/or pericholecystic fluid. Although no calcified gallstones are visualized, ultrasound recommended for further evaluation.    2.  Nodular liver surface contour consistent with cirrhosis and/or fibrosis. Mild splenomegaly.    3.  Normal appendix.   Abdomen US, limited (RUQ only)    Narrative    EXAM: US ABDOMEN LIMITED  LOCATION: [facility]  DATE/TIME: 6/6/2022 7:46 AM    INDICATION: ruq pain, abnl CT  COMPARISON: CT of the abdomen and pelvis with contrast 06/06/2022  TECHNIQUE: Limited abdominal ultrasound.    FINDINGS:    Large amount of bowel gas in the upper abdomen limits acoustic windows.    GALLBLADDER: The gallbladder is distended. Gallbladder wall is borderline thickened measuring 3-4 mm. Gallbladder sludge is present and there is mild pericholecystic fluid. No echogenic/shadowing gallstones.    BILE DUCTS: No biliary dilatation.  The common duct measures 3 mm.    LIVER: Nodular contour with mild increased echogenicity of the pancreas parenchyma consistent with cirrhosis. No focal mass.    RIGHT KIDNEY: No hydronephrosis.    PANCREAS: The pancreas is largely obscured by overlying gas.    No ascites.      Impression    IMPRESSION:    1.  Constellation of findings suggests early cholecystitis including gallbladder distention, biliary sludge, borderline wall thickening and pericholecystic fluid. No well-formed gallstones or intrahepatic/extrahepatic bile duct enlargement.  2.  Nodular echogenic liver consistent with cirrhosis.       XR Chest 1 View    Narrative    EXAM: XR CHEST 1 VIEW  LOCATION: [facility]  DATE/TIME: 6/6/2022 11:50 AM    INDICATION: post CABG 4 weeks ago, pre op GB surgery  COMPARISON: Abdomen CT 06/06/2022, chest x-ray 05/14/2022      Impression    IMPRESSION: Poststernotomy changes. Lungs are clear. Interval resolution of the trace left effusion. Heart and pulmonary vascularity are normal. No signs of acute disease.       Discharge Medications   Current Discharge Medication List      START taking these medications    Details   traMADol (ULTRAM) 50 MG tablet Take 1 tablet (50 mg) by mouth every 6 hours as needed for severe pain  Qty: 10 tablet, Refills: 0    Associated Diagnoses: Acute cholecystitis         CONTINUE these medications which have NOT CHANGED    Details   acetaminophen (TYLENOL) 325 MG tablet Take 2 tablets (650 mg) by mouth every 4 hours as needed for other, headaches or fever (For optimal non-opioid multimodal pain management to improve pain control.)  Qty: 30 tablet, Refills: 0    Associated Diagnoses: S/P CABG (coronary artery bypass graft)      albuterol (PROAIR HFA;PROVENTIL HFA;VENTOLIN HFA) 90 mcg/actuation inhaler [ALBUTEROL (PROAIR HFA;PROVENTIL HFA;VENTOLIN HFA) 90 MCG/ACTUATION INHALER] Inhale 2 puffs every 6 (six) hours as needed for wheezing.  Qty: 1 each, Refills: 1     Comments: May substitute the equivalent medication per insurance preference.  Associated Diagnoses: Asthma with exacerbation      aspirin (ASA) 81 MG chewable tablet 2 tablets (162 mg) by Oral or NG Tube route daily  Qty: 60 tablet, Refills: 3    Associated Diagnoses: S/P CABG (coronary artery bypass graft)      coenzyme Q-10 capsule Take 2 capsules (200 mg) by mouth daily    Associated Diagnoses: Coronary artery disease involving native coronary artery of native heart with angina pectoris (H)      fluticasone propionate (FLONASE) 50 mcg/actuation nasal spray Spray 1 spray in nostril daily as needed (seasonal)      hydrochlorothiazide (HYDRODIURIL) 25 MG tablet Take 12.5 mg by mouth daily      lisinopril (ZESTRIL) 5 MG tablet Take 1 tablet (5 mg) by mouth daily  Qty: 30 tablet, Refills: 3    Associated Diagnoses: S/P CABG (coronary artery bypass graft); Essential hypertension      metFORMIN (GLUCOPHAGE-XR) 500 MG 24 hr tablet Take 1,000 mg by mouth every evening      metoprolol tartrate (LOPRESSOR) 50 MG tablet Take 1 tablet (50 mg) by mouth 2 times daily  Qty: 60 tablet, Refills: 3    Associated Diagnoses: S/P CABG (coronary artery bypass graft); Essential hypertension      montelukast (SINGULAIR) 10 MG tablet Take 1 tablet (10 mg) by mouth daily [MONTELUKAST (SINGULAIR) 10 MG TABLET] TAKE 1 TABLET BY MOUTH EVERY NIGHT AT BEDTIME  Qty: 90 tablet, Refills: 3    Associated Diagnoses: Other allergy, other than to medicinal agents      nitroGLYcerin (NITROSTAT) 0.4 MG sublingual tablet For chest pain place 1 tablet under the tongue every 5 minutes for 3 doses. If symptoms persist 5 minutes after 1st dose call 911.  Qty: 45 tablet, Refills: 11    Associated Diagnoses: Coronary artery disease involving native coronary artery of native heart with angina pectoris (H)      rosuvastatin (CRESTOR) 20 MG tablet Take 1 tablet (20 mg) by mouth daily  Qty: 90 tablet, Refills: 11    Associated Diagnoses: Hyperlipidemia LDL goal  <70           Allergies   Allergies   Allergen Reactions     Atorvastatin Muscle Pain (Myalgia)

## 2022-06-07 NOTE — ANESTHESIA CARE TRANSFER NOTE
Patient: Mir Treviño Sr.    Procedure: Procedure(s):  CHOLECYSTECTOMY, LAPAROSCOPIC       Diagnosis: Acute cholecystitis [K81.0]  Diagnosis Additional Information: No value filed.    Anesthesia Type:   General     Note:    Oropharynx: oropharynx clear of all foreign objects and spontaneously breathing  Level of Consciousness: drowsy  Oxygen Supplementation: face mask  Level of Supplemental Oxygen (L/min / FiO2): 8  Independent Airway: airway patency satisfactory and stable  Dentition: dentition unchanged  Vital Signs Stable: post-procedure vital signs reviewed and stable  Report to RN Given: handoff report given  Patient transferred to: PACU    Handoff Report: Identifed the Patient, Identified the Reponsible Provider, Reviewed the pertinent medical history, Discussed the surgical course, Reviewed Intra-OP anesthesia mangement and issues during anesthesia, Set expectations for post-procedure period and Allowed opportunity for questions and acknowledgement of understanding      Vitals:  Vitals Value Taken Time   /66 06/07/22 0835   Temp 36.4  C (97.6  F) 06/07/22 0835   Pulse 61 06/07/22 0835   Resp 23 06/07/22 0835   SpO2 99 % 06/07/22 0835       Electronically Signed By: JASON Iniguez CRNA  June 7, 2022  8:36 AM

## 2022-06-07 NOTE — OP NOTE
[facility]  Operative Note    Pre-operative diagnosis: Acute cholecystitis [K81.0]   Post-operative diagnosis  acute cholecystitis   Procedure: Procedure(s):  CHOLECYSTECTOMY, LAPAROSCOPIC   Surgeon: Franck Flowers MD   Assistants(s):  None   Anesthesia: General Anesthetic    Estimated blood loss: 30 cc                Drains: None   Specimens: Gallbladder       Findings:  Cirrhotic liver; acute cholecystitis   Complications: None.           Description of procedure:      The patient was brought to the operating room where after induction of general anesthesia with endotracheal and the patient was positioned with the left arm tucked and right arm out.  The patient was then prepped and draped in standard sterile fashion.  After a procedural pause we began by injecting quarter percent Marcaine into the skin immediately adjacent to the umbilicus.  This was then sharply incised.  We then entered with a 5 mm optical trochar.  The patient was then placed in reverse Trendelenburg and right side up the body positioning.  We then proceeded to place 3 additional trochars across the right subcostal margin.      On initial evaluation the gallbladder it appeared distended, edematous and acutely inflamed.  The patient also had clear cobblestoning of the liver consistent with cirrhosis.   We began our operation by grasping the fundus the gallbladder and retracting it cephalad over the dome of the liver.  The neck of the gallbladder was then retracted lateral to the right side.  We then began by scoring the peritoneum overlying the triangle of Calot. Using primarily blunt dissection and electrocautery we proceeded to clear the fatty tissues and lymph node away from the triangle of Calot. The cystic duct and cystic artery were skeletonized. A critical view was obtained.  We then proceeded to place three clips each on the cystic artery and duct, which were then divided.  We then utilized electrocautery to  dissect the remainder of the gallbladder off the gallbladder fossa. Once this was completely removed we inspected the gallbladder fossa for hemostasis which appeared excellent. The gallbladder was then placed into an Endo Catch bag. All spilled fluid and blood were then aspirated clear from the right upper quadrant and after confirming no active bleeding from the gallbladder fossa the Endo Catch bag with the gallbladder intact was removed through the 12 mm port site. An 0 Vicryl stitch was then placed under laparoscopic guidance in the 12 mm port site.  We then desufflated the abdomen and removed our ports. The preplaced fascial tie was then tied down with excellent obliteration of the fascial defect. The remainder of the local anesthetic was then injected in the skin and fascia surrounding the port sites and the skin closed with running 4-0 subcuticular sutures      Franck Flowers MD, FACS  Office: [phone]  [facility]   General and Bariatric Surgery

## 2022-06-07 NOTE — ED NOTES
Pt would like to eat something if he can. Paged provider regarding changing the NPO order to after midnight so I can feed the pt now.

## 2022-06-07 NOTE — PLAN OF CARE
"  Problem: Plan of Care - These are the overarching goals to be used throughout the patient stay.    Goal: Plan of Care Review/Shift Note  Description: The Plan of Care Review/Shift note should be completed every shift.  The Outcome Evaluation is a brief statement about your assessment that the patient is improving, declining, or no change.  This information will be displayed automatically on your shift note.  Outcome: Ongoing, Progressing  Goal: Patient-Specific Goal (Individualized)  Description: You can add care plan individualizations to a care plan. Examples of Individualization might be:  \"Parent requests to be called daily at 9am for status\", \"I have a hard time hearing out of my right ear\", or \"Do not touch me to wake me up as it startles me\".  Outcome: Ongoing, Progressing  Goal: Optimal Comfort and Wellbeing  Outcome: Ongoing, Progressing   Goal Outcome Evaluation:        Pt alert and pleasant. Denies any pain at this times. Pt resting in bed.              "

## 2022-06-07 NOTE — ANESTHESIA PROCEDURE NOTES
Airway       Patient location during procedure: OR       Procedure Start/Stop Times: 6/7/2022 7:35 AM  Staff -        Anesthesiologist:  Dannie Uriostegui MD       CRNA: Heydi Mensah APRN CRNA       Other Anesthesia Staff: Niall Lan       Performed By: CELSO  Consent for Airway        Urgency: elective  Indications and Patient Condition       Indications for airway management: woodrow-procedural       Induction type:intravenous       Mask difficulty assessment: 1 - vent by mask    Final Airway Details       Final airway type: endotracheal airway       Successful airway: ETT - single  Endotracheal Airway Details        ETT size (mm): 8.0       Cuffed: yes       Successful intubation technique: direct laryngoscopy       DL Blade Type: Gayle 2       Grade View of Cords: 1       Adjucts: stylet and tooth guard       Position: Right       Measured from: lips       Secured at (cm): 23       Bite block used: None    Post intubation assessment        Placement verified by: capnometry, equal breath sounds and chest rise        Number of attempts at approach: 1       Number of other approaches attempted: 0       Secured with: silk tape       Ease of procedure: easy       Dentition: Intact and Unchanged    Medication(s) Administered   Medication Administration Time: 6/7/2022 7:35 AM

## 2022-06-07 NOTE — ANESTHESIA PREPROCEDURE EVALUATION
"Anesthesia Pre-Procedure Evaluation    Patient: Mir Treviño Sr.   MRN: [id number] : 1960        Procedure : Procedure(s):  CORONARY ARTERY BYPASS GRAFT (CABG), ENDOSCOPIC VESSEL PROCUREMENT, INTERNAL MAMMARY ARTERY HARVEST, ANESTHESIA TRANSESOPHAGEAL ECHOCARDIOGRAM  Pre-Admission Testing       Past Medical History:   Diagnosis Date     Coronary atherosclerosis     Created by Conversion [facility] Annotation: 2010  5:35PM - Nik Boyd: MI 7/10  Replacement Utility updated for latest IMO load     Diabetes (H)      Essential hypertension     Created by Conversion  Replacement Utility updated for latest IMO load     Pure hypercholesterolemia     Created by Conversion      Uncomplicated asthma       Past Surgical History:   Procedure Laterality Date     BYPASS GRAFT ARTERY CORONARY N/A 5/10/2022    Procedure: CORONARY ARTERY BYPASS GRAFT X3 ,  LEFT LEG ENDOSCOPIC VESSEL PROCUREMENT, LEFT INTERNAL MAMMARY ARTERY HARVEST, ANESTHESIA TRANSESOPHAGEAL ECHOCARDIOGRAM. EPIAORTIC ULTRASOUND.;  Surgeon: Nik Marcus MD;  Location: [facility] OR     CV CORONARY ANGIOGRAM N/A 2022    Procedure: Coronary Angiogram;  Surgeon: Ty Grace MD;  Location: [facility] CATH LAB CV     CV LEFT HEART CATH N/A 2022    Procedure: Left Heart Catheterization;  Surgeon: Ty Grace MD;  Location: [facility] CATH LAB CV     OTHER SURGICAL HISTORY      CORONARY STENTSTwo coronary artery stents in       Allergies   Allergen Reactions     Atorvastatin Muscle Pain (Myalgia)      Social History     Tobacco Use     Smoking status: Never Smoker     Smokeless tobacco: Never Used   Substance Use Topics     Alcohol use: Yes     Comment: Alcoholic Drinks/day: \"Once a month, maybe.\"      Wt Readings from Last 1 Encounters:   22 98.8 kg (217 lb 12.8 oz)        Anesthesia Evaluation   Pt has had prior anesthetic. Type: General.        ROS/MED HX  ENT/Pulmonary:     (+) asthma     Neurologic:  - neg neurologic " ROS     Cardiovascular: Comment: EKG        Sinus bradycardia   Inferior infarct , age undetermined   Anterior infarct , age undetermined   Abnormal ECG   No previous ECGs available   Confirmed by KWAKU MACIAS MD LOC:JN ([id number]) on 4/8/2022 2:33:26 PM     Echo 4/8/22:  Interpretation Summary    1. Technically difficult study despite utilization of ultrasound enhancing  agent.  2. The left ventricle is normal in size. Image quality does not provide for  detailed assessment of LV systolic function, but is felt to be moderately  decreased with a visually estimated ejection fraction of roughly 30-35%.  3. On selected views, the anterior wall appears hypokinetic though difficult  to quantify due to suboptimal acoustic imaging. The anteroseptal segment  appears akinetic.  4. No significant valvular heart disease is identified on this study though  the sensitivity, particularly of regurgitant lesions, is reduced due to poor  Doppler acoustics.  5. Normal right ventricular size with probable mild reduction right  ventricular systolic performance (the right ventricle is poorly visualized on  the study).  6. There is mild left atrial enlargement.    The acoustic quality of this study is very poor. If a more accurate assessment  of left ventricular systolic performance, regional wall motion, and other  morphology/function is required; would recommend cardiac MRI or other  alternative imaging modality for further evaluation.    Cardiac cath 4/8/22:  Findings:  LM:no obstruction  LAD:90% narrowing prior to the stent and before a large D1 take off, several severe lesions throughout the vessel including distal stent ISR, as well as de murray lesions  Lcx:mid-vessel 85% narrowing prior to take off of an OM2  RCA:dominant, mildly irregular    LVEDP:18    Carotid US 4/8/22:  FINDINGS:    RIGHT: Mild plaque at the bifurcation. The highest peak systolic velocity in the  cm/sec, consistent with less than 50% stenosis. Normal  velocities in the ECA. Antegrade flow within the vertebral artery. Normal multiphasic subclavian artery   waveforms.    LEFT: Mild plaque at the bifurcation. The highest peak systolic velocity in the ICA is 93 cm/sec, consistent with less than 50% stenosis. Normal velocities in the ECA. Antegrade flow within the vertebral artery. Normal multiphasic subclavian artery   waveforms.      (+) hypertension--CAD angina-past MI -stent-2010. CHF Last EF: 35 MONACO. Irregular Heartbeat/Palpitations,  (-) taking anticoagulants/antiplatelets, orthopnea/PND, syncope, pacemaker, arrhythmias, valvular problems/murmurs, pacemaker, ICD and CABG   METS/Exercise Tolerance: >4 METS    Hematologic:  - neg hematologic  ROS     Musculoskeletal:  - neg musculoskeletal ROS     GI/Hepatic:  - neg GI/hepatic ROS     Renal/Genitourinary:  - neg Renal ROS     Endo:     (+) type II DM, Obesity,     Psychiatric/Substance Use:  - neg psychiatric ROS     Infectious Disease:  - neg infectious disease ROS     Malignancy:  - neg malignancy ROS     Other:  - neg other ROS          Physical Exam    Airway  airway exam normal      Mallampati: II   TM distance: > 3 FB   Neck ROM: limited   Mouth opening: > 3 cm    Respiratory Devices and Support         Dental  no notable dental history         Cardiovascular   cardiovascular exam normal          Pulmonary   pulmonary exam normal                OUTSIDE LABS:  CBC:   Lab Results   Component Value Date    WBC 3.5 (L) 06/06/2022    WBC 7.7 05/11/2022    HGB 12.1 (L) 06/06/2022    HGB 13.4 05/11/2022    HCT 36.1 (L) 06/06/2022    HCT 38.7 (L) 05/11/2022     (L) 06/06/2022     (L) 05/14/2022     BMP:   Lab Results   Component Value Date     06/06/2022     05/12/2022    POTASSIUM 4.1 06/06/2022    POTASSIUM 4.0 05/15/2022    CHLORIDE 109 (H) 06/06/2022    CHLORIDE 103 05/12/2022    CO2 21 (L) 06/06/2022    CO2 28 05/12/2022    BUN 22 06/06/2022    BUN 27 (H) 05/12/2022    CR 0.83  06/06/2022    CR 0.96 05/12/2022     (H) 06/07/2022     (H) 06/07/2022     COAGS:   Lab Results   Component Value Date    PTT 33 05/10/2022    INR 1.21 (H) 06/06/2022    FIBR 189 05/10/2022     POC: No results found for: BGM, HCG, HCGS  HEPATIC:   Lab Results   Component Value Date    ALBUMIN 3.7 06/06/2022    PROTTOTAL 7.7 06/06/2022    ALT 29 06/06/2022    AST 39 06/06/2022    ALKPHOS 106 06/06/2022    BILITOTAL 0.7 06/06/2022     OTHER:   Lab Results   Component Value Date    PH 7.34 (L) 05/13/2022    LACT 2.1 (H) 05/10/2022    A1C 7.8 (H) 04/29/2022    JAMIE 8.9 06/06/2022    PHOS 2.6 05/15/2022    MAG 2.4 05/15/2022    LIPASE 34 06/06/2022       Anesthesia Plan    ASA Status:  3   NPO Status:  NPO Appropriate    Anesthesia Type: General.     - Airway: ETT   Induction: Intravenous, Propofol.   Maintenance: Balanced.   Techniques and Equipment:     - Airway: Video-Laryngoscope         Consents    Anesthesia Plan(s) and associated risks, benefits, and realistic alternatives discussed. Questions answered and patient/representative(s) expressed understanding.     - Discussed: Risks, Benefits and Alternatives for BOTH SEDATION and the PROCEDURE were discussed     - Discussed with:  Patient      - Patient is DNR/DNI Status: No    Use of blood products discussed: No .     Postoperative Care    Pain management: IV analgesics, Multi-modal analgesia.   PONV prophylaxis: Ondansetron (or other 5HT-3), Dexamethasone or Solumedrol     Comments:    Other Comments:                 Dannie Uriostegui MD

## 2022-06-07 NOTE — ANESTHESIA POSTPROCEDURE EVALUATION
Patient: Mir Treviño Sr.    Procedure: Procedure(s):  CHOLECYSTECTOMY, LAPAROSCOPIC       Anesthesia Type:  General    Note:  Disposition: Outpatient   Postop Pain Control: Uneventful            Sign Out: Well controlled pain   PONV: No   Neuro/Psych: Uneventful            Sign Out: Acceptable/Baseline neuro status   Airway/Respiratory: Uneventful            Sign Out: Acceptable/Baseline resp. status   CV/Hemodynamics: Uneventful            Sign Out: Acceptable CV status; No obvious hypovolemia; No obvious fluid overload   Other NRE: NONE   DID A NON-ROUTINE EVENT OCCUR? No    Event details/Postop Comments:  No issues.             Last vitals:  Vitals Value Taken Time   /68 06/07/22 0900   Temp 36.5  C (97.7  F) 06/07/22 0900   Pulse 63 06/07/22 0910   Resp 22 06/07/22 0910   SpO2 95 % 06/07/22 0910   Vitals shown include unvalidated device data.    Electronically Signed By: Bina Montero MD  June 7, 2022  4:01 PM

## 2022-06-08 ENCOUNTER — PATIENT OUTREACH (OUTPATIENT)
Dept: CARE COORDINATION | Facility: CLINIC | Age: 62
End: 2022-06-08
Payer: OTHER GOVERNMENT

## 2022-06-08 DIAGNOSIS — Z71.89 OTHER SPECIFIED COUNSELING: ICD-10-CM

## 2022-06-08 LAB
PATH REPORT.COMMENTS IMP SPEC: NORMAL
PATH REPORT.COMMENTS IMP SPEC: NORMAL
PATH REPORT.FINAL DX SPEC: NORMAL
PATH REPORT.GROSS SPEC: NORMAL
PATH REPORT.MICROSCOPIC SPEC OTHER STN: NORMAL
PATH REPORT.RELEVANT HX SPEC: NORMAL
PHOTO IMAGE: NORMAL

## 2022-06-08 NOTE — PROGRESS NOTES
"Clinic Care Coordination Contact  [facility]: Post-Discharge Note  SITUATION                                                      Admission:    Admission Date: 06/06/22   Reason for Admission: Acute cholecystitis  Discharge:   Discharge Date: 06/07/22  Discharge Diagnosis: Acute cholecystitis    BACKGROUND                                                      Per hospital discharge summary and inpatient provider notes:  62 year old male s/p CABG on 5/10/2022 for multivessel coronary artery disease, NIDDM, chronic heart failure with ischemic cardiomyopathy EF=45% on recent echo, hypertension, dyslipidemia, mild intermittent asthma presents with severe RUQ abd pain since evening prior to admission, pain is persistent, intolerable, came to ED for further evaluation. Denies N&V, fever, cough, dyspnea, dysuria, hematuria, diarrhea.      Has been recovering well post-op CABG, doing well in cardiac rehab, notes mild constipation treating with stool softener.     CT abdomen/pelvis with f/u US liver consistent with early acalculous cholecystitis. CXR without effusion or infiltrate, ECG with ST changes c/w recent cardiac surgery.     Imaging suggestive of cirrhosis; INR slightly prolonged. Hx of negative Hep B and Hep C serologies.          ASSESSMENT      Enrollment  Primary Care Care Coordination Status: Declined    Discharge Assessment  How are you doing now that you are home?: \" I'm doing okay\"  How are your symptoms? (Red Flag symptoms escalate to triage hotline per guidelines): Improved  Do you feel your condition is stable enough to be safe at home until your provider visit?: Yes  Does the patient have their discharge instructions? : Yes  Does the patient have questions regarding their discharge instructions? : No  Were you started on any new medications or were there changes to any of your previous medications? : Yes  Does the patient have all of their medications?: No (see comment)  Do you have questions " regarding any of your medications? : No  Do you have all of your needed medical supplies or equipment (DME)?  (i.e. oxygen tank, CPAP, cane, etc.): Yes  Discharge follow-up appointment scheduled within 14 calendar days? : No  Is patient agreeable to assistance with scheduling? : No    Post-op ([facility] Only)  If the patient had a surgery or procedure, do they have any questions for a nurse?: No             PLAN                                                      Outpatient Plan:     When to call - Contact Surgeon Team     Pain is not controlled by pain medicine or suddenly increases  You develop a fever greater than 101  Foul smell or drainage from your surgery sites  A large amount of bleeding that continues despite moderate pressure  Unable to pass urine within 8-10 hours of surgery          When to call - Reach out to Urgent Care     If you are experiencing uncontrolled Nausea and Vomiting, uncontrolled pain, inability to urinate and uncomfortable, and in need of immediate care, and you are NOT able to reach your Surgeon Team, go to an Urgent Care clinic.          When to call - Reasons to Call 911     Call 911 immediately if you experience sudden-onset chest pain, arm weakness/numbness, slurred speech, or shortness of breath          Symptoms - Fever Management     A low grade fever (<101 F) can be expected after surgery.          Symptoms - Reduced Urine Output     If it has been greater than 8 hours since you have urinated despite drinking plenty of water, call your Surgeon Team.      Weight bearing status - As tolerated          No driving or operating machinery     Do NOT drive any vehicle or operate mechanical equipment for 24 hours following the end of your surgery.  Even though you may feel normal, your reactions may be affected by Anesthesia medication you received.  Following 24 hours, you may drive as long as you are not taking narcotic medications, and can move quickly without limitations on movement  due to pain.       Under no circumstances does your surgery permit you to not wear a seatbelt.          No Alcohol     Do NOT drink alcoholic beverages for 24 hours following your surgery and while taking pain medications.          Diet Instructions     Regular diet. Patients can have difficulty with constipation following surgery, due in part to the administration of narcotic medications.  If you are suffering with constipation, you should avoid foods such as hard cheeses or red meat.  Foods high in fiber are recommended.          Shower/Bathing - Restrictions: Let water run over incisions and pat dry. No tub baths until bleeding stops.     Shower/Bathing - Restrictions: You may shower 24 hours after your operation. Let water run over incisions and pat dry. No tub baths until bleeding stops. Do NOT soak in any body of water (lake, pool, bath, etc.) for 7-10 days postoperatively.          Dressing / Wound Care - Wound     You may remove your Band-aids after a period of 48 hours.  The small white strips on the incisions act like artificial scabs, and will begin to peel at the edges at around 7-10 days.  These can then be removed.          Discharge Instructions - Comfort and Pain Management     Pain after surgery is normal and expected. You will have some amount of pain after surgery. Your pain will improve with time. There are several things you can do to help reduce your pain including: rest, ice, and using pain medications as needed. Use pain interventions and don't wait until pain level is out of control. Contact your Surgeon Team if you have pain that persists or worsens after surgery despite rest, ice, and taking your medication(s) as prescribed. You may have a dry mouth, a sore throat, muscles aches or trouble sleeping, and these symptoms should go away after 24 hours.          Discharge Instruction - Comfort and Pain Management     You may take Tylenol and Ibuprofen in their recommended over the counter  strength and frequency for mild to moderate pain. Read the labels on your Over the Counter (OTC) medications carefully and with care.          Discharge Instructions - Rest     You should continue to be active at home, including ambulating frequently.  If possible try to limit the amount of time spent in bed.          Discharge Instructions - Follow-up Appointment (Weeks)     Follow up: It is our practice to have all patients follow up with us 2-3 weeks after their surgery to ensure they are recovering well.  For straightforward laparoscopic procedures, this can be done either in clinic as a scheduled follow up appointment, or over the phone.  If you would like a scheduled follow up appointment in clinic, please call us at [phone] to schedule an appointment at your convenience.  If you would prefer to follow up with us by phone please let us know so that we may contact you 2-3 weeks following your procedure.          Return to normal activity as tolerated     Return to normal activity as tolerated.  If an activity hurts, don't do it.  If it doesn't hurt, proceed cautiously for the first two weeks.         Future Appointments   Date Time Provider Department Center   6/13/2022  1:00 PM [facility]   6/14/2022 11:30 AM [facility] CV[facility]   6/15/2022  1:00 PM [facility] CARDIAC REHAB RESOURCE 1 [facility]   6/17/2022  1:00 PM [facility] CARDIAC REHAB RESOURCE 1 [facility]   6/20/2022  1:00 PM [facility] CARDIAC REHAB RESOURCE 1 [facility]   6/22/2022  1:00 PM [facility] CARDIAC REHAB RESOURCE 1 [facility]   6/24/2022  1:00 PM [facility] CARDIAC REHAB RESOURCE 1 [facility]   6/27/2022  1:00 PM [facility] CARDIAC REHAB RESOURCE 1 [facility] N   6/27/2022  1:20 PM Jermaine Schafer MD [facility]   6/29/2022  1:00 PM [facility] CARDIAC REHAB RESOURCE 1 [facility]   7/1/2022  1:00 PM [facility] CARDIAC REHAB RESOURCE 1 [facility]   7/6/2022  1:00 PM [facility] CARDIAC REHAB RESOURCE 1 [facility]  [facility]   7/8/2022  1:00 PM [facility] CARDIAC REHAB RESOURCE 1 [facility]   7/11/2022  1:00 PM [facility] CARDIAC REHAB RESOURCE 1 [facility]   7/13/2022  1:00 PM [facility] CARDIAC REHAB RESOURCE 1 [facility]   7/15/2022  1:00 PM [facility] CARDIAC REHAB RESOURCE 1 [facility]   7/18/2022  1:00 PM [facility] CARDIAC REHAB RESOURCE 1 [facility]   7/20/2022  1:00 PM [facility] CARDIAC REHAB RESOURCE 1 [facility]   7/22/2022  1:00 PM [facility] CARDIAC REHAB RESOURCE 1 [facility]   7/25/2022  1:00 PM [facility] CARDIAC REHAB RESOURCE 1 [facility]   7/27/2022  1:00 PM [facility] CARDIAC REHAB RESOURCE 1 [facility]   7/29/2022  1:00 PM [facility] CARDIAC REHAB RESOURCE 1 [facility]   8/1/2022  1:00 PM [facility] CARDIAC REHAB RESOURCE 1 [facility]   8/3/2022  1:00 PM [facility] CARDIAC REHAB RESOURCE 1 [facility]   8/5/2022  1:00 PM [facility] CARDIAC REHAB RESOURCE 1 [facility]   8/8/2022  1:00 PM [facility] CARDIAC REHAB RESOURCE 1 [facility]   8/10/2022  1:00 PM [facility] CARDIAC REHAB RESOURCE 1 [facility]   8/12/2022  1:00 PM [facility] CARDIAC REHAB RESOURCE 1 [facility]   8/15/2022  1:00 PM [facility] CARDIAC REHAB RESOURCE 1 [facility]   8/17/2022  1:00 PM [facility] CARDIAC REHAB RESOURCE 1 [facility]   8/19/2022  1:00 PM [facility] CARDIAC REHAB RESOURCE 1 [facility]   8/22/2022  1:00 PM [facility] CARDIAC REHAB RESOURCE 1 [facility]   8/24/2022  1:00 PM [facility] CARDIAC REHAB RESOURCE 1 [facility]         For any urgent concerns, please contact our 24 hour nurse triage line: [phone] ([phone])         Sofia Aguila MA

## 2022-06-10 ENCOUNTER — TELEPHONE (OUTPATIENT)
Dept: SURGERY | Facility: CLINIC | Age: 62
End: 2022-06-10
Payer: OTHER GOVERNMENT

## 2022-06-10 NOTE — TELEPHONE ENCOUNTER
Tried calling patient to see how he is doing after his laparoscopic cholecystectomy 6/7. No answer. No voicemail set up.       [facility]      Millie Rao RN  [facility]  General Surgery  [facility] [address]  [email or web address]   Office:[phone]  Employed by [employer],

## 2022-06-20 ENCOUNTER — HOSPITAL ENCOUNTER (OUTPATIENT)
Dept: CARDIAC REHAB | Facility: HOSPITAL | Age: 62
Discharge: HOME | End: 2022-06-20
Attending: INTERNAL MEDICINE
Payer: OTHER GOVERNMENT

## 2022-06-20 PROCEDURE — 93798 PHYS/QHP OP CAR RHAB W/ECG: CPT

## 2022-06-22 ENCOUNTER — OFFICE VISIT (OUTPATIENT)
Dept: FAMILY MEDICINE | Facility: CLINIC | Age: 62
End: 2022-06-22
Payer: OTHER GOVERNMENT

## 2022-06-22 VITALS
DIASTOLIC BLOOD PRESSURE: 80 MMHG | WEIGHT: 212.1 LBS | SYSTOLIC BLOOD PRESSURE: 118 MMHG | OXYGEN SATURATION: 97 % | HEART RATE: 64 BPM | BODY MASS INDEX: 30.43 KG/M2

## 2022-06-22 DIAGNOSIS — E11.9 TYPE 2 DIABETES MELLITUS WITHOUT COMPLICATION, WITHOUT LONG-TERM CURRENT USE OF INSULIN (H): ICD-10-CM

## 2022-06-22 DIAGNOSIS — K74.60 CIRRHOSIS OF LIVER WITHOUT ASCITES, UNSPECIFIED HEPATIC CIRRHOSIS TYPE (H): Primary | ICD-10-CM

## 2022-06-22 LAB
ALBUMIN SERPL-MCNC: 3.8 G/DL (ref 3.5–5)
ALP SERPL-CCNC: 106 U/L (ref 45–120)
ALT SERPL W P-5'-P-CCNC: 35 U/L (ref 0–45)
AST SERPL W P-5'-P-CCNC: 42 U/L (ref 0–40)
BILIRUB DIRECT SERPL-MCNC: 0.3 MG/DL
BILIRUB SERPL-MCNC: 0.8 MG/DL (ref 0–1)
PROT SERPL-MCNC: 7.8 G/DL (ref 6–8)

## 2022-06-22 PROCEDURE — 80076 HEPATIC FUNCTION PANEL: CPT | Performed by: STUDENT IN AN ORGANIZED HEALTH CARE EDUCATION/TRAINING PROGRAM

## 2022-06-22 PROCEDURE — 99214 OFFICE O/P EST MOD 30 MIN: CPT | Performed by: STUDENT IN AN ORGANIZED HEALTH CARE EDUCATION/TRAINING PROGRAM

## 2022-06-22 PROCEDURE — 36415 COLL VENOUS BLD VENIPUNCTURE: CPT | Performed by: STUDENT IN AN ORGANIZED HEALTH CARE EDUCATION/TRAINING PROGRAM

## 2022-06-22 RX ORDER — METFORMIN HCL 500 MG
1000 TABLET, EXTENDED RELEASE 24 HR ORAL 2 TIMES DAILY WITH MEALS
Qty: 180 TABLET | Refills: 3 | Status: SHIPPED | OUTPATIENT
Start: 2022-06-22 | End: 2022-06-23

## 2022-06-22 NOTE — PROGRESS NOTES
Assessment and Plan     62-year-old male with past medical history of type 2 diabetes and CAD who presents after cholecystectomy for acute cholecystitis where the surgeon felt he had cirrhosis on his liver by visual inspection.  Following up on this.  I recommended imaging to confirm as well as a repeat hepatic panel though this was normal 2 weeks ago.  He also needs a refill on his metformin.  Patient plans establish care with myself and will come in for a physical/establishment of care in a couple weeks    1. Cirrhosis of liver without ascites, unspecified hepatic cirrhosis type (H)  - CT Abdomen w/o Contrast; Future  - Hepatic panel (Albumin, ALT, AST, Bili, Alk Phos, TP); Future    2. Type 2 diabetes mellitus without complication, without long-term current use of insulin (H)  - metFORMIN (GLUCOPHAGE XR) 500 MG 24 hr tablet; Take 2 tablets (1,000 mg) by mouth 2 times daily (with meals)  Dispense: 180 tablet; Refill: 3    Follow up: PRN  Options for treatment and follow-up care were reviewed with the patient and/or guardian. iMr Treviño Sr. and/or guardian engaged in the decision making process and verbalized understanding of the options discussed and agreed with the final plan.    Dr. Roberto Bates         HPI:   Mir Treviño Sr. is a 62 year old  male who presents for:    Chief Complaint   Patient presents with     Liver     Liver issue noted after gallbladder surgery. 6-7-22 [facility]. Refill needed of metformin     Patient had acute cholecystitis and surgical post cholecystectomy on 6/7/2022    Answers for HPI/ROS submitted by the patient on 6/21/2022  What is the reason for your visit today? : Liver problem  How many servings of fruits and vegetables do you eat daily?: 0-1  On average, how many sweetened beverages do you drink each day (Examples: soda, juice, sweet tea, etc.  Do NOT count diet or artificially sweetened beverages)?: 0  How many minutes a day do you exercise enough to make your heart  beat faster?: 30 to 60  How many days a week do you exercise enough to make your heart beat faster?: 3 or less  How many days per week do you miss taking your medication?: 0         PMHX:     Patient Active Problem List   Diagnosis     Hypercholesterolemia     Hypertension     Coronary artery disease involving native coronary artery of native heart with angina pectoris (H)     Asthma     Tuberculin PPD Induration Positive Interpretation     Allergies     Asthma With Acute Exacerbation     Elevated liver enzymes     Obesity     Heart failure with reduced ejection fraction (H)     Coronary artery disease of native artery of native heart with stable angina pectoris (H)     Chronic ischemic heart disease     Dyslipidemia     Elevated blood pressure reading without diagnosis of hypertension     Fatigue     Hypertensive heart disease     Abnormal liver function tests     Mild intermittent asthma     Type 2 diabetes mellitus without complication, without long-term current use of insulin (H)     ST elevation myocardial infarction (STEMI) of inferolateral wall (H)     Sensorineural hearing loss (SNHL) of left ear     Tinnitus     Acute cholecystitis     Other cirrhosis of liver (H)     Status post coronary artery bypass graft     Ischemic cardiomyopathy       Current Outpatient Medications   Medication Sig Dispense Refill     albuterol (PROAIR HFA;PROVENTIL HFA;VENTOLIN HFA) 90 mcg/actuation inhaler [ALBUTEROL (PROAIR HFA;PROVENTIL HFA;VENTOLIN HFA) 90 MCG/ACTUATION INHALER] Inhale 2 puffs every 6 (six) hours as needed for wheezing. 1 each 1     aspirin (ASA) 81 MG chewable tablet 2 tablets (162 mg) by Oral or NG Tube route daily 60 tablet 3     coenzyme Q-10 capsule Take 2 capsules (200 mg) by mouth daily       fluticasone propionate (FLONASE) 50 mcg/actuation nasal spray Spray 1 spray in nostril daily as needed (seasonal)       hydrochlorothiazide (HYDRODIURIL) 25 MG tablet Take 12.5 mg by mouth daily       lisinopril  "(ZESTRIL) 5 MG tablet Take 1 tablet (5 mg) by mouth daily 30 tablet 3     metFORMIN (GLUCOPHAGE-XR) 500 MG 24 hr tablet Take 1,000 mg by mouth every evening       metoprolol tartrate (LOPRESSOR) 50 MG tablet Take 1 tablet (50 mg) by mouth 2 times daily 60 tablet 3     montelukast (SINGULAIR) 10 MG tablet Take 1 tablet (10 mg) by mouth daily [MONTELUKAST (SINGULAIR) 10 MG TABLET] TAKE 1 TABLET BY MOUTH EVERY NIGHT AT BEDTIME (Patient taking differently: Take 10 mg by mouth daily as needed (seaonal allergies) [MONTELUKAST (SINGULAIR) 10 MG TABLET] TAKE 1 TABLET BY MOUTH EVERY NIGHT AT BEDTIME) 90 tablet 3     nitroGLYcerin (NITROSTAT) 0.4 MG sublingual tablet For chest pain place 1 tablet under the tongue every 5 minutes for 3 doses. If symptoms persist 5 minutes after 1st dose call 911. 45 tablet 11     rosuvastatin (CRESTOR) 20 MG tablet Take 1 tablet (20 mg) by mouth daily 90 tablet 11     acetaminophen (TYLENOL) 325 MG tablet Take 2 tablets (650 mg) by mouth every 4 hours as needed for other, headaches or fever (For optimal non-opioid multimodal pain management to improve pain control.) (Patient not taking: Reported on 6/22/2022) 30 tablet 0       Social History     Tobacco Use     Smoking status: Never Smoker     Smokeless tobacco: Never Used   Substance Use Topics     Alcohol use: Yes     Comment: Alcoholic Drinks/day: \"Once a month, maybe.\"     Drug use: No       Social History     Social History Narrative    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  Marisa Diego         Allergies   Allergen Reactions     Atorvastatin Muscle Pain (Myalgia)       No results found for this or any previous visit (from the past 24 hour(s)).         Review of Systems:    ROS: 10 point ROS neg other than the symptoms noted above in the HPI.         Physical Exam:     Vitals:    06/22/22 1423   BP: 118/80   BP Location: Right arm   Patient Position: Sitting   Cuff Size: Adult Regular "   Pulse: 64   SpO2: 97%   Weight: 96.2 kg (212 lb 1.6 oz)     Body mass index is 30.43 kg/m .    General appearance: Alert, cooperative, no distress, appears stated age  Head: Normocephalic, atraumatic, without obvious abnormality  Eyes: Pupils equal round, reactive.  Conjunctiva clear.  Nose: Nares normal, no drainage.  Throat: Lips, mucosa, tongue normal mucosa pink and moist  Neck: Supple, symmetric, trachea midline  Lungs: Clear to auscultation bilaterally, no wheezing or crackles present.  Respirations unlabored  Heart: Regular rate and rhythm, normal S1 and S2, no murmur, rub or gallop.  Abdomen: Soft, nontender, nondistended.  Incisions from cholecystectomy are C/D/I. .  No masses or organomegaly.

## 2022-06-23 RX ORDER — METFORMIN HCL 500 MG
TABLET, EXTENDED RELEASE 24 HR ORAL
Qty: 360 TABLET | Refills: 1 | Status: SHIPPED | OUTPATIENT
Start: 2022-06-23 | End: 2022-10-24

## 2022-06-23 NOTE — RESULT ENCOUNTER NOTE
Edvin  Your results from your recent clinic visit show:  Your Liver is looking good.  The AST and ALT were normal. I'll wait until your CT returns and give you a call    If you have more questions please call the clinic at [phone] or send me a Lore message    Dr. Roberto Bates

## 2022-06-24 ENCOUNTER — HOSPITAL ENCOUNTER (OUTPATIENT)
Dept: CARDIAC REHAB | Facility: HOSPITAL | Age: 62
Discharge: HOME | End: 2022-06-24
Attending: INTERNAL MEDICINE
Payer: OTHER GOVERNMENT

## 2022-06-24 PROCEDURE — 93798 PHYS/QHP OP CAR RHAB W/ECG: CPT

## 2022-06-27 ENCOUNTER — OFFICE VISIT (OUTPATIENT)
Dept: CARDIOLOGY | Facility: CLINIC | Age: 62
End: 2022-06-27
Payer: OTHER GOVERNMENT

## 2022-06-27 ENCOUNTER — HOSPITAL ENCOUNTER (OUTPATIENT)
Dept: CARDIAC REHAB | Facility: HOSPITAL | Age: 62
Discharge: HOME | End: 2022-06-27
Attending: INTERNAL MEDICINE
Payer: OTHER GOVERNMENT

## 2022-06-27 VITALS
SYSTOLIC BLOOD PRESSURE: 94 MMHG | DIASTOLIC BLOOD PRESSURE: 60 MMHG | HEIGHT: 70 IN | RESPIRATION RATE: 16 BRPM | HEART RATE: 62 BPM | WEIGHT: 215 LBS | BODY MASS INDEX: 30.78 KG/M2

## 2022-06-27 DIAGNOSIS — E78.5 HYPERLIPIDEMIA LDL GOAL <70: ICD-10-CM

## 2022-06-27 DIAGNOSIS — I25.5 ISCHEMIC CARDIOMYOPATHY: Primary | ICD-10-CM

## 2022-06-27 PROCEDURE — 99214 OFFICE O/P EST MOD 30 MIN: CPT | Mod: 24 | Performed by: INTERNAL MEDICINE

## 2022-06-27 PROCEDURE — 93798 PHYS/QHP OP CAR RHAB W/ECG: CPT

## 2022-06-27 RX ORDER — METOPROLOL SUCCINATE 50 MG/1
50 TABLET, EXTENDED RELEASE ORAL 2 TIMES DAILY
Qty: 180 TABLET | Refills: 11 | Status: SHIPPED | OUTPATIENT
Start: 2022-06-27 | End: 2022-10-25

## 2022-06-27 NOTE — LETTER
"6/27/2022    Geoff Devine MD  [address]  [facility] [address]    RE: Mir Treviño Sr.       Dear Colleague,     I had the pleasure of seeing Mir Treviño Sr. in the [facility].    Thank you, Dr. Black ref. provider found, for asking the [facility] team to see Mr. Mir Treviño Sr. to evaluate       Assessment/Recommendations   Assessment/Plan:  1. Ischemic CM EF45% on metoprolol change ot succinate, lisinopril, s/p CABG - check limited echo  2. CAD s/p CABG cont asp/metoprolol , lisinopril, statin, check lipids, goal LDL <70    Follow up 1 year     History of Present Illness/Subjective    Mr. Mir Treviño Sr. is a 62 year old male with DM, HTN, s/p CABG5/2022 then cholecystectomy in June, doin well, no chest pain/pressure, no dyspnea on exertion, PND/orthopnea, edema. CABG LIMA to LAD< SVG to OM and SVG to diagon.  Eating better, exercising.          Physical Examination Review of Systems   BP 94/60 (BP Location: Right arm, Patient Position: Sitting, Cuff Size: Adult Regular)   Pulse 62   Resp 16   Ht 1.778 m (5' 10\")   Wt 97.5 kg (215 lb)   BMI 30.85 kg/m    Body mass index is 30.85 kg/m .  Wt Readings from Last 3 Encounters:   06/27/22 97.5 kg (215 lb)   06/22/22 96.2 kg (212 lb 1.6 oz)   06/07/22 98.8 kg (217 lb 12.8 oz)     [unfilled]  General Appearance:   no distress, normal body habitus   ENT/Mouth: membranes moist, no oral lesions or bleeding gums.      EYES:  no scleral icterus, normal conjunctivae   Neck: no carotid bruits or thyromegaly   Chest/Lungs:   lungs are clear to auscultation, no rales or wheezing,  sternal scar, equal chest wall expansion    Cardiovascular:   Regular. Normal first and second heart sounds with no murmurs, rubs, or gallops; the carotid, radial and posterior tibial pulses are intact, Jugular venous pressure , edema bilaterally    Abdomen:  no organomegaly, masses, bruits, or tenderness; bowel sounds are present "   Extremities: no cyanosis or clubbing   Skin: no xanthelasma, warm.    Neurologic: normal  bilateral, no tremors     Psychiatric: alert and oriented x3, calm     Review of Systems - 12 points nega other than above      Medical History  Surgical History Family History Social History   Past Medical History:   Diagnosis Date     Coronary atherosclerosis     Created by Conversion [facility] Annotation: Jul 16 2010  5:35PM - Nik Boyd: [place] 7/10  Replacement Utility updated for latest IMO load     Diabetes (H)      Essential hypertension     Created by Conversion  Replacement Utility updated for latest IMO load     Pure hypercholesterolemia     Created by Conversion      Uncomplicated asthma     Past Surgical History:   Procedure Laterality Date     BYPASS GRAFT ARTERY CORONARY N/A 5/10/2022    Procedure: CORONARY ARTERY BYPASS GRAFT X3 ,  LEFT LEG ENDOSCOPIC VESSEL PROCUREMENT, LEFT INTERNAL MAMMARY ARTERY HARVEST, ANESTHESIA TRANSESOPHAGEAL ECHOCARDIOGRAM. EPIAORTIC ULTRASOUND.;  Surgeon: Nik Marcus MD;  Location: [facility] OR      CORONARY ANGIOGRAM N/A 4/8/2022    Procedure: Coronary Angiogram;  Surgeon: Ty Grace MD;  Location: [facility] CATH LAB CV     CV LEFT HEART CATH N/A 4/8/2022    Procedure: Left Heart Catheterization;  Surgeon: Ty Grace MD;  Location: [facility] CATH LAB CV     LAPAROSCOPIC CHOLECYSTECTOMY N/A 6/7/2022    Procedure: CHOLECYSTECTOMY, LAPAROSCOPIC;  Surgeon: Franck Flowers MD;  Location: [facility] OR     OTHER SURGICAL HISTORY      CORONARY STENTSTwo coronary artery stents in 2010    Family History   Problem Relation Age of Onset     No Known Problems Mother      No Known Problems Father      No Known Problems Sister      No Known Problems Brother      No Known Problems Daughter      No Known Problems Son      No Known Problems Brother      No Known Problems Son      No Known Problems Son     Social History     Socioeconomic History     Marital status:  "     Spouse name: Not on file     Number of children: Not on file     Years of education: Not on file     Highest education level: Not on file   Occupational History     Not on file   Tobacco Use     Smoking status: Never Smoker     Smokeless tobacco: Never Used   Substance and Sexual Activity     Alcohol use: Yes     Comment: Alcoholic Drinks/day: \"Once a month, maybe.\"     Drug use: No     Sexual activity: Not on file   Other Topics Concern     Not on file   Social History Narrative    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  Marisa Diego       Social Determinants of Health     Financial Resource Strain: Not on file   Food Insecurity: Not on file   Transportation Needs: Not on file   Physical Activity: Not on file   Stress: Not on file   Social Connections: Not on file   Intimate Partner Violence: Not on file   Housing Stability: Not on file          Medications  Allergies   Scheduled Meds:  Continuous Infusions:  PRN Meds:. Allergies   Allergen Reactions     Atorvastatin Muscle Pain (Myalgia)         Lab Results    Chemistry/lipid CBC Cardiac Enzymes/BNP/TSH/INR   Lab Results   Component Value Date    CHOL 177 04/08/2022    HDL 39 (L) 04/08/2022    TRIG 185 (H) 04/08/2022    BUN 22 06/06/2022     06/06/2022    CO2 21 (L) 06/06/2022    Lab Results   Component Value Date    WBC 3.5 (L) 06/06/2022    HGB 12.1 (L) 06/06/2022    HCT 36.1 (L) 06/06/2022    MCV 90 06/06/2022     (L) 06/06/2022    Lab Results   Component Value Date    INR 1.21 (H) 06/06/2022              Jermaine Schafer MD  Interventional Cardiology  [facility]    Thank you for allowing me to participate in the care of your patient.      Sincerely,     Jermaine Schafer MD     [facility]  cc:   No referring provider defined for this encounter.        "

## 2022-06-27 NOTE — PROGRESS NOTES
"  Thank you,  No ref. provider found, for asking the [facility] team to see Mr. Mir Treviño Sr. to evaluate       Assessment/Recommendations   Assessment/Plan:  1. Ischemic CM EF45% on metoprolol change ot succinate, lisinopril, s/p CABG - check limited echo  2. CAD s/p CABG cont asp/metoprolol , lisinopril, statin, check lipids, goal LDL <70    Follow up 1 year     History of Present Illness/Subjective    Mr. Mir Treviño Sr. is a 62 year old male with DM, HTN, s/p CABG5/2022 then cholecystectomy in June, doin well, no chest pain/pressure, no dyspnea on exertion, PND/orthopnea, edema. CABG LIMA to LAD< SVG to OM and SVG to diagon.  Eating better, exercising.          Physical Examination Review of Systems   BP 94/60 (BP Location: Right arm, Patient Position: Sitting, Cuff Size: Adult Regular)   Pulse 62   Resp 16   Ht 1.778 m (5' 10\")   Wt 97.5 kg (215 lb)   BMI 30.85 kg/m    Body mass index is 30.85 kg/m .  Wt Readings from Last 3 Encounters:   06/27/22 97.5 kg (215 lb)   06/22/22 96.2 kg (212 lb 1.6 oz)   06/07/22 98.8 kg (217 lb 12.8 oz)     [unfilled]  General Appearance:   no distress, normal body habitus   ENT/Mouth: membranes moist, no oral lesions or bleeding gums.      EYES:  no scleral icterus, normal conjunctivae   Neck: no carotid bruits or thyromegaly   Chest/Lungs:   lungs are clear to auscultation, no rales or wheezing,  sternal scar, equal chest wall expansion    Cardiovascular:   Regular. Normal first and second heart sounds with no murmurs, rubs, or gallops; the carotid, radial and posterior tibial pulses are intact, Jugular venous pressure , edema bilaterally    Abdomen:  no organomegaly, masses, bruits, or tenderness; bowel sounds are present   Extremities: no cyanosis or clubbing   Skin: no xanthelasma, warm.    Neurologic: normal  bilateral, no tremors     Psychiatric: alert and oriented x3, calm     Review of Systems - 12 points nega other than above  "     Medical History  Surgical History Family History Social History   Past Medical History:   Diagnosis Date     Coronary atherosclerosis     Created by Conversion [facility] Annotation: Jul 16 2010  5:35PM - Nik Boyd: [place] 7/10  Replacement Utility updated for latest IMO load     Diabetes (H)      Essential hypertension     Created by Conversion  Replacement Utility updated for latest IMO load     Pure hypercholesterolemia     Created by Conversion      Uncomplicated asthma     Past Surgical History:   Procedure Laterality Date     BYPASS GRAFT ARTERY CORONARY N/A 5/10/2022    Procedure: CORONARY ARTERY BYPASS GRAFT X3 ,  LEFT LEG ENDOSCOPIC VESSEL PROCUREMENT, LEFT INTERNAL MAMMARY ARTERY HARVEST, ANESTHESIA TRANSESOPHAGEAL ECHOCARDIOGRAM. EPIAORTIC ULTRASOUND.;  Surgeon: Nik Marcus MD;  Location: [facility] OR      CORONARY ANGIOGRAM N/A 4/8/2022    Procedure: Coronary Angiogram;  Surgeon: Ty Grace MD;  Location: [facility] CATH LAB CV     CV LEFT HEART CATH N/A 4/8/2022    Procedure: Left Heart Catheterization;  Surgeon: Ty Grace MD;  Location: [facility] CATH LAB CV     LAPAROSCOPIC CHOLECYSTECTOMY N/A 6/7/2022    Procedure: CHOLECYSTECTOMY, LAPAROSCOPIC;  Surgeon: Franck Flowers MD;  Location: [facility] OR     OTHER SURGICAL HISTORY      CORONARY STENTSTwo coronary artery stents in 2010    Family History   Problem Relation Age of Onset     No Known Problems Mother      No Known Problems Father      No Known Problems Sister      No Known Problems Brother      No Known Problems Daughter      No Known Problems Son      No Known Problems Brother      No Known Problems Son      No Known Problems Son     Social History     Socioeconomic History     Marital status:      Spouse name: Not on file     Number of children: Not on file     Years of education: Not on file     Highest education level: Not on file   Occupational History     Not on file   Tobacco Use     Smoking status:  "Never Smoker     Smokeless tobacco: Never Used   Substance and Sexual Activity     Alcohol use: Yes     Comment: Alcoholic Drinks/day: \"Once a month, maybe.\"     Drug use: No     Sexual activity: Not on file   Other Topics Concern     Not on file   Social History Narrative    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  Marisa Diego       Social Determinants of Health     Financial Resource Strain: Not on file   Food Insecurity: Not on file   Transportation Needs: Not on file   Physical Activity: Not on file   Stress: Not on file   Social Connections: Not on file   Intimate Partner Violence: Not on file   Housing Stability: Not on file          Medications  Allergies   Scheduled Meds:  Continuous Infusions:  PRN Meds:. Allergies   Allergen Reactions     Atorvastatin Muscle Pain (Myalgia)         Lab Results    Chemistry/lipid CBC Cardiac Enzymes/BNP/TSH/INR   Lab Results   Component Value Date    CHOL 177 04/08/2022    HDL 39 (L) 04/08/2022    TRIG 185 (H) 04/08/2022    BUN 22 06/06/2022     06/06/2022    CO2 21 (L) 06/06/2022    Lab Results   Component Value Date    WBC 3.5 (L) 06/06/2022    HGB 12.1 (L) 06/06/2022    HCT 36.1 (L) 06/06/2022    MCV 90 06/06/2022     (L) 06/06/2022    Lab Results   Component Value Date    INR 1.21 (H) 06/06/2022              Jermaine Schafer MD  Interventional Cardiology  [facility]            "

## 2022-07-01 ENCOUNTER — HOSPITAL ENCOUNTER (OUTPATIENT)
Dept: CARDIAC REHAB | Facility: HOSPITAL | Age: 62
Discharge: HOME | End: 2022-07-01
Attending: INTERNAL MEDICINE
Payer: OTHER GOVERNMENT

## 2022-07-01 PROCEDURE — 93798 PHYS/QHP OP CAR RHAB W/ECG: CPT

## 2022-07-03 ENCOUNTER — HOSPITAL ENCOUNTER (OUTPATIENT)
Dept: CT IMAGING | Facility: HOSPITAL | Age: 62
Discharge: HOME | End: 2022-07-03
Attending: STUDENT IN AN ORGANIZED HEALTH CARE EDUCATION/TRAINING PROGRAM | Admitting: STUDENT IN AN ORGANIZED HEALTH CARE EDUCATION/TRAINING PROGRAM
Payer: OTHER GOVERNMENT

## 2022-07-03 DIAGNOSIS — K74.60 CIRRHOSIS OF LIVER WITHOUT ASCITES, UNSPECIFIED HEPATIC CIRRHOSIS TYPE (H): ICD-10-CM

## 2022-07-03 PROCEDURE — 250N000011 HC RX IP 250 OP 636: Performed by: STUDENT IN AN ORGANIZED HEALTH CARE EDUCATION/TRAINING PROGRAM

## 2022-07-03 PROCEDURE — 74177 CT ABD & PELVIS W/CONTRAST: CPT

## 2022-07-03 RX ORDER — IOPAMIDOL 755 MG/ML
75 INJECTION, SOLUTION INTRAVASCULAR ONCE
Status: COMPLETED | OUTPATIENT
Start: 2022-07-03 | End: 2022-07-03

## 2022-07-03 RX ADMIN — IOPAMIDOL 75 ML: 755 INJECTION, SOLUTION INTRAVENOUS at 09:20

## 2022-07-06 ENCOUNTER — HOSPITAL ENCOUNTER (OUTPATIENT)
Dept: CARDIAC REHAB | Facility: HOSPITAL | Age: 62
Discharge: HOME | End: 2022-07-06
Attending: INTERNAL MEDICINE
Payer: OTHER GOVERNMENT

## 2022-07-06 PROCEDURE — 93798 PHYS/QHP OP CAR RHAB W/ECG: CPT

## 2022-07-14 ENCOUNTER — OFFICE VISIT (OUTPATIENT)
Dept: FAMILY MEDICINE | Facility: CLINIC | Age: 62
End: 2022-07-14
Payer: OTHER GOVERNMENT

## 2022-07-14 VITALS
WEIGHT: 213.5 LBS | HEART RATE: 57 BPM | BODY MASS INDEX: 31.62 KG/M2 | OXYGEN SATURATION: 97 % | HEIGHT: 69 IN | TEMPERATURE: 98.2 F | SYSTOLIC BLOOD PRESSURE: 112 MMHG | DIASTOLIC BLOOD PRESSURE: 70 MMHG

## 2022-07-14 DIAGNOSIS — Z23 HIGH PRIORITY FOR 2019-NCOV VACCINE: ICD-10-CM

## 2022-07-14 DIAGNOSIS — J45.20 MILD INTERMITTENT ASTHMA WITHOUT COMPLICATION: ICD-10-CM

## 2022-07-14 DIAGNOSIS — Z00.00 HEALTHCARE MAINTENANCE: ICD-10-CM

## 2022-07-14 DIAGNOSIS — Z12.5 SCREENING FOR PROSTATE CANCER: ICD-10-CM

## 2022-07-14 DIAGNOSIS — Z22.7 TB LUNG, LATENT: ICD-10-CM

## 2022-07-14 DIAGNOSIS — I10 ESSENTIAL HYPERTENSION: ICD-10-CM

## 2022-07-14 DIAGNOSIS — I25.118 CORONARY ARTERY DISEASE OF NATIVE ARTERY OF NATIVE HEART WITH STABLE ANGINA PECTORIS (H): ICD-10-CM

## 2022-07-14 DIAGNOSIS — Z11.4 SCREENING FOR HIV (HUMAN IMMUNODEFICIENCY VIRUS): Primary | ICD-10-CM

## 2022-07-14 DIAGNOSIS — Z95.1 S/P CABG (CORONARY ARTERY BYPASS GRAFT): ICD-10-CM

## 2022-07-14 DIAGNOSIS — Z00.00 ANNUAL PHYSICAL EXAM: ICD-10-CM

## 2022-07-14 DIAGNOSIS — H93.13 TINNITUS OF BOTH EARS: ICD-10-CM

## 2022-07-14 DIAGNOSIS — E11.9 TYPE 2 DIABETES MELLITUS WITHOUT COMPLICATION, WITHOUT LONG-TERM CURRENT USE OF INSULIN (H): ICD-10-CM

## 2022-07-14 PROBLEM — I11.9 HYPERTENSIVE HEART DISEASE: Status: RESOLVED | Noted: 2022-06-02 | Resolved: 2022-07-14

## 2022-07-14 PROBLEM — K74.69 OTHER CIRRHOSIS OF LIVER (H): Status: RESOLVED | Noted: 2022-06-06 | Resolved: 2022-07-14

## 2022-07-14 PROBLEM — I25.5 ISCHEMIC CARDIOMYOPATHY: Status: RESOLVED | Noted: 2022-06-06 | Resolved: 2022-07-14

## 2022-07-14 PROBLEM — I25.9 CHRONIC ISCHEMIC HEART DISEASE: Status: RESOLVED | Noted: 2022-06-02 | Resolved: 2022-07-14

## 2022-07-14 PROBLEM — R79.89 ABNORMAL LIVER FUNCTION TESTS: Status: RESOLVED | Noted: 2022-06-02 | Resolved: 2022-07-14

## 2022-07-14 PROBLEM — K81.0 ACUTE CHOLECYSTITIS: Status: RESOLVED | Noted: 2022-06-06 | Resolved: 2022-07-14

## 2022-07-14 PROCEDURE — 91306 COVID-19,PF,MODERNA (18+ YRS BOOSTER .25ML): CPT | Performed by: STUDENT IN AN ORGANIZED HEALTH CARE EDUCATION/TRAINING PROGRAM

## 2022-07-14 PROCEDURE — 99396 PREV VISIT EST AGE 40-64: CPT | Mod: 25 | Performed by: STUDENT IN AN ORGANIZED HEALTH CARE EDUCATION/TRAINING PROGRAM

## 2022-07-14 PROCEDURE — 0064A COVID-19,PF,MODERNA (18+ YRS BOOSTER .25ML): CPT | Performed by: STUDENT IN AN ORGANIZED HEALTH CARE EDUCATION/TRAINING PROGRAM

## 2022-07-14 RX ORDER — ASPIRIN 81 MG/1
162 TABLET, CHEWABLE ORAL DAILY
Qty: 60 TABLET | Refills: 0 | Status: ON HOLD | COMMUNITY
Start: 2022-07-14 | End: 2023-01-20

## 2022-07-14 ASSESSMENT — ASTHMA QUESTIONNAIRES
QUESTION_2 LAST FOUR WEEKS HOW OFTEN HAVE YOU HAD SHORTNESS OF BREATH: ONCE OR TWICE A WEEK
QUESTION_5 LAST FOUR WEEKS HOW WOULD YOU RATE YOUR ASTHMA CONTROL: COMPLETELY CONTROLLED
QUESTION_1 LAST FOUR WEEKS HOW MUCH OF THE TIME DID YOUR ASTHMA KEEP YOU FROM GETTING AS MUCH DONE AT WORK, SCHOOL OR AT HOME: A LITTLE OF THE TIME
ACT_TOTALSCORE: 18
QUESTION_3 LAST FOUR WEEKS HOW OFTEN DID YOUR ASTHMA SYMPTOMS (WHEEZING, COUGHING, SHORTNESS OF BREATH, CHEST TIGHTNESS OR PAIN) WAKE YOU UP AT NIGHT OR EARLIER THAN USUAL IN THE MORNING: ONCE A WEEK
ACT_TOTALSCORE: 18
QUESTION_4 LAST FOUR WEEKS HOW OFTEN HAVE YOU USED YOUR RESCUE INHALER OR NEBULIZER MEDICATION (SUCH AS ALBUTEROL): ONE OR TWO TIMES PER DAY

## 2022-07-14 ASSESSMENT — ENCOUNTER SYMPTOMS
FREQUENCY: 0
CHILLS: 0
CONSTIPATION: 0
FEVER: 0
EYE PAIN: 0
JOINT SWELLING: 0
PALPITATIONS: 0
ARTHRALGIAS: 1
HEADACHES: 0
WEAKNESS: 0
ABDOMINAL PAIN: 0
NERVOUS/ANXIOUS: 0
DIZZINESS: 0
HEARTBURN: 0
COUGH: 0
DIARRHEA: 0
MYALGIAS: 0
SORE THROAT: 0
PARESTHESIAS: 0
HEMATOCHEZIA: 0
HEMATURIA: 0
DYSURIA: 0
NAUSEA: 0
SHORTNESS OF BREATH: 0

## 2022-07-14 ASSESSMENT — PAIN SCALES - GENERAL: PAINLEVEL: NO PAIN (0)

## 2022-07-14 NOTE — PROGRESS NOTES
Assessment/ Plan         1. Screening for HIV (human immunodeficiency virus)  Has been screened through     2. S/P CABG (coronary artery bypass graft)  3. Coronary artery disease of native artery of native heart with stable angina pectoris (H)  On magnesium and aspirin.  Heart attack in 2010. PCI with stenting. A few years later having chest humaira with exertion and significant disease. Had a triple bypass 5/10/22.   - Cardiology managing   - aspirin (ASA) 81 MG chewable tablet; Take 2 tablets (162 mg) by mouth daily  Dispense: 60 tablet; Refill: 0    4. Hypertension  - BP goal: <140/90  - Currently taking meds: 12.5 mg daily hydrochlorothiazide, 5 mg daily lisinopril, 50 mg XR BID metorporlol  - Any side effects: None  - Last BMP: 5/22- normal  - Any symptoms of HTN such as chest pain, headaches, vision changes, nausea: None    BP Readings from Last 6 Encounters:   07/14/22 112/70   06/27/22 94/60   06/22/22 118/80   06/07/22 134/70   06/02/22 124/76   05/15/22 (!) 149/80     5. Type 2 diabetes mellitus without complication, without long-term current use of insulin (H)  Last A1c 7.8 in April.  Currently on metformin 2000 mg daily.  We will check an A1c and a microalbumin today and treat accordingly.  [facility]  Has been rescheduled a couple of times.  - Hemoglobin A1c; Future  - Albumin Random Urine Quantitative with Creat Ratio; Future    6. Tinnitus of both ears  Left ear severe, some on right. [facility] he doesn't use.     7. Mild intermittent asthma without complication  Seasonal, exacerbated by allergies and pollen. Only uses albuterol as needed and hasn't needed it since spring. Taking montelukast daily.     8. TB lung, latent  Positive after going to [place].   [facility] Annotation: Nov 24 2008  4:51PM - Jenniffer Germain: CXR and INH   X9mo    9. High priority for 2019-nCoV vaccine  - COVID-19,PF,MODERNA (18+ Yrs BOOSTER .25mL)    10.  Annual physical exam  - CBC with platelets; Future  - Comprehensive metabolic panel (BMP + Alb, Alk Phos, ALT, AST, Total. Bili, TP); Future  - Lipid panel reflex to direct LDL Fasting; Future    11. Screening for prostate cancer  - PSA, screen; Future    12. Healthcare maintenance    Follow-up in: 1 year for physical    Roberto Bates MD    Subjective:     Mir Treviño Sr. is a 62 year old female who presents for an annual exam.     Chief Complaint   Patient presents with     Physical     Establish care. Refill of lisinopril?      Shad tells me that his left hip he anne marie have pain and stiffness after sitting for a while.slowly progressive over time. He will control with conservative measures.     Answers for HPI/ROS submitted by the patient on 7/14/2022  Frequency of exercise:: 4-5 days/week  Getting at least 3 servings of Calcium per day:: Yes  Diet:: Diabetic  Taking medications regularly:: Yes  Medication side effects:: None  Bi-annual eye exam:: NO  Dental care twice a year:: Yes  Sleep apnea or symptoms of sleep apnea:: None  abdominal pain: No  Blood in stool: No  Blood in urine: No  chest pain: No  chills: No  congestion: No  constipation: No  cough: No  diarrhea: No  dizziness: No  ear pain: No  eye pain: No  nervous/anxious: No  fever: No  frequency: No  genital sores: No  headaches: No  hearing loss: No  heartburn: No  arthralgias: Yes  joint swelling: No  peripheral edema: No  mood changes: No  myalgias: No  nausea: No  dysuria: No  palpitations: No  Skin sensation changes: No  sore throat: No  urgency: No  rash: No  shortness of breath: No  visual disturbance: No  weakness: No  impotence: No  penile discharge: No  Additional concerns today:: No  Duration of exercise:: Greater than 60 minutes    Immunization History   Administered Date(s) Administered     Adeno T4 05/15/1987     Adeno T7 05/15/1987     Anthrax 04/20/2007, 05/07/2007, 06/18/2007, 11/20/2007, 07/24/2012     COVID-19,PF,Moderna  04/10/2021, 05/08/2021, 01/25/2022     DT (PEDS <7y) 01/01/2005     FLU 6-35 months 09/18/2011, 09/19/2012     Flu, Unspecified 09/09/2013, 01/01/2014, 11/01/2020, 10/01/2021     Influenza (H1N1) 02/07/2010     Influenza (IIV3) PF 10/01/2015, 10/01/2016, 10/01/2017, 09/01/2018     Influenza Intranasal Vaccine 11/20/2007     Influenza Vaccine IM > 6 months Valent IIV4 (Alfuria,Fluzone) 10/02/2016     MMR 01/28/1995, 07/13/2015     Pneumococcal 23 valent 06/12/2013     Pneumococcal, Unspecified 07/01/2012     Polio, Unspecified  05/15/1987     Rubella 05/27/1987     Small Pox 06/18/2007     Td (Adult), Adsorbed 01/28/1995, 06/07/2005, 12/02/2005     Td Tetanus Not Adsbed Adult  01/01/2015     Tdap (Adacel,Boostrix) 01/01/2007, 07/24/2012, 08/21/2021     Twinrix A/B 01/04/2004, 06/07/2005, 12/02/2005     Typhoid IM 01/04/2004, 04/20/2007, 07/24/2012     Typhoid, Unspecified Formulation 01/04/2004, 04/20/2007, 07/24/2012     Yellow Fever 01/28/1995     Zoster vaccine recombinant adjuvanted (SHINGRIX) 01/11/2021, 04/26/2021     Immunization status: due today.     Current Outpatient Medications   Medication Sig Dispense Refill     acetaminophen (TYLENOL) 325 MG tablet Take 2 tablets (650 mg) by mouth every 4 hours as needed for other, headaches or fever (For optimal non-opioid multimodal pain management to improve pain control.) 30 tablet 0     albuterol (PROAIR HFA;PROVENTIL HFA;VENTOLIN HFA) 90 mcg/actuation inhaler [ALBUTEROL (PROAIR HFA;PROVENTIL HFA;VENTOLIN HFA) 90 MCG/ACTUATION INHALER] Inhale 2 puffs every 6 (six) hours as needed for wheezing. 1 each 1     aspirin (ASA) 81 MG chewable tablet 2 tablets (162 mg) by Oral or NG Tube route daily (Patient taking differently: Take 162 mg by mouth daily) 60 tablet 3     coenzyme Q-10 capsule Take 2 capsules (200 mg) by mouth daily       fluticasone propionate (FLONASE) 50 mcg/actuation nasal spray Spray 1 spray in nostril daily as needed (seasonal)        hydrochlorothiazide (HYDRODIURIL) 25 MG tablet Take 12.5 mg by mouth daily       lisinopril (ZESTRIL) 5 MG tablet Take 1 tablet (5 mg) by mouth daily 30 tablet 3     MAGNESIUM PO Take 1 tablet by mouth daily Magnesium citrate 210 mg       metFORMIN (GLUCOPHAGE XR) 500 MG 24 hr tablet TAKE 2 TABLETS(1000 MG) BY MOUTH TWICE DAILY WITH MEALS 360 tablet 1     metoprolol succinate ER (TOPROL XL) 50 MG 24 hr tablet Take 1 tablet (50 mg) by mouth 2 times daily 180 tablet 11     montelukast (SINGULAIR) 10 MG tablet Take 1 tablet (10 mg) by mouth daily [MONTELUKAST (SINGULAIR) 10 MG TABLET] TAKE 1 TABLET BY MOUTH EVERY NIGHT AT BEDTIME (Patient taking differently: Take 10 mg by mouth daily as needed (seaonal allergies) [MONTELUKAST (SINGULAIR) 10 MG TABLET] TAKE 1 TABLET BY MOUTH EVERY NIGHT AT BEDTIME) 90 tablet 3     nitroGLYcerin (NITROSTAT) 0.4 MG sublingual tablet For chest pain place 1 tablet under the tongue every 5 minutes for 3 doses. If symptoms persist 5 minutes after 1st dose call 911. 45 tablet 11     rosuvastatin (CRESTOR) 20 MG tablet Take 1 tablet (20 mg) by mouth daily 90 tablet 11     Past Medical History:   Diagnosis Date     Coronary atherosclerosis     Created by Conversion [facility] Annotation: Jul 16 2010  5:35PM - Nik Boyd: [place] 7/10  Replacement Utility updated for latest IMO load     Diabetes (H)      Essential hypertension     Created by Conversion  Replacement Utility updated for latest IMO load     Pure hypercholesterolemia     Created by Conversion      Uncomplicated asthma      Past Surgical History:   Procedure Laterality Date     BYPASS GRAFT ARTERY CORONARY N/A 5/10/2022    Procedure: CORONARY ARTERY BYPASS GRAFT X3 ,  LEFT LEG ENDOSCOPIC VESSEL PROCUREMENT, LEFT INTERNAL MAMMARY ARTERY HARVEST, ANESTHESIA TRANSESOPHAGEAL ECHOCARDIOGRAM. EPIAORTIC ULTRASOUND.;  Surgeon: Nik Marcus MD;  Location: [facility] OR     CV CORONARY ANGIOGRAM N/A 4/8/2022    Procedure: Coronary Angiogram;   "Surgeon: Ty Grace MD;  Location: [facility] CATH LAB CV     CV LEFT HEART CATH N/A 4/8/2022    Procedure: Left Heart Catheterization;  Surgeon: Ty Grace MD;  Location: [facility] CATH LAB CV     LAPAROSCOPIC CHOLECYSTECTOMY N/A 6/7/2022    Procedure: CHOLECYSTECTOMY, LAPAROSCOPIC;  Surgeon: Franck Flowers MD;  Location: [facility] Main OR     OTHER SURGICAL HISTORY      CORONARY STENTSTwo coronary artery stents in 2010     Atorvastatin  Family History   Problem Relation Age of Onset     No Known Problems Mother      No Known Problems Father      No Known Problems Sister      No Known Problems Brother      No Known Problems Daughter      No Known Problems Son      No Known Problems Brother      No Known Problems Son      No Known Problems Son      Social History     Socioeconomic History     Marital status:      Spouse name: Not on file     Number of children: Not on file     Years of education: Not on file     Highest education level: Not on file   Occupational History     Not on file   Tobacco Use     Smoking status: Never Smoker     Smokeless tobacco: Never Used   Substance and Sexual Activity     Alcohol use: Yes     Comment: Alcoholic Drinks/day: \"Once a month, maybe.\"     Drug use: No     Sexual activity: Not on file   Other Topics Concern     Not on file   Social History Narrative    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  Marisa Diego       Social Determinants of Health     Financial Resource Strain: Not on file   Food Insecurity: Not on file   Transportation Needs: Not on file   Physical Activity: Not on file   Stress: Not on file   Social Connections: Not on file   Intimate Partner Violence: Not on file   Housing Stability: Not on file       Review of Systems  Complete ROS negative except as noted in the HPI    Objective:      Vitals:    07/14/22 1107   BP: 112/70   BP Location: Left arm   Patient Position: Sitting   Cuff Size: Adult " "Regular   Pulse: 57   Temp: 98.2  F (36.8  C)   TempSrc: Temporal   SpO2: 97%   Weight: 96.8 kg (213 lb 8 oz)   Height: 1.746 m (5' 8.75\")       General appearance: Alert, cooperative, no distress, appears stated age  Head: Normocephalic, atraumatic, without obvious abnormality  EARS: TM's gray dull with structures seen bilaterally  Eyes: Pupils equal round, reactive.  Conjunctiva clear.  Nose: Nares normal, no drainage.  Throat: Lips, mucosa, tongue normal mucosa pink and moist  Neck: Supple, symmetric, trachea midline, no adenopathy.  No thyroid enlargement, tenderness or nodules.    Lungs: Clear to auscultation bilaterally, no wheezing or crackles present.  Respirations unlabored  Heart: Regular rate and rhythm, normal S1 and S2, no murmur, rub or gallop.  Abdomen: Soft, nontender, nondistended.  Bowel sounds active in all 4 quadrants.  No masses or organomegaly.  Extremities: Extremities normal, atraumatic.  No cyanosis or edema.  Skin: Skin color, texture, turgor normal no rashes or lesions on limited skin exam  Neurologic: CN II through XII intact, normal strength.      Roberto Murphy MD    "

## 2022-07-18 ENCOUNTER — MEDICAL CORRESPONDENCE (OUTPATIENT)
Dept: CARDIAC REHAB | Facility: HOSPITAL | Age: 62
End: 2022-07-18

## 2022-07-18 ENCOUNTER — LAB (OUTPATIENT)
Dept: LAB | Facility: CLINIC | Age: 62
End: 2022-07-18
Payer: OTHER GOVERNMENT

## 2022-07-18 DIAGNOSIS — Z12.5 SCREENING FOR PROSTATE CANCER: ICD-10-CM

## 2022-07-18 DIAGNOSIS — E78.5 HYPERLIPIDEMIA LDL GOAL <70: ICD-10-CM

## 2022-07-18 DIAGNOSIS — E11.9 TYPE 2 DIABETES MELLITUS WITHOUT COMPLICATION, WITHOUT LONG-TERM CURRENT USE OF INSULIN (H): ICD-10-CM

## 2022-07-18 DIAGNOSIS — Z00.00 ANNUAL PHYSICAL EXAM: ICD-10-CM

## 2022-07-18 LAB
ALBUMIN SERPL BCG-MCNC: 4 G/DL (ref 3.5–5.2)
ALP SERPL-CCNC: 97 U/L (ref 40–129)
ALT SERPL W P-5'-P-CCNC: 47 U/L (ref 10–50)
ANION GAP SERPL CALCULATED.3IONS-SCNC: 10 MMOL/L (ref 7–15)
AST SERPL W P-5'-P-CCNC: 62 U/L (ref 10–50)
BILIRUB SERPL-MCNC: 0.7 MG/DL
BUN SERPL-MCNC: 17.1 MG/DL (ref 8–23)
CALCIUM SERPL-MCNC: 9 MG/DL (ref 8.8–10.2)
CHLORIDE SERPL-SCNC: 104 MMOL/L (ref 98–107)
CHOLEST SERPL-MCNC: 111 MG/DL
CREAT SERPL-MCNC: 0.82 MG/DL (ref 0.67–1.17)
CREAT UR-MCNC: 270 MG/DL
DEPRECATED HCO3 PLAS-SCNC: 25 MMOL/L (ref 22–29)
ERYTHROCYTE [DISTWIDTH] IN BLOOD BY AUTOMATED COUNT: 13 % (ref 10–15)
GFR SERPL CREATININE-BSD FRML MDRD: >90 ML/MIN/1.73M2
GLUCOSE SERPL-MCNC: 107 MG/DL (ref 70–99)
HBA1C MFR BLD: 6.4 % (ref 0–5.6)
HCT VFR BLD AUTO: 38.9 % (ref 40–53)
HDLC SERPL-MCNC: 42 MG/DL
HGB BLD-MCNC: 13.3 G/DL (ref 13.3–17.7)
LDLC SERPL CALC-MCNC: 50 MG/DL
MCH RBC QN AUTO: 29.5 PG (ref 26.5–33)
MCHC RBC AUTO-ENTMCNC: 34.2 G/DL (ref 31.5–36.5)
MCV RBC AUTO: 86 FL (ref 78–100)
MICROALBUMIN UR-MCNC: 13.3 MG/L
MICROALBUMIN/CREAT UR: 4.93 MG/G CR (ref 0–17)
NONHDLC SERPL-MCNC: 69 MG/DL
PLATELET # BLD AUTO: 123 10E3/UL (ref 150–450)
POTASSIUM SERPL-SCNC: 4.2 MMOL/L (ref 3.4–5.3)
PROT SERPL-MCNC: 7.4 G/DL (ref 6.4–8.3)
PSA SERPL-MCNC: 1.58 NG/ML (ref 0–4.5)
RBC # BLD AUTO: 4.51 10E6/UL (ref 4.4–5.9)
SODIUM SERPL-SCNC: 139 MMOL/L (ref 136–145)
TRIGL SERPL-MCNC: 94 MG/DL
WBC # BLD AUTO: 3.8 10E3/UL (ref 4–11)

## 2022-07-18 PROCEDURE — 80061 LIPID PANEL: CPT

## 2022-07-18 PROCEDURE — G0103 PSA SCREENING: HCPCS

## 2022-07-18 PROCEDURE — 36415 COLL VENOUS BLD VENIPUNCTURE: CPT

## 2022-07-18 PROCEDURE — 85027 COMPLETE CBC AUTOMATED: CPT

## 2022-07-18 PROCEDURE — 80053 COMPREHEN METABOLIC PANEL: CPT

## 2022-07-18 PROCEDURE — 83036 HEMOGLOBIN GLYCOSYLATED A1C: CPT

## 2022-07-18 PROCEDURE — 82043 UR ALBUMIN QUANTITATIVE: CPT

## 2022-07-19 NOTE — RESULT ENCOUNTER NOTE
I called and spoke with the patient about their recent clinic visit results. I answered any questions they had.      Dr. Roberto Bates

## 2022-08-31 DIAGNOSIS — I10 ESSENTIAL HYPERTENSION: Primary | ICD-10-CM

## 2022-08-31 RX ORDER — HYDROCHLOROTHIAZIDE 12.5 MG/1
12.5 CAPSULE ORAL DAILY
Qty: 90 CAPSULE | Refills: 11 | Status: SHIPPED | OUTPATIENT
Start: 2022-08-31 | End: 2022-10-18

## 2022-09-13 ENCOUNTER — MYC MEDICAL ADVICE (OUTPATIENT)
Dept: FAMILY MEDICINE | Facility: CLINIC | Age: 62
End: 2022-09-13

## 2022-09-13 DIAGNOSIS — I10 ESSENTIAL HYPERTENSION: ICD-10-CM

## 2022-09-13 DIAGNOSIS — Z95.1 S/P CABG (CORONARY ARTERY BYPASS GRAFT): ICD-10-CM

## 2022-09-13 RX ORDER — LISINOPRIL 5 MG/1
5 TABLET ORAL DAILY
Qty: 90 TABLET | Refills: 3 | Status: SHIPPED | OUTPATIENT
Start: 2022-09-13 | End: 2022-10-25

## 2022-09-13 NOTE — TELEPHONE ENCOUNTER
"Last Written Prescription Date:  5/16/22  Last Fill Quantity: 30,  # refills: 3   Last office visit provider:  7/14/22     Requested Prescriptions   Pending Prescriptions Disp Refills     lisinopril (ZESTRIL) 5 MG tablet 30 tablet 3     Sig: Take 1 tablet (5 mg) by mouth daily       ACE Inhibitors (Including Combos) Protocol Passed - 9/13/2022  7:45 AM        Passed - Blood pressure under 140/90 in past 12 months     BP Readings from Last 3 Encounters:   07/14/22 112/70   06/27/22 94/60   06/22/22 118/80                 Passed - Recent (12 mo) or future (30 days) visit within the authorizing provider's specialty     Patient has had an office visit with the authorizing provider or a provider within the authorizing providers department within the previous 12 mos or has a future within next 30 days. See \"Patient Info\" tab in inbasket, or \"Choose Columns\" in Meds & Orders section of the refill encounter.              Passed - Medication is active on med list        Passed - Patient is age 18 or older        Passed - Normal serum creatinine on file in past 12 months     Recent Labs   Lab Test 07/18/22  0736   CR 0.82       Ok to refill medication if creatinine is low          Passed - Normal serum potassium on file in past 12 months     Recent Labs   Lab Test 07/18/22  0736   POTASSIUM 4.2                  PETE TAYLOR RN 09/13/22 1:36 PM  "

## 2022-10-18 DIAGNOSIS — Z95.1 S/P CABG (CORONARY ARTERY BYPASS GRAFT): ICD-10-CM

## 2022-10-18 DIAGNOSIS — I10 ESSENTIAL HYPERTENSION: ICD-10-CM

## 2022-10-18 DIAGNOSIS — I25.5 ISCHEMIC CARDIOMYOPATHY: ICD-10-CM

## 2022-10-18 DIAGNOSIS — Z91.09 OTHER ALLERGY, OTHER THAN TO MEDICINAL AGENTS: ICD-10-CM

## 2022-10-18 DIAGNOSIS — E78.5 HYPERLIPIDEMIA LDL GOAL <70: ICD-10-CM

## 2022-10-18 RX ORDER — ROSUVASTATIN CALCIUM 20 MG/1
20 TABLET, COATED ORAL DAILY
Qty: 90 TABLET | Refills: 3 | Status: ON HOLD | OUTPATIENT
Start: 2022-10-18 | End: 2023-01-20

## 2022-10-18 RX ORDER — LISINOPRIL 5 MG/1
5 TABLET ORAL DAILY
Qty: 90 TABLET | Refills: 3 | OUTPATIENT
Start: 2022-10-18

## 2022-10-18 RX ORDER — MONTELUKAST SODIUM 10 MG/1
10 TABLET ORAL DAILY
Qty: 90 TABLET | Refills: 3 | Status: SHIPPED | OUTPATIENT
Start: 2022-10-18 | End: 2023-11-01

## 2022-10-18 RX ORDER — METOPROLOL SUCCINATE 50 MG/1
50 TABLET, EXTENDED RELEASE ORAL 2 TIMES DAILY
Qty: 180 TABLET | Refills: 11 | OUTPATIENT
Start: 2022-10-18

## 2022-10-18 RX ORDER — HYDROCHLOROTHIAZIDE 12.5 MG/1
12.5 CAPSULE ORAL DAILY
Qty: 90 CAPSULE | Refills: 3 | Status: SHIPPED | OUTPATIENT
Start: 2022-10-18 | End: 2023-10-03

## 2022-10-24 DIAGNOSIS — I10 ESSENTIAL HYPERTENSION: ICD-10-CM

## 2022-10-24 DIAGNOSIS — Z95.1 S/P CABG (CORONARY ARTERY BYPASS GRAFT): ICD-10-CM

## 2022-10-24 DIAGNOSIS — E11.9 TYPE 2 DIABETES MELLITUS WITHOUT COMPLICATION, WITHOUT LONG-TERM CURRENT USE OF INSULIN (H): ICD-10-CM

## 2022-10-25 DIAGNOSIS — I25.5 ISCHEMIC CARDIOMYOPATHY: ICD-10-CM

## 2022-10-25 RX ORDER — LISINOPRIL 5 MG/1
5 TABLET ORAL DAILY
Qty: 90 TABLET | Refills: 2 | Status: ON HOLD | OUTPATIENT
Start: 2022-10-25 | End: 2023-01-20

## 2022-10-25 RX ORDER — METFORMIN HCL 500 MG
1000 TABLET, EXTENDED RELEASE 24 HR ORAL 2 TIMES DAILY WITH MEALS
Qty: 360 TABLET | Refills: 0 | Status: ON HOLD | OUTPATIENT
Start: 2022-10-25 | End: 2023-01-20

## 2022-10-25 RX ORDER — METOPROLOL SUCCINATE 50 MG/1
50 TABLET, EXTENDED RELEASE ORAL 2 TIMES DAILY
Qty: 180 TABLET | Refills: 3 | Status: ON HOLD | OUTPATIENT
Start: 2022-10-25 | End: 2023-01-20

## 2022-10-25 NOTE — TELEPHONE ENCOUNTER
"Routing refill request to provider for review/approval because:  Early refill requested.    Last Written Prescription Date:  6/23/22  Last Fill Quantity: 360,  # refills: 1   Last office visit provider:  7/14/22     Requested Prescriptions   Pending Prescriptions Disp Refills     metFORMIN (GLUCOPHAGE XR) 500 MG 24 hr tablet 360 tablet 1     Sig: Take 2 tablets (1,000 mg) by mouth 2 times daily (with meals)       Biguanide Agents Passed - 10/25/2022  2:58 PM        Passed - Patient is age 10 or older        Passed - Patient has documented A1c within the specified period of time.     If HgbA1C is 8 or greater, it needs to be on file within the past 3 months.  If less than 8, must be on file within the past 6 months.     Recent Labs   Lab Test 07/18/22  0736   A1C 6.4*             Passed - Patient's CR is NOT>1.4 OR Patient's EGFR is NOT<45 within past 12 mos.     Recent Labs   Lab Test 07/18/22  0736   GFRESTIMATED >90       Recent Labs   Lab Test 07/18/22  0736   CR 0.82             Passed - Patient does NOT have a diagnosis of CHF.        Passed - Medication is active on med list        Passed - Recent (6 mo) or future (30 days) visit within the authorizing provider's specialty     Patient had office visit in the last 6 months or has a visit in the next 30 days with authorizing provider or within the authorizing provider's specialty.  See \"Patient Info\" tab in inbasket, or \"Choose Columns\" in Meds & Orders section of the refill encounter.             Signed Prescriptions Disp Refills    lisinopril (ZESTRIL) 5 MG tablet 90 tablet 2     Sig: Take 1 tablet (5 mg) by mouth daily       There is no refill protocol information for this order          Geoff Gabriel RN 10/25/22 3:01 PM  "

## 2022-11-09 ENCOUNTER — TELEPHONE (OUTPATIENT)
Dept: FAMILY MEDICINE | Facility: CLINIC | Age: 62
End: 2022-11-09

## 2022-11-09 ENCOUNTER — OFFICE VISIT (OUTPATIENT)
Dept: FAMILY MEDICINE | Facility: CLINIC | Age: 62
End: 2022-11-09
Payer: OTHER GOVERNMENT

## 2022-11-09 VITALS
BODY MASS INDEX: 31.88 KG/M2 | DIASTOLIC BLOOD PRESSURE: 78 MMHG | HEART RATE: 65 BPM | TEMPERATURE: 98.7 F | RESPIRATION RATE: 16 BRPM | SYSTOLIC BLOOD PRESSURE: 130 MMHG | WEIGHT: 214.3 LBS | OXYGEN SATURATION: 96 %

## 2022-11-09 DIAGNOSIS — J10.1 INFLUENZA A: Primary | ICD-10-CM

## 2022-11-09 DIAGNOSIS — R52 BODY ACHES: ICD-10-CM

## 2022-11-09 LAB
FLUAV AG SPEC QL IA: POSITIVE
FLUBV AG SPEC QL IA: NEGATIVE

## 2022-11-09 PROCEDURE — 87804 INFLUENZA ASSAY W/OPTIC: CPT | Mod: 59 | Performed by: FAMILY MEDICINE

## 2022-11-09 PROCEDURE — 99214 OFFICE O/P EST MOD 30 MIN: CPT | Performed by: FAMILY MEDICINE

## 2022-11-09 RX ORDER — OSELTAMIVIR PHOSPHATE 75 MG/1
75 CAPSULE ORAL 2 TIMES DAILY
Qty: 10 CAPSULE | Refills: 0 | Status: SHIPPED | OUTPATIENT
Start: 2022-11-09 | End: 2022-11-14

## 2022-11-09 NOTE — PROGRESS NOTES
Assessment:       Influenza A    - oseltamivir (TAMIFLU) 75 MG capsule  Dispense: 10 capsule; Refill: 0    Body aches    - Influenza A & B Antigen - Clinic Collect         Plan:     Patient with type 2 diabetes, asthma, and obesity at high risk for complications tested positive for influenza A.  We will treat with Tamiflu.  Recommend rest, fluids, Tylenol, or ibuprofen as needed for fever or discomfort.  Follow-up if symptoms getting worse or not improving in the next 5 days.    MEDICATIONS:   Orders Placed This Encounter   Medications     oseltamivir (TAMIFLU) 75 MG capsule     Sig: Take 1 capsule (75 mg) by mouth 2 times daily for 5 days     Dispense:  10 capsule     Refill:  0         Subjective:       62 year old male with type 2 diabetes and asthma and obesity presents for evaluation sudden onset of fever, cough, body aches about 2 days ago.  His grandkids are all positive for influenza B and he has been around them quite a lot.  No shortness of breath or wheezing.  He has had a headache as well.    Patient Active Problem List   Diagnosis     Hypertension     TB lung, latent     Allergies     Obesity     Heart failure with reduced ejection fraction (H)     Coronary artery disease of native artery of native heart with stable angina pectoris (H)     Mild intermittent asthma     Type 2 diabetes mellitus without complication, without long-term current use of insulin (H)     Sensorineural hearing loss (SNHL) of left ear     Tinnitus     Status post coronary artery bypass graft     Healthcare maintenance       Past Medical History:   Diagnosis Date     Coronary atherosclerosis     Created by [employer] [facility] Annotation: Jul 16 2010  5:35PM - Nik Boyd: [place] 7/10  Replacement Utility updated for latest IMO load     Diabetes (H)      Essential hypertension     Created by Conversion  Replacement Utility updated for latest IMO load     Pure hypercholesterolemia     Created by Conversion      Uncomplicated asthma         Past Surgical History:   Procedure Laterality Date     BYPASS GRAFT ARTERY CORONARY N/A 5/10/2022    Procedure: CORONARY ARTERY BYPASS GRAFT X3 ,  LEFT LEG ENDOSCOPIC VESSEL PROCUREMENT, LEFT INTERNAL MAMMARY ARTERY HARVEST, ANESTHESIA TRANSESOPHAGEAL ECHOCARDIOGRAM. EPIAORTIC ULTRASOUND.;  Surgeon: Nik Marcus MD;  Location: [facility] OR      CORONARY ANGIOGRAM N/A 4/8/2022    Procedure: Coronary Angiogram;  Surgeon: Ty Grace MD;  Location: [facility] CATH LAB CV     CV LEFT HEART CATH N/A 4/8/2022    Procedure: Left Heart Catheterization;  Surgeon: Ty Grace MD;  Location: [facility] CATH LAB CV     LAPAROSCOPIC CHOLECYSTECTOMY N/A 6/7/2022    Procedure: CHOLECYSTECTOMY, LAPAROSCOPIC;  Surgeon: Franck Flowers MD;  Location: [facility] OR     OTHER SURGICAL HISTORY      CORONARY STENTSTwo coronary artery stents in 2010       Current Outpatient Medications   Medication     acetaminophen (TYLENOL) 325 MG tablet     albuterol (PROAIR HFA;PROVENTIL HFA;VENTOLIN HFA) 90 mcg/actuation inhaler     aspirin (ASA) 81 MG chewable tablet     coenzyme Q-10 capsule     fluticasone propionate (FLONASE) 50 mcg/actuation nasal spray     hydrochlorothiazide (HYDRODIURIL) 25 MG tablet     hydrochlorothiazide (MICROZIDE) 12.5 MG capsule     lisinopril (ZESTRIL) 5 MG tablet     MAGNESIUM PO     metFORMIN (GLUCOPHAGE XR) 500 MG 24 hr tablet     metoprolol succinate ER (TOPROL XL) 50 MG 24 hr tablet     montelukast (SINGULAIR) 10 MG tablet     nitroGLYcerin (NITROSTAT) 0.4 MG sublingual tablet     rosuvastatin (CRESTOR) 20 MG tablet     No current facility-administered medications for this visit.       Allergies   Allergen Reactions     Atorvastatin Muscle Pain (Myalgia)       Family History   Problem Relation Age of Onset     No Known Problems Mother      No Known Problems Father      No Known Problems Sister      No Known Problems Brother      No Known Problems Daughter      No Known Problems Son   "    No Known Problems Brother      No Known Problems Son      No Known Problems Son        Social History     Socioeconomic History     Marital status:      Spouse name: None     Number of children: None     Years of education: None     Highest education level: None   Tobacco Use     Smoking status: Never     Smokeless tobacco: Never   Substance and Sexual Activity     Alcohol use: Yes     Comment: Alcoholic Drinks/day: \"Once a month, maybe.\"     Drug use: No     Sexual activity: Yes     Partners: Female     Comment: wife   Social History Narrative    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  Marisa Diego           Review of Systems  Pertinent items are noted in HPI.      Objective:                     General Appearance:    /78 (BP Location: Right arm, Patient Position: Sitting, Cuff Size: Adult Large)   Pulse 65   Temp 98.7  F (37.1  C) (Oral)   Resp 16   Wt 97.2 kg (214 lb 4.8 oz)   SpO2 96%   BMI 31.88 kg/m          Alert, mildly ill-appearing but in no distress.   Head:    Normocephalic, without obvious abnormality, atraumatic   Eyes:    Conjunctiva/corneas clear   Ears:    Normal TM's without erythema or bulging. Normal external ear canals, both ears   Nose:   Nares normal, septum midline, mucosa normal, no drainage    or sinus tenderness   Throat:   Lips, mucosa, and tongue normal; teeth and gums normal.  No tonsilar hypertrophy or exudate.   Neck:   Supple, symmetrical, trachea midline, no adenopathy    Lungs:     Clear to auscultation bilaterally without wheezes, rales, or rhonchi, respirations unlabored    Heart:    Regular rate and rhythm, S1 and S2 normal, no murmur, rub or gallop       Extremities:   Extremities normal, atraumatic, no cyanosis or edema   Skin:   Skin color, texture, turgor normal, no rashes or lesions           Results for orders placed or performed in visit on 11/09/22   Influenza A & B Antigen - Clinic Collect     " Status: Abnormal    Specimen: Nose; Swab   Result Value Ref Range    Influenza A antigen Positive (A) Negative    Influenza B antigen Negative Negative    Narrative    Test results must be correlated with clinical data. If necessary, results should be confirmed by a molecular assay or viral culture.       This note has been dictated using voice recognition software. Any grammatical or context distortions are unintentional and inherent to the software

## 2022-11-19 ENCOUNTER — HEALTH MAINTENANCE LETTER (OUTPATIENT)
Age: 62
End: 2022-11-19

## 2023-01-11 ASSESSMENT — ASTHMA QUESTIONNAIRES
QUESTION_2 LAST FOUR WEEKS HOW OFTEN HAVE YOU HAD SHORTNESS OF BREATH: ONCE OR TWICE A WEEK
ACT_TOTALSCORE: 17
QUESTION_1 LAST FOUR WEEKS HOW MUCH OF THE TIME DID YOUR ASTHMA KEEP YOU FROM GETTING AS MUCH DONE AT WORK, SCHOOL OR AT HOME: NONE OF THE TIME
QUESTION_5 LAST FOUR WEEKS HOW WOULD YOU RATE YOUR ASTHMA CONTROL: SOMEWHAT CONTROLLED
QUESTION_3 LAST FOUR WEEKS HOW OFTEN DID YOUR ASTHMA SYMPTOMS (WHEEZING, COUGHING, SHORTNESS OF BREATH, CHEST TIGHTNESS OR PAIN) WAKE YOU UP AT NIGHT OR EARLIER THAN USUAL IN THE MORNING: TWO OR THREE NIGHTS A WEEK
QUESTION_4 LAST FOUR WEEKS HOW OFTEN HAVE YOU USED YOUR RESCUE INHALER OR NEBULIZER MEDICATION (SUCH AS ALBUTEROL): TWO OR THREE TIMES PER WEEK
ACT_TOTALSCORE: 17

## 2023-01-16 ENCOUNTER — OFFICE VISIT (OUTPATIENT)
Dept: FAMILY MEDICINE | Facility: CLINIC | Age: 63
End: 2023-01-16
Payer: OTHER GOVERNMENT

## 2023-01-16 ENCOUNTER — ANCILLARY PROCEDURE (OUTPATIENT)
Dept: GENERAL RADIOLOGY | Facility: CLINIC | Age: 63
End: 2023-01-16
Attending: STUDENT IN AN ORGANIZED HEALTH CARE EDUCATION/TRAINING PROGRAM
Payer: OTHER GOVERNMENT

## 2023-01-16 VITALS
SYSTOLIC BLOOD PRESSURE: 120 MMHG | TEMPERATURE: 98.1 F | OXYGEN SATURATION: 99 % | BODY MASS INDEX: 33.51 KG/M2 | HEART RATE: 56 BPM | DIASTOLIC BLOOD PRESSURE: 80 MMHG | WEIGHT: 225.31 LBS

## 2023-01-16 DIAGNOSIS — I25.118 CORONARY ARTERY DISEASE OF NATIVE ARTERY OF NATIVE HEART WITH STABLE ANGINA PECTORIS (H): Primary | ICD-10-CM

## 2023-01-16 DIAGNOSIS — Z12.11 SCREEN FOR COLON CANCER: ICD-10-CM

## 2023-01-16 DIAGNOSIS — I25.9 CHEST PAIN DUE TO MYOCARDIAL ISCHEMIA, UNSPECIFIED ISCHEMIC CHEST PAIN TYPE: ICD-10-CM

## 2023-01-16 DIAGNOSIS — E11.9 TYPE 2 DIABETES MELLITUS WITHOUT COMPLICATION, WITHOUT LONG-TERM CURRENT USE OF INSULIN (H): ICD-10-CM

## 2023-01-16 DIAGNOSIS — I50.20 HEART FAILURE WITH REDUCED EJECTION FRACTION (H): ICD-10-CM

## 2023-01-16 LAB
ALBUMIN SERPL BCG-MCNC: 4.1 G/DL (ref 3.5–5.2)
ALP SERPL-CCNC: 80 U/L (ref 40–129)
ALT SERPL W P-5'-P-CCNC: 52 U/L (ref 10–50)
ANION GAP SERPL CALCULATED.3IONS-SCNC: 14 MMOL/L (ref 7–15)
AST SERPL W P-5'-P-CCNC: 76 U/L (ref 10–50)
BILIRUB SERPL-MCNC: 0.7 MG/DL
BUN SERPL-MCNC: 14.5 MG/DL (ref 8–23)
CALCIUM SERPL-MCNC: 9.2 MG/DL (ref 8.8–10.2)
CHLORIDE SERPL-SCNC: 104 MMOL/L (ref 98–107)
CREAT SERPL-MCNC: 0.81 MG/DL (ref 0.67–1.17)
DEPRECATED HCO3 PLAS-SCNC: 22 MMOL/L (ref 22–29)
ERYTHROCYTE [DISTWIDTH] IN BLOOD BY AUTOMATED COUNT: 13 % (ref 10–15)
GFR SERPL CREATININE-BSD FRML MDRD: >90 ML/MIN/1.73M2
GLUCOSE SERPL-MCNC: 88 MG/DL (ref 70–99)
HBA1C MFR BLD: 6.9 % (ref 0–5.6)
HCT VFR BLD AUTO: 40.7 % (ref 40–53)
HGB BLD-MCNC: 14.1 G/DL (ref 13.3–17.7)
MCH RBC QN AUTO: 30.1 PG (ref 26.5–33)
MCHC RBC AUTO-ENTMCNC: 34.6 G/DL (ref 31.5–36.5)
MCV RBC AUTO: 87 FL (ref 78–100)
NT-PROBNP SERPL-MCNC: 87 PG/ML (ref 0–900)
PLATELET # BLD AUTO: 147 10E3/UL (ref 150–450)
POTASSIUM SERPL-SCNC: 3.8 MMOL/L (ref 3.4–5.3)
PROT SERPL-MCNC: 8.1 G/DL (ref 6.4–8.3)
RBC # BLD AUTO: 4.68 10E6/UL (ref 4.4–5.9)
SODIUM SERPL-SCNC: 140 MMOL/L (ref 136–145)
TROPONIN T SERPL HS-MCNC: 9 NG/L
WBC # BLD AUTO: 4.4 10E3/UL (ref 4–11)

## 2023-01-16 PROCEDURE — 83880 ASSAY OF NATRIURETIC PEPTIDE: CPT | Performed by: STUDENT IN AN ORGANIZED HEALTH CARE EDUCATION/TRAINING PROGRAM

## 2023-01-16 PROCEDURE — 84484 ASSAY OF TROPONIN QUANT: CPT | Performed by: STUDENT IN AN ORGANIZED HEALTH CARE EDUCATION/TRAINING PROGRAM

## 2023-01-16 PROCEDURE — 85027 COMPLETE CBC AUTOMATED: CPT | Performed by: STUDENT IN AN ORGANIZED HEALTH CARE EDUCATION/TRAINING PROGRAM

## 2023-01-16 PROCEDURE — 36415 COLL VENOUS BLD VENIPUNCTURE: CPT | Performed by: STUDENT IN AN ORGANIZED HEALTH CARE EDUCATION/TRAINING PROGRAM

## 2023-01-16 PROCEDURE — 83036 HEMOGLOBIN GLYCOSYLATED A1C: CPT | Performed by: STUDENT IN AN ORGANIZED HEALTH CARE EDUCATION/TRAINING PROGRAM

## 2023-01-16 PROCEDURE — 99214 OFFICE O/P EST MOD 30 MIN: CPT | Performed by: STUDENT IN AN ORGANIZED HEALTH CARE EDUCATION/TRAINING PROGRAM

## 2023-01-16 PROCEDURE — 80053 COMPREHEN METABOLIC PANEL: CPT | Performed by: STUDENT IN AN ORGANIZED HEALTH CARE EDUCATION/TRAINING PROGRAM

## 2023-01-16 PROCEDURE — 71046 X-RAY EXAM CHEST 2 VIEWS: CPT | Mod: TC | Performed by: RADIOLOGY

## 2023-01-16 ASSESSMENT — PAIN SCALES - GENERAL: PAINLEVEL: NO PAIN (0)

## 2023-01-16 NOTE — RESULT ENCOUNTER NOTE
Mir,  Your results from your recent clinic visit show:  Your EKG was normal    If you have more questions please call the clinic at [phone] or send me a (In)Touch Network message    Dr. Roberto Bates

## 2023-01-16 NOTE — PROGRESS NOTES
Assessment and Plan     63-year-old male with past [place] of CAD, HFrEF, type 2 diabetes who presents with chest pain going on intermittently over the last couple weeks.  Episode that was very reminiscent of his previous heart attack with chest tightness, diaphoresis, shortness of breath.  He has felt fatigued and short of breath with exertion since this episode.  I am very concerned that he had another ischemic event.  He is not having active chest pain now though.  We will do an EKG looking for ischemic changes from his previous, labs including troponin and BNP, chest x-ray to rule out other etiologies and rapid access referral as I think he needs to see cardiology urgently and placed a stress test order.  They just want to do angiogram instead though.  Will defer to cardiology.    1. Heart failure with reduced ejection fraction (H)  - [facility]  Referral; Future  - NM Lexiscan stress test; Future    2. Type 2 diabetes mellitus without complication, without long-term current use of insulin (H)  - HEMOGLOBIN A1C; Future    4. Coronary artery disease of native artery of native heart with stable angina pectoris (H)  - [facility]  Referral; Future    5. Chest pain due to myocardial ischemia, unspecified ischemic chest pain type  - [facility]  Referral; Future  - NM Lexiscan stress test; Future  - Troponin T, High Sensitivity; Future  - Comprehensive metabolic panel (BMP + Alb, Alk Phos, ALT, AST, Total. Bili, TP); Future  - CBC with platelets; Future  - BNP-N terminal pro; Future  - EKG 12-lead, tracing only  - XR Chest 2 Views; Future    Follow up: PRN after workup  Options for treatment and follow-up care were reviewed with the patient and/or guardian. Mir Treviño Sr. and/or guardian engaged in the decision making process and verbalized understanding of the options discussed and agreed with the final plan.    Dr. Roberto Bates         HPI:   Mir ALVAREZ  Hudson Pitts is a 63 year old  male who presents for:    Chief Complaint   Patient presents with     chest tightness     Possible heart attack on December had symptoms      Fatigue     Pateint tells me that he has had some intermittent chest pain lately. Seems to be associate with exertion. Describes as tightness like a weight on his chest. He did have diaphoresis with one episode. He did have a fever at one time with this and fever was 102. Had some chills as well. He describes tightness on his left side with some pinching. Fatigued as well.  He notes this has been going on for a couple weeks or so.     Answers for HPI/ROS submitted by the patient on 1/11/2023  How many servings of fruits and vegetables do you eat daily?: 0-1  On average, how many sweetened beverages do you drink each day (Examples: soda, juice, sweet tea, etc.  Do NOT count diet or artificially sweetened beverages)?: 0  How many minutes a day do you exercise enough to make your heart beat faster?: 9 or less  How many days a week do you exercise enough to make your heart beat faster?: 3 or less  How many days per week do you miss taking your medication?: 0  What is the reason for your visit today?: Chest tightness  When did your symptoms begin?: 1-2 weeks ago  How would you describe these symptoms?: Moderate  Are your symptoms:: Improving  Have you had these symptoms before?: No  Is there anything that makes you feel worse?: Exertion  Is there anything that makes you feel better?: Sleep         PMHX:     Patient Active Problem List   Diagnosis     Hypertension     TB lung, latent     Allergies     Obesity     Heart failure with reduced ejection fraction (H)     Coronary artery disease of native artery of native heart with stable angina pectoris (H)     Mild intermittent asthma     Type 2 diabetes mellitus without complication, without long-term current use of insulin (H)     Sensorineural hearing loss (SNHL) of left ear     Tinnitus     Status post  "coronary artery bypass graft     Healthcare maintenance       Social History     Tobacco Use     Smoking status: Never     Smokeless tobacco: Never   Substance Use Topics     Alcohol use: Yes     Comment: Alcoholic Drinks/day: \"Once a month, maybe.\"     Drug use: No       Social History     Social History Narrative    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  Josuédustin Diego         Allergies   Allergen Reactions     Atorvastatin Muscle Pain (Myalgia)       No results found for this or any previous visit (from the past 24 hour(s)).         Review of Systems:    ROS: 10 point ROS neg other than the symptoms noted above in the HPI.         Physical Exam:     Vitals:    01/16/23 0856   BP: 120/80   Pulse: 56   Temp: 98.1  F (36.7  C)   SpO2: 99%   Weight: 102.2 kg (225 lb 5 oz)     Body mass index is 33.51 kg/m .    General appearance: Alert, cooperative, no distress, appears stated age  Head: Normocephalic, atraumatic, without obvious abnormality  Eyes: Pupils equal round, reactive.  Conjunctiva clear.  Nose: Nares normal, no drainage.  Throat: Lips, mucosa, tongue normal mucosa pink and moist  Neck: Supple, symmetric, trachea midline  Lungs: Clear to auscultation bilaterally, no wheezing or crackles present.  Respirations unlabored  Heart: Regular rate and rhythm, normal S1 and S2, no murmur, rub or gallop.  Extremities: Extremities normal, atraumatic.  No cyanosis or edema.  Skin: Skin color, texture, turgor normal no rashes or lesions on limited skin exam            "

## 2023-01-18 ENCOUNTER — HOSPITAL ENCOUNTER (OUTPATIENT)
Dept: NUCLEAR MEDICINE | Facility: HOSPITAL | Age: 63
Discharge: HOME | End: 2023-01-18
Attending: STUDENT IN AN ORGANIZED HEALTH CARE EDUCATION/TRAINING PROGRAM
Payer: OTHER GOVERNMENT

## 2023-01-18 ENCOUNTER — APPOINTMENT (OUTPATIENT)
Dept: RADIOLOGY | Facility: HOSPITAL | Age: 63
End: 2023-01-18
Attending: STUDENT IN AN ORGANIZED HEALTH CARE EDUCATION/TRAINING PROGRAM
Payer: OTHER GOVERNMENT

## 2023-01-18 ENCOUNTER — HOSPITAL ENCOUNTER (OUTPATIENT)
Facility: HOSPITAL | Age: 63
Setting detail: OBSERVATION
Discharge: HOME | End: 2023-01-20
Attending: STUDENT IN AN ORGANIZED HEALTH CARE EDUCATION/TRAINING PROGRAM | Admitting: INTERNAL MEDICINE
Payer: OTHER GOVERNMENT

## 2023-01-18 DIAGNOSIS — E11.9 TYPE 2 DIABETES MELLITUS WITHOUT COMPLICATION, WITHOUT LONG-TERM CURRENT USE OF INSULIN (H): ICD-10-CM

## 2023-01-18 DIAGNOSIS — I10 ESSENTIAL HYPERTENSION: ICD-10-CM

## 2023-01-18 DIAGNOSIS — R07.9 EXERTIONAL CHEST PAIN: ICD-10-CM

## 2023-01-18 DIAGNOSIS — I50.20 HEART FAILURE WITH REDUCED EJECTION FRACTION (H): ICD-10-CM

## 2023-01-18 DIAGNOSIS — I25.9 CHEST PAIN DUE TO MYOCARDIAL ISCHEMIA, UNSPECIFIED ISCHEMIC CHEST PAIN TYPE: ICD-10-CM

## 2023-01-18 DIAGNOSIS — I25.118 CORONARY ARTERY DISEASE OF NATIVE ARTERY OF NATIVE HEART WITH STABLE ANGINA PECTORIS (H): Primary | ICD-10-CM

## 2023-01-18 DIAGNOSIS — R94.39 ABNORMAL STRESS TEST: ICD-10-CM

## 2023-01-18 LAB
ANION GAP SERPL CALCULATED.3IONS-SCNC: 7 MMOL/L (ref 7–15)
BUN SERPL-MCNC: 17.2 MG/DL (ref 8–23)
CALCIUM SERPL-MCNC: 8.9 MG/DL (ref 8.8–10.2)
CHLORIDE SERPL-SCNC: 104 MMOL/L (ref 98–107)
CREAT SERPL-MCNC: 0.72 MG/DL (ref 0.67–1.17)
DEPRECATED HCO3 PLAS-SCNC: 26 MMOL/L (ref 22–29)
ERYTHROCYTE [DISTWIDTH] IN BLOOD BY AUTOMATED COUNT: 12.7 % (ref 10–15)
GFR SERPL CREATININE-BSD FRML MDRD: >90 ML/MIN/1.73M2
GLUCOSE BLDC GLUCOMTR-MCNC: 121 MG/DL (ref 70–99)
GLUCOSE BLDC GLUCOMTR-MCNC: 209 MG/DL (ref 70–99)
GLUCOSE BLDC GLUCOMTR-MCNC: 209 MG/DL (ref 70–99)
GLUCOSE SERPL-MCNC: 123 MG/DL (ref 70–99)
HCT VFR BLD AUTO: 40 % (ref 40–53)
HGB BLD-MCNC: 13.6 G/DL (ref 13.3–17.7)
HOLD SPECIMEN: NORMAL
MCH RBC QN AUTO: 29.4 PG (ref 26.5–33)
MCHC RBC AUTO-ENTMCNC: 34 G/DL (ref 31.5–36.5)
MCV RBC AUTO: 86 FL (ref 78–100)
PLATELET # BLD AUTO: 141 10E3/UL (ref 150–450)
POTASSIUM SERPL-SCNC: 3.7 MMOL/L (ref 3.4–5.3)
RBC # BLD AUTO: 4.63 10E6/UL (ref 4.4–5.9)
SARS-COV-2 RNA RESP QL NAA+PROBE: NEGATIVE
SODIUM SERPL-SCNC: 137 MMOL/L (ref 136–145)
STRESS ECHO TARGET HR: 158
TROPONIN T SERPL HS-MCNC: 10 NG/L
TROPONIN T SERPL HS-MCNC: 12 NG/L
WBC # BLD AUTO: 3.9 10E3/UL (ref 4–11)

## 2023-01-18 PROCEDURE — 36415 COLL VENOUS BLD VENIPUNCTURE: CPT | Performed by: STUDENT IN AN ORGANIZED HEALTH CARE EDUCATION/TRAINING PROGRAM

## 2023-01-18 PROCEDURE — 93005 ELECTROCARDIOGRAM TRACING: CPT | Performed by: STUDENT IN AN ORGANIZED HEALTH CARE EDUCATION/TRAINING PROGRAM

## 2023-01-18 PROCEDURE — 250N000011 HC RX IP 250 OP 636: Performed by: STUDENT IN AN ORGANIZED HEALTH CARE EDUCATION/TRAINING PROGRAM

## 2023-01-18 PROCEDURE — A9500 TC99M SESTAMIBI: HCPCS | Performed by: STUDENT IN AN ORGANIZED HEALTH CARE EDUCATION/TRAINING PROGRAM

## 2023-01-18 PROCEDURE — U0003 INFECTIOUS AGENT DETECTION BY NUCLEIC ACID (DNA OR RNA); SEVERE ACUTE RESPIRATORY SYNDROME CORONAVIRUS 2 (SARS-COV-2) (CORONAVIRUS DISEASE [COVID-19]), AMPLIFIED PROBE TECHNIQUE, MAKING USE OF HIGH THROUGHPUT TECHNOLOGIES AS DESCRIBED BY CMS-2020-01-R: HCPCS | Performed by: STUDENT IN AN ORGANIZED HEALTH CARE EDUCATION/TRAINING PROGRAM

## 2023-01-18 PROCEDURE — 71046 X-RAY EXAM CHEST 2 VIEWS: CPT

## 2023-01-18 PROCEDURE — 99285 EMERGENCY DEPT VISIT HI MDM: CPT | Mod: 25

## 2023-01-18 PROCEDURE — 99222 1ST HOSP IP/OBS MODERATE 55: CPT | Performed by: STUDENT IN AN ORGANIZED HEALTH CARE EDUCATION/TRAINING PROGRAM

## 2023-01-18 PROCEDURE — 80048 BASIC METABOLIC PNL TOTAL CA: CPT | Performed by: STUDENT IN AN ORGANIZED HEALTH CARE EDUCATION/TRAINING PROGRAM

## 2023-01-18 PROCEDURE — 96372 THER/PROPH/DIAG INJ SC/IM: CPT | Performed by: STUDENT IN AN ORGANIZED HEALTH CARE EDUCATION/TRAINING PROGRAM

## 2023-01-18 PROCEDURE — 343N000001 HC RX 343: Performed by: STUDENT IN AN ORGANIZED HEALTH CARE EDUCATION/TRAINING PROGRAM

## 2023-01-18 PROCEDURE — C9803 HOPD COVID-19 SPEC COLLECT: HCPCS

## 2023-01-18 PROCEDURE — 210N000001 HC R&B IMCU HEART CARE

## 2023-01-18 PROCEDURE — 93017 CV STRESS TEST TRACING ONLY: CPT

## 2023-01-18 PROCEDURE — 78452 HT MUSCLE IMAGE SPECT MULT: CPT

## 2023-01-18 PROCEDURE — 82962 GLUCOSE BLOOD TEST: CPT | Mod: 91

## 2023-01-18 PROCEDURE — 78451 HT MUSCLE IMAGE SPECT SING: CPT

## 2023-01-18 PROCEDURE — 78451 HT MUSCLE IMAGE SPECT SING: CPT | Mod: 26 | Performed by: INTERNAL MEDICINE

## 2023-01-18 PROCEDURE — 85027 COMPLETE CBC AUTOMATED: CPT | Performed by: STUDENT IN AN ORGANIZED HEALTH CARE EDUCATION/TRAINING PROGRAM

## 2023-01-18 PROCEDURE — 84484 ASSAY OF TROPONIN QUANT: CPT | Performed by: STUDENT IN AN ORGANIZED HEALTH CARE EDUCATION/TRAINING PROGRAM

## 2023-01-18 PROCEDURE — 250N000013 HC RX MED GY IP 250 OP 250 PS 637: Performed by: STUDENT IN AN ORGANIZED HEALTH CARE EDUCATION/TRAINING PROGRAM

## 2023-01-18 PROCEDURE — 250N000013 HC RX MED GY IP 250 OP 250 PS 637: Performed by: INTERNAL MEDICINE

## 2023-01-18 RX ORDER — ALBUTEROL SULFATE 90 UG/1
2 AEROSOL, METERED RESPIRATORY (INHALATION) EVERY 6 HOURS PRN
Status: DISCONTINUED | OUTPATIENT
Start: 2023-01-18 | End: 2023-01-20 | Stop reason: HOSPADM

## 2023-01-18 RX ORDER — NITROGLYCERIN 0.4 MG/1
0.4 TABLET SUBLINGUAL EVERY 5 MIN PRN
Status: DISCONTINUED | OUTPATIENT
Start: 2023-01-18 | End: 2023-01-20 | Stop reason: HOSPADM

## 2023-01-18 RX ORDER — LISINOPRIL 5 MG/1
5 TABLET ORAL DAILY
Status: DISCONTINUED | OUTPATIENT
Start: 2023-01-19 | End: 2023-01-20

## 2023-01-18 RX ORDER — HEPARIN SODIUM 5000 [USP'U]/.5ML
5000 INJECTION, SOLUTION INTRAVENOUS; SUBCUTANEOUS EVERY 8 HOURS
Status: DISCONTINUED | OUTPATIENT
Start: 2023-01-18 | End: 2023-01-20 | Stop reason: HOSPADM

## 2023-01-18 RX ORDER — DEXTROSE MONOHYDRATE 25 G/50ML
25-50 INJECTION, SOLUTION INTRAVENOUS
Status: DISCONTINUED | OUTPATIENT
Start: 2023-01-18 | End: 2023-01-20 | Stop reason: HOSPADM

## 2023-01-18 RX ORDER — ASPIRIN 81 MG/1
162 TABLET, CHEWABLE ORAL ONCE
Status: COMPLETED | OUTPATIENT
Start: 2023-01-18 | End: 2023-01-18

## 2023-01-18 RX ORDER — NICOTINE POLACRILEX 4 MG
15-30 LOZENGE BUCCAL
Status: DISCONTINUED | OUTPATIENT
Start: 2023-01-18 | End: 2023-01-20 | Stop reason: HOSPADM

## 2023-01-18 RX ORDER — METOPROLOL SUCCINATE 50 MG/1
50 TABLET, EXTENDED RELEASE ORAL 2 TIMES DAILY
Status: DISCONTINUED | OUTPATIENT
Start: 2023-01-18 | End: 2023-01-20

## 2023-01-18 RX ORDER — MAGNESIUM HYDROXIDE/ALUMINUM HYDROXICE/SIMETHICONE 120; 1200; 1200 MG/30ML; MG/30ML; MG/30ML
30 SUSPENSION ORAL EVERY 4 HOURS PRN
Status: DISCONTINUED | OUTPATIENT
Start: 2023-01-18 | End: 2023-01-20 | Stop reason: HOSPADM

## 2023-01-18 RX ORDER — MONTELUKAST SODIUM 10 MG/1
10 TABLET ORAL DAILY
Status: DISCONTINUED | OUTPATIENT
Start: 2023-01-18 | End: 2023-01-20 | Stop reason: HOSPADM

## 2023-01-18 RX ORDER — ASPIRIN 81 MG/1
81 TABLET ORAL DAILY
Status: DISCONTINUED | OUTPATIENT
Start: 2023-01-19 | End: 2023-01-19

## 2023-01-18 RX ORDER — ROSUVASTATIN CALCIUM 40 MG/1
40 TABLET, COATED ORAL DAILY
Status: DISCONTINUED | OUTPATIENT
Start: 2023-01-18 | End: 2023-01-20 | Stop reason: HOSPADM

## 2023-01-18 RX ADMIN — ROSUVASTATIN CALCIUM 40 MG: 40 TABLET, COATED ORAL at 18:37

## 2023-01-18 RX ADMIN — Medication 8.5 MCI.: at 12:01

## 2023-01-18 RX ADMIN — HEPARIN SODIUM 5000 UNITS: 10000 INJECTION, SOLUTION INTRAVENOUS; SUBCUTANEOUS at 22:06

## 2023-01-18 RX ADMIN — METOPROLOL SUCCINATE 50 MG: 50 TABLET, EXTENDED RELEASE ORAL at 22:05

## 2023-01-18 RX ADMIN — ASPIRIN 81 MG CHEWABLE TABLET 162 MG: 81 TABLET CHEWABLE at 18:37

## 2023-01-18 RX ADMIN — MONTELUKAST 10 MG: 10 TABLET, FILM COATED ORAL at 18:37

## 2023-01-18 ASSESSMENT — ACTIVITIES OF DAILY LIVING (ADL)
ADLS_ACUITY_SCORE: 35
ADLS_ACUITY_SCORE: 35
DEPENDENT_IADLS:: INDEPENDENT
ADLS_ACUITY_SCORE: 35

## 2023-01-18 NOTE — PHARMACY-ADMISSION MEDICATION HISTORY
Pharmacy Note - Admission Medication History    Pertinent Provider Information: N/A     ______________________________________________________________________    Prior To Admission (PTA) med list completed and updated in EMR.       PTA Med List   Medication Sig Last Dose     acetaminophen (TYLENOL) 325 MG tablet Take 2 tablets (650 mg) by mouth every 4 hours as needed for other, headaches or fever (For optimal non-opioid multimodal pain management to improve pain control.) Unknown     albuterol (PROAIR HFA;PROVENTIL HFA;VENTOLIN HFA) 90 mcg/actuation inhaler [ALBUTEROL (PROAIR HFA;PROVENTIL HFA;VENTOLIN HFA) 90 MCG/ACTUATION INHALER] Inhale 2 puffs every 6 (six) hours as needed for wheezing. Unknown     aspirin (ASA) 81 MG chewable tablet Take 2 tablets (162 mg) by mouth daily 1/18/2023 at AM     coenzyme Q-10 capsule Take 2 capsules (200 mg) by mouth daily 1/18/2023 at AM     fluticasone propionate (FLONASE) 50 mcg/actuation nasal spray Spray 1 spray in nostril daily as needed (seasonal) Unknown     hydrochlorothiazide (MICROZIDE) 12.5 MG capsule Take 1 capsule (12.5 mg) by mouth daily 1/18/2023 at AM     lisinopril (ZESTRIL) 5 MG tablet Take 1 tablet (5 mg) by mouth daily 1/18/2023 at AM     MAGNESIUM PO Take 1 tablet by mouth daily Magnesium citrate 210 mg 1/18/2023 at AM     metFORMIN (GLUCOPHAGE XR) 500 MG 24 hr tablet Take 2 tablets (1,000 mg) by mouth 2 times daily (with meals) 1/18/2023 at AM     metoprolol succinate ER (TOPROL XL) 50 MG 24 hr tablet Take 1 tablet (50 mg) by mouth 2 times daily 1/18/2023 at AM     montelukast (SINGULAIR) 10 MG tablet Take 1 tablet (10 mg) by mouth daily [MONTELUKAST (SINGULAIR) 10 MG TABLET] TAKE 1 TABLET BY MOUTH EVERY NIGHT AT BEDTIME 1/17/2023 at PM     nitroGLYcerin (NITROSTAT) 0.4 MG sublingual tablet For chest pain place 1 tablet under the tongue every 5 minutes for 3 doses. If symptoms persist 5 minutes after 1st dose call 911. Unknown     rosuvastatin (CRESTOR) 20 MG  tablet Take 1 tablet (20 mg) by mouth daily 1/17/2023 at PM       Information source(s): Patient and CareEveryjose/[employer]Scripts  Method of interview communication: in-person    Summary of Changes to PTA Med List  New: N/A  Discontinued: N/A  Changed: N/A    Patient was asked about OTC/herbal products specifically.  PTA med list reflects this.    In the past week, patient estimated taking medication this percent of the time:  greater than 90%.    Medication Affordability:  Not including over the counter (OTC) medications, was there a time in the past 12 months when you did not take your medications as prescribed because of cost?: No    Allergies were reviewed, assessed, and updated with the patient.      Patient did not bring any medications to the hospital and can't retrieve from home. No multi-dose medications are available for use during hospital stay.     The information provided in this note is only as accurate as the sources available at the time of the update(s).    Thank you for the opportunity to participate in the care of this patient.    Jamil Brown [facility]  1/18/2023 3:02 PM

## 2023-01-18 NOTE — ED NOTES
Bed: JNED-03  Expected date: 1/18/23  Expected time:   Means of arrival:   Comments:  Cardiology patient

## 2023-01-18 NOTE — ED PROVIDER NOTES
EMERGENCY DEPARTMENT ENCOUNTER       ED Course & Medical Decision Making     2:21 PM I met with patient for initial encounter.  4:22 PM I spoke to the hospitalist Dr Simms regarding patient.    Final Impression  63 year old male presents for evaluation of chest pain/tightness that has been ongoing since 12/28/2022.  Pain initially began with exertion when he was carrying his 45 pound grandchild across the snowy area of about 100 feet, had some exertional chest pain, diaphoresis, persistent pain over the last few weeks      Prior to making a final disposition on this patient the results of patient's tests and other diagnostic studies were discussed with the patient. All questions were answered. Patient expressed understanding of the plan and was amenable to it.    Medical Decision Making    History:    Supplemental history from: Documented in chart, if applicable    External Record(s) reviewed: Outpatient Record: 1/16/23 clinic note    Work Up:    Chart documentation includes differential considered and any EKGs or imaging independently interpreted by provider, where specified.    In additional to work up documented, I considered the following work up: Documented in chart, if applicable.    External consultation:    Discussion of management with another provider: Hospitalist    Complicating factors:    Care impacted by chronic illness: Diabetes, Heart Disease, Hyperlipidemia and Hypertension    Care affected by social determinants of health: N/A    Disposition considerations: Admit.    Medications - No data to display    Final Impression     1. Abnormal stress test    2. Exertional chest pain        Chief Complaint     Chief Complaint   Patient presents with     Chest Pain     Patient arrives from cardiology clinic where he was scheduled for a stress test.  Patient states he had chest pressure and that he has not been feeling right since 12/28.  EKG was obtained by cardiology and noted some changes.  Cardiology  [facility] patient is scheduled to go to cath lab tomorrow.     TERESO Treviño Sr. is a 63 year old male with a pertinent history of DMII, CAD, HTN, HLD, heart failure, coronary bypass (5/10/22), who presents for evaluation of chest pain.    Per chart review, patient was seen at [facility] today for a stress test. Nuclear stress test is abnormal.  Resting images demonstrate a large  area of diminished perfusion involving the mid to distal anteroseptal,  anterior, apical and distal inferolateral wall.  Stress images were not  performed due to report of chest pain and EKG changes compared to prior  ECG. Patient sent to ED for coronary angiogram.    Patient endorses chest pain with a pressure like sensation which has been ongoing since 12/28. He reports that the pressure began when he was carrying his grandchild 100 ft across the yard, and had associated dizziness, shortness of breath, sweats, and a fever of 100.2. Patient notes that his other symptoms quickly subsided, but the chest pain persisted.     Patient states he was referred by his PCP to have a stress test today, which was stopped partway through, with the patient being sent to the ED.    Patient denies all other complaints at this time.      I, Vadim Green am serving as a scribe to document services personally performed by Dr. Olegario Juarez MD, based on my observation and the provider's statements to me. I, Dr. Olegario Juarez MD attest that Vadim Green is acting in a scribe capacity, has observed my performance of the services and has documented them in accordance with my direction.    Past Medical History     Past Medical History:   Diagnosis Date     Coronary atherosclerosis      Diabetes (H)      Essential hypertension      Pure hypercholesterolemia      Uncomplicated asthma      Past Surgical History:   Procedure Laterality Date     BYPASS GRAFT ARTERY CORONARY N/A 5/10/2022    Procedure: CORONARY ARTERY BYPASS GRAFT X3 ,  LEFT LEG ENDOSCOPIC  "VESSEL PROCUREMENT, LEFT INTERNAL MAMMARY ARTERY HARVEST, ANESTHESIA TRANSESOPHAGEAL ECHOCARDIOGRAM. EPIAORTIC ULTRASOUND.;  Surgeon: Nik Marcus MD;  Location: [facility] Main OR     CV CORONARY ANGIOGRAM N/A 4/8/2022    Procedure: Coronary Angiogram;  Surgeon: Ty Grace MD;  Location: [facility] CATH LAB CV     CV LEFT HEART CATH N/A 4/8/2022    Procedure: Left Heart Catheterization;  Surgeon: Ty Grace MD;  Location: [facility] CATH LAB CV     LAPAROSCOPIC CHOLECYSTECTOMY N/A 6/7/2022    Procedure: CHOLECYSTECTOMY, LAPAROSCOPIC;  Surgeon: Franck Flowers MD;  Location: [facility] OR     OTHER SURGICAL HISTORY      CORONARY STENTSTwo coronary artery stents in 2010     Family History   Problem Relation Age of Onset     No Known Problems Mother      No Known Problems Father      No Known Problems Sister      No Known Problems Brother      No Known Problems Daughter      No Known Problems Son      No Known Problems Brother      No Known Problems Son      No Known Problems Son       Social History     Tobacco Use     Smoking status: Never     Smokeless tobacco: Never   Substance Use Topics     Alcohol use: Yes     Comment: Alcoholic Drinks/day: \"Once a month, maybe.\"     Drug use: No     Allergies   Allergen Reactions     Atorvastatin Muscle Pain (Myalgia)       Relevant past medical, surgical, family and social history as documented above, has been reviewed and discussed with patient. No changes or additions, unless otherwise noted in the HPI.    Current Medications     acetaminophen (TYLENOL) 325 MG tablet  albuterol (PROAIR HFA;PROVENTIL HFA;VENTOLIN HFA) 90 mcg/actuation inhaler  aspirin (ASA) 81 MG chewable tablet  coenzyme Q-10 capsule  fluticasone propionate (FLONASE) 50 mcg/actuation nasal spray  hydrochlorothiazide (MICROZIDE) 12.5 MG capsule  lisinopril (ZESTRIL) 5 MG tablet  MAGNESIUM PO  metFORMIN (GLUCOPHAGE XR) 500 MG 24 hr tablet  metoprolol succinate ER (TOPROL XL) 50 MG 24 hr " tablet  montelukast (SINGULAIR) 10 MG tablet  nitroGLYcerin (NITROSTAT) 0.4 MG sublingual tablet  rosuvastatin (CRESTOR) 20 MG tablet        Review of Systems       Cardiovascular: Positive for chest tightness.       Remainder of systems reviewed, unless noted in HPI all others negative.    Physical Exam     /68   Pulse (!) 48   Temp 98.1  F (36.7  C) (Oral)   Resp 12   Wt 100.7 kg (222 lb)   SpO2 96%   BMI 33.02 kg/m    Constitutional: Awake, alert, in no acute distress.      Head: Normocephalic, atraumatic.      ENT: Mucous membranes moist.      Eyes: PERRL, EOMI, Conjunctiva normal.      Respiratory: Respirations even, unlabored. Lungs clear to ascultation bilaterally, in no acute respiratory distress.      Cardiovascular: Regular rate and rhythm. +2 radial pulses, equal bilaterally. No pitting edema.      GI: Abdomen soft, non-tender. No guarding or rebound.     Musculoskeletal: Moves all 4 extremities equally, strength symmetrical on bilateral uppers and lowers.      Integument: Warm, dry.  No diaphoresis  Neurologic: Alert & oriented x 3. Normal speech. Grossly normal motor and sensory function. No focal deficits noted.      Psychiatric: Normal mood and affect. Normal judgement.        Labs & Imaging     Results for orders placed or performed during the hospital encounter of 01/18/23   Chest XR,  PA & LAT    Impression    IMPRESSION: Poststernotomy. Normal heart size and pulmonary vascularity. Clear lungs. No pleural fluid or pneumothorax. Cholecystectomy clips in right upper quadrant.   Extra Blue Top Tube   Result Value Ref Range    Hold Specimen JIC    Extra Red Top Tube   Result Value Ref Range    Hold Specimen JIC    Extra Green Top (Lithium Heparin) Tube   Result Value Ref Range    Hold Specimen JIC    Extra Purple Top Tube   Result Value Ref Range    Hold Specimen JIC    CBC (+ platelets, no diff)   Result Value Ref Range    WBC Count 3.9 (L) 4.0 - 11.0 10e3/uL    RBC Count 4.63 4.40 - 5.90  10e6/uL    Hemoglobin 13.6 13.3 - 17.7 g/dL    Hematocrit 40.0 40.0 - 53.0 %    MCV 86 78 - 100 fL    MCH 29.4 26.5 - 33.0 pg    MCHC 34.0 31.5 - 36.5 g/dL    RDW 12.7 10.0 - 15.0 %    Platelet Count 141 (L) 150 - 450 10e3/uL   Basic metabolic panel   Result Value Ref Range    Sodium 137 136 - 145 mmol/L    Potassium 3.7 3.4 - 5.3 mmol/L    Chloride 104 98 - 107 mmol/L    Carbon Dioxide (CO2) 26 22 - 29 mmol/L    Anion Gap 7 7 - 15 mmol/L    Urea Nitrogen 17.2 8.0 - 23.0 mg/dL    Creatinine 0.72 0.67 - 1.17 mg/dL    Calcium 8.9 8.8 - 10.2 mg/dL    Glucose 123 (H) 70 - 99 mg/dL    GFR Estimate >90 >60 mL/min/1.73m2   Troponin T, High Sensitivity (now)   Result Value Ref Range    Troponin T, High Sensitivity 10 <=22 ng/L   Asymptomatic COVID-19 Virus (Coronavirus) by PCR Nasopharyngeal    Specimen: Nasopharyngeal; Swab   Result Value Ref Range    SARS CoV2 PCR Negative Negative       EKG     Sinus bradycardia, rate 53.  .  .  QTc 433.  No STEMI.     Olegario Juarez MD  01/18/23 8933

## 2023-01-18 NOTE — H&P
[facility]    History and Physical - Hospitalist Service       Date of Admission:  1/18/2023    Assessment & Plan      Mir Treviño Sr. is a 63 year old male admitted on 1/18/2023. He presents from cardiology clinic with chest pain.     #Unstable Angina   Since 3 weeks ago, at outpatient stress test today noted to have Resting images demonstrate a large area of diminished perfusion involving the mid to distal anteroseptal, anterior, apical and distal inferolateral wall.  Stress images were not performed due to report of chest pain and EKG changes compared to prior ECG.  -Admit to tele   -EKG w/o and ST elevations, has some T wave flattening   -Troponin negative x 1, f/u on next one   -No active chest pain, has PRN NTG. No need for heparin ggt.   -Continue meds as below and cardiology consulted: NPO after MN for cath tomorrow     #CAD s/p CABG in 2022  #HTN  -Continue home ASA, Metoprolol and Lisinopril   -Increased Crestor to 40 mg     #Thrombocytopenia  Chronic, marlyn 111 in 4/2022. Unclear if ever worked up but has been trending up since last year.     T2DM  -Hold home metformin, HbA1c 6.9 on 1/16  -FS ACHS and sliding scale insulin   -Will be NPO after MN        Diet: NPO per Anesthesia Guidelines for Procedure/Surgery Except for: Meds    DVT Prophylaxis: Heparin SQ  Connor Catheter: Not present  Lines: None     Cardiac Monitoring: ACTIVE order. Indication: Chest pain/ ACS rule out (24 hours)  Code Status: Full Code      Clinically Significant Risk Factors Present on Admission                  # Hypertension: home medication list includes antihypertensive(s)     # DMII: A1C = 6.9 % (Ref range: 0.0 - 5.6 %) within past 3 months            Disposition Plan      Expected Discharge Date: 01/20/2023                  Sarah Simms MD  Hospitalist Service  [facility]  Securely message with [employer] (more info)  Text page via [employer] Paging/[employer]      ______________________________________________________________________    Chief Complaint   Chest pain     History is obtained from the patient and wife     History of Present Illness   Mir Treviño Sr. is a 63 year old male who has a PMH of DM2, HTN, CAD s/p CABG in 5/2022 who started experiencing chest discomfort 3 weeks ago after having to carry his 45lbs grandchild in the show. He developed sweats, chest pressure however was reluctant to go to the ER. Since then he has been feeling fatigued and sleeping lots. He continues to have a mild chest pressure and MONACO. He denies any chest pain or SOB currently. He went to get an outpatient stress test today however given his symptoms he was sent straight to the ER to get a cath.     In the ER, VSS and patient in no active chest pain. Continues to have discomfort. He has been adherent to all his josiah medications.     Past Medical History    Past Medical History:   Diagnosis Date     Coronary atherosclerosis     Created by [employer] [facility] Annotation: Jul 16 2010  5:35PM - Nik Boyd: MI 7/10  Replacement Utility updated for latest IMO load     Diabetes (H)      Essential hypertension     Created by Conversion  Replacement Utility updated for latest IMO load     Pure hypercholesterolemia     Created by Conversion      Uncomplicated asthma        Past Surgical History   Past Surgical History:   Procedure Laterality Date     BYPASS GRAFT ARTERY CORONARY N/A 5/10/2022    Procedure: CORONARY ARTERY BYPASS GRAFT X3 ,  LEFT LEG ENDOSCOPIC VESSEL PROCUREMENT, LEFT INTERNAL MAMMARY ARTERY HARVEST, ANESTHESIA TRANSESOPHAGEAL ECHOCARDIOGRAM. EPIAORTIC ULTRASOUND.;  Surgeon: Nik Marcus MD;  Location: [facility] Main OR     CV CORONARY ANGIOGRAM N/A 4/8/2022    Procedure: Coronary Angiogram;  Surgeon: Ty Grace MD;  Location: [facility] CATH LAB CV     CV LEFT HEART CATH N/A 4/8/2022    Procedure: Left Heart Catheterization;  Surgeon: Ty Grace MD;   Location: [facility] CATH LAB CV     LAPAROSCOPIC CHOLECYSTECTOMY N/A 6/7/2022    Procedure: CHOLECYSTECTOMY, LAPAROSCOPIC;  Surgeon: Franck Flowers MD;  Location: [facility] Main OR     OTHER SURGICAL HISTORY      CORONARY STENTSTwo coronary artery stents in 2010       Prior to Admission Medications   Prior to Admission Medications   Prescriptions Last Dose Informant Patient Reported? Taking?   MAGNESIUM PO 1/18/2023 at AM  Yes Yes   Sig: Take 1 tablet by mouth daily Magnesium citrate 210 mg   acetaminophen (TYLENOL) 325 MG tablet Unknown  No Yes   Sig: Take 2 tablets (650 mg) by mouth every 4 hours as needed for other, headaches or fever (For optimal non-opioid multimodal pain management to improve pain control.)   albuterol (PROAIR HFA;PROVENTIL HFA;VENTOLIN HFA) 90 mcg/actuation inhaler Unknown  No Yes   Sig: [ALBUTEROL (PROAIR HFA;PROVENTIL HFA;VENTOLIN HFA) 90 MCG/ACTUATION INHALER] Inhale 2 puffs every 6 (six) hours as needed for wheezing.   aspirin (ASA) 81 MG chewable tablet 1/18/2023 at AM  Yes Yes   Sig: Take 2 tablets (162 mg) by mouth daily   coenzyme Q-10 capsule 1/18/2023 at AM  No Yes   Sig: Take 2 capsules (200 mg) by mouth daily   fluticasone propionate (FLONASE) 50 mcg/actuation nasal spray Unknown  Yes Yes   Sig: Spray 1 spray in nostril daily as needed (seasonal)   hydrochlorothiazide (MICROZIDE) 12.5 MG capsule 1/18/2023 at AM  No Yes   Sig: Take 1 capsule (12.5 mg) by mouth daily   lisinopril (ZESTRIL) 5 MG tablet 1/18/2023 at AM  No Yes   Sig: Take 1 tablet (5 mg) by mouth daily   metFORMIN (GLUCOPHAGE XR) 500 MG 24 hr tablet 1/18/2023 at AM  No Yes   Sig: Take 2 tablets (1,000 mg) by mouth 2 times daily (with meals)   metoprolol succinate ER (TOPROL XL) 50 MG 24 hr tablet 1/18/2023 at AM  No Yes   Sig: Take 1 tablet (50 mg) by mouth 2 times daily   montelukast (SINGULAIR) 10 MG tablet 1/17/2023 at PM  No Yes   Sig: Take 1 tablet (10 mg) by mouth daily [MONTELUKAST (SINGULAIR) 10 MG TABLET]  TAKE 1 TABLET BY MOUTH EVERY NIGHT AT BEDTIME   nitroGLYcerin (NITROSTAT) 0.4 MG sublingual tablet Unknown  No Yes   Sig: For chest pain place 1 tablet under the tongue every 5 minutes for 3 doses. If symptoms persist 5 minutes after 1st dose call 911.   rosuvastatin (CRESTOR) 20 MG tablet 1/17/2023 at PM  No Yes   Sig: Take 1 tablet (20 mg) by mouth daily      Facility-Administered Medications: None        Family History   I have reviewed this patient's family history and updated it with pertinent information if needed.  Family History   Problem Relation Age of Onset     No Known Problems Mother      No Known Problems Father      No Known Problems Sister      No Known Problems Brother      No Known Problems Daughter      No Known Problems Son      No Known Problems Brother      No Known Problems Son      No Known Problems Son        Allergies   Allergies   Allergen Reactions     Atorvastatin Muscle Pain (Myalgia)        Physical Exam   Vital Signs: Temp: 98.1  F (36.7  C) Temp src: Oral BP: 127/68 Pulse: (!) 48   Resp: 12 SpO2: 96 % O2 Device: None (Room air)    Weight: 222 lbs 0 oz  Physical Exam  Constitutional:       General: He is not in acute distress.     Appearance: Normal appearance. He is obese. He is not toxic-appearing.   HENT:      Head: Normocephalic and atraumatic.   Cardiovascular:      Rate and Rhythm: Regular rhythm. Bradycardia present.      Heart sounds: No murmur heard.  Pulmonary:      Effort: Pulmonary effort is normal. No respiratory distress.      Breath sounds: Normal breath sounds. No wheezing.   Abdominal:      Palpations: Abdomen is soft.   Musculoskeletal:         General: No swelling. Normal range of motion.   Neurological:      General: No focal deficit present.      Mental Status: He is alert and oriented to person, place, and time.       Medical Decision Making       60 MINUTES SPENT BY ME on the date of service doing chart review, history, exam, documentation & further activities  per the note.      Data     I have personally reviewed the following data over the past 24 hrs:    3.9 (L)  \   13.6   / 141 (L)     137 104 17.2 /  123 (H)   3.7 26 0.72 \       Trop: 10 BNP: N/A       Imaging results reviewed over the past 24 hrs:   Recent Results (from the past 24 hour(s))   NM MPI Single Rest Only   Result Value    Target     Narrative       Nuclear stress test is abnormal.  Resting images demonstrate a large   area of diminished perfusion involving the mid to distal anteroseptal,   anterior, apical and distal inferolateral wall.  Stress images were not   performed due to report of chest pain and EKG changes compared to prior   ECG.  Patient taken to the ED for evaluation and likely admission for   coronary angiography.     There is no prior study for comparison.     Chest XR,  PA & LAT    Narrative    EXAM: XR CHEST 2 VIEWS  LOCATION: [facility]  DATE/TIME: 1/18/2023 3:49 PM    INDICATION: chest discomfort x2 weeks, failed stress test today  COMPARISON: 01/16/2023.      Impression    IMPRESSION: Poststernotomy. Normal heart size and pulmonary vascularity. Clear lungs. No pleural fluid or pneumothorax. Cholecystectomy clips in right upper quadrant.

## 2023-01-18 NOTE — ED TRIAGE NOTES
Patient arrives from cardiology clinic where he was scheduled for a stress test.  Patient states he had chest pressure and that he has not been feeling right since 12/28.  EKG was obtained by cardiology and noted some changes.  Cardiology states patient is scheduled to go to cath lab tomorrow.

## 2023-01-19 LAB
ABO/RH(D): NORMAL
ACT BLD: 279 SECONDS (ref 74–150)
ANION GAP SERPL CALCULATED.3IONS-SCNC: 9 MMOL/L (ref 7–15)
ANTIBODY SCREEN: NEGATIVE
BUN SERPL-MCNC: 16.6 MG/DL (ref 8–23)
CALCIUM SERPL-MCNC: 8.9 MG/DL (ref 8.8–10.2)
CHLORIDE SERPL-SCNC: 106 MMOL/L (ref 98–107)
CHOLEST SERPL-MCNC: 107 MG/DL
CREAT SERPL-MCNC: 0.72 MG/DL (ref 0.67–1.17)
DEPRECATED HCO3 PLAS-SCNC: 24 MMOL/L (ref 22–29)
GFR SERPL CREATININE-BSD FRML MDRD: >90 ML/MIN/1.73M2
GLUCOSE BLDC GLUCOMTR-MCNC: 125 MG/DL (ref 70–99)
GLUCOSE BLDC GLUCOMTR-MCNC: 142 MG/DL (ref 70–99)
GLUCOSE BLDC GLUCOMTR-MCNC: 195 MG/DL (ref 70–99)
GLUCOSE SERPL-MCNC: 127 MG/DL (ref 70–99)
HDLC SERPL-MCNC: 36 MG/DL
HOLD SPECIMEN: NORMAL
INR PPP: 1.17 (ref 0.85–1.15)
LDLC SERPL CALC-MCNC: 44 MG/DL
NONHDLC SERPL-MCNC: 71 MG/DL
POTASSIUM SERPL-SCNC: 4.3 MMOL/L (ref 3.4–5.3)
SODIUM SERPL-SCNC: 139 MMOL/L (ref 136–145)
SPECIMEN EXPIRATION DATE: NORMAL
TRIGL SERPL-MCNC: 137 MG/DL

## 2023-01-19 PROCEDURE — 96372 THER/PROPH/DIAG INJ SC/IM: CPT | Performed by: STUDENT IN AN ORGANIZED HEALTH CARE EDUCATION/TRAINING PROGRAM

## 2023-01-19 PROCEDURE — 250N000013 HC RX MED GY IP 250 OP 250 PS 637: Performed by: INTERNAL MEDICINE

## 2023-01-19 PROCEDURE — 250N000013 HC RX MED GY IP 250 OP 250 PS 637: Performed by: NURSE PRACTITIONER

## 2023-01-19 PROCEDURE — 250N000013 HC RX MED GY IP 250 OP 250 PS 637: Performed by: STUDENT IN AN ORGANIZED HEALTH CARE EDUCATION/TRAINING PROGRAM

## 2023-01-19 PROCEDURE — C1760 CLOSURE DEV, VASC: HCPCS | Performed by: INTERNAL MEDICINE

## 2023-01-19 PROCEDURE — 99152 MOD SED SAME PHYS/QHP 5/>YRS: CPT | Performed by: INTERNAL MEDICINE

## 2023-01-19 PROCEDURE — 250N000011 HC RX IP 250 OP 636: Performed by: STUDENT IN AN ORGANIZED HEALTH CARE EDUCATION/TRAINING PROGRAM

## 2023-01-19 PROCEDURE — 93459 L HRT ART/GRFT ANGIO: CPT | Performed by: INTERNAL MEDICINE

## 2023-01-19 PROCEDURE — 93459 L HRT ART/GRFT ANGIO: CPT | Mod: 26 | Performed by: INTERNAL MEDICINE

## 2023-01-19 PROCEDURE — C1769 GUIDE WIRE: HCPCS | Performed by: INTERNAL MEDICINE

## 2023-01-19 PROCEDURE — 255N000002 HC RX 255 OP 636: Performed by: INTERNAL MEDICINE

## 2023-01-19 PROCEDURE — G0378 HOSPITAL OBSERVATION PER HR: HCPCS

## 2023-01-19 PROCEDURE — 80048 BASIC METABOLIC PNL TOTAL CA: CPT | Performed by: STUDENT IN AN ORGANIZED HEALTH CARE EDUCATION/TRAINING PROGRAM

## 2023-01-19 PROCEDURE — 99153 MOD SED SAME PHYS/QHP EA: CPT | Performed by: INTERNAL MEDICINE

## 2023-01-19 PROCEDURE — 99231 SBSQ HOSP IP/OBS SF/LOW 25: CPT | Performed by: INTERNAL MEDICINE

## 2023-01-19 PROCEDURE — 272N000001 HC OR GENERAL SUPPLY STERILE: Performed by: INTERNAL MEDICINE

## 2023-01-19 PROCEDURE — C1887 CATHETER, GUIDING: HCPCS | Performed by: INTERNAL MEDICINE

## 2023-01-19 PROCEDURE — 258N000003 HC RX IP 258 OP 636: Performed by: INTERNAL MEDICINE

## 2023-01-19 PROCEDURE — 99222 1ST HOSP IP/OBS MODERATE 55: CPT | Performed by: INTERNAL MEDICINE

## 2023-01-19 PROCEDURE — 86901 BLOOD TYPING SEROLOGIC RH(D): CPT | Performed by: NURSE PRACTITIONER

## 2023-01-19 PROCEDURE — 36415 COLL VENOUS BLD VENIPUNCTURE: CPT | Performed by: STUDENT IN AN ORGANIZED HEALTH CARE EDUCATION/TRAINING PROGRAM

## 2023-01-19 PROCEDURE — C1894 INTRO/SHEATH, NON-LASER: HCPCS | Performed by: INTERNAL MEDICINE

## 2023-01-19 PROCEDURE — 93571 IV DOP VEL&/PRESS C FLO 1ST: CPT | Mod: 26 | Performed by: INTERNAL MEDICINE

## 2023-01-19 PROCEDURE — 93799 UNLISTED CV SVC/PROCEDURE: CPT | Performed by: INTERNAL MEDICINE

## 2023-01-19 PROCEDURE — 85610 PROTHROMBIN TIME: CPT | Performed by: STUDENT IN AN ORGANIZED HEALTH CARE EDUCATION/TRAINING PROGRAM

## 2023-01-19 PROCEDURE — 85347 COAGULATION TIME ACTIVATED: CPT

## 2023-01-19 PROCEDURE — 82962 GLUCOSE BLOOD TEST: CPT

## 2023-01-19 PROCEDURE — 250N000009 HC RX 250: Performed by: INTERNAL MEDICINE

## 2023-01-19 PROCEDURE — 250N000011 HC RX IP 250 OP 636: Performed by: INTERNAL MEDICINE

## 2023-01-19 PROCEDURE — 80061 LIPID PANEL: CPT | Performed by: STUDENT IN AN ORGANIZED HEALTH CARE EDUCATION/TRAINING PROGRAM

## 2023-01-19 DEVICE — CLOSURE ANGIOSEAL 6FR 610130: Type: IMPLANTABLE DEVICE | Site: GROIN | Status: FUNCTIONAL

## 2023-01-19 RX ORDER — FENTANYL CITRATE 50 UG/ML
25 INJECTION, SOLUTION INTRAMUSCULAR; INTRAVENOUS
Status: DISCONTINUED | OUTPATIENT
Start: 2023-01-19 | End: 2023-01-19 | Stop reason: HOSPADM

## 2023-01-19 RX ORDER — ASPIRIN 325 MG
325 TABLET ORAL ONCE
Status: DISCONTINUED | OUTPATIENT
Start: 2023-01-19 | End: 2023-01-19 | Stop reason: HOSPADM

## 2023-01-19 RX ORDER — NITROGLYCERIN 0.4 MG/1
TABLET SUBLINGUAL
Qty: 25 TABLET | Refills: 3 | Status: SHIPPED | OUTPATIENT
Start: 2023-01-19

## 2023-01-19 RX ORDER — FENTANYL CITRATE 50 UG/ML
25 INJECTION, SOLUTION INTRAMUSCULAR; INTRAVENOUS
Status: DISCONTINUED | OUTPATIENT
Start: 2023-01-19 | End: 2023-01-19

## 2023-01-19 RX ORDER — FLUMAZENIL 0.1 MG/ML
0.2 INJECTION, SOLUTION INTRAVENOUS
Status: DISCONTINUED | OUTPATIENT
Start: 2023-01-19 | End: 2023-01-19

## 2023-01-19 RX ORDER — ATROPINE SULFATE 0.1 MG/ML
0.5 INJECTION INTRAVENOUS
Status: ACTIVE | OUTPATIENT
Start: 2023-01-19 | End: 2023-01-19

## 2023-01-19 RX ORDER — ASPIRIN 81 MG/1
243 TABLET, CHEWABLE ORAL ONCE
Status: DISCONTINUED | OUTPATIENT
Start: 2023-01-19 | End: 2023-01-19 | Stop reason: HOSPADM

## 2023-01-19 RX ORDER — HEPARIN SODIUM 1000 [USP'U]/ML
INJECTION, SOLUTION INTRAVENOUS; SUBCUTANEOUS
Status: DISCONTINUED | OUTPATIENT
Start: 2023-01-19 | End: 2023-01-19 | Stop reason: HOSPADM

## 2023-01-19 RX ORDER — IODIXANOL 320 MG/ML
INJECTION, SOLUTION INTRAVASCULAR
Status: DISCONTINUED | OUTPATIENT
Start: 2023-01-19 | End: 2023-01-19 | Stop reason: HOSPADM

## 2023-01-19 RX ORDER — NITROGLYCERIN 5 MG/ML
VIAL (ML) INTRAVENOUS
Status: DISCONTINUED | OUTPATIENT
Start: 2023-01-19 | End: 2023-01-19 | Stop reason: HOSPADM

## 2023-01-19 RX ORDER — NALOXONE HYDROCHLORIDE 0.4 MG/ML
0.4 INJECTION, SOLUTION INTRAMUSCULAR; INTRAVENOUS; SUBCUTANEOUS
Status: ACTIVE | OUTPATIENT
Start: 2023-01-19 | End: 2023-01-19

## 2023-01-19 RX ORDER — ISOSORBIDE MONONITRATE 30 MG/1
30 TABLET, EXTENDED RELEASE ORAL DAILY
Status: DISCONTINUED | OUTPATIENT
Start: 2023-01-19 | End: 2023-01-20 | Stop reason: HOSPADM

## 2023-01-19 RX ORDER — OXYCODONE HYDROCHLORIDE 5 MG/1
10 TABLET ORAL EVERY 4 HOURS PRN
Status: DISCONTINUED | OUTPATIENT
Start: 2023-01-19 | End: 2023-01-20 | Stop reason: HOSPADM

## 2023-01-19 RX ORDER — SODIUM CHLORIDE 9 MG/ML
75 INJECTION, SOLUTION INTRAVENOUS CONTINUOUS
Status: ACTIVE | OUTPATIENT
Start: 2023-01-19 | End: 2023-01-19

## 2023-01-19 RX ORDER — DIAZEPAM 5 MG
10 TABLET ORAL
Status: COMPLETED | OUTPATIENT
Start: 2023-01-19 | End: 2023-01-19

## 2023-01-19 RX ORDER — FENTANYL CITRATE 50 UG/ML
INJECTION, SOLUTION INTRAMUSCULAR; INTRAVENOUS
Status: DISCONTINUED | OUTPATIENT
Start: 2023-01-19 | End: 2023-01-19 | Stop reason: HOSPADM

## 2023-01-19 RX ORDER — NALOXONE HYDROCHLORIDE 0.4 MG/ML
0.2 INJECTION, SOLUTION INTRAMUSCULAR; INTRAVENOUS; SUBCUTANEOUS
Status: ACTIVE | OUTPATIENT
Start: 2023-01-19 | End: 2023-01-19

## 2023-01-19 RX ORDER — OXYCODONE HYDROCHLORIDE 5 MG/1
5 TABLET ORAL EVERY 4 HOURS PRN
Status: DISCONTINUED | OUTPATIENT
Start: 2023-01-19 | End: 2023-01-20 | Stop reason: HOSPADM

## 2023-01-19 RX ORDER — LIDOCAINE 40 MG/G
CREAM TOPICAL
Status: DISCONTINUED | OUTPATIENT
Start: 2023-01-19 | End: 2023-01-19 | Stop reason: HOSPADM

## 2023-01-19 RX ORDER — SODIUM CHLORIDE 9 MG/ML
INJECTION, SOLUTION INTRAVENOUS CONTINUOUS
Status: DISCONTINUED | OUTPATIENT
Start: 2023-01-19 | End: 2023-01-19 | Stop reason: HOSPADM

## 2023-01-19 RX ORDER — ASPIRIN 81 MG/1
81 TABLET ORAL DAILY
Status: DISCONTINUED | OUTPATIENT
Start: 2023-01-20 | End: 2023-01-20 | Stop reason: HOSPADM

## 2023-01-19 RX ORDER — ACETAMINOPHEN 325 MG/1
650 TABLET ORAL EVERY 4 HOURS PRN
Status: DISCONTINUED | OUTPATIENT
Start: 2023-01-19 | End: 2023-01-20 | Stop reason: HOSPADM

## 2023-01-19 RX ORDER — HYDRALAZINE HYDROCHLORIDE 20 MG/ML
10 INJECTION INTRAMUSCULAR; INTRAVENOUS
Status: DISCONTINUED | OUTPATIENT
Start: 2023-01-19 | End: 2023-01-20 | Stop reason: HOSPADM

## 2023-01-19 RX ADMIN — METOPROLOL SUCCINATE 50 MG: 50 TABLET, EXTENDED RELEASE ORAL at 10:21

## 2023-01-19 RX ADMIN — ASPIRIN 325 MG: 325 TABLET, COATED ORAL at 11:16

## 2023-01-19 RX ADMIN — HEPARIN SODIUM 5000 UNITS: 10000 INJECTION, SOLUTION INTRAVENOUS; SUBCUTANEOUS at 07:02

## 2023-01-19 RX ADMIN — HEPARIN SODIUM 5000 UNITS: 10000 INJECTION, SOLUTION INTRAVENOUS; SUBCUTANEOUS at 21:17

## 2023-01-19 RX ADMIN — LISINOPRIL 5 MG: 5 TABLET ORAL at 10:22

## 2023-01-19 RX ADMIN — ROSUVASTATIN CALCIUM 40 MG: 40 TABLET, COATED ORAL at 10:22

## 2023-01-19 RX ADMIN — MONTELUKAST 10 MG: 10 TABLET, FILM COATED ORAL at 10:22

## 2023-01-19 RX ADMIN — ISOSORBIDE MONONITRATE 30 MG: 30 TABLET, EXTENDED RELEASE ORAL at 17:04

## 2023-01-19 RX ADMIN — SODIUM CHLORIDE 75 ML/HR: 9 INJECTION, SOLUTION INTRAVENOUS at 17:04

## 2023-01-19 RX ADMIN — DIAZEPAM 10 MG: 5 TABLET ORAL at 14:07

## 2023-01-19 ASSESSMENT — ACTIVITIES OF DAILY LIVING (ADL)
ADLS_ACUITY_SCORE: 22
ADLS_ACUITY_SCORE: 37
ADLS_ACUITY_SCORE: 20
ADLS_ACUITY_SCORE: 37
ADLS_ACUITY_SCORE: 22
CHANGE_IN_FUNCTIONAL_STATUS_SINCE_ONSET_OF_CURRENT_ILLNESS/INJURY: NO
CONCENTRATING,_REMEMBERING_OR_MAKING_DECISIONS_DIFFICULTY: NO
ADLS_ACUITY_SCORE: 37
WALKING_OR_CLIMBING_STAIRS_DIFFICULTY: NO
TOILETING_ISSUES: NO
DIFFICULTY_EATING/SWALLOWING: NO
DRESSING/BATHING_DIFFICULTY: NO
FALL_HISTORY_WITHIN_LAST_SIX_MONTHS: NO
DOING_ERRANDS_INDEPENDENTLY_DIFFICULTY: NO
ADLS_ACUITY_SCORE: 37
ADLS_ACUITY_SCORE: 37
EQUIPMENT_CURRENTLY_USED_AT_HOME: GLUCOMETER
ADLS_ACUITY_SCORE: 37
ADLS_ACUITY_SCORE: 22

## 2023-01-19 ASSESSMENT — EJECTION FRACTION: EF_VALUE: .22

## 2023-01-19 NOTE — PROGRESS NOTES
[facility]    Medicine Progress Note - Hospitalist Service    Date of Admission:  1/18/2023    Assessment & Plan      Mir Treviño Sr. is a 63 year old male admitted on 1/18/2023. He presents from cardiology clinic with chest pain.     Chest Pain  -Since 3 weeks ago, at outpatient stress test today noted to have Resting images demonstrate a large area of diminished perfusion involving the mid to distal anteroseptal, anterior, apical and distal inferolateral wall.  Stress images were not performed due to report of chest pain and EKG changes compared to prior ECG.  -Patient states he has had persistent chest pain since picking up his 45 pound grandchild and then walking inside with them, did have associated diaphoresis  -Continuous cardiac monitoring  -On admission EKG w/o and ST elevations, has some T wave flattening   -Troponins negative x2  -Cardiac cath 1/19 without intervention    CAD s/p CABG in 2022  HTN  -Continue home ASA, Metoprolol and Lisinopril   -Increased Crestor to 40 mg     Asymptomatic bradycardia worsens with sleep  -We will place parameters on metoprolol  -Somewhat concerning for sleep apnea  -Consider sleep study at discharge    Thrombocytopenia  Chronic, marlyn 111 in 4/2022. Unclear if ever worked up but has been trending up since last year.     T2DM  -Hold home metformin, HbA1c 6.9 on 1/16  -FS ACHS and sliding scale insulin   -Will be NPO after MN     Cirrhosis with gastrosplenic varices  -Noted during cholecystectomy 6/6/2022, on abdominal ultrasound 6/6/2022, and abdominal CT 7/3/22       Diet: Low Saturated Fat Na <2400 mg    DVT Prophylaxis: Heparin SQ  Connor Catheter: Not present  Lines: None     Cardiac Monitoring: ACTIVE order. Indication: Post- PCI/Angiogram (24 hours)  Code Status: Full Code      Clinically Significant Risk Factors                       # DMII: A1C = 6.9 % (Ref range: 0.0 - 5.6 %) within past 3 months, PRESENT ON ADMISSION           Disposition Plan      Expected Discharge Date: 01/20/2023      Destination: home with family            Ne Levy MD  Hospitalist Service  [facility]  Securely message with [employer] (more info)  Text page via [employer] Paging/Directory   ______________________________________________________________________    Interval History   He had his cath today that did not require intervention. He is relieved by this. He is not having complaints. Likely discharge home tomorrow.    Physical Exam   Vital Signs: Temp: 97.6  F (36.4  C) Temp src: Oral BP: (!) 127/90 Pulse: 58   Resp: 16 SpO2: 98 % O2 Device: None (Room air) Oxygen Delivery: 2 LPM  Weight: 222 lbs 0 oz    General Appearance: Awake, alert, in no acute distress  Respiratory: CTAB, no wheeze  Cardiovascular: RRR, no murmur noted  GI: soft, nontender, non distended, normal bowel sounds  Skin: no jaundice, no rash      Medical Decision Making       30 MINUTES SPENT BY ME on the date of service doing chart review, history, exam, documentation & further activities per the note.      Data     I have personally reviewed the following data over the past 24 hrs:    N/A  \   N/A   / N/A     139 106 16.6 /  195 (H)   4.3 24 0.72 \       INR:  1.17 (H) PTT:  N/A   D-dimer:  N/A Fibrinogen:  N/A       Imaging results reviewed over the past 24 hrs:   Recent Results (from the past 24 hour(s))   Cardiac Catheterization    Narrative      Exertional/persistent chest pain and dyspnea in a diabetic man with 3   vessel CABG in May 2022.    Grossly normal left main.    Severe proximal LAD with a mid LAD occlusion and severe distal disease.   Patent LIMA to the apical LAD which backfills the distal LAD. Patent SVG   to the first diagonal which backfills the mid LAD.    The circumflex feeds a large OM-2 and moderate OM-3. The SVG to the   distal portion of OM-3 is widely patent.    The RCA had moderate ostial and distal narrowing, dFR was 1.0. The   lesions  improved with intracoronary nitroglycerin.    LV EDP moderately elevated. LV-Ao pressure gradient 5mmHg.

## 2023-01-19 NOTE — PLAN OF CARE
Problem: Plan of Care - These are the overarching goals to be used throughout the patient stay.    Goal: Absence of Hospital-Acquired Illness or Injury  Intervention: Identify and Manage Fall Risk  Recent Flowsheet Documentation  Taken 1/18/2023 1900 by Marilynn Carlson RN  Safety Promotion/Fall Prevention: activity supervised     Problem: Plan of Care - These are the overarching goals to be used throughout the patient stay.    Goal: Optimal Comfort and Wellbeing  Outcome: Progressing   Goal Outcome Evaluation:         Pt is progressing with these goals.  Denies any chest pain, continues to be sinus joaquina.  Continue to monitor status.

## 2023-01-19 NOTE — UTILIZATION REVIEW
"Admission Status; Secondary Review Determination     Under the authority of the Utilization Management Committee, the utilization review process indicated a secondary review on Mir Treviño Sr..  The review outcome is based on review of the medical records, discussions with staff, and applying clinical experience noted on the date of the review.     (x) Observation Status Appropriate - This patient does not meet hospital inpatient criteria and is placed in observation status. If this patient's primary payer is Medicare and was admitted as an inpatient, Condition Code 44 should be used and patient status changed to \"observation\".     RATIONALE FOR DETERMINATION   Mir Treviño Sr is a 63 yr old male who has been experiencing chest discomfort and sent to ED on 1/18/23 with abnormal resting images of anterior septal apical scar.  Troponin all normal.  No need for heparin or nitroglycerin infusion.  Angiogram performed today without intervention needed.      The severity of illness, intensity of service provided, expected LOS and risk for adverse outcome make the care appropriate for further observation; however, doesn't meet criteria for hospital inpatient admission. Dr Levy notified of this determination and agrees with downgrade of status.      The information on this document is developed by the utilization review team in order for the business office to ensure compliance.  This only denotes the appropriateness of proper admission status and does not reflect the quality of care rendered.         The definitions of Inpatient Status and Observation Status used in making the determination above are those provided in the CMS Coverage Manual, Chapter 1 and Chapter 6, section 70.4.      Sincerely,  Bisi Gonsales MD  Utilization Review  Physician Advisor  [employer]    "

## 2023-01-19 NOTE — ED NOTES
Bed: JNED-37  Expected date: 1/18/23  Expected time:   Means of arrival:   Comments:  Hold for room 3

## 2023-01-19 NOTE — PLAN OF CARE
Problem: Plan of Care - These are the overarching goals to be used throughout the patient stay.    Goal: Optimal Comfort and Wellbeing  Outcome: Progressing       Pt comfortable overnight. No pain reported, a/o 4x.   Tele in place, Sinus Everett HR ~45-60.  Independent makes needs known.

## 2023-01-19 NOTE — CONSULTS
CARDIOLOGY CONSULT NOTE         Assessment:   1.  Chest discomfort- somewhat atypical and that it is there all the time following the lifting up of his grandson, however does get worse with activity and some shortness of breath.  Could be acute closure of one of his grafts although could also be musculoskeletal from sternal wound healing.  Does have a relatively high ROGER score based on age, aspirin use, chest pain, coronary disease and ECG changes.  Given this, will pursue invasive angiography with possible intervention.  Patient understands and is willing to proceed.  2.  Abnormal cardiovascular stress test-rest images showed a medium sized area of anterior septal apical scar, based on MRI this was known but there was about 60% viability.  Stress imaging was not performed.  3.  ECG changes-as above.  4.  Coronary artery disease- angiography secondary to abnormal stress test April 2022 showed normal left main, LAD 90% proximal stenosis, distal stent in-stent restenosis, circumflex with mid 85% lesion in the right coronary artery unremarkable.  This prompted coronary artery bypass grafting.  5.  Coronary artery bypass grafting- May 10, 2022 patient had a LIMA to the LAD, vein graft to diagonal and vein graft to obtuse marginal artery.     Plan:   1.  Proceed with invasive angiography and possible intervention today.  2.  Can follow with Dr. Jermaine Schafer.     Current History:   [facility] has been requested by Dr. Ne Levy to evaluate Mir Johnsonabel Langston.  who is a 63 year old year old  male for chest pain.  Patient was in his usual state of health till around the end of December when he was lifting up his grandson and he has developed chest tightness and pressure.  It did not really go away following that and he tells me anytime he is physically active he has worsening chest discomfort.  He gets short of breath with this as well.  He mention this to his primary physician Dr. Maciel  Katherine who then ordered stress nuclear yesterday.  Baseline resting images showed a large anteroseptal defect, there was questionable ECG changes and he was sent urgently to the emergency department.  Troponins are negative, cardiology consultation is requested.  12/28 chewst pain tightness soco romano with incrased fatigue  Nuke stress    Past Medical History:     Past Medical History:   Diagnosis Date     Coronary atherosclerosis     Created by Conversion [facility] Annotation: Jul 16 2010  5:35PM - Nik Boyd: MI 7/10  Replacement Utility updated for latest IMO load     Diabetes (H)      Essential hypertension     Created by Conversion  Replacement Utility updated for latest IMO load     Pure hypercholesterolemia     Created by Conversion      Uncomplicated asthma      Past history is negative for cancer, tuberculosis, diabetes mellitus, myocardial infarction,  rheumatic fever, cerebrovascular accident, chronic kidney disease, peptic ulcer disease, chronic obstructive pulmonary disease, or thyroid disorder.    Past Surgical History:     Past Surgical History:   Procedure Laterality Date     BYPASS GRAFT ARTERY CORONARY N/A 5/10/2022    Procedure: CORONARY ARTERY BYPASS GRAFT X3 ,  LEFT LEG ENDOSCOPIC VESSEL PROCUREMENT, LEFT INTERNAL MAMMARY ARTERY HARVEST, ANESTHESIA TRANSESOPHAGEAL ECHOCARDIOGRAM. EPIAORTIC ULTRASOUND.;  Surgeon: Nik Marcus MD;  Location: [facility] OR     CV CORONARY ANGIOGRAM N/A 4/8/2022    Procedure: Coronary Angiogram;  Surgeon: Ty Grace MD;  Location: [facility] CATH LAB CV     CV LEFT HEART CATH N/A 4/8/2022    Procedure: Left Heart Catheterization;  Surgeon: Ty Grace MD;  Location: [facility] CATH LAB CV     LAPAROSCOPIC CHOLECYSTECTOMY N/A 6/7/2022    Procedure: CHOLECYSTECTOMY, LAPAROSCOPIC;  Surgeon: Franck Flowers MD;  Location: [facility] OR     OTHER SURGICAL HISTORY      CORONARY STENTSTwo coronary artery stents in 2010     Family History:    Reviewed, and negative for coronary artery disease.    Social History:   Reviewed, and he  reports that he has never smoked. He has never used smokeless tobacco. He reports current alcohol use. He reports that he does not use drugs.  He is retired, lives at home independently with his wife, and his primary physician is Dr. Raheem Murphy.    Meds:       aspirin  81 mg Oral Daily     heparin ANTICOAGULANT  5,000 Units Subcutaneous Q8H     insulin aspart  1-3 Units Subcutaneous TID AC     insulin aspart  1-3 Units Subcutaneous At Bedtime     lisinopril  5 mg Oral Daily     metoprolol succinate ER  50 mg Oral BID     montelukast  10 mg Oral Daily     rosuvastatin  40 mg Oral Daily     Allergies:   Atorvastatin    Review of Systems:   A 12 point comprehensive review of systems was  as follows:   General: negative for fatigue, weight loss or weight gain  Constitutional: negative for anorexia, chills, fevers, malaise, night sweats   Eyes: negative for cataracts, color blindness, contacts/glasses, glaucoma, icterus, irritation, redness and visual disturbance  Ears, nose, mouth, throat, and face: negative for ear drainage, earaches, epistaxis, facial trauma, hearing loss, hoarseness, nasal congestion, snoring, sore mouth, sore throat, tinnitus and voice change  Respiratory: negative for cough, dyspnea on exertion,  hemoptysis, sputum production, pleurisy, stridor, wheezing, PND, orthopnea  Cardiovascular: positive for chest pain, chest pressure/discomfort, negative claudication, dyspnea, exertional chest pressure/discomfort, fatigue, irregular heart beat, lower extremity edema, near-syncope,  palpitations,  syncope   Gastrointestinal: negative for abdominal pain, change in bowel haibits, constipation, diarrhea, dyspepsia, dysphagia, jaundice, melena, nausea, odynophagia, reflux symptoms and vomiting  Genitourinary: negative for decreased stream  Hematologic/lymphatic: negative for bleeding  Musculoskeletal: negative for  arthralgias, back pain, bone pain, muscle weakness, myalgias, neck pain and stiff joints  Neurological: negative for syncope, presyncope, coordination problems, dizziness, gait problems, headaches, memory problems, paresthesia, seizures, speech problems, tremors, vertigo and weakness    Objective:     Physical Exam:  BP (!) 151/81 (BP Location: Left arm)   Pulse (!) 45   Temp 98  F (36.7  C) (Oral)   Resp 16   Wt 100.7 kg (222 lb)   SpO2 98%   BMI 33.02 kg/m       Head:    Normocephalic, without obvious abnormality, atraumatic   Eyes:    PERRL, conjunctiva/corneas clear, EOM's intact,           Nose:   Nares normal, septum midline, mucosa normal, no drainage  or sinus tenderness   Throat:   Lips, mucosa, and tongue normal; teeth and gums normal   Neck:   Supple, symmetrical, trachea midline, no adenopathy;        thyroid:  No enlargement/tenderness/nodules; no carotid    bruit or JVD   Back:     Symmetric, no curvature, ROM normal, no CVA tenderness   Lungs:     Clear to auscultation bilaterally, respirations unlabored   Chest wall:    No tenderness, midline sternotomy scar   Heart:    Regular rate and rhythm, S1 and S2 normal, no murmur, rub   or gallop   Abdomen:     Soft, non-tender, bowel sounds active all four quadrants,     no masses, no organomegaly   Extremities:   Extremities normal, atraumatic, no cyanosis or edema   Pulses:   2+ and symmetric all extremities   Skin:   Skin color, texture, turgor normal, no rashes or lesions   Neurologic:   CNII-XII intact. Normal strength, sensation and reflexes       throughout     Cardiographics and Imaging  Radiology Results: Chest x-ray shows Poststernotomy. Normal heart size and pulmonary vascularity. Clear lungs. No pleural fluid or pneumothorax. Cholecystectomy clips in right upper quadrant.    EKG Results: personally reviewed sinus bradycardia, leftward axis, old anterior Q wave MI type pattern.    Lab Review   Pertinent Labs  Lab Results: personally  reviewed       No results found for: CKTOTAL, CKMB, TROPONINI    Lab Results   Component Value Date    BUN 16.6 01/19/2023     01/19/2023    CO2 24 01/19/2023       Lab Results   Component Value Date    WBC 3.9 (L) 01/18/2023    HGB 13.6 01/18/2023    HCT 40.0 01/18/2023    MCV 86 01/18/2023     (L) 01/18/2023       No results found for: BNP     Clinically Significant Risk Factors Present on Admission               # Coagulation Defect: INR = 1.17 (Ref range: 0.85 - 1.15) and/or PTT = N/A, will monitor for bleeding    # DMII: A1C = 6.9 % (Ref range: 0.0 - 5.6 %) within past 3 months

## 2023-01-19 NOTE — PLAN OF CARE
"  Problem: Plan of Care - These are the overarching goals to be used throughout the patient stay.    Goal: Plan of Care Review  Description: The Plan of Care Review/Shift note should be completed every shift.  The Outcome Evaluation is a brief statement about your assessment that the patient is improving, declining, or no change.  This information will be displayed automatically on your shift note.  Outcome: Progressing  Goal: Patient-Specific Goal (Individualized)  Description: You can add care plan individualizations to a care plan. Examples of Individualization might be:  \"Parent requests to be called daily at 9am for status\", \"I have a hard time hearing out of my right ear\", or \"Do not touch me to wake me up as it startles me\".  Outcome: Progressing  Goal: Absence of Hospital-Acquired Illness or Injury  Outcome: Progressing  Intervention: Identify and Manage Fall Risk  Recent Flowsheet Documentation  Taken 1/19/2023 1540 by Favio Dawson, RN  Safety Promotion/Fall Prevention: (bedrest) other (see comments)  Intervention: Prevent Skin Injury  Recent Flowsheet Documentation  Taken 1/19/2023 1540 by Favio Dawson, RN  Body Position: supine  Goal: Optimal Comfort and Wellbeing  Outcome: Progressing  Goal: Readiness for Transition of Care  Outcome: Progressing   Goal Outcome Evaluation:       Pt came post angiogram procedure. Pt is alert and oriented, denied SOB, chest pain and dizziness. Rt femoral approach done; no bleeding, hematoma noted, CMS intact. Kept pt supine/bedrest for 2 hours.    Vitals within normal limits except HR at 46-50's during sleep. Pt stated that it is his baseline while sleeping. Denied any symptoms.    Paged hospitalist reg HR; will put parameters with metoprolol tab.    Pt tolerated well with clear liquids, will advance pt diet on cardiac diet.    Pt ambulated to bathroom. Denied pain, dizziness and SOB. Right groin dry and intact, no bleeding and hematoma noted, no tingling sensation " noted.

## 2023-01-19 NOTE — CONSULTS
"Care Management Initial Consult    General Information  Assessment completed with: Mir Loja  Type of CM/SW Visit: Initial Assessment    Primary Care Provider verified and updated as needed: Yes   Readmission within the last 30 days: no previous admission in last 30 days      Reason for Consult: discharge planning  Advance Care Planning: Advance Care Planning Reviewed: no concerns identified          Communication Assessment  Patient's communication style: spoken language (English or Bilingual)                           Living Environment:   People in home: spouse     Current living Arrangements: house      Able to return to prior arrangements: yes       Family/Social Support:  Care provided by: self  Provides care for: no one  Marital Status:   Wife  Sofia       Description of Support System: Supportive, Involved    Support Assessment: Adequate family and caregiver support, Adequate social supports, Patient communicates needs well met    Current Resources:   Patient receiving home care services: No     Community Resources: None  Equipment currently used at home: glucometer  Supplies currently used at home: Diabetic Supplies, Hearing Aid Batteries, Other (\"1 hearing aid for left ear; glasses\")    Employment/Financial:  Employment Status: , previous service     Employment/ Comments: [facility] notification #: [id number].  Financial Concerns:     Referral to Financial Worker: No       Lifestyle & Psychosocial Needs:  Social Determinants of Health     Tobacco Use: Low Risk      Smoking Tobacco Use: Never     Smokeless Tobacco Use: Never     Passive Exposure: Not on file   Alcohol Use: Not on file   Financial Resource Strain: Not on file   Food Insecurity: Not on file   Transportation Needs: Not on file   Physical Activity: Not on file   Stress: Not on file   Social Connections: Not on file   Intimate Partner Violence: Not on file   Depression: Not at risk     PHQ-2 Score: 0   Housing " Stability: Not on file       Functional Status:  Prior to admission patient needed assistance:   Dependent ADLs:: Independent, Ambulation-no assistive device  Dependent IADLs:: Independent  Assesssment of Functional Status: At functional baseline    Mental Health Status:          Chemical Dependency Status:                Values/Beliefs:  Spiritual, Cultural Beliefs, Yazidism Practices, Values that affect care:                 Additional Information:  Mir lives in a house with his wife. He is independent with ADLs at baseline and drives.     Likely no discharge needs at this time and should be able to return home.     Wife to transport at discharge.     [facility] notification #: [id number].    Brittney Spaulding RN

## 2023-01-19 NOTE — PROGRESS NOTES
Transferred to [facility] for Coronary Angiogram. Pt states a good understanding of the procedure and agrees to proceed. Prepped and ready.

## 2023-01-20 ENCOUNTER — APPOINTMENT (OUTPATIENT)
Dept: CARDIOLOGY | Facility: HOSPITAL | Age: 63
End: 2023-01-20
Attending: INTERNAL MEDICINE
Payer: OTHER GOVERNMENT

## 2023-01-20 VITALS
TEMPERATURE: 98 F | RESPIRATION RATE: 20 BRPM | SYSTOLIC BLOOD PRESSURE: 131 MMHG | WEIGHT: 221.2 LBS | DIASTOLIC BLOOD PRESSURE: 74 MMHG | HEART RATE: 61 BPM | OXYGEN SATURATION: 97 % | BODY MASS INDEX: 32.9 KG/M2

## 2023-01-20 LAB
GLUCOSE BLDC GLUCOMTR-MCNC: 102 MG/DL (ref 70–99)
GLUCOSE BLDC GLUCOMTR-MCNC: 123 MG/DL (ref 70–99)
LVEF ECHO: NORMAL

## 2023-01-20 PROCEDURE — 250N000013 HC RX MED GY IP 250 OP 250 PS 637: Performed by: INTERNAL MEDICINE

## 2023-01-20 PROCEDURE — 96372 THER/PROPH/DIAG INJ SC/IM: CPT | Performed by: STUDENT IN AN ORGANIZED HEALTH CARE EDUCATION/TRAINING PROGRAM

## 2023-01-20 PROCEDURE — G0378 HOSPITAL OBSERVATION PER HR: HCPCS

## 2023-01-20 PROCEDURE — 93308 TTE F-UP OR LMTD: CPT | Mod: 26 | Performed by: INTERNAL MEDICINE

## 2023-01-20 PROCEDURE — 99232 SBSQ HOSP IP/OBS MODERATE 35: CPT | Performed by: INTERNAL MEDICINE

## 2023-01-20 PROCEDURE — 999N000208 ECHOCARDIOGRAM LIMITED

## 2023-01-20 PROCEDURE — 250N000013 HC RX MED GY IP 250 OP 250 PS 637: Performed by: STUDENT IN AN ORGANIZED HEALTH CARE EDUCATION/TRAINING PROGRAM

## 2023-01-20 PROCEDURE — 93325 DOPPLER ECHO COLOR FLOW MAPG: CPT | Mod: 26 | Performed by: INTERNAL MEDICINE

## 2023-01-20 PROCEDURE — 255N000002 HC RX 255 OP 636: Performed by: INTERNAL MEDICINE

## 2023-01-20 PROCEDURE — 82962 GLUCOSE BLOOD TEST: CPT

## 2023-01-20 PROCEDURE — 93321 DOPPLER ECHO F-UP/LMTD STD: CPT | Mod: 26 | Performed by: INTERNAL MEDICINE

## 2023-01-20 PROCEDURE — 250N000011 HC RX IP 250 OP 636: Performed by: STUDENT IN AN ORGANIZED HEALTH CARE EDUCATION/TRAINING PROGRAM

## 2023-01-20 PROCEDURE — C8924 2D TTE W OR W/O FOL W/CON,FU: HCPCS

## 2023-01-20 PROCEDURE — 99239 HOSP IP/OBS DSCHRG MGMT >30: CPT | Performed by: INTERNAL MEDICINE

## 2023-01-20 RX ORDER — ROSUVASTATIN CALCIUM 40 MG/1
40 TABLET, COATED ORAL DAILY
Qty: 30 TABLET | Refills: 1 | Status: SHIPPED | OUTPATIENT
Start: 2023-01-21 | End: 2023-03-28

## 2023-01-20 RX ORDER — METFORMIN HCL 500 MG
1000 TABLET, EXTENDED RELEASE 24 HR ORAL 2 TIMES DAILY WITH MEALS
Qty: 360 TABLET | Refills: 0
Start: 2023-01-21 | End: 2023-02-18

## 2023-01-20 RX ORDER — METOPROLOL SUCCINATE 50 MG/1
50 TABLET, EXTENDED RELEASE ORAL DAILY
Qty: 30 TABLET | Refills: 1 | Status: SHIPPED | OUTPATIENT
Start: 2023-01-21 | End: 2023-11-02

## 2023-01-20 RX ORDER — LISINOPRIL 5 MG/1
10 TABLET ORAL DAILY
Status: DISCONTINUED | OUTPATIENT
Start: 2023-01-21 | End: 2023-01-20 | Stop reason: HOSPADM

## 2023-01-20 RX ORDER — LISINOPRIL 10 MG/1
10 TABLET ORAL DAILY
Qty: 30 TABLET | Refills: 1 | Status: SHIPPED | OUTPATIENT
Start: 2023-01-21 | End: 2023-03-28

## 2023-01-20 RX ORDER — METOPROLOL SUCCINATE 50 MG/1
50 TABLET, EXTENDED RELEASE ORAL DAILY
Status: DISCONTINUED | OUTPATIENT
Start: 2023-01-21 | End: 2023-01-20 | Stop reason: HOSPADM

## 2023-01-20 RX ORDER — ISOSORBIDE MONONITRATE 30 MG/1
30 TABLET, EXTENDED RELEASE ORAL DAILY
Qty: 30 TABLET | Refills: 1 | Status: SHIPPED | OUTPATIENT
Start: 2023-01-21 | End: 2023-02-01

## 2023-01-20 RX ADMIN — ISOSORBIDE MONONITRATE 30 MG: 30 TABLET, EXTENDED RELEASE ORAL at 08:55

## 2023-01-20 RX ADMIN — ROSUVASTATIN CALCIUM 40 MG: 40 TABLET, COATED ORAL at 08:55

## 2023-01-20 RX ADMIN — METOPROLOL SUCCINATE 50 MG: 50 TABLET, EXTENDED RELEASE ORAL at 08:56

## 2023-01-20 RX ADMIN — LISINOPRIL 5 MG: 5 TABLET ORAL at 08:55

## 2023-01-20 RX ADMIN — HEPARIN SODIUM 5000 UNITS: 10000 INJECTION, SOLUTION INTRAVENOUS; SUBCUTANEOUS at 06:00

## 2023-01-20 RX ADMIN — Medication 81 MG: at 08:55

## 2023-01-20 RX ADMIN — PERFLUTREN 3 ML: 6.52 INJECTION, SUSPENSION INTRAVENOUS at 11:58

## 2023-01-20 RX ADMIN — MONTELUKAST 10 MG: 10 TABLET, FILM COATED ORAL at 08:55

## 2023-01-20 ASSESSMENT — ACTIVITIES OF DAILY LIVING (ADL)
ADLS_ACUITY_SCORE: 22

## 2023-01-20 NOTE — PROGRESS NOTES
CARDIOLOGY PROGRESS NOTE      Assessment/Plan:  1.  Chest discomfort-  atypical in that it is there all the time following the lifting up of his grandson, however did get worse with activity and some shortness of breath.  This is since resolved, could be musculoskeletal from sternal wound healing.   2.  Abnormal cardiovascular stress test-rest images showed a medium sized area of anterior septal apical scar, based on MRI this was known but there was about 60% viability.  Stress imaging was not performed.  3.  ECG changes- no significant obstructive disease noted.  4.  Coronary artery disease- angiography secondary to above showed normal left main, ostial LAD 70% stenosis followed by a proximal 90% stenosis followed by a mid 25% stenosis followed by another mid 99% stenosis followed by a distal 90% stenosis followed by a 30% and 80% stenosis.  First diagonal is a 30% stenosis, circumflex is normal with a second obtuse marginal artery 20% stenosis and third obtuse marginal artery 80% stenosis followed by 25% lesion.  Right coronary artery has a proximal 30% lesion.  No need for revascularization.   5.  Coronary artery bypass grafting- May 10, 2022 patient had a LIMA to the LAD, vein graft to diagonal and vein graft to obtuse marginal artery.  Although these are patent based on angiography yesterday.  6.  Fatigue-does have sinus bradycardia so we will change metoprolol succinate from 50 twice a day to 50 once a day.  7.  Benign essential hypertension-we will increase lisinopril up to 10 mg a day given the above.  8.  Pure hypercholesterolemia- total cholesterol 107 with an LDL of 44.  9.  Diabetes mellitus- hemoglobin A1c good at 6.9.    Plan:  1.  Check limited echo to document ejection fraction.  2.  Agree with discharge home today.  3.  Change metoprolol succinate down to only once a day.  4.  Increase lisinopril to 10 mg a day.    Discharge Plannin.  No cardiac barrier to discharge today.  2.  Can follow  with Dr. Jermaine Schafer primary cardiologist.     LOS: 1 day     Subjective:  Interval History:    63-year-old  gentleman being seen on third day of hospitalization.  He feels somewhat fatigued, no more chest pains, palpitations, shortness of breath, PND, orthopnea, syncope, dizziness or peripheral edema.    Medications    aspirin  81 mg Oral Daily     heparin ANTICOAGULANT  5,000 Units Subcutaneous Q8H     insulin aspart  1-3 Units Subcutaneous TID AC     insulin aspart  1-3 Units Subcutaneous At Bedtime     isosorbide mononitrate  30 mg Oral Daily     lisinopril  5 mg Oral Daily     metoprolol succinate ER  50 mg Oral BID     montelukast  10 mg Oral Daily     rosuvastatin  40 mg Oral Daily     Objective:   Vital signs in last 24 hours:  Temp:  [97.6  F (36.4  C)-98.3  F (36.8  C)] 98.1  F (36.7  C)  Pulse:  [50-60] 52  Resp:  [16-20] 20  BP: (100-142)/(57-90) 134/78  SpO2:  [96 %-100 %] 99 %    Physical Exam:  /78 (BP Location: Right arm, Patient Position: Semi-Mayfield's, Cuff Size: Adult Regular)   Pulse 52   Temp 98.1  F (36.7  C) (Oral)   Resp 20   Wt 100.3 kg (221 lb 3.2 oz)   SpO2 99%   BMI 32.90 kg/m      General Appearance:    Alert, cooperative, no distress, appears stated age   Head:    Normocephalic, without obvious abnormality, atraumatic   Throat:   Lips, mucosa, and tongue normal; teeth and gums normal   Neck:   Supple, symmetrical, trachea midline, no adenopathy;        thyroid:  No enlargement/tenderness/nodules; no carotid    bruit or JVD   Back:     Symmetric, no curvature, ROM normal, no CVA tenderness   Lungs:     Clear to auscultation bilaterally, respirations unlabored   Chest wall:    No tenderness, midline sternotomy scar   Heart:    Regular rate and rhythm, S1 and S2 normal, no murmur, rub   or gallop   Abdomen:     Soft, non-tender, bowel sounds active all four quadrants,     no masses, no organomegaly   Extremities:   Normal, atraumatic, no cyanosis or edema   Pulses:    2+ and symmetric all extremities   Skin:   Skin color, texture, turgor normal, no rashes or lesions     Cardiographics:      ECG: Personally reviewed by myself shows sinus bradycardia, leftward axis, possible old anterior Q wave MI type pattern.  Nonspecific ST-T wave changes.    Echocardiogram: Pending      Lab Results:   Recent Labs   Lab 01/18/23  1423   WBC 3.9*   HGB 13.6   HCT 40.0   *     Recent Labs   Lab 01/19/23  0715      CO2 24   BUN 16.6   .       No results found for: CKTOTAL, CKMB, TROPONINI         Cartilage Fenestration Performed?: No Mohs Rapid Report Verbiage: The area of clinically evident tumor was marked with skin marking ink and appropriately hatched.  The initial incision was made following the Mohs approach through the skin.  The specimen was taken to the lab, divided into the necessary number of pieces, chromacoded and processed according to the Mohs protocol.  This was repeated in successive stages until a tumor free defect was achieved. Rhomboid Transposition Flap Text: The defect edges were debeveled with a #15 scalpel blade.  Given the location of the defect and the proximity to free margins a rhomboid transposition flap was deemed most appropriate.  Using a sterile surgical marker, an appropriate rhomboid flap was drawn incorporating the defect.    The area thus outlined was incised deep to adipose tissue with a #15 scalpel blade.  The skin margins were undermined to an appropriate distance in all directions utilizing iris scissors. Additional Anesthesia Volume In Cc: 0 Consent (Lip)/Introductory Paragraph: The rationale for Mohs was explained to the patient and consent was obtained. The risks, benefits and alternatives to therapy were discussed in detail. Specifically, the risks of lip deformity, changes in the oral aperture, infection, scarring, bleeding, prolonged wound healing, incomplete removal, allergy to anesthesia, nerve injury and recurrence were addressed. Prior to the procedure, the treatment site was clearly identified and confirmed by the patient. All components of Universal Protocol/PAUSE Rule completed. No Residual Tumor Seen Histology Text: There were no malignant cells seen in the sections examined. Area H Indication Text: Tumors in this location are included in Area H (eyelids, eyebrows, nose, lips, chin, ear, pre-auricular, post-auricular, temple, genitalia, hands, feet, ankles and areola).  Tissue conservation is critical in these anatomic locations. Ftsg Text: The defect edges were debeveled with a #15 scalpel blade.  Given the location of the defect, shape of the defect and the proximity to free margins a full thickness skin graft was deemed most appropriate.  Using a sterile surgical marker, the primary defect shape was transferred to the donor site. The area thus outlined was incised deep to adipose tissue with a #15 scalpel blade.  The harvested graft was then trimmed of adipose tissue until only dermis and epidermis was left.  The skin margins of the secondary defect were undermined to an appropriate distance in all directions utilizing iris scissors.  The secondary defect was closed with interrupted buried subcutaneous sutures.  The skin edges were then re-apposed with running  sutures.  The skin graft was then placed in the primary defect and oriented appropriately. O-Z Flap Text: The defect edges were debeveled with a #15 scalpel blade.  Given the location of the defect, shape of the defect and the proximity to free margins an O-Z flap was deemed most appropriate.  Using a sterile surgical marker, an appropriate transposition flap was drawn incorporating the defect and placing the expected incisions within the relaxed skin tension lines where possible. The area thus outlined was incised deep to adipose tissue with a #15 scalpel blade.  The skin margins were undermined to an appropriate distance in all directions utilizing iris scissors. Stage 7: Additional Anesthesia Type: 1% lidocaine with epinephrine Crescentic Complex Repair Preamble Text (Leave Blank If You Do Not Want): Extensive wide undermining was performed. Double O-Z Plasty Text: The defect edges were debeveled with a #15 scalpel blade.  Given the location of the defect, shape of the defect and the proximity to free margins a Double O-Z plasty (double transposition flap) was deemed most appropriate.  Using a sterile surgical marker, the appropriate transposition flaps were drawn incorporating the defect and placing the expected incisions within the relaxed skin tension lines where possible. The area thus outlined was incised deep to adipose tissue with a #15 scalpel blade.  The skin margins were undermined to an appropriate distance in all directions utilizing iris scissors.  Hemostasis was achieved with electrocautery.  The flaps were then transposed into place, one clockwise and the other counterclockwise, and anchored with interrupted buried subcutaneous sutures. Consent 1/Introductory Paragraph: The rationale for Mohs was explained to the patient and consent was obtained. The risks, benefits and alternatives to therapy were discussed in detail. Specifically, the risks of infection, scarring, bleeding, prolonged wound healing, incomplete removal, allergy to anesthesia, nerve injury and recurrence were addressed. Prior to the procedure, the treatment site was clearly identified and confirmed by the patient. All components of Universal Protocol/PAUSE Rule completed. Mid-Level Procedure Text (A): After obtaining clear surgical margins the patient was sent to a mid-level provider for surgical repair.  The patient understands they will receive post-surgical care and follow-up from the mid-level provider. Show Mohs Rapid Report Variable In The Stage Tabs (Ensure You Have This How You Like Before You Hide): Yes Suturegard Body: The suture ends were repeatedly re-tightened and re-clamped to achieve the desired tissue expansion. Provider Procedure Text (A): After obtaining clear surgical margins the defect was repaired by another provider. Referred To Otolaryngology For Closure Text (Leave Blank If You Do Not Want): After obtaining clear surgical margins the patient was sent to otolaryngology for surgical repair.  The patient understands they will receive post-surgical care and follow-up from the referring physician's office. Double M-Plasty Intermediate Repair Preamble Text (Leave Blank If You Do Not Want): Undermining was performed with blunt dissection. Advancement-Rotation Flap Text: The defect edges were debeveled with a #15 scalpel blade.  Given the location of the defect, shape of the defect and the proximity to free margins an advancement-rotation flap was deemed most appropriate.  Using a sterile surgical marker, an appropriate flap was drawn incorporating the defect and placing the expected incisions within the relaxed skin tension lines where possible. The area thus outlined was incised deep to adipose tissue with a #15 scalpel blade.  The skin margins were undermined to an appropriate distance in all directions utilizing iris scissors. Cheek Interpolation Flap Text: A decision was made to reconstruct the defect utilizing an interpolation axial flap and a staged reconstruction.  A telfa template was made of the defect.  This telfa template was then used to outline the Cheek Interpolation flap.  The donor area for the pedicle flap was then injected with anesthesia.  The flap was excised through the skin and subcutaneous tissue down to the layer of the underlying musculature.  The interpolation flap was carefully excised within this deep plane to maintain its blood supply.  The edges of the donor site were undermined.   The donor site was closed in a primary fashion.  The pedicle was then rotated into position and sutured.  Once the tube was sutured into place, adequate blood supply was confirmed with blanching and refill.  The pedicle was then wrapped with xeroform gauze and dressed appropriately with a telfa and gauze bandage to ensure continued blood supply and protect the attached pedicle. Hatchet Flap Text: The defect edges were debeveled with a #15 scalpel blade.  Given the location of the defect, shape of the defect and the proximity to free margins a hatchet flap was deemed most appropriate.  Using a sterile surgical marker, an appropriate hatchet flap was drawn incorporating the defect and placing the expected incisions within the relaxed skin tension lines where possible.    The area thus outlined was incised deep to adipose tissue with a #15 scalpel blade.  The skin margins were undermined to an appropriate distance in all directions utilizing iris scissors. Repair Anesthesia Method: local infiltration Rhombic Flap Text: The defect edges were debeveled with a #15 scalpel blade.  Given the location of the defect and the proximity to free margins a rhombic flap was deemed most appropriate.  Using a sterile surgical marker, an appropriate rhombic flap was drawn incorporating the defect.    The area thus outlined was incised deep to adipose tissue with a #15 scalpel blade.  The skin margins were undermined to an appropriate distance in all directions utilizing iris scissors. Primary Defect Width In Cm (Final Defect Size - Required For Flaps/Grafts): 1.1 Surgeon/Pathologist Verbiage (Will Incorporate Name Of Surgeon From Intro If Not Blank): operated in two distinct and integrated capacities as the surgeon and pathologist. Keystone Flap Text: The defect edges were debeveled with a #15 scalpel blade.  Given the location of the defect, shape of the defect a keystone flap was deemed most appropriate.  Using a sterile surgical marker, an appropriate keystone flap was drawn incorporating the defect, outlining the appropriate donor tissue and placing the expected incisions within the relaxed skin tension lines where possible. The area thus outlined was incised deep to adipose tissue with a #15 scalpel blade.  The skin margins were undermined to an appropriate distance in all directions around the primary defect and laterally outward around the flap utilizing iris scissors. Asc Procedure Text (C): After obtaining clear surgical margins the patient was sent to an ASC for surgical repair.  The patient understands they will receive post-surgical care and follow-up from the ASC physician. Hemostasis: Electrocautery Consent (Scalp)/Introductory Paragraph: The rationale for Mohs was explained to the patient and consent was obtained. The risks, benefits and alternatives to therapy were discussed in detail. Specifically, the risks of changes in hair growth pattern secondary to repair, infection, scarring, bleeding, prolonged wound healing, incomplete removal, allergy to anesthesia, nerve injury and recurrence were addressed. Prior to the procedure, the treatment site was clearly identified and confirmed by the patient. All components of Universal Protocol/PAUSE Rule completed. Plastic Surgeon Procedure Text (A): After obtaining clear surgical margins the patient was sent to plastics for surgical repair.  The patient understands they will receive post-surgical care and follow-up from the referring physician's office. Full Thickness Lip Wedge Repair (Flap) Text: Given the location of the defect and the proximity to free margins a full thickness wedge repair was deemed most appropriate.  Using a sterile surgical marker, the appropriate repair was drawn incorporating the defect and placing the expected incisions perpendicular to the vermilion border.  The vermilion border was also meticulously outlined to ensure appropriate reapproximation during the repair.  The area thus outlined was incised through and through with a #15 scalpel blade.  The muscularis and dermis were reaproximated with deep sutures following hemostasis. Care was taken to realign the vermilion border before proceeding with the superficial closure.  Once the vermilion was realigned the superfical and mucosal closure was finished. Cheiloplasty (Less Than 50%) Text: A decision was made to reconstruct the defect with a  cheiloplasty.  The defect was undermined extensively.  Additional obicularis oris muscle was excised with a 15 blade scalpel.  The defect was converted into a full thickness wedge, of less than 50% of the vertical height of the lip, to facilite a better cosmetic result.  Small vessels were then tied off with 5-0 monocyrl. The obicularis oris, superficial fascia, adipose and dermis were then reapproximated.  After the deeper layers were approximated the epidermis was reapproximated with particular care given to realign the vermilion border. Double O-Z Flap Text: The defect edges were debeveled with a #15 scalpel blade.  Given the location of the defect, shape of the defect and the proximity to free margins a Double O-Z flap was deemed most appropriate.  Using a sterile surgical marker, an appropriate transposition flap was drawn incorporating the defect and placing the expected incisions within the relaxed skin tension lines where possible. The area thus outlined was incised deep to adipose tissue with a #15 scalpel blade.  The skin margins were undermined to an appropriate distance in all directions utilizing iris scissors. Cheiloplasty (Complex) Text: A decision was made to reconstruct the defect with a  cheiloplasty.  The defect was undermined extensively.  Additional obicularis oris muscle was excised with a 15 blade scalpel.  The defect was converted into a full thickness wedge to facilite a better cosmetic result.  Small vessels were then tied off with 5-0 monocyrl. The obicularis oris, superficial fascia, adipose and dermis were then reapproximated.  After the deeper layers were approximated the epidermis was reapproximated with particular care given to realign the vermilion border. Consent (Near Eyelid Margin)/Introductory Paragraph: The rationale for Mohs was explained to the patient and consent was obtained. The risks, benefits and alternatives to therapy were discussed in detail. Specifically, the risks of ectropion or eyelid deformity, infection, scarring, bleeding, prolonged wound healing, incomplete removal, allergy to anesthesia, nerve injury and recurrence were addressed. Prior to the procedure, the treatment site was clearly identified and confirmed by the patient. All components of Universal Protocol/PAUSE Rule completed. Consent (Ear)/Introductory Paragraph: The rationale for Mohs was explained to the patient and consent was obtained. The risks, benefits and alternatives to therapy were discussed in detail. Specifically, the risks of ear deformity, infection, scarring, bleeding, prolonged wound healing, incomplete removal, allergy to anesthesia, nerve injury and recurrence were addressed. Prior to the procedure, the treatment site was clearly identified and confirmed by the patient. All components of Universal Protocol/PAUSE Rule completed. Closure 3 Information: This tab is for additional flaps and grafts above and beyond our usual structured repairs.  Please note if you enter information here it will not currently bill and you will need to add the billing information manually. Melolabial Interpolation Flap Text: A decision was made to reconstruct the defect utilizing an interpolation axial flap and a staged reconstruction.  A telfa template was made of the defect.  This telfa template was then used to outline the melolabial interpolation flap.  The donor area for the pedicle flap was then injected with anesthesia.  The flap was excised through the skin and subcutaneous tissue down to the layer of the underlying musculature.  The pedicle flap was carefully excised within this deep plane to maintain its blood supply.  The edges of the donor site were undermined.   The donor site was closed in a primary fashion.  The pedicle was then rotated into position and sutured.  Once the tube was sutured into place, adequate blood supply was confirmed with blanching and refill.  The pedicle was then wrapped with xeroform gauze and dressed appropriately with a telfa and gauze bandage to ensure continued blood supply and protect the attached pedicle. Stage 1: Number Of Blocks?: 1 Referred To Oculoplastics For Closure Text (Leave Blank If You Do Not Want): After obtaining clear surgical margins the patient was sent to oculoplastics for surgical repair.  The patient understands they will receive post-surgical care and follow-up from the referring physician's office. Consent (Marginal Mandibular)/Introductory Paragraph: The rationale for Mohs was explained to the patient and consent was obtained. The risks, benefits and alternatives to therapy were discussed in detail. Specifically, the risks of damage to the marginal mandibular branch of the facial nerve, infection, scarring, bleeding, prolonged wound healing, incomplete removal, allergy to anesthesia, and recurrence were addressed. Prior to the procedure, the treatment site was clearly identified and confirmed by the patient. All components of Universal Protocol/PAUSE Rule completed. Purse String (Intermediate) Text: Given the location of the defect and the characteristics of the surrounding skin a purse string intermediate closure was deemed most appropriate.  Undermining was performed circumfirentially around the surgical defect.  A purse string suture was then placed and tightened. Island Pedicle Flap Text: The defect edges were debeveled with a #15 scalpel blade.  Given the location of the defect, shape of the defect and the proximity to free margins an island pedicle advancement flap was deemed most appropriate.  Using a sterile surgical marker, an appropriate advancement flap was drawn incorporating the defect, outlining the appropriate donor tissue and placing the expected incisions within the relaxed skin tension lines where possible.    The area thus outlined was incised deep to adipose tissue with a #15 scalpel blade.  The skin margins were undermined to an appropriate distance in all directions around the primary defect and laterally outward around the island pedicle utilizing iris scissors.  There was minimal undermining beneath the pedicle flap. Graft Donor Site Bandage (Optional-Leave Blank If You Don't Want In Note): Steri-strips and a pressure bandage were applied to the donor site. Deep Sutures: 4-0 Biosyn Partial Purse String (Intermediate) Text: Given the location of the defect and the characteristics of the surrounding skin an intermediate purse string closure was deemed most appropriate.  Undermining was performed circumfirentially around the surgical defect.  A purse string suture was then placed and tightened. Wound tension only allowed a partial closure of the circular defect. Paramedian Forehead Flap Text: A decision was made to reconstruct the defect utilizing an interpolation axial flap and a staged reconstruction.  A telfa template was made of the defect.  This telfa template was then used to outline the paramedian forehead pedicle flap.  The donor area for the pedicle flap was then injected with anesthesia.  The flap was excised through the skin and subcutaneous tissue down to the layer of the underlying musculature.  The pedicle flap was carefully excised within this deep plane to maintain its blood supply.  The edges of the donor site were undermined.   The donor site was closed in a primary fashion.  The pedicle was then rotated into position and sutured.  Once the tube was sutured into place, adequate blood supply was confirmed with blanching and refill.  The pedicle was then wrapped with xeroform gauze and dressed appropriately with a telfa and gauze bandage to ensure continued blood supply and protect the attached pedicle. S Plasty Text: Given the location and shape of the defect, and the orientation of relaxed skin tension lines, an S-plasty was deemed most appropriate for repair.  Using a sterile surgical marker, the appropriate outline of the S-plasty was drawn, incorporating the defect and placing the expected incisions within the relaxed skin tension lines where possible.  The area thus outlined was incised deep to adipose tissue with a #15 scalpel blade.  The skin margins were undermined to an appropriate distance in all directions utilizing iris scissors. The skin flaps were advanced over the defect.  The opposing margins were then approximated with interrupted buried subcutaneous sutures. Posterior Auricular Interpolation Flap Text: A decision was made to reconstruct the defect utilizing an interpolation axial flap and a staged reconstruction.  A telfa template was made of the defect.  This telfa template was then used to outline the posterior auricular interpolation flap.  The donor area for the pedicle flap was then injected with anesthesia.  The flap was excised through the skin and subcutaneous tissue down to the layer of the underlying musculature.  The pedicle flap was carefully excised within this deep plane to maintain its blood supply.  The edges of the donor site were undermined.   The donor site was closed in a primary fashion.  The pedicle was then rotated into position and sutured.  Once the tube was sutured into place, adequate blood supply was confirmed with blanching and refill.  The pedicle was then wrapped with xeroform gauze and dressed appropriately with a telfa and gauze bandage to ensure continued blood supply and protect the attached pedicle. Suture Removal: 7 days Burow's Advancement Flap Text: The defect edges were debeveled with a #15 scalpel blade.  Given the location of the defect and the proximity to free margins a Burow's advancement flap was deemed most appropriate.  Using a sterile surgical marker, the appropriate advancement flap was drawn incorporating the defect and placing the expected incisions within the relaxed skin tension lines where possible.    The area thus outlined was incised deep to adipose tissue with a #15 scalpel blade.  The skin margins were undermined to an appropriate distance in all directions utilizing iris scissors. Chonodrocutaneous Helical Advancement Flap Text: The defect edges were debeveled with a #15 scalpel blade.  Given the location of the defect and the proximity to free margins a chondrocutaneous helical advancement flap was deemed most appropriate.  Using a sterile surgical marker, the appropriate advancement flap was drawn incorporating the defect and placing the expected incisions within the relaxed skin tension lines where possible.    The area thus outlined was incised deep to adipose tissue with a #15 scalpel blade.  The skin margins were undermined to an appropriate distance in all directions utilizing iris scissors. Crescentic Advancement Flap Text: The defect edges were debeveled with a #15 scalpel blade.  Given the location of the defect and the proximity to free margins a crescentic advancement flap was deemed most appropriate.  Using a sterile surgical marker, the appropriate advancement flap was drawn incorporating the defect and placing the expected incisions within the relaxed skin tension lines where possible.    The area thus outlined was incised deep to adipose tissue with a #15 scalpel blade.  The skin margins were undermined to an appropriate distance in all directions utilizing iris scissors. Surgeon: Dr. Fernandez O-L Flap Text: The defect edges were debeveled with a #15 scalpel blade.  Given the location of the defect, shape of the defect and the proximity to free margins an O-L flap was deemed most appropriate.  Using a sterile surgical marker, an appropriate advancement flap was drawn incorporating the defect and placing the expected incisions within the relaxed skin tension lines where possible.    The area thus outlined was incised deep to adipose tissue with a #15 scalpel blade.  The skin margins were undermined to an appropriate distance in all directions utilizing iris scissors. Consent Type: Consent 1 (Standard) Consent 2/Introductory Paragraph: Mohs surgery was explained to the patient and consent was obtained. The risks, benefits and alternatives to therapy were discussed in detail. Specifically, the risks of infection, scarring, bleeding, prolonged wound healing, incomplete removal, allergy to anesthesia, nerve injury and recurrence were addressed. Prior to the procedure, the treatment site was clearly identified and confirmed by the patient. All components of Universal Protocol/PAUSE Rule completed. Same Histology In Subsequent Stages Text: The pattern and morphology of the tumor is as described in the first stage. Wound Care: Bacitracin Zinc Body Location Override (Optional - Billing Will Still Be Based On Selected Body Map Location If Applicable): Left superior lateral forehead Eye Protection Verbiage: Before proceeding with the stage, a plastic scleral shield was inserted. The globe was anesthetized with a few drops of 1% lidocaine with 1:100,000 epinephrine. Then, an appropriate sized scleral shield was chosen and coated with lacrilube ointment. The shield was gently inserted and left in place for the duration of each stage. After the stage was completed, the shield was gently removed. Purse String (Simple) Text: Given the location of the defect and the characteristics of the surrounding skin a purse string closure was deemed most appropriate.  Undermining was performed circumfirentially around the surgical defect.  A purse string suture was then placed and tightened. Wound Care (No Sutures): Petrolatum Surgical Defect Length In Cm (Optional): 1.2 Bilobed Flap Text: The defect edges were debeveled with a #15 scalpel blade.  Given the location of the defect and the proximity to free margins a bilobe flap was deemed most appropriate.  Using a sterile surgical marker, an appropriate bilobe flap drawn around the defect.    The area thus outlined was incised deep to adipose tissue with a #15 scalpel blade.  The skin margins were undermined to an appropriate distance in all directions utilizing iris scissors. Dermal Autograft Text: The defect edges were debeveled with a #15 scalpel blade.  Given the location of the defect, shape of the defect and the proximity to free margins a dermal autograft was deemed most appropriate.  Using a sterile surgical marker, the primary defect shape was transferred to the donor site. The area thus outlined was incised deep to adipose tissue with a #15 scalpel blade.  The harvested graft was then trimmed of adipose and epidermal tissue until only dermis was left.  The skin graft was then placed in the primary defect and oriented appropriately. Advancement Flap (Double) Text: The defect edges were debeveled with a #15 scalpel blade.  Given the location of the defect and the proximity to free margins a double advancement flap was deemed most appropriate.  Using a sterile surgical marker, the appropriate advancement flaps were drawn incorporating the defect and placing the expected incisions within the relaxed skin tension lines where possible.    The area thus outlined was incised deep to adipose tissue with a #15 scalpel blade.  The skin margins were undermined to an appropriate distance in all directions utilizing iris scissors. Tarsorrhaphy Text: A tarsorrhaphy was performed using Frost sutures. Information: Selecting Yes will display possible errors in your note based on the variables you have selected. This validation is only offered as a suggestion for you. PLEASE NOTE THAT THE VALIDATION TEXT WILL BE REMOVED WHEN YOU FINALIZE YOUR NOTE. IF YOU WANT TO FAX A PRELIMINARY NOTE YOU WILL NEED TO TOGGLE THIS TO 'NO' IF YOU DO NOT WANT IT IN YOUR FAXED NOTE. A-T Advancement Flap Text: The defect edges were debeveled with a #15 scalpel blade.  Given the location of the defect, shape of the defect and the proximity to free margins an A-T advancement flap was deemed most appropriate.  Using a sterile surgical marker, an appropriate advancement flap was drawn incorporating the defect and placing the expected incisions within the relaxed skin tension lines where possible.    The area thus outlined was incised deep to adipose tissue with a #15 scalpel blade.  The skin margins were undermined to an appropriate distance in all directions utilizing iris scissors. O-T Advancement Flap Text: The defect edges were debeveled with a #15 scalpel blade.  Given the location of the defect, shape of the defect and the proximity to free margins an O-T advancement flap was deemed most appropriate.  Using a sterile surgical marker, an appropriate advancement flap was drawn incorporating the defect and placing the expected incisions within the relaxed skin tension lines where possible.    The area thus outlined was incised deep to adipose tissue with a #15 scalpel blade.  The skin margins were undermined to an appropriate distance in all directions utilizing iris scissors. Transposition Flap Text: The defect edges were debeveled with a #15 scalpel blade.  Given the location of the defect and the proximity to free margins a transposition flap was deemed most appropriate.  Using a sterile surgical marker, an appropriate transposition flap was drawn incorporating the defect.    The area thus outlined was incised deep to adipose tissue with a #15 scalpel blade.  The skin margins were undermined to an appropriate distance in all directions utilizing iris scissors. Pain Refusal Text: I offered to prescribe pain medication but the patient refused to take this medication. Length To Time In Minutes Device Was In Place: 10 Bilateral Helical Rim Advancement Flap Text: The defect edges were debeveled with a #15 blade scalpel.  Given the location of the defect and the proximity to free margins (helical rim) a bilateral helical rim advancement flap was deemed most appropriate.  Using a sterile surgical marker, the appropriate advancement flaps were drawn incorporating the defect and placing the expected incisions between the helical rim and antihelix where possible.  The area thus outlined was incised through and through with a #15 scalpel blade.  With a skin hook and iris scissors, the flaps were gently and sharply undermined and freed up. Ear Wedge Repair Text: A wedge excision was completed by carrying down an excision through the full thickness of the ear and cartilage with an inward facing Burow's triangle. The wound was then closed in a layered fashion. Anesthesia Type: 1% lidocaine with epinephrine and 0.5% Marcaine Epidermal Closure Graft Donor Site (Optional): simple interrupted X Size Of Lesion In Cm (Optional): 0.6 Anesthesia Volume In Cc: 2 Melolabial Transposition Flap Text: The defect edges were debeveled with a #15 scalpel blade.  Given the location of the defect and the proximity to free margins a melolabial flap was deemed most appropriate.  Using a sterile surgical marker, an appropriate melolabial transposition flap was drawn incorporating the defect.    The area thus outlined was incised deep to adipose tissue with a #15 scalpel blade.  The skin margins were undermined to an appropriate distance in all directions utilizing iris scissors. Dressing: pressure dressing with telfa Manual Repair Warning Statement: We plan on removing the manually selected variable below in favor of our much easier automatic structured text blocks found in the previous tab. We decided to do this to help make the flow better and give you the full power of structured data. Manual selection is never going to be ideal in our platform and I would encourage you to avoid using manual selection from this point on, especially since I will be sunsetting this feature. It is important that you do one of two things with the customized text below. First, you can save all of the text in a word file so you can have it for future reference. Second, transfer the text to the appropriate area in the Library tab. Lastly, if there is a flap or graft type which we do not have you need to let us know right away so I can add it in before the variable is hidden. No need to panic, we plan to give you roughly 6 months to make the change. Consent (Temporal Branch)/Introductory Paragraph: The rationale for Mohs was explained to the patient and consent was obtained. The risks, benefits and alternatives to therapy were discussed in detail. Specifically, the risks of damage to the temporal branch of the facial nerve, infection, scarring, bleeding, prolonged wound healing, incomplete removal, allergy to anesthesia, and recurrence were addressed. Prior to the procedure, the treatment site was clearly identified and confirmed by the patient. All components of Universal Protocol/PAUSE Rule completed. Medical Necessity Statement: Based on my medical judgement, Mohs surgery is the most appropriate treatment for this cancer compared to other treatments. Subsequent Stages Histo Method Verbiage: Using a similar technique to that described above, a thin layer of tissue was removed from all areas where tumor was visible on the previous stage.  The tissue was again oriented, mapped, dyed, and processed as above. Banner Transposition Flap Text: The defect edges were debeveled with a #15 scalpel blade.  Given the location of the defect and the proximity to free margins a Banner transposition flap was deemed most appropriate.  Using a sterile surgical marker, an appropriate flap drawn around the defect. The area thus outlined was incised deep to adipose tissue with a #15 scalpel blade.  The skin margins were undermined to an appropriate distance in all directions utilizing iris scissors. Initial Size Of Lesion: 0.4 Date Of Previous Biopsy (Optional): 06/18/19 Alternatives Discussed Intro (Do Not Add Period): I discussed alternative treatments to Mohs surgery and specifically discussed the risks and benefits of Detail Level: Detailed Secondary Intention Text (Leave Blank If You Do Not Want): The defect will heal with secondary intention. Mastoid Interpolation Flap Text: A decision was made to reconstruct the defect utilizing an interpolation axial flap and a staged reconstruction.  A telfa template was made of the defect.  This telfa template was then used to outline the mastoid interpolation flap.  The donor area for the pedicle flap was then injected with anesthesia.  The flap was excised through the skin and subcutaneous tissue down to the layer of the underlying musculature.  The pedicle flap was carefully excised within this deep plane to maintain its blood supply.  The edges of the donor site were undermined.   The donor site was closed in a primary fashion.  The pedicle was then rotated into position and sutured.  Once the tube was sutured into place, adequate blood supply was confirmed with blanching and refill.  The pedicle was then wrapped with xeroform gauze and dressed appropriately with a telfa and gauze bandage to ensure continued blood supply and protect the attached pedicle. Skin Substitute Text: The defect edges were debeveled with a #15 scalpel blade.  Given the location of the defect, shape of the defect and the proximity to free margins a skin substitute graft was deemed most appropriate.  The graft material was trimmed to fit the size of the defect. The graft was then placed in the primary defect and oriented appropriately. Postop Diagnosis: same Estimated Blood Loss (Cc): minimal Area M Indication Text: Tumors in this location are included in Area M (cheek, forehead, scalp, neck, jawline and pretibial skin).  Mohs surgery is indicated for tumors in these anatomic locations. Tissue Cultured Epidermal Autograft Text: The defect edges were debeveled with a #15 scalpel blade.  Given the location of the defect, shape of the defect and the proximity to free margins a tissue cultured epidermal autograft was deemed most appropriate.  The graft was then trimmed to fit the size of the defect.  The graft was then placed in the primary defect and oriented appropriately. Muscle Hinge Flap Text: The defect edges were debeveled with a #15 scalpel blade.  Given the size, depth and location of the defect and the proximity to free margins a muscle hinge flap was deemed most appropriate.  Using a sterile surgical marker, an appropriate hinge flap was drawn incorporating the defect. The area thus outlined was incised with a #15 scalpel blade.  The skin margins were undermined to an appropriate distance in all directions utilizing iris scissors. Modified Advancement Flap Text: The defect edges were debeveled with a #15 scalpel blade.  Given the location of the defect, shape of the defect and the proximity to free margins a modified advancement flap was deemed most appropriate.  Using a sterile surgical marker, an appropriate advancement flap was drawn incorporating the defect and placing the expected incisions within the relaxed skin tension lines where possible.    The area thus outlined was incised deep to adipose tissue with a #15 scalpel blade.  The skin margins were undermined to an appropriate distance in all directions utilizing iris scissors. Suturegard Retention Suture: 0-0 Nylon Closure 2 Information: This tab is for additional flaps and grafts, including complex repair and grafts and complex repair and flaps. You can also specify a different location for the additional defect, if the location is the same you do not need to select a new one. We will insert the automated text for the repair you select below just as we do for solitary flaps and grafts. Please note that at this time if you select a location with a different insurance zone you will need to override the ICD10 and CPT if appropriate. Consent (Spinal Accessory)/Introductory Paragraph: The rationale for Mohs was explained to the patient and consent was obtained. The risks, benefits and alternatives to therapy were discussed in detail. Specifically, the risks of damage to the spinal accessory nerve, infection, scarring, bleeding, prolonged wound healing, incomplete removal, allergy to anesthesia, and recurrence were addressed. Prior to the procedure, the treatment site was clearly identified and confirmed by the patient. All components of Universal Protocol/PAUSE Rule completed. Surgical Defect Length In Cm (Optional): 1.3 Cheek-To-Nose Interpolation Flap Text: A decision was made to reconstruct the defect utilizing an interpolation axial flap and a staged reconstruction.  A telfa template was made of the defect.  This telfa template was then used to outline the Cheek-To-Nose Interpolation flap.  The donor area for the pedicle flap was then injected with anesthesia.  The flap was excised through the skin and subcutaneous tissue down to the layer of the underlying musculature.  The interpolation flap was carefully excised within this deep plane to maintain its blood supply.  The edges of the donor site were undermined.   The donor site was closed in a primary fashion.  The pedicle was then rotated into position and sutured.  Once the tube was sutured into place, adequate blood supply was confirmed with blanching and refill.  The pedicle was then wrapped with xeroform gauze and dressed appropriately with a telfa and gauze bandage to ensure continued blood supply and protect the attached pedicle. Mucosal Advancement Flap Text: Given the location of the defect, shape of the defect and the proximity to free margins a mucosal advancement flap was deemed most appropriate. Incisions were made with a 15 blade scalpel in the appropriate fashion along the cutaneous vermilion border and the mucosal lip. The remaining actinically damaged mucosal tissue was excised.  The mucosal advancement flap was then elevated to the gingival sulcus with care taken to preserve the neurovascular structures and advanced into the primary defect. Care was taken to ensure that precise realignment of the vermilion border was achieved. Post-Care Instructions: I reviewed with the patient in detail post-care instructions. Patient is not to engage in any heavy lifting, exercise, or swimming for the next 14 days. Should the patient develop any fevers, chills, bleeding, severe pain patient will contact the office immediately. Surgical Defect Width In Cm (Optional): 0.8 Anesthesia Volume In Cc: 1.5 Mohs Histo Method Verbiage: Each section was then chromacoded and processed in the Mohs lab using the Mohs protocol and submitted for frozen section. Stage 2: Additional Anesthesia Volume In Cc: 0.3 Dressing (No Sutures): dry sterile dressing Bcc Histology Text: There were numerous aggregates of basaloid cells. Island Pedicle Flap-Requiring Vessel Identification Text: The defect edges were debeveled with a #15 scalpel blade.  Given the location of the defect, shape of the defect and the proximity to free margins an island pedicle advancement flap was deemed most appropriate.  Using a sterile surgical marker, an appropriate advancement flap was drawn, based on the axial vessel mentioned above, incorporating the defect, outlining the appropriate donor tissue and placing the expected incisions within the relaxed skin tension lines where possible.    The area thus outlined was incised deep to adipose tissue with a #15 scalpel blade.  The skin margins were undermined to an appropriate distance in all directions around the primary defect and laterally outward around the island pedicle utilizing iris scissors.  There was minimal undermining beneath the pedicle flap. V-Y Flap Text: The defect edges were debeveled with a #15 scalpel blade.  Given the location of the defect, shape of the defect and the proximity to free margins a V-Y flap was deemed most appropriate.  Using a sterile surgical marker, an appropriate advancement flap was drawn incorporating the defect and placing the expected incisions within the relaxed skin tension lines where possible.    The area thus outlined was incised deep to adipose tissue with a #15 scalpel blade.  The skin margins were undermined to an appropriate distance in all directions utilizing iris scissors. Mohs Case Number: [id number] Rotation Flap Text: The defect edges were debeveled with a #15 scalpel blade.  Given the location of the defect, shape of the defect and the proximity to free margins a rotation flap was deemed most appropriate.  Using a sterile surgical marker, an appropriate rotation flap was drawn incorporating the defect and placing the expected incisions within the relaxed skin tension lines where possible.    The area thus outlined was incised deep to adipose tissue with a #15 scalpel blade.  The skin margins were undermined to an appropriate distance in all directions utilizing iris scissors. Previous Accession (Optional): [id number] Referring Physician (Optional): Mrs. Layla PA-C Location Indication Override (Is Already Calculated Based On Selected Body Location): Area M Interpolation Flap Text: A decision was made to reconstruct the defect utilizing an interpolation axial flap and a staged reconstruction.  A telfa template was made of the defect.  This telfa template was then used to outline the interpolation flap.  The donor area for the pedicle flap was then injected with anesthesia.  The flap was excised through the skin and subcutaneous tissue down to the layer of the underlying musculature.  The interpolation flap was carefully excised within this deep plane to maintain its blood supply.  The edges of the donor site were undermined.   The donor site was closed in a primary fashion.  The pedicle was then rotated into position and sutured.  Once the tube was sutured into place, adequate blood supply was confirmed with blanching and refill.  The pedicle was then wrapped with xeroform gauze and dressed appropriately with a telfa and gauze bandage to ensure continued blood supply and protect the attached pedicle. Bi-Rhombic Flap Text: The defect edges were debeveled with a #15 scalpel blade.  Given the location of the defect and the proximity to free margins a bi-rhombic flap was deemed most appropriate.  Using a sterile surgical marker, an appropriate rhombic flap was drawn incorporating the defect. The area thus outlined was incised deep to adipose tissue with a #15 scalpel blade.  The skin margins were undermined to an appropriate distance in all directions utilizing iris scissors. Dressing: pressure dressing No Repair - Repaired With Adjacent Surgical Defect Text (Leave Blank If You Do Not Want): After obtaining clear surgical margins the defect was repaired concurrently with another surgical defect which was in close approximation. Z Plasty Text: The lesion was extirpated to the level of the fat with a #15 scalpel blade.  Given the location of the defect, shape of the defect and the proximity to free margins a Z-plasty was deemed most appropriate for repair.  Using a sterile surgical marker, the appropriate transposition arms of the Z-plasty were drawn incorporating the defect and placing the expected incisions within the relaxed skin tension lines where possible.    The area thus outlined was incised deep to adipose tissue with a #15 scalpel blade.  The skin margins were undermined to an appropriate distance in all directions utilizing iris scissors.  The opposing transposition arms were then transposed into place in opposite direction and anchored with interrupted buried subcutaneous sutures. O-T Plasty Text: The defect edges were debeveled with a #15 scalpel blade.  Given the location of the defect, shape of the defect and the proximity to free margins an O-T plasty was deemed most appropriate.  Using a sterile surgical marker, an appropriate O-T plasty was drawn incorporating the defect and placing the expected incisions within the relaxed skin tension lines where possible.    The area thus outlined was incised deep to adipose tissue with a #15 scalpel blade.  The skin margins were undermined to an appropriate distance in all directions utilizing iris scissors. Partial Purse String (Simple) Text: Given the location of the defect and the characteristics of the surrounding skin a simple purse string closure was deemed most appropriate.  Undermining was performed circumfirentially around the surgical defect.  A purse string suture was then placed and tightened. Wound tension only allowed a partial closure of the circular defect. Location Indication Override (Is Already Calculated Based On Selected Body Location): Area H Surgical Defect Length In Cm (Optional): 0.9 Complex Repair And Graft Additional Text (Will Appearing After The Standard Complex Repair Text): The complex repair was not sufficient to completely close the primary defect. The remaining additional defect was repaired with the graft mentioned below. Spiral Flap Text: The defect edges were debeveled with a #15 scalpel blade.  Given the location of the defect, shape of the defect and the proximity to free margins a spiral flap was deemed most appropriate.  Using a sterile surgical marker, an appropriate rotation flap was drawn incorporating the defect and placing the expected incisions within the relaxed skin tension lines where possible. The area thus outlined was incised deep to adipose tissue with a #15 scalpel blade.  The skin margins were undermined to an appropriate distance in all directions utilizing iris scissors. Non-Graft Cartilage Fenestration Text: The cartilage was fenestrated with a 2mm punch biopsy to help facilitate healing. Inflammation Suggestive Of Cancer Camouflage Histology Text: There was a dense lymphocytic infiltrate which prevented adequate histologic evaluation of adjacent structures. Cartilage Graft Text: The defect edges were debeveled with a #15 scalpel blade.  Given the location of the defect, shape of the defect, the fact the defect involved a full thickness cartilage defect a cartilage graft was deemed most appropriate.  An appropriate donor site was identified, cleansed, and anesthetized. The cartilage graft was then harvested and transferred to the recipient site, oriented appropriately and then sutured into place.  The secondary defect was then repaired using a primary closure. Bilobed Transposition Flap Text: The defect edges were debeveled with a #15 scalpel blade.  Given the location of the defect and the proximity to free margins a bilobed transposition flap was deemed most appropriate.  Using a sterile surgical marker, an appropriate bilobe flap drawn around the defect.    The area thus outlined was incised deep to adipose tissue with a #15 scalpel blade.  The skin margins were undermined to an appropriate distance in all directions utilizing iris scissors. Split-Thickness Skin Graft Text: The defect edges were debeveled with a #15 scalpel blade.  Given the location of the defect, shape of the defect and the proximity to free margins a split thickness skin graft was deemed most appropriate.  Using a sterile surgical marker, the primary defect shape was transferred to the donor site. The split thickness graft was then harvested.  The skin graft was then placed in the primary defect and oriented appropriately. Trilobed Flap Text: The defect edges were debeveled with a #15 scalpel blade.  Given the location of the defect and the proximity to free margins a trilobed flap was deemed most appropriate.  Using a sterile surgical marker, an appropriate trilobed flap drawn around the defect.    The area thus outlined was incised deep to adipose tissue with a #15 scalpel blade.  The skin margins were undermined to an appropriate distance in all directions utilizing iris scissors. Simple / Intermediate / Complex Repair - Final Wound Length In Cm: 3.6 Complex Repair And Flap Additional Text (Will Appearing After The Standard Complex Repair Text): The complex repair was not sufficient to completely close the primary defect. The remaining additional defect was repaired with the flap mentioned below. Advancement Flap (Single) Text: The defect edges were debeveled with a #15 scalpel blade.  Given the location of the defect and the proximity to free margins a single advancement flap was deemed most appropriate.  Using a sterile surgical marker, an appropriate advancement flap was drawn incorporating the defect and placing the expected incisions within the relaxed skin tension lines where possible.    The area thus outlined was incised deep to adipose tissue with a #15 scalpel blade.  The skin margins were undermined to an appropriate distance in all directions utilizing iris scissors. Unna Boot Text: An Unna boot was placed to help immobilize the limb and facilitate more rapid healing. Bcc Infiltrative Histology Text: There were numerous aggregates of basaloid cells demonstrating an infiltrative pattern. Suturegard Intro: Intraoperative tissue expansion was performed, utilizing the SUTUREGARD device, in order to reduce wound tension. Mohs Method Verbiage: An incision at a 45 degree angle following the standard Mohs approach was done and the specimen was harvested as a microscopic controlled layer. Epidermal Sutures: 6-0 Surgipro Consent 3/Introductory Paragraph: I gave the patient a chance to ask questions they had about the procedure.  Following this I explained the Mohs procedure and consent was obtained. The risks, benefits and alternatives to therapy were discussed in detail. Specifically, the risks of infection, scarring, bleeding, prolonged wound healing, incomplete removal, allergy to anesthesia, nerve injury and recurrence were addressed. Prior to the procedure, the treatment site was clearly identified and confirmed by the patient. All components of Universal Protocol/PAUSE Rule completed. V-Y Plasty Text: The defect edges were debeveled with a #15 scalpel blade.  Given the location of the defect, shape of the defect and the proximity to free margins an V-Y advancement flap was deemed most appropriate.  Using a sterile surgical marker, an appropriate advancement flap was drawn incorporating the defect and placing the expected incisions within the relaxed skin tension lines where possible.    The area thus outlined was incised deep to adipose tissue with a #15 scalpel blade.  The skin margins were undermined to an appropriate distance in all directions utilizing iris scissors. Consent (Nose)/Introductory Paragraph: The rationale for Mohs was explained to the patient and consent was obtained. The risks, benefits and alternatives to therapy were discussed in detail. Specifically, the risks of nasal deformity, changes in the flow of air through the nose, infection, scarring, bleeding, prolonged wound healing, incomplete removal, allergy to anesthesia, nerve injury and recurrence were addressed. Prior to the procedure, the treatment site was clearly identified and confirmed by the patient. All components of Universal Protocol/PAUSE Rule completed. Island Pedicle Flap With Canthal Suspension Text: The defect edges were debeveled with a #15 scalpel blade.  Given the location of the defect, shape of the defect and the proximity to free margins an island pedicle advancement flap was deemed most appropriate.  Using a sterile surgical marker, an appropriate advancement flap was drawn incorporating the defect, outlining the appropriate donor tissue and placing the expected incisions within the relaxed skin tension lines where possible. The area thus outlined was incised deep to adipose tissue with a #15 scalpel blade.  The skin margins were undermined to an appropriate distance in all directions around the primary defect and laterally outward around the island pedicle utilizing iris scissors.  There was minimal undermining beneath the pedicle flap. A suspension suture was placed in the canthal tendon to prevent tension and prevent ectropion. Epidermal Autograft Text: The defect edges were debeveled with a #15 scalpel blade.  Given the location of the defect, shape of the defect and the proximity to free margins an epidermal autograft was deemed most appropriate.  Using a sterile surgical marker, the primary defect shape was transferred to the donor site. The epidermal graft was then harvested.  The skin graft was then placed in the primary defect and oriented appropriately. Composite Graft Text: The defect edges were debeveled with a #15 scalpel blade.  Given the location of the defect, shape of the defect, the proximity to free margins and the fact the defect was full thickness a composite graft was deemed most appropriate.  The defect was outline and then transferred to the donor site.  A full thickness graft was then excised from the donor site. The graft was then placed in the primary defect, oriented appropriately and then sutured into place.  The secondary defect was then repaired using a primary closure. Double Island Pedicle Flap Text: The defect edges were debeveled with a #15 scalpel blade.  Given the location of the defect, shape of the defect and the proximity to free margins a double island pedicle advancement flap was deemed most appropriate.  Using a sterile surgical marker, an appropriate advancement flap was drawn incorporating the defect, outlining the appropriate donor tissue and placing the expected incisions within the relaxed skin tension lines where possible.    The area thus outlined was incised deep to adipose tissue with a #15 scalpel blade.  The skin margins were undermined to an appropriate distance in all directions around the primary defect and laterally outward around the island pedicle utilizing iris scissors.  There was minimal undermining beneath the pedicle flap. Area L Indication Text: Tumors in this location are included in Area L (trunk and extremities).  Mohs surgery is indicated for larger tumors, or tumors with aggressive histologic features, in these anatomic locations. Star Wedge Flap Text: The defect edges were debeveled with a #15 scalpel blade.  Given the location of the defect, shape of the defect and the proximity to free margins a star wedge flap was deemed most appropriate.  Using a sterile surgical marker, an appropriate rotation flap was drawn incorporating the defect and placing the expected incisions within the relaxed skin tension lines where possible. The area thus outlined was incised deep to adipose tissue with a #15 scalpel blade.  The skin margins were undermined to an appropriate distance in all directions utilizing iris scissors. Suturegard Retention Suture: 2-0 Nylon Alar Island Pedicle Flap Text: The defect edges were debeveled with a #15 scalpel blade.  Given the location of the defect, shape of the defect and the proximity to the alar rim an island pedicle advancement flap was deemed most appropriate.  Using a sterile surgical marker, an appropriate advancement flap was drawn incorporating the defect, outlining the appropriate donor tissue and placing the expected incisions within the nasal ala running parallel to the alar rim. The area thus outlined was incised with a #15 scalpel blade.  The skin margins were undermined minimally to an appropriate distance in all directions around the primary defect and laterally outward around the island pedicle utilizing iris scissors.  There was minimal undermining beneath the pedicle flap. Retention Suture Bite Size: 3 mm Dorsal Nasal Flap Text: The defect edges were debeveled with a #15 scalpel blade.  Given the location of the defect and the proximity to free margins a dorsal nasal flap was deemed most appropriate.  Using a sterile surgical marker, an appropriate dorsal nasal flap was drawn around the defect.    The area thus outlined was incised deep to adipose tissue with a #15 scalpel blade.  The skin margins were undermined to an appropriate distance in all directions utilizing iris scissors. Mauc Instructions: By selecting yes to the question below the MAUC number will be added into the note.  This will be calculated automatically based on the diagnosis chosen, the size entered, the body zone selected (H,M,L) and the specific indications you chose. You will also have the option to override the Mohs AUC if you disagree with the automatically calculated number and this option is found in the Case Summary tab. Helical Rim Advancement Flap Text: The defect edges were debeveled with a #15 blade scalpel.  Given the location of the defect and the proximity to free margins (helical rim) a double helical rim advancement flap was deemed most appropriate.  Using a sterile surgical marker, the appropriate advancement flaps were drawn incorporating the defect and placing the expected incisions between the helical rim and antihelix where possible.  The area thus outlined was incised through and through with a #15 scalpel blade.  With a skin hook and iris scissors, the flaps were gently and sharply undermined and freed up. Repair Type: Complex Repair Mohs Case Number: [id number] Localized Dermabrasion With Wire Brush Text: The patient was draped in routine manner.  Localized dermabrasion using 3 x 17 mm wire brush was performed in routine manner to papillary dermis. This spot dermabrasion is being performed to complete skin cancer reconstruction. It also will eliminate the other sun damaged precancerous cells that are known to be part of the regional effect of a lifetime's worth of sun exposure. This localized dermabrasion is therapeutic and should not be considered cosmetic in any regard. Body Location Override (Optional - Billing Will Still Be Based On Selected Body Map Location If Applicable): Midline glabella Xenograft Text: The defect edges were debeveled with a #15 scalpel blade.  Given the location of the defect, shape of the defect and the proximity to free margins a xenograft was deemed most appropriate.  The graft was then trimmed to fit the size of the defect.  The graft was then placed in the primary defect and oriented appropriately. Donor Site Anesthesia Type: same as repair anesthesia H Plasty Text: Given the location of the defect, shape of the defect and the proximity to free margins a H-plasty was deemed most appropriate for repair.  Using a sterile surgical marker, the appropriate advancement arms of the H-plasty were drawn incorporating the defect and placing the expected incisions within the relaxed skin tension lines where possible. The area thus outlined was incised deep to adipose tissue with a #15 scalpel blade. The skin margins were undermined to an appropriate distance in all directions utilizing iris scissors.  The opposing advancement arms were then advanced into place in opposite direction and anchored with interrupted buried subcutaneous sutures. Ear Star Wedge Flap Text: The defect edges were debeveled with a #15 blade scalpel.  Given the location of the defect and the proximity to free margins (helical rim) an ear star wedge flap was deemed most appropriate.  Using a sterile surgical marker, the appropriate flap was drawn incorporating the defect and placing the expected incisions between the helical rim and antihelix where possible.  The area thus outlined was incised through and through with a #15 scalpel blade. W Plasty Text: The lesion was extirpated to the level of the fat with a #15 scalpel blade.  Given the location of the defect, shape of the defect and the proximity to free margins a W-plasty was deemed most appropriate for repair.  Using a sterile surgical marker, the appropriate transposition arms of the W-plasty were drawn incorporating the defect and placing the expected incisions within the relaxed skin tension lines where possible.    The area thus outlined was incised deep to adipose tissue with a #15 scalpel blade.  The skin margins were undermined to an appropriate distance in all directions utilizing iris scissors.  The opposing transposition arms were then transposed into place in opposite direction and anchored with interrupted buried subcutaneous sutures. Patient Name (Optional- Will Render 'the Patient' If Blank): Timoteo Home Suture Removal Text: Patient was provided instructions on removing sutures and will remove their sutures at home.  If they have any questions or difficulties they will call the office. Mercedes Flap Text: The defect edges were debeveled with a #15 scalpel blade.  Given the location of the defect, shape of the defect and the proximity to free margins a Mercedes flap was deemed most appropriate.  Using a sterile surgical marker, an appropriate advancement flap was drawn incorporating the defect and placing the expected incisions within the relaxed skin tension lines where possible. The area thus outlined was incised deep to adipose tissue with a #15 scalpel blade.  The skin margins were undermined to an appropriate distance in all directions utilizing iris scissors. O-Z Plasty Text: The defect edges were debeveled with a #15 scalpel blade.  Given the location of the defect, shape of the defect and the proximity to free margins an O-Z plasty (double transposition flap) was deemed most appropriate.  Using a sterile surgical marker, the appropriate transposition flaps were drawn incorporating the defect and placing the expected incisions within the relaxed skin tension lines where possible.    The area thus outlined was incised deep to adipose tissue with a #15 scalpel blade.  The skin margins were undermined to an appropriate distance in all directions utilizing iris scissors.  Hemostasis was achieved with electrocautery.  The flaps were then transposed into place, one clockwise and the other counterclockwise, and anchored with interrupted buried subcutaneous sutures. Epidermal Closure: running locked Simple / Intermediate / Complex Repair - Final Wound Length In Cm: 4.4 X Size Of Lesion In Cm (Optional): 0.5 Referring Physician (Optional): RIA Montelongo+C Graft Cartilage Fenestration Text: The cartilage was fenestrated with a 2mm punch biopsy to help facilitate graft survival and healing.

## 2023-01-20 NOTE — DISCHARGE SUMMARY
Discharge instructions provided in writing to patient, teaching done, pt affirmed understanding as evidenced by verbal feedback of information. Pt's vital signs were stable, pt was pain free, ambulated in hallway without experiencing any adverse symptoms. Pt was accompanied to main entrance and assisted to car.  Pt's designated  was wife. Discharged to home.

## 2023-01-20 NOTE — PROGRESS NOTES
Care Management Discharge Note    Discharge Date: 01/20/2023       Discharge Disposition: Home    Discharge Services: None    Discharge DME:  (per treatment team)    Discharge Transportation: family or friend will provide    Private pay costs discussed: Not applicable    PAS Confirmation Code:  (not applicable)  Patient/family educated on Medicare website which has current facility and service quality ratings:  (not applicable)    Education Provided on the Discharge Plan:  Per treatment team   Persons Notified of Discharge Plans: patient   Patient/Family in Agreement with the Plan: yes    Handoff Referral Completed: not applicable     Additional Information:  Pt discharging home to prior living environment with spouse.  No CM needs identified.  Family to transport.         MARILIA De Jesus

## 2023-01-20 NOTE — DISCHARGE SUMMARY
[facility]  Hospitalist Discharge Summary      Date of Admission:  1/18/2023  Date of Discharge:  1/20/2023  Discharging Provider: Jorge Alberto Rubio MD  Discharge Service: Hospitalist Service    Discharge Diagnoses   Chest discomfort atypical  Abnormal stress test    Follow-ups Needed After Discharge }    Unresulted Labs Ordered in the Past 30 Days of this Admission     No orders found from 12/19/2022 to 1/19/2023.      These results will be followed up by not applicable    Discharge Disposition   Discharged to home  Condition at discharge: Stable      Hospital Course      Mir Treviño Sr. is a 63 year old male admitted on 1/18/2023. He presents from cardiology clinic with chest pain.     Chest Pain  -Since 3 weeks ago, at outpatient stress test today noted to have Resting images demonstrate a large area of diminished perfusion involving the mid to distal anteroseptal, anterior, apical and distal inferolateral wall.  Stress images were not performed due to report of chest pain and EKG changes compared to prior ECG.  -Patient states he has had persistent chest pain since picking up his 45 pound grandchild and then walking inside with them, did have associated diaphoresis  -Continuous cardiac monitoring  -On admission EKG w/o and ST elevations, has some T wave flattening   -Troponins negative x2  -Cardiac cath 1/19.  Interventional cardiology recommended high-dose statin and nitrates for chest pain.  No stents were placed.  -- Aspirin decreased to 81 mg as per cardiology. Imdur added by cardiology    CAD s/p CABG in 2022  HTN  -Continue home ASA, Metoprolol and Lisinopril.  Lisinopril increased to 10 mg per cardiology.  -Increased Crestor to 40 mg     Asymptomatic bradycardia worsens with sleep  -Metoprolol decreased from twice daily to once daily as per cardiology.  -Somewhat concerning for sleep apnea  -Defer sleep study at discharge    Thrombocytopenia  Chronic, marlyn 111 in 4/2022. Unclear  if ever worked up but has been trending up since last year.     T2DM  -Hold home metformin, HbA1c 6.9 on 1/16.  Restart on 1/21, since he will be 48 hours after coronary angiogram.  -FS ACHS and sliding scale insulin       Cirrhosis with gastrosplenic varices  -Noted during cholecystectomy 6/6/2022, on abdominal ultrasound 6/6/2022, and abdominal CT 7/3/22      Consultations This Hospital Stay   CARDIOLOGY IP CONSULT  CARE MANAGEMENT / SOCIAL WORK IP CONSULT  PHARMACY IP CONSULT  PHARMACY IP CONSULT    Code Status   Full Code    Time Spent on this Encounter   I, Jorge Alberto Rubio MD, personally saw the patient today and spent greater than 30 minutes discharging this patient.       Jorge Alberto Rubio MD  [facility] [address] [address]  Phone: [phone]  Fax: [phone]  ______________________________________________________________________    Physical Exam   Vital Signs: Temp: 98.1  F (36.7  C) Temp src: Oral BP: 134/78 Pulse: 52   Resp: 20 SpO2: 99 % O2 Device: None (Room air) Oxygen Delivery: 2 LPM  Weight: 221 lbs 3.2 oz  General Appearance: Does not appear to be in distress  Respiratory: Breathing at 14 times a minute  Cardiovascular: Regular rate and rhythm  GI: No abdominal distention  Skin: No generalized skin rash  Other: No tremors noted       Primary Care Physician   Roberto Murphy    Discharge Orders      Follow-up and recommended labs and tests     Follow up with primary care provider, Roberto Murphy, within 7 days for hospital follow- up.  No follow up labs or test are needed.  Follow-up with Dr. Schafer in 4 to 6 weeks.     Activity    Your activity upon discharge: activity as tolerated     Diet    Follow this diet upon discharge: Orders Placed This Encounter      Low Saturated Fat Na <2400 mg, diabetic       Significant Results and Procedures   Results for orders placed or performed during the hospital encounter of 01/18/23   Chest XR,  PA & LAT  "   Narrative    EXAM: XR CHEST 2 VIEWS  LOCATION: [facility]  DATE/TIME: 1/18/2023 3:49 PM    INDICATION: chest discomfort x2 weeks, failed stress test today  COMPARISON: 01/16/2023.      Impression    IMPRESSION: Poststernotomy. Normal heart size and pulmonary vascularity. Clear lungs. No pleural fluid or pneumothorax. Cholecystectomy clips in right upper quadrant.   Cardiac Catheterization    Narrative      Exertional/persistent chest pain and dyspnea in a diabetic man with 3   vessel CABG in May 2022.    Grossly normal left main.    Severe proximal LAD with a mid LAD occlusion and severe distal disease.   Patent LIMA to the apical LAD which backfills the distal LAD. Patent SVG   to the first diagonal which backfills the mid LAD.    The circumflex feeds a large OM-2 and moderate OM-3. The SVG to the   distal portion of OM-3 is widely patent.    The RCA had moderate ostial and distal narrowing, dFR was 1.0. The   lesions improved with intracoronary nitroglycerin.    LV EDP moderately elevated. LV-Ao pressure gradient 5mmHg.            Discharge Medications   Current Discharge Medication List      START taking these medications    Details   aspirin (ASA) 81 MG EC tablet Take 1 tablet (81 mg) by mouth daily Start tomorrow morning.  Qty: 90 tablet, Refills: 3    Associated Diagnoses: Exertional chest pain; Coronary artery disease of native artery of native heart with stable angina pectoris (H)      isosorbide mononitrate (IMDUR) 30 MG 24 hr tablet Take 1 tablet (30 mg) by mouth daily DO NOT CRUSH. Can split tablet in half along score bing.  Qty: 30 tablet, Refills: 1    Associated Diagnoses: Coronary artery disease of native artery of native heart with stable angina pectoris (H)      !! nitroGLYcerin (NITROSTAT) 0.4 MG sublingual tablet One tablet under the tongue every 5 minutes if needed for chest pain. May repeat every 5 minutes for a maximum of 3 doses in 15 minutes\"  Qty: 25 tablet, " Refills: 3    Associated Diagnoses: Exertional chest pain; Coronary artery disease of native artery of native heart with stable angina pectoris (H)       !! - Potential duplicate medications found. Please discuss with provider.      CONTINUE these medications which have CHANGED    Details   lisinopril (ZESTRIL) 10 MG tablet Take 1 tablet (10 mg) by mouth daily  Qty: 30 tablet, Refills: 1    Associated Diagnoses: Essential hypertension      metFORMIN (GLUCOPHAGE XR) 500 MG 24 hr tablet Take 2 tablets (1,000 mg) by mouth 2 times daily (with meals)  Qty: 360 tablet, Refills: 0    Associated Diagnoses: Type 2 diabetes mellitus without complication, without long-term current use of insulin (H)      metoprolol succinate ER (TOPROL XL) 50 MG 24 hr tablet Take 1 tablet (50 mg) by mouth daily DO NOT CRUSH. Tablet may be split in half along score line. Hold for HR < 60  Qty: 30 tablet, Refills: 1    Associated Diagnoses: Coronary artery disease of native artery of native heart with stable angina pectoris (H)      rosuvastatin (CRESTOR) 40 MG tablet Take 1 tablet (40 mg) by mouth daily  Qty: 30 tablet, Refills: 1    Associated Diagnoses: Coronary artery disease of native artery of native heart with stable angina pectoris (H)         CONTINUE these medications which have NOT CHANGED    Details   acetaminophen (TYLENOL) 325 MG tablet Take 2 tablets (650 mg) by mouth every 4 hours as needed for other, headaches or fever (For optimal non-opioid multimodal pain management to improve pain control.)  Qty: 30 tablet, Refills: 0    Associated Diagnoses: S/P CABG (coronary artery bypass graft)      albuterol (PROAIR HFA;PROVENTIL HFA;VENTOLIN HFA) 90 mcg/actuation inhaler [ALBUTEROL (PROAIR HFA;PROVENTIL HFA;VENTOLIN HFA) 90 MCG/ACTUATION INHALER] Inhale 2 puffs every 6 (six) hours as needed for wheezing.  Qty: 1 each, Refills: 1    Comments: May substitute the equivalent medication per insurance preference.  Associated Diagnoses:  Asthma with exacerbation      coenzyme Q-10 capsule Take 2 capsules (200 mg) by mouth daily    Associated Diagnoses: Coronary artery disease involving native coronary artery of native heart with angina pectoris (H)      fluticasone propionate (FLONASE) 50 mcg/actuation nasal spray Spray 1 spray in nostril daily as needed (seasonal)      hydrochlorothiazide (MICROZIDE) 12.5 MG capsule Take 1 capsule (12.5 mg) by mouth daily  Qty: 90 capsule, Refills: 3    Associated Diagnoses: Essential hypertension      MAGNESIUM PO Take 1 tablet by mouth daily Magnesium citrate 210 mg      montelukast (SINGULAIR) 10 MG tablet Take 1 tablet (10 mg) by mouth daily [MONTELUKAST (SINGULAIR) 10 MG TABLET] TAKE 1 TABLET BY MOUTH EVERY NIGHT AT BEDTIME  Qty: 90 tablet, Refills: 3    Associated Diagnoses: Other allergy, other than to medicinal agents      !! nitroGLYcerin (NITROSTAT) 0.4 MG sublingual tablet For chest pain place 1 tablet under the tongue every 5 minutes for 3 doses. If symptoms persist 5 minutes after 1st dose call 911.  Qty: 45 tablet, Refills: 11    Associated Diagnoses: Coronary artery disease involving native coronary artery of native heart with angina pectoris (H)       !! - Potential duplicate medications found. Please discuss with provider.      STOP taking these medications       aspirin (ASA) 81 MG chewable tablet Comments:   Reason for Stopping:             Allergies   Allergies   Allergen Reactions     Atorvastatin Muscle Pain (Myalgia)

## 2023-01-20 NOTE — PLAN OF CARE
Pt alert and oriented, vitally stable. Pt calls appropriately and is independent. Pt has denied pain and given medications per order. MATTI MENDOZA, RN    Problem: Plan of Care - These are the overarching goals to be used throughout the patient stay.    Goal: Plan of Care Review  Description: The Plan of Care Review/Shift note should be completed every shift.  The Outcome Evaluation is a brief statement about your assessment that the patient is improving, declining, or no change.  This information will be displayed automatically on your shift note.  Outcome: Progressing     Problem: Plan of Care - These are the overarching goals to be used throughout the patient stay.    Goal: Absence of Hospital-Acquired Illness or Injury  Intervention: Identify and Manage Fall Risk  Recent Flowsheet Documentation  Taken 1/20/2023 0015 by MATTI MENDOZA  Safety Promotion/Fall Prevention:   clutter free environment maintained   lighting adjusted   nonskid shoes/slippers when out of bed   patient and family education  Taken 1/19/2023 2000 by MATTI MENDOZA  Safety Promotion/Fall Prevention:   clutter free environment maintained   lighting adjusted   nonskid shoes/slippers when out of bed   patient and family education     Problem: Plan of Care - These are the overarching goals to be used throughout the patient stay.    Goal: Absence of Hospital-Acquired Illness or Injury  Intervention: Prevent Skin Injury  Recent Flowsheet Documentation  Taken 1/20/2023 0015 by MATTI MENDOZA  Body Position: position changed independently  Taken 1/19/2023 2000 by MATTI MENDOZA  Body Position: position changed independently  Taken 1/19/2023 1952 by MATTI MENDOZA  Body Position: position changed independently     Problem: Plan of Care - These are the overarching goals to be used throughout the patient stay.    Goal: Optimal Comfort and Wellbeing  Outcome: Progressing     Problem: Plan of Care - These are the overarching goals to be used  throughout the patient stay.    Goal: Readiness for Transition of Care  Outcome: Progressing

## 2023-02-01 ENCOUNTER — OFFICE VISIT (OUTPATIENT)
Dept: CARDIOLOGY | Facility: CLINIC | Age: 63
End: 2023-02-01
Payer: OTHER GOVERNMENT

## 2023-02-01 VITALS
RESPIRATION RATE: 16 BRPM | HEIGHT: 69 IN | BODY MASS INDEX: 32.73 KG/M2 | WEIGHT: 221 LBS | DIASTOLIC BLOOD PRESSURE: 58 MMHG | HEART RATE: 56 BPM | SYSTOLIC BLOOD PRESSURE: 108 MMHG

## 2023-02-01 DIAGNOSIS — I25.118 CORONARY ARTERY DISEASE OF NATIVE ARTERY OF NATIVE HEART WITH STABLE ANGINA PECTORIS (H): ICD-10-CM

## 2023-02-01 DIAGNOSIS — I25.9 CHEST PAIN DUE TO MYOCARDIAL ISCHEMIA, UNSPECIFIED ISCHEMIC CHEST PAIN TYPE: ICD-10-CM

## 2023-02-01 DIAGNOSIS — I50.20 HEART FAILURE WITH REDUCED EJECTION FRACTION (H): ICD-10-CM

## 2023-02-01 PROCEDURE — 99214 OFFICE O/P EST MOD 30 MIN: CPT | Performed by: INTERNAL MEDICINE

## 2023-02-01 NOTE — PROGRESS NOTES
"    Cardiology Progress Note     Assessment:  Coronary artery disease history of anterior MI and stenting of LAD in 2010, status postcoronary artery bypass graft surgery May 2022, recent hospitalization for atypical chest pain, stable coronary anatomy with all bypass grafts patent  Ischemic cardiomyopathy, mild  Chronic systolic heart failure, compensated  Hypertension, good control  Diabetes mellitus  Hypercholesterolemia on high-dose of statin      Plan:  We went over the results of recent coronary angiogram and echo.  He was relieved and reassured.  We discussed the secondary prevention of cardiac events.  I explained to him the rationale behind being on current medications.  I will think he needs to be on isosorbide.    Follow-up in 1 year    Subjective:   This is 63 year old male who comes in today for follow-up visit.  He has a history of multivessel coronary artery disease and ischemic cardiomyopathy.  He underwent coronary artery bypass graft surgery in May of last year.  2 weeks ago he developed a chest tightness after carrying 45-year-old grandson through the snow.  He came to the emergency room.  He had negative troponins.  He underwent coronary angiogram.  His coronary anatomy was stable.  Bypasses were patent.  Dose of metoprolol was readjusted because of bradycardia.  He feels better today.  He denies chest pain or shortness of breath.  His energy level has improved.    Review of Systems:   Negative other than history of present illness    Objective:   /58 (BP Location: Left arm, Patient Position: Sitting, Cuff Size: Adult Large)   Pulse 56   Resp 16   Ht 1.746 m (5' 8.75\")   Wt 100.2 kg (221 lb)   BMI 32.87 kg/m    Physical Exam:  GENERAL: no distress  NECK: No JVD  LUNGS: Clear to auscultation.  CARDIAC: regular rhythm, S1 & S2 normal.  No heaves, thrills, gallops or murmurs.  ABDOMEN: flat, negative hepatosplenomegaly, soft and non-tender.  EXTREMITIES: No evidence of cyanosis, clubbing " "or edema.    Current Outpatient Medications   Medication Sig Dispense Refill     acetaminophen (TYLENOL) 325 MG tablet Take 2 tablets (650 mg) by mouth every 4 hours as needed for other, headaches or fever (For optimal non-opioid multimodal pain management to improve pain control.) 30 tablet 0     albuterol (PROAIR HFA;PROVENTIL HFA;VENTOLIN HFA) 90 mcg/actuation inhaler [ALBUTEROL (PROAIR HFA;PROVENTIL HFA;VENTOLIN HFA) 90 MCG/ACTUATION INHALER] Inhale 2 puffs every 6 (six) hours as needed for wheezing. 1 each 1     aspirin (ASA) 81 MG EC tablet Take 1 tablet (81 mg) by mouth daily Start tomorrow morning. 90 tablet 3     coenzyme Q-10 capsule Take 2 capsules (200 mg) by mouth daily       fluticasone propionate (FLONASE) 50 mcg/actuation nasal spray Spray 1 spray in nostril daily as needed (seasonal)       hydrochlorothiazide (MICROZIDE) 12.5 MG capsule Take 1 capsule (12.5 mg) by mouth daily 90 capsule 3     lisinopril (ZESTRIL) 10 MG tablet Take 1 tablet (10 mg) by mouth daily 30 tablet 1     metFORMIN (GLUCOPHAGE XR) 500 MG 24 hr tablet Take 2 tablets (1,000 mg) by mouth 2 times daily (with meals) 360 tablet 0     metoprolol succinate ER (TOPROL XL) 50 MG 24 hr tablet Take 1 tablet (50 mg) by mouth daily DO NOT CRUSH. Tablet may be split in half along score line. Hold for HR < 60 30 tablet 1     montelukast (SINGULAIR) 10 MG tablet Take 1 tablet (10 mg) by mouth daily [MONTELUKAST (SINGULAIR) 10 MG TABLET] TAKE 1 TABLET BY MOUTH EVERY NIGHT AT BEDTIME 90 tablet 3     nitroGLYcerin (NITROSTAT) 0.4 MG sublingual tablet One tablet under the tongue every 5 minutes if needed for chest pain. May repeat every 5 minutes for a maximum of 3 doses in 15 minutes\" 25 tablet 3     nitroGLYcerin (NITROSTAT) 0.4 MG sublingual tablet For chest pain place 1 tablet under the tongue every 5 minutes for 3 doses. If symptoms persist 5 minutes after 1st dose call 911. 45 tablet 11     rosuvastatin (CRESTOR) 40 MG tablet Take 1 tablet " (40 mg) by mouth daily 30 tablet 1       Cardiographics:    ECG: Read by me 1/19/2023 normal sinus rhythm anteroseptal myocardial infarction, old, nonspecific ST-T abnormalities, normal QRS duration    Echocardiogram: January 2023  The visual ejection fraction is 45-50%.  There is septal akinesis.  There is apical dyskinesis.  There is mild anterior wall hypokinesis.  Right ventricular function cannot be assessed due to poor image quality.  No significant valve disease.    Coronary angio: January 2023    Exertional/persistent chest pain and dyspnea in a diabetic man with 3 vessel CABG in May 2022.    Grossly normal left main.    Severe proximal LAD with a mid LAD occlusion and severe distal disease. Patent LIMA to the apical LAD which backfills the distal LAD. Patent SVG to the first diagonal which backfills the mid LAD.    The circumflex feeds a large OM-2 and moderate OM-3. The SVG to the distal portion of OM-3 is widely patent.    The RCA had moderate ostial and distal narrowing, dFR was 1.0. The lesions improved with intracoronary nitroglycerin.    LV EDP moderately elevated. LV-Ao pressure gradient 5mmHg.     Lab Results    Chemistry/lipid CBC Cardiac Enzymes/BNP/TSH/INR   Recent Labs   Lab Test 01/19/23  0715   CHOL 107   HDL 36*   LDL 44   TRIG 137     Recent Labs   Lab Test 01/19/23  0715 07/18/22  0736 04/08/22  1055   LDL 44 50 101     Recent Labs   Lab Test 01/20/23  1208 01/19/23  0817 01/19/23  0715   NA  --   --  139   POTASSIUM  --   --  4.3   CHLORIDE  --   --  106   CO2  --   --  24   *   < > 127*   BUN  --   --  16.6   CR  --   --  0.72   GFRESTIMATED  --   --  >90   JAMIE  --   --  8.9    < > = values in this interval not displayed.     Recent Labs   Lab Test 01/19/23  0715 01/18/23  1423 01/16/23  0929   CR 0.72 0.72 0.81     Recent Labs   Lab Test 01/16/23  0929 07/18/22  0736 04/29/22  0826   A1C 6.9* 6.4* 7.8*          Recent Labs   Lab Test 01/18/23  1423   WBC 3.9*   HGB 13.6   HCT 40.0    MCV 86   *     Recent Labs   Lab Test 01/18/23  1423 01/16/23  0929 07/18/22  0736   HGB 13.6 14.1 13.3    No results for input(s): TROPONINI in the last 96920 hours.  Recent Labs   Lab Test 01/16/23  0929   NTBNP 87     No results for input(s): TSH in the last 80245 hours.  Recent Labs   Lab Test 01/19/23  0715 06/06/22  0408 05/10/22  1303   INR 1.17* 1.21* 1.45*

## 2023-02-01 NOTE — PATIENT INSTRUCTIONS
Mir Treviño Sr.,    It was a pleasure to see you today at the [facility].     My recommendations after this visit include:    Stay active  You can stop isosorbide    W. Marcus Lezama MD, FACC, FASE

## 2023-02-01 NOTE — LETTER
"2/1/2023    Roberto Murphy MD  [address] [place] [address]    RE: Mir Treviño Sr.       Dear Colleague,     I had the pleasure of seeing Mir Treviño Sr. in the [facility].      Cardiology Progress Note     Assessment:  Coronary artery disease history of anterior MI and stenting of LAD in 2010, status postcoronary artery bypass graft surgery May 2022, recent hospitalization for atypical chest pain, stable coronary anatomy with all bypass grafts patent  Ischemic cardiomyopathy, mild  Chronic systolic heart failure, compensated  Hypertension, good control  Diabetes mellitus  Hypercholesterolemia on high-dose of statin      Plan:  We went over the results of recent coronary angiogram and echo.  He was relieved and reassured.  We discussed the secondary prevention of cardiac events.  I explained to him the rationale behind being on current medications.  I will think he needs to be on isosorbide.    Follow-up in 1 year    Subjective:   This is 63 year old male who comes in today for follow-up visit.  He has a history of multivessel coronary artery disease and ischemic cardiomyopathy.  He underwent coronary artery bypass graft surgery in May of last year.  2 weeks ago he developed a chest tightness after carrying 45-year-old grandson through the snow.  He came to the emergency room.  He had negative troponins.  He underwent coronary angiogram.  His coronary anatomy was stable.  Bypasses were patent.  Dose of metoprolol was readjusted because of bradycardia.  He feels better today.  He denies chest pain or shortness of breath.  His energy level has improved.    Review of Systems:   Negative other than history of present illness    Objective:   /58 (BP Location: Left arm, Patient Position: Sitting, Cuff Size: Adult Large)   Pulse 56   Resp 16   Ht 1.746 m (5' 8.75\")   Wt 100.2 kg (221 lb)   BMI 32.87 kg/m    Physical Exam:  GENERAL: no distress  NECK: No JVD  LUNGS: Clear to " "auscultation.  CARDIAC: regular rhythm, S1 & S2 normal.  No heaves, thrills, gallops or murmurs.  ABDOMEN: flat, negative hepatosplenomegaly, soft and non-tender.  EXTREMITIES: No evidence of cyanosis, clubbing or edema.    Current Outpatient Medications   Medication Sig Dispense Refill     acetaminophen (TYLENOL) 325 MG tablet Take 2 tablets (650 mg) by mouth every 4 hours as needed for other, headaches or fever (For optimal non-opioid multimodal pain management to improve pain control.) 30 tablet 0     albuterol (PROAIR HFA;PROVENTIL HFA;VENTOLIN HFA) 90 mcg/actuation inhaler [ALBUTEROL (PROAIR HFA;PROVENTIL HFA;VENTOLIN HFA) 90 MCG/ACTUATION INHALER] Inhale 2 puffs every 6 (six) hours as needed for wheezing. 1 each 1     aspirin (ASA) 81 MG EC tablet Take 1 tablet (81 mg) by mouth daily Start tomorrow morning. 90 tablet 3     coenzyme Q-10 capsule Take 2 capsules (200 mg) by mouth daily       fluticasone propionate (FLONASE) 50 mcg/actuation nasal spray Spray 1 spray in nostril daily as needed (seasonal)       hydrochlorothiazide (MICROZIDE) 12.5 MG capsule Take 1 capsule (12.5 mg) by mouth daily 90 capsule 3     lisinopril (ZESTRIL) 10 MG tablet Take 1 tablet (10 mg) by mouth daily 30 tablet 1     metFORMIN (GLUCOPHAGE XR) 500 MG 24 hr tablet Take 2 tablets (1,000 mg) by mouth 2 times daily (with meals) 360 tablet 0     metoprolol succinate ER (TOPROL XL) 50 MG 24 hr tablet Take 1 tablet (50 mg) by mouth daily DO NOT CRUSH. Tablet may be split in half along score line. Hold for HR < 60 30 tablet 1     montelukast (SINGULAIR) 10 MG tablet Take 1 tablet (10 mg) by mouth daily [MONTELUKAST (SINGULAIR) 10 MG TABLET] TAKE 1 TABLET BY MOUTH EVERY NIGHT AT BEDTIME 90 tablet 3     nitroGLYcerin (NITROSTAT) 0.4 MG sublingual tablet One tablet under the tongue every 5 minutes if needed for chest pain. May repeat every 5 minutes for a maximum of 3 doses in 15 minutes\" 25 tablet 3     nitroGLYcerin (NITROSTAT) 0.4 MG " sublingual tablet For chest pain place 1 tablet under the tongue every 5 minutes for 3 doses. If symptoms persist 5 minutes after 1st dose call 911. 45 tablet 11     rosuvastatin (CRESTOR) 40 MG tablet Take 1 tablet (40 mg) by mouth daily 30 tablet 1       Cardiographics:    ECG: Read by me 1/19/2023 normal sinus rhythm anteroseptal myocardial infarction, old, nonspecific ST-T abnormalities, normal QRS duration    Echocardiogram: January 2023  The visual ejection fraction is 45-50%.  There is septal akinesis.  There is apical dyskinesis.  There is mild anterior wall hypokinesis.  Right ventricular function cannot be assessed due to poor image quality.  No significant valve disease.    Coronary angio: January 2023    Exertional/persistent chest pain and dyspnea in a diabetic man with 3 vessel CABG in May 2022.    Grossly normal left main.    Severe proximal LAD with a mid LAD occlusion and severe distal disease. Patent LIMA to the apical LAD which backfills the distal LAD. Patent SVG to the first diagonal which backfills the mid LAD.    The circumflex feeds a large OM-2 and moderate OM-3. The SVG to the distal portion of OM-3 is widely patent.    The RCA had moderate ostial and distal narrowing, dFR was 1.0. The lesions improved with intracoronary nitroglycerin.    LV EDP moderately elevated. LV-Ao pressure gradient 5mmHg.     Lab Results    Chemistry/lipid CBC Cardiac Enzymes/BNP/TSH/INR   Recent Labs   Lab Test 01/19/23  0715   CHOL 107   HDL 36*   LDL 44   TRIG 137     Recent Labs   Lab Test 01/19/23  0715 07/18/22  0736 04/08/22  1055   LDL 44 50 101     Recent Labs   Lab Test 01/20/23  1208 01/19/23  0817 01/19/23  0715   NA  --   --  139   POTASSIUM  --   --  4.3   CHLORIDE  --   --  106   CO2  --   --  24   *   < > 127*   BUN  --   --  16.6   CR  --   --  0.72   GFRESTIMATED  --   --  >90   JAMIE  --   --  8.9    < > = values in this interval not displayed.     Recent Labs   Lab Test 01/19/23  0715  01/18/23  1423 01/16/23  0929   CR 0.72 0.72 0.81     Recent Labs   Lab Test 01/16/23  0929 07/18/22  0736 04/29/22  0826   A1C 6.9* 6.4* 7.8*          Recent Labs   Lab Test 01/18/23  1423   WBC 3.9*   HGB 13.6   HCT 40.0   MCV 86   *     Recent Labs   Lab Test 01/18/23  1423 01/16/23  0929 07/18/22  0736   HGB 13.6 14.1 13.3    No results for input(s): TROPONINI in the last 82298 hours.  Recent Labs   Lab Test 01/16/23  0929   NTBNP 87     No results for input(s): TSH in the last 49688 hours.  Recent Labs   Lab Test 01/19/23  0715 06/06/22  0408 05/10/22  1303   INR 1.17* 1.21* 1.45*                        Thank you for allowing me to participate in the care of your patient.      Sincerely,     Marcus Lezama MD     [facility]  cc:   Roberto Murphy MD  [address] [place] [address]

## 2023-02-16 DIAGNOSIS — E11.9 TYPE 2 DIABETES MELLITUS WITHOUT COMPLICATION, WITHOUT LONG-TERM CURRENT USE OF INSULIN (H): ICD-10-CM

## 2023-02-18 RX ORDER — METFORMIN HCL 500 MG
TABLET, EXTENDED RELEASE 24 HR ORAL
Qty: 360 TABLET | Refills: 1 | Status: SHIPPED | OUTPATIENT
Start: 2023-02-18 | End: 2023-09-29

## 2023-02-18 NOTE — TELEPHONE ENCOUNTER
"Last Written Prescription Date:  1/20/23  Last Fill Quantity: 360,  # refills: 0   Last office visit provider:  1/16/23     Requested Prescriptions   Pending Prescriptions Disp Refills     metFORMIN (GLUCOPHAGE XR) 500 MG 24 hr tablet [Pharmacy Med Name: METFORMIN HCL ER TABS 500MG] 360 tablet 3     Sig: TAKE 2 TABLETS TWICE A DAY WITH MEALS       Biguanide Agents Passed - 2/16/2023  5:13 PM        Passed - Patient is age 10 or older        Passed - Patient has documented A1c within the specified period of time.     If HgbA1C is 8 or greater, it needs to be on file within the past 3 months.  If less than 8, must be on file within the past 6 months.     Recent Labs   Lab Test 01/16/23  0929   A1C 6.9*             Passed - Patient's CR is NOT>1.4 OR Patient's EGFR is NOT<45 within past 12 mos.     Recent Labs   Lab Test 01/19/23  0715   GFRESTIMATED >90       Recent Labs   Lab Test 01/19/23  0715   CR 0.72             Passed - Patient does NOT have a diagnosis of CHF.        Passed - Medication is active on med list        Passed - Recent (6 mo) or future (30 days) visit within the authorizing provider's specialty     Patient had office visit in the last 6 months or has a visit in the next 30 days with authorizing provider or within the authorizing provider's specialty.  See \"Patient Info\" tab in inbasket, or \"Choose Columns\" in Meds & Orders section of the refill encounter.                 Karie Munoz RN 02/18/23 10:29 AM  "

## 2023-03-28 ENCOUNTER — MYC MEDICAL ADVICE (OUTPATIENT)
Dept: FAMILY MEDICINE | Facility: CLINIC | Age: 63
End: 2023-03-28
Payer: OTHER GOVERNMENT

## 2023-03-28 DIAGNOSIS — I10 ESSENTIAL HYPERTENSION: ICD-10-CM

## 2023-03-28 DIAGNOSIS — I25.118 CORONARY ARTERY DISEASE OF NATIVE ARTERY OF NATIVE HEART WITH STABLE ANGINA PECTORIS (H): ICD-10-CM

## 2023-03-28 RX ORDER — LISINOPRIL 10 MG/1
10 TABLET ORAL DAILY
Qty: 30 TABLET | Refills: 1 | Status: SHIPPED | OUTPATIENT
Start: 2023-03-28 | End: 2023-05-17

## 2023-03-28 RX ORDER — ROSUVASTATIN CALCIUM 40 MG/1
40 TABLET, COATED ORAL DAILY
Qty: 30 TABLET | Refills: 1 | Status: SHIPPED | OUTPATIENT
Start: 2023-03-28 | End: 2023-04-21

## 2023-04-21 DIAGNOSIS — I25.118 CORONARY ARTERY DISEASE OF NATIVE ARTERY OF NATIVE HEART WITH STABLE ANGINA PECTORIS (H): ICD-10-CM

## 2023-04-21 RX ORDER — ROSUVASTATIN CALCIUM 40 MG/1
40 TABLET, COATED ORAL DAILY
Qty: 90 TABLET | Refills: 3 | Status: SHIPPED | OUTPATIENT
Start: 2023-04-21 | End: 2023-05-17

## 2023-05-15 ENCOUNTER — MYC MEDICAL ADVICE (OUTPATIENT)
Dept: FAMILY MEDICINE | Facility: CLINIC | Age: 63
End: 2023-05-15
Payer: OTHER GOVERNMENT

## 2023-05-15 DIAGNOSIS — R79.89 LOW TESTOSTERONE: Primary | ICD-10-CM

## 2023-05-16 DIAGNOSIS — I25.118 CORONARY ARTERY DISEASE OF NATIVE ARTERY OF NATIVE HEART WITH STABLE ANGINA PECTORIS (H): ICD-10-CM

## 2023-05-16 DIAGNOSIS — I10 ESSENTIAL HYPERTENSION: ICD-10-CM

## 2023-05-16 NOTE — TELEPHONE ENCOUNTER
Pending Prescriptions:                       Disp   Refills    lisinopril (ZESTRIL) 10 MG tablet         30 tab*1            Sig: Take 1 tablet (10 mg) by mouth daily    rosuvastatin (CRESTOR) 40 MG tablet       90 tab*3            Sig: Take 1 tablet (40 mg) by mouth daily    Pharmacy is calling and asking if this patient can get a 90 days worth of lisinopril. Please advise and call pharmacy back if needed please and thank you. [facility] [phone]

## 2023-05-17 ENCOUNTER — TRANSFERRED RECORDS (OUTPATIENT)
Dept: MULTI SPECIALTY CLINIC | Facility: CLINIC | Age: 63
End: 2023-05-17

## 2023-05-17 LAB — RETINOPATHY: NORMAL

## 2023-05-17 RX ORDER — LISINOPRIL 10 MG/1
10 TABLET ORAL DAILY
Qty: 90 TABLET | Refills: 2 | Status: SHIPPED | OUTPATIENT
Start: 2023-05-17 | End: 2024-01-24

## 2023-05-17 RX ORDER — ROSUVASTATIN CALCIUM 40 MG/1
40 TABLET, COATED ORAL DAILY
Qty: 90 TABLET | Refills: 2 | Status: SHIPPED | OUTPATIENT
Start: 2023-05-17 | End: 2024-03-25

## 2023-05-17 NOTE — TELEPHONE ENCOUNTER
"  Last Written Prescription Date:  3/28/23  Last Fill Quantity: 30,  # refills: 1  Last office visit provider:  1/16/23    Last Written Prescription Date:  4/21/23  Last Fill Quantity: 90,  # refills: 3  Last office visit provider:  1/16/23    Requested Prescriptions   Pending Prescriptions Disp Refills     lisinopril (ZESTRIL) 10 MG tablet 30 tablet 1     Sig: Take 1 tablet (10 mg) by mouth daily       ACE Inhibitors (Including Combos) Protocol Passed - 5/17/2023  2:49 PM        Passed - Blood pressure under 140/90 in past 12 months     BP Readings from Last 3 Encounters:   02/01/23 108/58   01/20/23 131/74   01/16/23 120/80                 Passed - Recent (12 mo) or future (30 days) visit within the authorizing provider's specialty     Patient has had an office visit with the authorizing provider or a provider within the authorizing providers department within the previous 12 mos or has a future within next 30 days. See \"Patient Info\" tab in inbasket, or \"Choose Columns\" in Meds & Orders section of the refill encounter.              Passed - Medication is active on med list        Passed - Patient is age 18 or older        Passed - Normal serum creatinine on file in past 12 months     Recent Labs   Lab Test 01/19/23  0715   CR 0.72       Ok to refill medication if creatinine is low          Passed - Normal serum potassium on file in past 12 months     Recent Labs   Lab Test 01/19/23  0715   POTASSIUM 4.3                rosuvastatin (CRESTOR) 40 MG tablet 90 tablet 3     Sig: Take 1 tablet (40 mg) by mouth daily       Statins Protocol Passed - 5/17/2023  2:49 PM        Passed - LDL on file in past 12 months     Recent Labs   Lab Test 01/19/23  0715   LDL 44             Passed - No abnormal creatine kinase in past 12 months     No lab results found.             Passed - Recent (12 mo) or future (30 days) visit within the authorizing provider's specialty     Patient has had an office visit with the authorizing " "provider or a provider within the authorizing providers department within the previous 12 mos or has a future within next 30 days. See \"Patient Info\" tab in inbasket, or \"Choose Columns\" in Meds & Orders section of the refill encounter.              Passed - Medication is active on med list        Passed - Patient is age 18 or older             Janet Elizabeth RN 05/17/23 2:49 PM  "

## 2023-05-22 ENCOUNTER — LAB (OUTPATIENT)
Dept: LAB | Facility: CLINIC | Age: 63
End: 2023-05-22
Payer: OTHER GOVERNMENT

## 2023-05-22 DIAGNOSIS — E11.9 TYPE 2 DIABETES MELLITUS WITHOUT COMPLICATION, WITHOUT LONG-TERM CURRENT USE OF INSULIN (H): Primary | ICD-10-CM

## 2023-05-22 DIAGNOSIS — R79.89 LOW TESTOSTERONE: ICD-10-CM

## 2023-05-22 LAB — HBA1C MFR BLD: 8.4 % (ref 0–5.6)

## 2023-05-22 PROCEDURE — 36415 COLL VENOUS BLD VENIPUNCTURE: CPT

## 2023-05-22 PROCEDURE — 83036 HEMOGLOBIN GLYCOSYLATED A1C: CPT

## 2023-05-22 PROCEDURE — 84403 ASSAY OF TOTAL TESTOSTERONE: CPT

## 2023-05-22 NOTE — RESULT ENCOUNTER NOTE
Patient's diabetes has worsened since I last saw him 4 months ago.  Think we should have a diabetic follow-up in the next month or 2 about this.  Please call patient and help him schedule this.    Roberto Bates MD

## 2023-05-24 LAB — TESTOST SERPL-MCNC: 570 NG/DL (ref 240–950)

## 2023-05-24 NOTE — RESULT ENCOUNTER NOTE
Mir  Your results from your recent clinic visit show:  Your testosterone was normal    If you have more questions please call the clinic at [phone] or send me a [employer] message    Dr. Roberto Bates

## 2023-07-09 ASSESSMENT — ASTHMA QUESTIONNAIRES: ACT_TOTALSCORE: 25

## 2023-07-10 ENCOUNTER — OFFICE VISIT (OUTPATIENT)
Dept: FAMILY MEDICINE | Facility: CLINIC | Age: 63
End: 2023-07-10
Payer: OTHER GOVERNMENT

## 2023-07-10 VITALS
WEIGHT: 223.31 LBS | BODY MASS INDEX: 31.97 KG/M2 | SYSTOLIC BLOOD PRESSURE: 138 MMHG | TEMPERATURE: 97.4 F | DIASTOLIC BLOOD PRESSURE: 80 MMHG | HEART RATE: 48 BPM | OXYGEN SATURATION: 99 % | HEIGHT: 70 IN

## 2023-07-10 DIAGNOSIS — I25.118 CORONARY ARTERY DISEASE OF NATIVE ARTERY OF NATIVE HEART WITH STABLE ANGINA PECTORIS (H): ICD-10-CM

## 2023-07-10 DIAGNOSIS — E11.42 DIABETIC POLYNEUROPATHY ASSOCIATED WITH TYPE 2 DIABETES MELLITUS (H): ICD-10-CM

## 2023-07-10 DIAGNOSIS — E11.9 TYPE 2 DIABETES MELLITUS WITHOUT COMPLICATION, WITHOUT LONG-TERM CURRENT USE OF INSULIN (H): Primary | ICD-10-CM

## 2023-07-10 DIAGNOSIS — I85.00 IDIOPATHIC ESOPHAGEAL VARICES WITHOUT BLEEDING (H): ICD-10-CM

## 2023-07-10 DIAGNOSIS — K76.0 HEPATIC STEATOSIS: ICD-10-CM

## 2023-07-10 DIAGNOSIS — I50.20 HEART FAILURE WITH REDUCED EJECTION FRACTION (H): ICD-10-CM

## 2023-07-10 DIAGNOSIS — Z00.00 HEALTHCARE MAINTENANCE: ICD-10-CM

## 2023-07-10 PROBLEM — R94.39 ABNORMAL CARDIOVASCULAR STRESS TEST: Status: RESOLVED | Noted: 2023-01-18 | Resolved: 2023-07-10

## 2023-07-10 LAB
CREAT UR-MCNC: 86.6 MG/DL
MICROALBUMIN UR-MCNC: <12 MG/L
MICROALBUMIN/CREAT UR: NORMAL MG/G{CREAT}

## 2023-07-10 PROCEDURE — 82570 ASSAY OF URINE CREATININE: CPT | Performed by: STUDENT IN AN ORGANIZED HEALTH CARE EDUCATION/TRAINING PROGRAM

## 2023-07-10 PROCEDURE — 91313 COVID-19 BIVALENT 18+ (MODERNA): CPT | Performed by: STUDENT IN AN ORGANIZED HEALTH CARE EDUCATION/TRAINING PROGRAM

## 2023-07-10 PROCEDURE — 82043 UR ALBUMIN QUANTITATIVE: CPT | Performed by: STUDENT IN AN ORGANIZED HEALTH CARE EDUCATION/TRAINING PROGRAM

## 2023-07-10 PROCEDURE — 99214 OFFICE O/P EST MOD 30 MIN: CPT | Mod: 25 | Performed by: STUDENT IN AN ORGANIZED HEALTH CARE EDUCATION/TRAINING PROGRAM

## 2023-07-10 PROCEDURE — 0134A COVID-19 BIVALENT 18+ (MODERNA): CPT | Performed by: STUDENT IN AN ORGANIZED HEALTH CARE EDUCATION/TRAINING PROGRAM

## 2023-07-10 PROCEDURE — 99207 PR FOOT EXAM NO CHARGE: CPT | Performed by: STUDENT IN AN ORGANIZED HEALTH CARE EDUCATION/TRAINING PROGRAM

## 2023-07-10 RX ORDER — ISOSORBIDE MONONITRATE 30 MG/1
TABLET, EXTENDED RELEASE ORAL
COMMUNITY
Start: 2023-01-24 | End: 2024-05-02

## 2023-07-10 ASSESSMENT — PAIN SCALES - GENERAL: PAINLEVEL: NO PAIN (0)

## 2023-07-10 NOTE — PROGRESS NOTES
Diabetes Check-up      ASSESSMENT AND PLAN:  63-year-old male with past [place] of CAD, HFrEF, type 2 diabetes who presents for diabetic Follow-up. We also discussed liver concerns    1. Coronary artery disease of native artery of native heart with stable angina pectoris (H)  2. Heart failure with reduced ejection fraction (H)  3. Type 2 diabetes mellitus without complication, without long-term current use of insulin (H)  Patient's last A1C high at 8.4.  Recommended addition of jardiance given CAD/CHF Hx. Will titrate himn up to highest dose. He has recently stopped metformin for SE of fatigue. Has improved off of metformin. I recommended he start jardiance and if tolerating add back on metformin. If has SE again then message me and will try to get him on ozempic.   - empagliflozin (JARDIANCE) 10 MG TABS tablet; Take 1 tablet (10 mg) by mouth daily for 14 days  Dispense: 14 tablet; Refill: 0  - empagliflozin (JARDIANCE) 25 MG TABS tablet; Take 1 tablet (25 mg) by mouth daily  Dispense: 90 tablet; Refill: 1    4. Idiopathic esophageal varices without bleeding (H)  5. Hepatic steatosis  Patient has a Hx of hepatic steatosis and varices. Unknown etiology for this. I recommended workup though GI. He is wondering if this could be combat related.   - Adult GI  Referral - Consult Only; Future    Follow up in 3 months, sooner as needed    Options for treatment and follow-up care were reviewed with the patient. Mir Treviño Sr.  engaged in the decision making process and verbalized understanding of the options discussed and agreed with the final plan.    Dr. Roberto Bates       SUBJECTIVE  Mir Treviño Sr. is a 63 year old who presents today for follow up of DIABETES MELLITUS .       Diagnosed in 2020  Most recent HgbA1c:   Lab Results   Component Value Date    A1C 8.4 05/22/2023    A1C 6.9 01/16/2023    A1C 6.4 07/18/2022    A1C 7.8 04/29/2022     Current medication regimen: Metformin 2,000 mg  totally daily    ASA: 81 mg daily  Statin: Rosuvastatin 20 mg daily    Complications:  - Retinopathy: Last ophthalmology appointment- scheduled to go now September sometime 22  - Nephropathy:  Last Comprehensive Metabolic Panel:  Lab Results   Component Value Date     01/19/2023    POTASSIUM 4.3 01/19/2023    CHLORIDE 106 01/19/2023    CO2 24 01/19/2023    ANIONGAP 9 01/19/2023     (H) 01/20/2023    BUN 16.6 01/19/2023    CR 0.72 01/19/2023    GFRESTIMATED >90 01/19/2023    JAMIE 8.9 01/19/2023       - Neuropathy: foot exam 7/14/22    BP Readings from Last 3 Encounters:   02/01/23 108/58   01/20/23 131/74   01/16/23 120/80       Recent Labs   Lab Test 01/19/23  0715 07/18/22  0736   CHOL 107 111   HDL 36* 42   LDL 44 50   TRIG 137 94       Wt Readings from Last 3 Encounters:   02/01/23 100.2 kg (221 lb)   01/20/23 100.3 kg (221 lb 3.2 oz)   01/16/23 102.2 kg (225 lb 5 oz)       Current Outpatient Medications   Medication Sig Dispense Refill     acetaminophen (TYLENOL) 325 MG tablet Take 2 tablets (650 mg) by mouth every 4 hours as needed for other, headaches or fever (For optimal non-opioid multimodal pain management to improve pain control.) 30 tablet 0     albuterol (PROAIR HFA;PROVENTIL HFA;VENTOLIN HFA) 90 mcg/actuation inhaler [ALBUTEROL (PROAIR HFA;PROVENTIL HFA;VENTOLIN HFA) 90 MCG/ACTUATION INHALER] Inhale 2 puffs every 6 (six) hours as needed for wheezing. 1 each 1     aspirin (ASA) 81 MG EC tablet Take 1 tablet (81 mg) by mouth daily Start tomorrow morning. 90 tablet 3     coenzyme Q-10 capsule Take 2 capsules (200 mg) by mouth daily       fluticasone propionate (FLONASE) 50 mcg/actuation nasal spray Spray 1 spray in nostril daily as needed (seasonal)       hydrochlorothiazide (MICROZIDE) 12.5 MG capsule Take 1 capsule (12.5 mg) by mouth daily 90 capsule 3     lisinopril (ZESTRIL) 10 MG tablet Take 1 tablet (10 mg) by mouth daily 90 tablet 2     metFORMIN (GLUCOPHAGE XR) 500 MG 24 hr tablet TAKE  "2 TABLETS TWICE A DAY WITH MEALS 360 tablet 1     metoprolol succinate ER (TOPROL XL) 50 MG 24 hr tablet Take 1 tablet (50 mg) by mouth daily DO NOT CRUSH. Tablet may be split in half along score line. Hold for HR < 60 30 tablet 1     montelukast (SINGULAIR) 10 MG tablet Take 1 tablet (10 mg) by mouth daily [MONTELUKAST (SINGULAIR) 10 MG TABLET] TAKE 1 TABLET BY MOUTH EVERY NIGHT AT BEDTIME 90 tablet 3     nitroGLYcerin (NITROSTAT) 0.4 MG sublingual tablet One tablet under the tongue every 5 minutes if needed for chest pain. May repeat every 5 minutes for a maximum of 3 doses in 15 minutes\" 25 tablet 3     nitroGLYcerin (NITROSTAT) 0.4 MG sublingual tablet For chest pain place 1 tablet under the tongue every 5 minutes for 3 doses. If symptoms persist 5 minutes after 1st dose call 911. 45 tablet 11     rosuvastatin (CRESTOR) 40 MG tablet Take 1 tablet (40 mg) by mouth daily 90 tablet 2       Histories reviewed and updated in Epic.    OBJECTIVE:  Vitals: There were no vitals taken for this visit.  BMIE= There is no height or weight on file to calculate BMI.    General appearance: Alert, cooperative, no distress, appears stated age  Head: Normocephalic, atraumatic, without obvious abnormality  Eyes: Pupils equal round, reactive.  Conjunctiva clear.  Nose: Nares normal, no drainage.  Throat: Lips, mucosa, tongue normal mucosa pink and moist  Neck: Supple, symmetric, trachea midline  Lungs: Clear to auscultation bilaterally, no wheezing or crackles present.  Respirations unlabored  Heart: Regular rate and rhythm, normal S1 and S2, no murmur, rub or gallop.  Extremities: Extremities normal, atraumatic.  No cyanosis or edema.  Skin: Skin color, texture, turgor normal no rashes or lesions on limited skin exam      Diabetic Foot Screen:  Any complaints of increased pain or numbness ? Some pain at night, no numbness  Is there a foot ulcer now or a history of foot ulcer? No  Does the foot have an abnormal shape? No  Are the " nails thick, too long or ingrown? No  Are there any redness or open areas? No         Sensation Testing done at all points on the diagram with monofilament     Right Foot: Sensation Normal at all points  Left Foot: Sensation Normal at all points     Risk Category: 0- No loss of protective sensation

## 2023-07-11 NOTE — RESULT ENCOUNTER NOTE
Mir,  Your results from your recent clinic visit show:  Your kidneys are not leaking protein (albumin)    If you have more questions please call the clinic at [phone] or send me a [employer] message    Dr. Roberto Bates

## 2023-07-12 NOTE — TELEPHONE ENCOUNTER
RECORDS RECEIVED FROM: Roberto Murphy MD // Hepatic steatosis   Appt Date: 10/2/2023   NOTES STATUS DETAILS   OFFICE NOTE from referring provider Internal 7/10/2023, 6/22/2022 OV with BARBARA Murphy   OFFICE NOTES from other specialists Internal 5/15/2017 OV with IGNACIO Devine   DISCHARGE SUMMARY from hospital N/A    MEDICATION LIST Internal    LIVER BIOSPY (IF APPLICABLE)      PATHOLOGY REPORTS  N/A    IMAGING     ENDOSCOPY (IF AVAILABLE) N/A    COLONOSCOPY (IF AVAILABLE) N/A    ULTRASOUND LIVER Internal 6/6/2022 US ABD   CT OF ABDOMEN Internal 7/3/2022 CT ABD PEL   6/6/2022 CT ABD PEL    MRI OF LIVER N/A    FIBROSCAN, US ELASTOGRAPHY, FIBROSIS SCAN, MR ELASTOGRAPHY N/A    LABS     HEPATIC PANEL (LIVER PANEL) Internal 6/22/2022   BASIC METABOLIC PANEL Internal 1/19/2023, 1/18/2023   COMPLETE METABOLIC PANEL Internal 1/16/2023   COMPLETE BLOOD COUNT (CBC) Internal 1/18/2023   INTERNATIONAL NORMALIZED RATIO (INR) Internal 1/19/2023

## 2023-07-13 DIAGNOSIS — I50.20 HEART FAILURE WITH REDUCED EJECTION FRACTION (H): ICD-10-CM

## 2023-07-13 DIAGNOSIS — E11.9 TYPE 2 DIABETES MELLITUS WITHOUT COMPLICATION, WITHOUT LONG-TERM CURRENT USE OF INSULIN (H): ICD-10-CM

## 2023-07-13 NOTE — TELEPHONE ENCOUNTER
"Last Written Prescription Date:  7/10/23  Last Fill Quantity: 14,  # refills: 0   Last office visit provider:   7/10/23    Requested Prescriptions   Pending Prescriptions Disp Refills    empagliflozin (JARDIANCE) 10 MG TABS tablet 14 tablet 0     Sig: Take 1 tablet (10 mg) by mouth daily       Sodium Glucose Co-Transport Inhibitor Agents Passed - 7/13/2023  9:29 AM        Passed - Patient has documented A1c within the specified period of time.     If HgbA1C is 8 or greater, it needs to be on file within the past 3 months.  If less than 8, must be on file within the past 6 months.     Recent Labs   Lab Test 05/22/23  0742   A1C 8.4*             Passed - No creatinine >1.4 or GFR <45 within the past 12 mos     Recent Labs   Lab Test 01/19/23  0715   GFRESTIMATED >90       Recent Labs   Lab Test 01/19/23  0715   CR 0.72             Passed - Medication is active on med list        Passed - Patient is age 18 or older        Passed - Patient has documented normal Potassium within the last 12 mos.     Recent Labs   Lab Test 01/19/23  0715   POTASSIUM 4.3             Passed - Recent (6 mo) or future (30 days) visit within the authorizing provider's specialty     Patient had office visit in the last 6 months or has a visit in the next 30 days with authorizing provider or within the authorizing provider's specialty.  See \"Patient Info\" tab in inbasket, or \"Choose Columns\" in Meds & Orders section of the refill encounter.                 Ne Ortiz RN 07/13/23 3:23 PM  "

## 2023-07-13 NOTE — TELEPHONE ENCOUNTER
Pending Prescriptions:                       Disp   Refills    empagliflozin (JARDIANCE) 10 MG TABS tabl*14 tab*0            Sig: Take 1 tablet (10 mg) by mouth daily

## 2023-07-24 ENCOUNTER — MYC MEDICAL ADVICE (OUTPATIENT)
Dept: FAMILY MEDICINE | Facility: CLINIC | Age: 63
End: 2023-07-24
Payer: OTHER GOVERNMENT

## 2023-07-24 DIAGNOSIS — M54.16 LUMBAR RADICULOPATHY: Primary | ICD-10-CM

## 2023-07-24 RX ORDER — METHYLPREDNISOLONE 4 MG
TABLET, DOSE PACK ORAL
Qty: 21 TABLET | Refills: 0 | Status: SHIPPED | OUTPATIENT
Start: 2023-07-24 | End: 2024-05-02

## 2023-09-10 ENCOUNTER — HEALTH MAINTENANCE LETTER (OUTPATIENT)
Age: 63
End: 2023-09-10

## 2023-09-19 ENCOUNTER — OFFICE VISIT (OUTPATIENT)
Dept: FAMILY MEDICINE | Facility: CLINIC | Age: 63
End: 2023-09-19
Payer: OTHER GOVERNMENT

## 2023-09-19 VITALS
HEIGHT: 69 IN | BODY MASS INDEX: 32.03 KG/M2 | WEIGHT: 216.27 LBS | SYSTOLIC BLOOD PRESSURE: 130 MMHG | OXYGEN SATURATION: 98 % | DIASTOLIC BLOOD PRESSURE: 70 MMHG | HEART RATE: 52 BPM | TEMPERATURE: 97 F

## 2023-09-19 DIAGNOSIS — R35.89 POLYURIA: ICD-10-CM

## 2023-09-19 DIAGNOSIS — E11.9 TYPE 2 DIABETES MELLITUS WITHOUT COMPLICATION, WITHOUT LONG-TERM CURRENT USE OF INSULIN (H): Primary | ICD-10-CM

## 2023-09-19 DIAGNOSIS — T88.7XXA MEDICATION SIDE EFFECTS: ICD-10-CM

## 2023-09-19 LAB
ALBUMIN UR-MCNC: NEGATIVE MG/DL
ANION GAP SERPL CALCULATED.3IONS-SCNC: 10 MMOL/L (ref 7–15)
APPEARANCE UR: CLEAR
BACTERIA #/AREA URNS HPF: ABNORMAL /HPF
BILIRUB UR QL STRIP: NEGATIVE
BUN SERPL-MCNC: 18 MG/DL (ref 8–23)
CALCIUM SERPL-MCNC: 9 MG/DL (ref 8.8–10.2)
CHLORIDE SERPL-SCNC: 105 MMOL/L (ref 98–107)
COLOR UR AUTO: YELLOW
CREAT SERPL-MCNC: 0.89 MG/DL (ref 0.67–1.17)
DEPRECATED HCO3 PLAS-SCNC: 24 MMOL/L (ref 22–29)
EGFRCR SERPLBLD CKD-EPI 2021: >90 ML/MIN/1.73M2
GLUCOSE SERPL-MCNC: 126 MG/DL (ref 70–99)
GLUCOSE UR STRIP-MCNC: >=1000 MG/DL
HBA1C MFR BLD: 7.4 % (ref 0–5.6)
HGB UR QL STRIP: ABNORMAL
KETONES UR STRIP-MCNC: ABNORMAL MG/DL
LEUKOCYTE ESTERASE UR QL STRIP: NEGATIVE
NITRATE UR QL: NEGATIVE
PH UR STRIP: 5.5 [PH] (ref 5–8)
POTASSIUM SERPL-SCNC: 4.1 MMOL/L (ref 3.4–5.3)
PSA SERPL DL<=0.01 NG/ML-MCNC: 1.01 NG/ML (ref 0–4.5)
RBC #/AREA URNS AUTO: ABNORMAL /HPF
SODIUM SERPL-SCNC: 139 MMOL/L (ref 136–145)
SP GR UR STRIP: 1.02 (ref 1–1.03)
SQUAMOUS #/AREA URNS AUTO: ABNORMAL /LPF
TSH SERPL DL<=0.005 MIU/L-ACNC: 2.12 UIU/ML (ref 0.3–4.2)
UROBILINOGEN UR STRIP-ACNC: 0.2 E.U./DL
WBC #/AREA URNS AUTO: ABNORMAL /HPF

## 2023-09-19 PROCEDURE — 90682 RIV4 VACC RECOMBINANT DNA IM: CPT | Performed by: STUDENT IN AN ORGANIZED HEALTH CARE EDUCATION/TRAINING PROGRAM

## 2023-09-19 PROCEDURE — 83036 HEMOGLOBIN GLYCOSYLATED A1C: CPT | Performed by: STUDENT IN AN ORGANIZED HEALTH CARE EDUCATION/TRAINING PROGRAM

## 2023-09-19 PROCEDURE — 99214 OFFICE O/P EST MOD 30 MIN: CPT | Mod: 25 | Performed by: STUDENT IN AN ORGANIZED HEALTH CARE EDUCATION/TRAINING PROGRAM

## 2023-09-19 PROCEDURE — G0103 PSA SCREENING: HCPCS | Performed by: STUDENT IN AN ORGANIZED HEALTH CARE EDUCATION/TRAINING PROGRAM

## 2023-09-19 PROCEDURE — 90471 IMMUNIZATION ADMIN: CPT | Performed by: STUDENT IN AN ORGANIZED HEALTH CARE EDUCATION/TRAINING PROGRAM

## 2023-09-19 PROCEDURE — 81001 URINALYSIS AUTO W/SCOPE: CPT | Performed by: STUDENT IN AN ORGANIZED HEALTH CARE EDUCATION/TRAINING PROGRAM

## 2023-09-19 PROCEDURE — 80048 BASIC METABOLIC PNL TOTAL CA: CPT | Performed by: STUDENT IN AN ORGANIZED HEALTH CARE EDUCATION/TRAINING PROGRAM

## 2023-09-19 PROCEDURE — 36415 COLL VENOUS BLD VENIPUNCTURE: CPT | Performed by: STUDENT IN AN ORGANIZED HEALTH CARE EDUCATION/TRAINING PROGRAM

## 2023-09-19 PROCEDURE — 84443 ASSAY THYROID STIM HORMONE: CPT | Performed by: STUDENT IN AN ORGANIZED HEALTH CARE EDUCATION/TRAINING PROGRAM

## 2023-09-19 ASSESSMENT — PAIN SCALES - GENERAL: PAINLEVEL: NO PAIN (0)

## 2023-09-19 NOTE — Clinical Note
Jus Robert,  I sent a referral to you for this bro. Don't worry I explained it would be an appointment with you and someone from your team would call him to schedule.  Hx of DM2, CAD, CHF.  I started him on jardiance in May and having polyuria. I think this is very likely from the medication by his Hx though I am doing some testing looking for other etiologies. Wondering if you could help with trialing a different SGLT2 that maybe would less likely have this side effect? I don't know if that is possible. I think he would have significant long term benefit from staying on this class of medications given his Hx.   Would love to hear your thoughts!  Roberto Bates MD

## 2023-09-19 NOTE — PROGRESS NOTES
Assessment and Plan   63-year-old male with relevant past medical history of CAD, CHF, type 2 diabetes uncontrolled on last check in May who presents for polyuria and follow-up of diabetes.  Started patient on Jardiance which by history seems to be improving his blood sugars but unfortunately likely having polyuria from this.  I will do some other testing looking for possible other etiologies such as UTI, BPH, prostate cancer but I think these are unlikely given the history.  I will send referral to MTM pharmacist to assist with trialing other medications.  Would like to keep him on an SGLT2 inhibitor given his history but do not know if he would have similar side effects on these or if one of the medications in this class would be less likely to have this side effect.    1. Type 2 diabetes mellitus without complication, without long-term current use of insulin (H)  - Hemoglobin A1c; Future  - TSH with free T4 reflex; Future  - Basic metabolic panel  (Ca, Cl, CO2, Creat, Gluc, K, Na, BUN); Future    2. Polyuria  - PSA, screen; Future  - UA Macroscopic with reflex to Microscopic and Culture - Lab Collect; Future  - Med Therapy Management Referral    3. Medication side effects  - Med Therapy Management Referral    Follow up: early 2024 for physical with me, Follow-up with MTM for possibly medication side effect    Options for treatment and follow-up care were reviewed with the patient and/or guardian. Mir Treviño Sr. and/or guardian engaged in the decision making process and verbalized understanding of the options discussed and agreed with the final plan.    Dr. Roberto Bates         HPI:   Mir Treviño Sr. is a 63 year old  male who presents for:    Chief Complaint   Patient presents with    Prostate Check    Urinary Frequency     Last saw patient in May.  At that time I was seeing him for diabetic visit and diabetes uncontrolled with an A1c of 8.4 approximately.  Patient had stopped his metformin due to side  "effects.  I started him on Jardiance given his history of CAD and heart failure.  He tells me his blood sugars have been looking improved in the low 100s.  However he has been noticing polyuria that started very consistently with starting Jardiance.  He is up to 25 mg daily on the Jardiance.  He has not noticed other symptoms with it such as dysuria, hematuria, penile pain or irritation.  He is urinating approximately once an hour.  Including at night.  He denies symptoms of incomplete emptying such as needing to return to the bathroom soon after or weak stream or inability to start urinating.         PMHX:     Patient Active Problem List   Diagnosis    Hypertension    TB lung, latent    Allergies    Obesity    Heart failure with reduced ejection fraction (H)    Coronary artery disease of native artery of native heart with stable angina pectoris (H)    Mild intermittent asthma    Type 2 diabetes mellitus without complication, without long-term current use of insulin (H)    Sensorineural hearing loss (SNHL) of left ear    Tinnitus    Status post coronary artery bypass graft    Healthcare maintenance    Hepatic steatosis    Diabetic polyneuropathy associated with type 2 diabetes mellitus (H)       Social History     Tobacco Use    Smoking status: Never    Smokeless tobacco: Never   Substance Use Topics    Alcohol use: Yes     Comment: Alcoholic Drinks/day: \"Once a month, maybe.\"    Drug use: No       Social History     Social History Narrative    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  Marisa Diego         No Known Allergies    No results found for this or any previous visit (from the past 24 hour(s)).         Review of Systems:    ROS: 10 point ROS neg other than the symptoms noted above in the HPI.         Physical Exam:     Vitals:    09/19/23 0728   BP: 130/70   Pulse: 52   Temp: 97  F (36.1  C)   SpO2: 98%   Weight: 98.1 kg (216 lb 4.3 oz)   Height: 1.753 m (5' " "9\")     Body mass index is 31.94 kg/m .    General appearance: Alert, cooperative, no distress, appears stated age  Head: Normocephalic, atraumatic, without obvious abnormality  Eyes: Pupils equal round, reactive.  Conjunctiva clear.  Nose: Nares normal, no drainage.  Throat: Lips, mucosa, tongue normal mucosa pink and moist  Neck: Supple, symmetric, trachea midline,        "

## 2023-09-20 ENCOUNTER — TELEPHONE (OUTPATIENT)
Dept: FAMILY MEDICINE | Facility: CLINIC | Age: 63
End: 2023-09-20
Payer: OTHER GOVERNMENT

## 2023-09-20 NOTE — TELEPHONE ENCOUNTER
MTM referral from: [facility] visit (referral by provider)    MTM referral outreach attempt #1 on September 20, 2023 at 1:29 PM      Outcome: Patient is not interested at this time because his insurance does not  cover MTM, will route to MTM Pharmacist/Provider as an FYI. Thank you for the referral.     Use private pay for the carrier/Plan on the flowsheet    Crista Lopez - [facility]

## 2023-09-21 NOTE — RESULT ENCOUNTER NOTE
Mir,  Your results from your recent clinic visit show:  Your lab results looked good. I think its the medicine causing your urine symtoms. I have asked my nurses to call you to have a visit to consider switch to a different medication    If you have more questions please call the clinic at [phone] or send me a [facility] message    Dr. Roberto Bates

## 2023-09-29 ENCOUNTER — OFFICE VISIT (OUTPATIENT)
Dept: FAMILY MEDICINE | Facility: CLINIC | Age: 63
End: 2023-09-29
Payer: OTHER GOVERNMENT

## 2023-09-29 VITALS
DIASTOLIC BLOOD PRESSURE: 69 MMHG | HEART RATE: 53 BPM | RESPIRATION RATE: 14 BRPM | BODY MASS INDEX: 33.01 KG/M2 | SYSTOLIC BLOOD PRESSURE: 123 MMHG | HEIGHT: 68 IN | OXYGEN SATURATION: 96 % | TEMPERATURE: 98.3 F | WEIGHT: 217.8 LBS

## 2023-09-29 DIAGNOSIS — E11.9 TYPE 2 DIABETES MELLITUS WITHOUT COMPLICATION, WITHOUT LONG-TERM CURRENT USE OF INSULIN (H): Primary | ICD-10-CM

## 2023-09-29 PROCEDURE — 99214 OFFICE O/P EST MOD 30 MIN: CPT | Performed by: STUDENT IN AN ORGANIZED HEALTH CARE EDUCATION/TRAINING PROGRAM

## 2023-09-29 RX ORDER — SEMAGLUTIDE 0.25 MG/.5ML
0.25 INJECTION, SOLUTION SUBCUTANEOUS WEEKLY
Qty: 2 ML | Refills: 0 | Status: SHIPPED | OUTPATIENT
Start: 2023-09-29 | End: 2023-10-02

## 2023-09-29 ASSESSMENT — PAIN SCALES - GENERAL: PAINLEVEL: NO PAIN (0)

## 2023-10-02 ENCOUNTER — TELEPHONE (OUTPATIENT)
Dept: FAMILY MEDICINE | Facility: CLINIC | Age: 63
End: 2023-10-02

## 2023-10-02 ENCOUNTER — PRE VISIT (OUTPATIENT)
Dept: GASTROENTEROLOGY | Facility: CLINIC | Age: 63
End: 2023-10-02

## 2023-10-02 ENCOUNTER — OFFICE VISIT (OUTPATIENT)
Dept: GASTROENTEROLOGY | Facility: CLINIC | Age: 63
End: 2023-10-02
Attending: STUDENT IN AN ORGANIZED HEALTH CARE EDUCATION/TRAINING PROGRAM
Payer: OTHER GOVERNMENT

## 2023-10-02 VITALS
OXYGEN SATURATION: 98 % | HEART RATE: 59 BPM | WEIGHT: 218.4 LBS | DIASTOLIC BLOOD PRESSURE: 77 MMHG | BODY MASS INDEX: 33.1 KG/M2 | SYSTOLIC BLOOD PRESSURE: 125 MMHG | HEIGHT: 68 IN

## 2023-10-02 DIAGNOSIS — I50.20 HEART FAILURE WITH REDUCED EJECTION FRACTION (H): ICD-10-CM

## 2023-10-02 DIAGNOSIS — E66.09 CLASS 1 OBESITY DUE TO EXCESS CALORIES WITH SERIOUS COMORBIDITY AND BODY MASS INDEX (BMI) OF 32.0 TO 32.9 IN ADULT: ICD-10-CM

## 2023-10-02 DIAGNOSIS — Z95.1 STATUS POST CORONARY ARTERY BYPASS GRAFT: ICD-10-CM

## 2023-10-02 DIAGNOSIS — I85.00 IDIOPATHIC ESOPHAGEAL VARICES WITHOUT BLEEDING (H): ICD-10-CM

## 2023-10-02 DIAGNOSIS — E66.811 CLASS 1 OBESITY DUE TO EXCESS CALORIES WITH SERIOUS COMORBIDITY AND BODY MASS INDEX (BMI) OF 32.0 TO 32.9 IN ADULT: ICD-10-CM

## 2023-10-02 DIAGNOSIS — E11.9 TYPE 2 DIABETES MELLITUS WITHOUT COMPLICATION, WITHOUT LONG-TERM CURRENT USE OF INSULIN (H): Primary | ICD-10-CM

## 2023-10-02 DIAGNOSIS — K76.0 HEPATIC STEATOSIS: ICD-10-CM

## 2023-10-02 DIAGNOSIS — K74.60 CIRRHOSIS OF LIVER WITHOUT ASCITES, UNSPECIFIED HEPATIC CIRRHOSIS TYPE (H): Primary | ICD-10-CM

## 2023-10-02 DIAGNOSIS — K76.9 LIVER LESION: ICD-10-CM

## 2023-10-02 LAB
ALBUMIN SERPL BCG-MCNC: 4.2 G/DL (ref 3.5–5.2)
ALP SERPL-CCNC: 72 U/L (ref 40–129)
ALT SERPL W P-5'-P-CCNC: 53 U/L (ref 0–70)
ANION GAP SERPL CALCULATED.3IONS-SCNC: 12 MMOL/L (ref 7–15)
AST SERPL W P-5'-P-CCNC: 59 U/L (ref 0–45)
BILIRUB SERPL-MCNC: 0.8 MG/DL
BUN SERPL-MCNC: 12.3 MG/DL (ref 8–23)
CALCIUM SERPL-MCNC: 9 MG/DL (ref 8.8–10.2)
CHLORIDE SERPL-SCNC: 109 MMOL/L (ref 98–107)
CREAT SERPL-MCNC: 0.81 MG/DL (ref 0.67–1.17)
DEPRECATED HCO3 PLAS-SCNC: 22 MMOL/L (ref 22–29)
EGFRCR SERPLBLD CKD-EPI 2021: >90 ML/MIN/1.73M2
ERYTHROCYTE [DISTWIDTH] IN BLOOD BY AUTOMATED COUNT: 12.7 % (ref 10–15)
FERRITIN SERPL-MCNC: 73 NG/ML (ref 31–409)
GLUCOSE SERPL-MCNC: 187 MG/DL (ref 70–99)
HCT VFR BLD AUTO: 44.1 % (ref 40–53)
HGB BLD-MCNC: 14.9 G/DL (ref 13.3–17.7)
INR PPP: 1.26 (ref 0.85–1.15)
MCH RBC QN AUTO: 30 PG (ref 26.5–33)
MCHC RBC AUTO-ENTMCNC: 33.8 G/DL (ref 31.5–36.5)
MCV RBC AUTO: 89 FL (ref 78–100)
PLATELET # BLD AUTO: 121 10E3/UL (ref 150–450)
POTASSIUM SERPL-SCNC: 3.7 MMOL/L (ref 3.4–5.3)
PROT SERPL-MCNC: 7.5 G/DL (ref 6.4–8.3)
RBC # BLD AUTO: 4.97 10E6/UL (ref 4.4–5.9)
SODIUM SERPL-SCNC: 143 MMOL/L (ref 135–145)
WBC # BLD AUTO: 3.9 10E3/UL (ref 4–11)

## 2023-10-02 PROCEDURE — 36415 COLL VENOUS BLD VENIPUNCTURE: CPT | Performed by: STUDENT IN AN ORGANIZED HEALTH CARE EDUCATION/TRAINING PROGRAM

## 2023-10-02 PROCEDURE — 99204 OFFICE O/P NEW MOD 45 MIN: CPT | Performed by: STUDENT IN AN ORGANIZED HEALTH CARE EDUCATION/TRAINING PROGRAM

## 2023-10-02 PROCEDURE — 85027 COMPLETE CBC AUTOMATED: CPT | Performed by: STUDENT IN AN ORGANIZED HEALTH CARE EDUCATION/TRAINING PROGRAM

## 2023-10-02 PROCEDURE — 85610 PROTHROMBIN TIME: CPT | Performed by: STUDENT IN AN ORGANIZED HEALTH CARE EDUCATION/TRAINING PROGRAM

## 2023-10-02 PROCEDURE — 82728 ASSAY OF FERRITIN: CPT | Performed by: STUDENT IN AN ORGANIZED HEALTH CARE EDUCATION/TRAINING PROGRAM

## 2023-10-02 PROCEDURE — 82105 ALPHA-FETOPROTEIN SERUM: CPT | Performed by: STUDENT IN AN ORGANIZED HEALTH CARE EDUCATION/TRAINING PROGRAM

## 2023-10-02 PROCEDURE — 80053 COMPREHEN METABOLIC PANEL: CPT | Performed by: STUDENT IN AN ORGANIZED HEALTH CARE EDUCATION/TRAINING PROGRAM

## 2023-10-02 ASSESSMENT — PAIN SCALES - GENERAL: PAINLEVEL: NO PAIN (0)

## 2023-10-02 NOTE — TELEPHONE ENCOUNTER
"See message from the [facility] Prior Authorization team regarding the Wegovy on 10/2/23:    \"[facility] Prior Authorization Team   Phone: [phone]     PRIOR AUTHORIZATION DENIED     Medication: WEGOVY 0.25 MG/0.5ML SC SOAJ    See message from the pharmacist on 10/2/23 regarding medication authorization for diabetes medication:    \"Since the patient has diabetes, have you tried instead sending in Ozempic?     Wegovy is approved for weight loss but since patient has diagnosis of diabetes I think that Ozempic instead would be covered versus trying to appeal Wegovy.     I would recommend trying to send in Ozempic first.     Britt Cortes, PharmD   Medication Therapy Management Pharmacist   [facility]\"    See message from the PCP regarding the above PharmD's message on 10/2/23:    \"I have placed orders for ozempic. Please call patient and inform him of this change due to insurance problems. Patient should try to  ozempic instead     Roberto Bates MD\"    Writer called and left a message for the patient to call back.    When the patient returns the call, please relay the PCP's message above about new orders for Ozempic, as well as inform the patient that the PA for the Wegovy was denied by his insurance company.    Please route to the RN queue for any questions or concerns.    Imelda Mathews RN, BSN  [facility]        "

## 2023-10-02 NOTE — TELEPHONE ENCOUNTER
[facility] Prior Authorization Team   Phone: [phone]    PRIOR AUTHORIZATION DENIED    Medication: WEGOVY 0.25 MG/0.5ML SC SOAJ  Insurance Company: [employer] - Phone [phone] Fax [phone]  Denial Date: 10/2/2023  Denial Rational: MUST TRY/FAIL OR HAVE CONTRAINDICATIONS TO ALL PREFERRED DRUGS - PHENTERMINE, QSYMIA, AND CONTRAVE      Appeal Information: IF PROVIDER WOULD LIKE TO APPEAL THIS DECISION PLEASE PROVIDE PA TEAM WITH A LETTER OF MEDICAL NECESSITY      Patient Notified: No

## 2023-10-02 NOTE — LETTER
10/2/2023         RE: Mir Treviño Sr.  [address] [id number]        Dear Colleague,    Thank you for referring your patient, Mir Treviño Sr., to the [facility]. Please see a copy of my visit note below.    [facility] New Patient Visit    Date of Visit: October 2, 2023    Reason for referral: Cirrhosis    Subjective: Mr. Treviño is a 63-year-old man with a history of diabetes, hyperlipidemia, coronary disease, who presents for evaluation of cirrhosis.    He underwent a CABG in 2022.  Shortly after this he developed abdominal pain and underwent a cholecystectomy.  Was told he had varices at the time of the surgery.  Had a CT in July 2022 that showed fatty infiltration of the liver with slight nodularity.  Showed signs of portal hypertension with gastrosplenic varices.  No ascites.    Otherwise is doing well.  Drinks alcohol maybe once or twice a month in the summer, denies a heavy history.    His EF is slightly decreased, but otherwise does not have clinical heart failure.    He had testing for hepatitis B and C that was negative.  Ferritin was normal.    ROS: 14 point ROS negative except for positives noted in HPI.    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     Coronary atherosclerosis     Created by [facility] Annotation: Jul 16 2010  5:35PM - Nik Boyd: MI 7/10  Replacement Utility updated for latest IMO load     Diabetes (H)      Essential hypertension     Created by Conversion  Replacement Utility updated for latest IMO load     Pure hypercholesterolemia     Created by Conversion      Uncomplicated asthma        PSHx:  Past Surgical History:   Procedure Laterality Date     BYPASS GRAFT ARTERY CORONARY N/A 5/10/2022    Procedure: CORONARY ARTERY BYPASS GRAFT X3 ,  LEFT LEG ENDOSCOPIC VESSEL PROCUREMENT, LEFT INTERNAL MAMMARY ARTERY HARVEST, ANESTHESIA  "TRANSESOPHAGEAL ECHOCARDIOGRAM. EPIAORTIC ULTRASOUND.;  Surgeon: Nik Marcus MD;  Location: [facility] Main OR     CV CORONARY ANGIOGRAM N/A 4/8/2022    Procedure: Coronary Angiogram;  Surgeon: Ty Grace MD;  Location: [facility] CATH LAB CV     CV CORONARY ANGIOGRAM N/A 1/19/2023    Procedure: Coronary Angiogram;  Surgeon: Zoe Mancilla MD;  Location: [facility] LAB CV     CV FRACTIONAL FLOW RATIO WIRE N/A 1/19/2023    Procedure: Fractional Flow Ratio Wire;  Surgeon: Zoe Mancilla MD;  Location: [facility] CATH LAB CV     CV LEFT HEART CATH N/A 4/8/2022    Procedure: Left Heart Catheterization;  Surgeon: Ty Grace MD;  Location: [facility] CATH LAB CV     CV LEFT HEART CATH N/A 1/19/2023    Procedure: Left Heart Catheterization;  Surgeon: Zoe Mancilla MD;  Location: [facility] LAB CV     LAPAROSCOPIC CHOLECYSTECTOMY N/A 6/7/2022    Procedure: CHOLECYSTECTOMY, LAPAROSCOPIC;  Surgeon: Franck Flowers MD;  Location: [facility] OR     OTHER SURGICAL HISTORY      CORONARY STENTSTwo coronary artery stents in 2010       FamHx:  Family History   Problem Relation Age of Onset     No Known Problems Mother      No Known Problems Father      No Known Problems Sister      No Known Problems Brother      No Known Problems Daughter      No Known Problems Son      No Known Problems Brother      No Known Problems Son      No Known Problems Son      No family history of liver disease, liver cancer    SocHx:  Social History     Socioeconomic History     Marital status:      Spouse name: Not on file     Number of children: Not on file     Years of education: Not on file     Highest education level: Not on file   Occupational History     Not on file   Tobacco Use     Smoking status: Never     Smokeless tobacco: Never   Vaping Use     Vaping Use: Never used   Substance and Sexual Activity     Alcohol use: Yes     Comment: Alcoholic Drinks/day: \"Once a month, maybe.\"     Drug use: No     Sexual " activity: Yes     Partners: Female     Comment: wife   Other Topics Concern     Not on file   Social History Narrative    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  Marisa Diego       Social Determinants of Health     Financial Resource Strain: Low Risk  (9/28/2023)    Financial Resource Strain      Within the past 12 months, have you or your family members you live with been unable to get utilities (heat, electricity) when it was really needed?: No   Food Insecurity: Low Risk  (9/28/2023)    Food Insecurity      Within the past 12 months, did you worry that your food would run out before you got money to buy more?: No      Within the past 12 months, did the food you bought just not last and you didn t have money to get more?: No   Transportation Needs: Low Risk  (9/28/2023)    Transportation Needs      Within the past 12 months, has lack of transportation kept you from medical appointments, getting your medicines, non-medical meetings or appointments, work, or from getting things that you need?: No   Physical Activity: Not on file   Stress: Not on file   Social Connections: Not on file   Interpersonal Safety: Low Risk  (9/29/2023)    Interpersonal Safety      Do you feel physically and emotionally safe where you currently live?: Yes      Within the past 12 months, have you been hit, slapped, kicked or otherwise physically hurt by someone?: No      Within the past 12 months, have you been humiliated or emotionally abused in other ways by your partner or ex-partner?: No   Housing Stability: Low Risk  (9/28/2023)    Housing Stability      Do you have housing? : Yes      Are you worried about losing your housing?: No       Medications:  Current Outpatient Medications   Medication     acetaminophen (TYLENOL) 325 MG tablet     albuterol (ACCUNEB) 1.25 MG/3ML neb solution     albuterol (PROAIR HFA/PROVENTIL HFA/VENTOLIN HFA) 108 (90 Base) MCG/ACT inhaler     aspirin (ASA)  "81 MG EC tablet     coenzyme Q-10 capsule     fluticasone propionate (FLONASE) 50 mcg/actuation nasal spray     hydrochlorothiazide (MICROZIDE) 12.5 MG capsule     isosorbide mononitrate (IMDUR) 30 MG 24 hr tablet     lisinopril (ZESTRIL) 10 MG tablet     methylPREDNISolone (MEDROL DOSEPAK) 4 MG tablet therapy pack     metoprolol succinate ER (TOPROL XL) 50 MG 24 hr tablet     montelukast (SINGULAIR) 10 MG tablet     nitroGLYcerin (NITROSTAT) 0.4 MG sublingual tablet     nitroGLYcerin (NITROSTAT) 0.4 MG sublingual tablet     rosuvastatin (CRESTOR) 40 MG tablet     semaglutide (OZEMPIC) 2 MG/3ML pen     No current facility-administered medications for this visit.     No OTCs, herbals    Allergies:  No Known Allergies    Objective:  /77 (BP Location: Left arm, Patient Position: Sitting, Cuff Size: Adult Large)   Pulse 59   Ht 1.736 m (5' 8.35\")   Wt 99.1 kg (218 lb 6.4 oz)   SpO2 98%   BMI 32.87 kg/m    Constitutional: pleasant man in NAD  Eyes: non icteric  Respiratory: Normal respiratory excursion   MSK: normal range of motion of visualized extremities  Abd: Non distended  Skin: No jaundice  Psychiatric: normal mood and orientation    Labs:  Last Comprehensive Metabolic Panel:  Sodium   Date Value Ref Range Status   10/02/2023 143 135 - 145 mmol/L Final     Comment:     Reference intervals for this test were updated on 09/26/2023 to more accurately reflect our healthy population. There may be differences in the flagging of prior results with similar values performed with this method. Interpretation of those prior results can be made in the context of the updated reference intervals.      Potassium   Date Value Ref Range Status   10/02/2023 3.7 3.4 - 5.3 mmol/L Final   06/06/2022 4.1 3.5 - 5.0 mmol/L Final     Chloride   Date Value Ref Range Status   10/02/2023 109 (H) 98 - 107 mmol/L Final   06/06/2022 109 (H) 98 - 107 mmol/L Final     Carbon Dioxide (CO2)   Date Value Ref Range Status   10/02/2023 22 22 " - 29 mmol/L Final   06/06/2022 21 (L) 22 - 31 mmol/L Final     Anion Gap   Date Value Ref Range Status   10/02/2023 12 7 - 15 mmol/L Final   06/06/2022 9 5 - 18 mmol/L Final     Glucose   Date Value Ref Range Status   10/02/2023 187 (H) 70 - 99 mg/dL Final   06/06/2022 140 (H) 70 - 125 mg/dL Final     GLUCOSE BY METER POCT   Date Value Ref Range Status   01/20/2023 102 (H) 70 - 99 mg/dL Final     Urea Nitrogen   Date Value Ref Range Status   10/02/2023 12.3 8.0 - 23.0 mg/dL Final   06/06/2022 22 8 - 22 mg/dL Final     Creatinine   Date Value Ref Range Status   10/02/2023 0.81 0.67 - 1.17 mg/dL Final     GFR Estimate   Date Value Ref Range Status   10/02/2023 >90 >60 mL/min/1.73m2 Final     Calcium   Date Value Ref Range Status   10/02/2023 9.0 8.8 - 10.2 mg/dL Final     Bilirubin Total   Date Value Ref Range Status   10/02/2023 0.8 <=1.2 mg/dL Final     Alkaline Phosphatase   Date Value Ref Range Status   10/02/2023 72 40 - 129 U/L Final     ALT   Date Value Ref Range Status   10/02/2023 53 0 - 70 U/L Final     Comment:     Reference intervals for this test were updated on 6/12/2023 to more accurately reflect our healthy population. There may be differences in the flagging of prior results with similar values performed with this method. Interpretation of those prior results can be made in the context of the updated reference intervals.       AST   Date Value Ref Range Status   10/02/2023 59 (H) 0 - 45 U/L Final     Comment:     Reference intervals for this test were updated on 6/12/2023 to more accurately reflect our healthy population. There may be differences in the flagging of prior results with similar values performed with this method. Interpretation of those prior results can be made in the context of the updated reference intervals.       Lab Results   Component Value Date    WBC 3.9 01/18/2023     Lab Results   Component Value Date    RBC 4.63 01/18/2023     Lab Results   Component Value Date    HGB 13.6  01/18/2023     Lab Results   Component Value Date    HCT 40.0 01/18/2023     Lab Results   Component Value Date    MCV 86 01/18/2023     Lab Results   Component Value Date    MCH 29.4 01/18/2023     Lab Results   Component Value Date    MCHC 34.0 01/18/2023     Lab Results   Component Value Date    RDW 12.7 01/18/2023     Lab Results   Component Value Date     01/18/2023       INR   Date Value Ref Range Status   10/02/2023 1.26 (H) 0.85 - 1.15 Final        MELD 3.0: 8 at 10/2/2023  3:47 PM  MELD-Na: 9 at 10/2/2023  3:47 PM  Calculated from:  Serum Creatinine: 0.81 mg/dL (Using min of 1 mg/dL) at 10/2/2023  3:47 PM  Serum Sodium: 143 mmol/L (Using max of 137 mmol/L) at 10/2/2023  3:47 PM  Total Bilirubin: 0.8 mg/dL (Using min of 1 mg/dL) at 10/2/2023  3:47 PM  Serum Albumin: 4.2 g/dL (Using max of 3.5 g/dL) at 10/2/2023  3:47 PM  INR(ratio): 1.26 at 10/2/2023  3:47 PM  Age at listing (hypothetical): 63 years  Sex: Male at 10/2/2023  3:47 PM      Imaging: Reviewed in EHR    Endoscopy: No upper endoscopy  [facility]  Was told to come back in 10 years    Independently reviewed labs and imaging.     Assessment/Plan: Mr. Treviño is a 63-year-old man with a history of diabetes, hyperlipidemia, coronary disease, who presents for evaluation of cirrhosis.    Discussed I agree that he has cirrhosis given his imaging findings especially with signs of portal hypertension.  His liver disease is likely secondary to metabolic associated steatotic liver disease given his other risk factors.  No significant alcohol history.  Viral hepatitis testing is negative.    Discussed the natural history of compensated cirrhosis.  Discussed the need for variceal screening and liver cancer screening.  Recommend that he completely abstain from alcohol    Orders Placed This Encounter   Procedures     US Abdomen Limited     US Abdomen Limited     CBC with platelets     Comprehensive metabolic panel      INR     AFP tumor marker     Ferritin     CBC with platelets     Comprehensive metabolic panel     INR     AFP tumor marker     Adult GI  Referral - Procedure Only       RTC 6 months with abdominal ultrasound and labs    Kelly Bazan MD MS  Hepatology/Liver Transplant  [facility]        Again, thank you for allowing me to participate in the care of your patient.        Sincerely,        Kelly Bazan MD

## 2023-10-02 NOTE — PROGRESS NOTES
[facility] New Patient Visit    Date of Visit: October 2, 2023    Reason for referral: Cirrhosis    Subjective: Mr. Treviño is a 63-year-old man with a history of diabetes, hyperlipidemia, coronary disease, who presents for evaluation of cirrhosis.    He underwent a CABG in 2022.  Shortly after this he developed abdominal pain and underwent a cholecystectomy.  Was told he had varices at the time of the surgery.  Had a CT in July 2022 that showed fatty infiltration of the liver with slight nodularity.  Showed signs of portal hypertension with gastrosplenic varices.  No ascites.    Otherwise is doing well.  Drinks alcohol maybe once or twice a month in the summer, denies a heavy history.    His EF is slightly decreased, but otherwise does not have clinical heart failure.    He had testing for hepatitis B and C that was negative.  Ferritin was normal.    ROS: 14 point ROS negative except for positives noted in HPI.    PMHx:  Past Medical History:   Diagnosis Date    Cirrhosis of liver (H)     with gastrosplenic varices    Coronary atherosclerosis     Created by Conversion [facility] Annotation: Jul 16 2010  5:35PM - Nik Boyd: MI 7/10  Replacement Utility updated for latest IMO load    Diabetes (H)     Essential hypertension     Created by Conversion  Replacement Utility updated for latest IMO load    Pure hypercholesterolemia     Created by Conversion     Uncomplicated asthma        PSHx:  Past Surgical History:   Procedure Laterality Date    BYPASS GRAFT ARTERY CORONARY N/A 5/10/2022    Procedure: CORONARY ARTERY BYPASS GRAFT X3 ,  LEFT LEG ENDOSCOPIC VESSEL PROCUREMENT, LEFT INTERNAL MAMMARY ARTERY HARVEST, ANESTHESIA TRANSESOPHAGEAL ECHOCARDIOGRAM. EPIAORTIC ULTRASOUND.;  Surgeon: Nik Marcus MD;  Location: [facility] Main OR    CV CORONARY ANGIOGRAM N/A 4/8/2022    Procedure: Coronary Angiogram;  Surgeon: Ty Grace MD;  Location: [facility] CATH LAB CV    CV CORONARY ANGIOGRAM  "N/A 1/19/2023    Procedure: Coronary Angiogram;  Surgeon: Zoe Mancilla MD;  Location: [facility] CATH LAB CV    CV FRACTIONAL FLOW RATIO WIRE N/A 1/19/2023    Procedure: Fractional Flow Ratio Wire;  Surgeon: Zoe Mancilla MD;  Location: [facility] CATH LAB CV    CV LEFT HEART CATH N/A 4/8/2022    Procedure: Left Heart Catheterization;  Surgeon: Ty Grace MD;  Location: [facility] CATH LAB CV    CV LEFT HEART CATH N/A 1/19/2023    Procedure: Left Heart Catheterization;  Surgeon: Zoe Mancilla MD;  Location: [facility] CATH LAB CV    LAPAROSCOPIC CHOLECYSTECTOMY N/A 6/7/2022    Procedure: CHOLECYSTECTOMY, LAPAROSCOPIC;  Surgeon: rFanck Flowers MD;  Location: [facility] [place] [place] OR    OTHER SURGICAL HISTORY      CORONARY STENTSTwo coronary artery stents in 2010       FamHx:  Family History   Problem Relation Age of Onset    No Known Problems Mother     No Known Problems Father     No Known Problems Sister     No Known Problems Brother     No Known Problems Daughter     No Known Problems Son     No Known Problems Brother     No Known Problems Son     No Known Problems Son      No family history of liver disease, liver cancer    SocHx:  Social History     Socioeconomic History    Marital status:      Spouse name: Not on file    Number of children: Not on file    Years of education: Not on file    Highest education level: Not on file   Occupational History    Not on file   Tobacco Use    Smoking status: Never    Smokeless tobacco: Never   Vaping Use    Vaping Use: Never used   Substance and Sexual Activity    Alcohol use: Yes     Comment: Alcoholic Drinks/day: \"Once a month, maybe.\"    Drug use: No    Sexual activity: Yes     Partners: Female     Comment: wife   Other Topics Concern    Not on file   Social History Narrative    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  Marisa Diego       Social Determinants of Health     Financial Resource Strain: Low Risk  " (9/28/2023)    Financial Resource Strain     Within the past 12 months, have you or your family members you live with been unable to get utilities (heat, electricity) when it was really needed?: No   Food Insecurity: Low Risk  (9/28/2023)    Food Insecurity     Within the past 12 months, did you worry that your food would run out before you got money to buy more?: No     Within the past 12 months, did the food you bought just not last and you didn t have money to get more?: No   Transportation Needs: Low Risk  (9/28/2023)    Transportation Needs     Within the past 12 months, has lack of transportation kept you from medical appointments, getting your medicines, non-medical meetings or appointments, work, or from getting things that you need?: No   Physical Activity: Not on file   Stress: Not on file   Social Connections: Not on file   Interpersonal Safety: Low Risk  (9/29/2023)    Interpersonal Safety     Do you feel physically and emotionally safe where you currently live?: Yes     Within the past 12 months, have you been hit, slapped, kicked or otherwise physically hurt by someone?: No     Within the past 12 months, have you been humiliated or emotionally abused in other ways by your partner or ex-partner?: No   Housing Stability: Low Risk  (9/28/2023)    Housing Stability     Do you have housing? : Yes     Are you worried about losing your housing?: No       Medications:  Current Outpatient Medications   Medication    acetaminophen (TYLENOL) 325 MG tablet    albuterol (ACCUNEB) 1.25 MG/3ML neb solution    albuterol (PROAIR HFA/PROVENTIL HFA/VENTOLIN HFA) 108 (90 Base) MCG/ACT inhaler    aspirin (ASA) 81 MG EC tablet    coenzyme Q-10 capsule    fluticasone propionate (FLONASE) 50 mcg/actuation nasal spray    hydrochlorothiazide (MICROZIDE) 12.5 MG capsule    isosorbide mononitrate (IMDUR) 30 MG 24 hr tablet    lisinopril (ZESTRIL) 10 MG tablet    methylPREDNISolone (MEDROL DOSEPAK) 4 MG tablet therapy pack     "metoprolol succinate ER (TOPROL XL) 50 MG 24 hr tablet    montelukast (SINGULAIR) 10 MG tablet    nitroGLYcerin (NITROSTAT) 0.4 MG sublingual tablet    nitroGLYcerin (NITROSTAT) 0.4 MG sublingual tablet    rosuvastatin (CRESTOR) 40 MG tablet    semaglutide (OZEMPIC) 2 MG/3ML pen     No current facility-administered medications for this visit.     No OTCs, herbals    Allergies:  No Known Allergies    Objective:  /77 (BP Location: Left arm, Patient Position: Sitting, Cuff Size: Adult Large)   Pulse 59   Ht 1.736 m (5' 8.35\")   Wt 99.1 kg (218 lb 6.4 oz)   SpO2 98%   BMI 32.87 kg/m    Constitutional: pleasant man in NAD  Eyes: non icteric  Respiratory: Normal respiratory excursion   MSK: normal range of motion of visualized extremities  Abd: Non distended  Skin: No jaundice  Psychiatric: normal mood and orientation    Labs:  Last Comprehensive Metabolic Panel:  Sodium   Date Value Ref Range Status   10/02/2023 143 135 - 145 mmol/L Final     Comment:     Reference intervals for this test were updated on 09/26/2023 to more accurately reflect our healthy population. There may be differences in the flagging of prior results with similar values performed with this method. Interpretation of those prior results can be made in the context of the updated reference intervals.      Potassium   Date Value Ref Range Status   10/02/2023 3.7 3.4 - 5.3 mmol/L Final   06/06/2022 4.1 3.5 - 5.0 mmol/L Final     Chloride   Date Value Ref Range Status   10/02/2023 109 (H) 98 - 107 mmol/L Final   06/06/2022 109 (H) 98 - 107 mmol/L Final     Carbon Dioxide (CO2)   Date Value Ref Range Status   10/02/2023 22 22 - 29 mmol/L Final   06/06/2022 21 (L) 22 - 31 mmol/L Final     Anion Gap   Date Value Ref Range Status   10/02/2023 12 7 - 15 mmol/L Final   06/06/2022 9 5 - 18 mmol/L Final     Glucose   Date Value Ref Range Status   10/02/2023 187 (H) 70 - 99 mg/dL Final   06/06/2022 140 (H) 70 - 125 mg/dL Final     GLUCOSE BY METER POCT "   Date Value Ref Range Status   01/20/2023 102 (H) 70 - 99 mg/dL Final     Urea Nitrogen   Date Value Ref Range Status   10/02/2023 12.3 8.0 - 23.0 mg/dL Final   06/06/2022 22 8 - 22 mg/dL Final     Creatinine   Date Value Ref Range Status   10/02/2023 0.81 0.67 - 1.17 mg/dL Final     GFR Estimate   Date Value Ref Range Status   10/02/2023 >90 >60 mL/min/1.73m2 Final     Calcium   Date Value Ref Range Status   10/02/2023 9.0 8.8 - 10.2 mg/dL Final     Bilirubin Total   Date Value Ref Range Status   10/02/2023 0.8 <=1.2 mg/dL Final     Alkaline Phosphatase   Date Value Ref Range Status   10/02/2023 72 40 - 129 U/L Final     ALT   Date Value Ref Range Status   10/02/2023 53 0 - 70 U/L Final     Comment:     Reference intervals for this test were updated on 6/12/2023 to more accurately reflect our healthy population. There may be differences in the flagging of prior results with similar values performed with this method. Interpretation of those prior results can be made in the context of the updated reference intervals.       AST   Date Value Ref Range Status   10/02/2023 59 (H) 0 - 45 U/L Final     Comment:     Reference intervals for this test were updated on 6/12/2023 to more accurately reflect our healthy population. There may be differences in the flagging of prior results with similar values performed with this method. Interpretation of those prior results can be made in the context of the updated reference intervals.       Lab Results   Component Value Date    WBC 3.9 01/18/2023     Lab Results   Component Value Date    RBC 4.63 01/18/2023     Lab Results   Component Value Date    HGB 13.6 01/18/2023     Lab Results   Component Value Date    HCT 40.0 01/18/2023     Lab Results   Component Value Date    MCV 86 01/18/2023     Lab Results   Component Value Date    MCH 29.4 01/18/2023     Lab Results   Component Value Date    MCHC 34.0 01/18/2023     Lab Results   Component Value Date    RDW 12.7 01/18/2023     Lab  Results   Component Value Date     01/18/2023       INR   Date Value Ref Range Status   10/02/2023 1.26 (H) 0.85 - 1.15 Final        MELD 3.0: 8 at 10/2/2023  3:47 PM  MELD-Na: 9 at 10/2/2023  3:47 PM  Calculated from:  Serum Creatinine: 0.81 mg/dL (Using min of 1 mg/dL) at 10/2/2023  3:47 PM  Serum Sodium: 143 mmol/L (Using max of 137 mmol/L) at 10/2/2023  3:47 PM  Total Bilirubin: 0.8 mg/dL (Using min of 1 mg/dL) at 10/2/2023  3:47 PM  Serum Albumin: 4.2 g/dL (Using max of 3.5 g/dL) at 10/2/2023  3:47 PM  INR(ratio): 1.26 at 10/2/2023  3:47 PM  Age at listing (hypothetical): 63 years  Sex: Male at 10/2/2023  3:47 PM      Imaging: Reviewed in EHR    Endoscopy: No upper endoscopy  [facility]  Was told to come back in 10 years    Independently reviewed labs and imaging.     Assessment/Plan: Mr. Treviño is a 63-year-old man with a history of diabetes, hyperlipidemia, coronary disease, who presents for evaluation of cirrhosis.    Discussed I agree that he has cirrhosis given his imaging findings especially with signs of portal hypertension.  His liver disease is likely secondary to metabolic associated steatotic liver disease given his other risk factors.  No significant alcohol history.  Viral hepatitis testing is negative.    Discussed the natural history of compensated cirrhosis.  Discussed the need for variceal screening and liver cancer screening.  Recommend that he completely abstain from alcohol    Orders Placed This Encounter   Procedures    US Abdomen Limited    US Abdomen Limited    CBC with platelets    Comprehensive metabolic panel    INR    AFP tumor marker    Ferritin    CBC with platelets    Comprehensive metabolic panel    INR    AFP tumor marker    Adult GI  Referral - Procedure Only       RTC 6 months with abdominal ultrasound and labs    Kelly Bazan MD MS  Hepatology/Liver Transplant  [facility]

## 2023-10-02 NOTE — TELEPHONE ENCOUNTER
I have placed orders for ozempic. Please call patient and inform him of this change due to insurance problems. Patient should try to  ozempic instead    Roberto Bates MD

## 2023-10-02 NOTE — TELEPHONE ENCOUNTER
I would like to appeal this. Patient is not taking this medication for weight loss. He is taking for type 2 diabetes and has failed several other medications in other classes. Please assist me in proceeding with appeal    Roberto Bates MD

## 2023-10-02 NOTE — NURSING NOTE
"Chief Complaint   Patient presents with    New Patient     New consult for Idiopathic esophageal varices without bleeding and Hepatic steatosis.     He requests these members of his care team be copied on today's visit information:  PCP:   Roberto Murphy MD  [address] [place] [address]    Vitals:    10/02/23 1451   BP: 125/77   BP Location: Left arm   Patient Position: Sitting   Cuff Size: Adult Large   Pulse: 59   SpO2: 98%   Weight: 99.1 kg (218 lb 6.4 oz)   Height: 1.736 m (5' 8.35\")     Body mass index is 32.87 kg/m .    Medications were reconciled.        Susan Gutierrez CMA    " 3

## 2023-10-03 DIAGNOSIS — I10 ESSENTIAL HYPERTENSION: ICD-10-CM

## 2023-10-03 LAB — AFP SERPL-MCNC: 3.2 NG/ML

## 2023-10-03 RX ORDER — HYDROCHLOROTHIAZIDE 12.5 MG/1
1 CAPSULE ORAL DAILY
Qty: 90 CAPSULE | Refills: 0 | Status: SHIPPED | OUTPATIENT
Start: 2023-10-03 | End: 2024-01-02

## 2023-10-04 NOTE — TELEPHONE ENCOUNTER
Informed pt of provider's message and pt verbalized understanding.    FARHANA KarimiN, RN  [facility]

## 2023-10-11 ENCOUNTER — ANCILLARY PROCEDURE (OUTPATIENT)
Dept: ULTRASOUND IMAGING | Facility: CLINIC | Age: 63
End: 2023-10-11
Attending: STUDENT IN AN ORGANIZED HEALTH CARE EDUCATION/TRAINING PROGRAM
Payer: OTHER GOVERNMENT

## 2023-10-11 DIAGNOSIS — K74.60 CIRRHOSIS OF LIVER WITHOUT ASCITES, UNSPECIFIED HEPATIC CIRRHOSIS TYPE (H): ICD-10-CM

## 2023-10-11 DIAGNOSIS — I85.00 IDIOPATHIC ESOPHAGEAL VARICES WITHOUT BLEEDING (H): ICD-10-CM

## 2023-10-11 DIAGNOSIS — K76.0 HEPATIC STEATOSIS: ICD-10-CM

## 2023-10-11 PROCEDURE — 76705 ECHO EXAM OF ABDOMEN: CPT

## 2023-10-18 ENCOUNTER — E-VISIT (OUTPATIENT)
Dept: FAMILY MEDICINE | Facility: CLINIC | Age: 63
End: 2023-10-18
Payer: OTHER GOVERNMENT

## 2023-10-18 DIAGNOSIS — E11.9 TYPE 2 DIABETES MELLITUS WITHOUT COMPLICATION, WITHOUT LONG-TERM CURRENT USE OF INSULIN (H): Primary | ICD-10-CM

## 2023-10-18 PROCEDURE — 99207 PR NON-BILLABLE SERV PER CHARTING: CPT | Performed by: STUDENT IN AN ORGANIZED HEALTH CARE EDUCATION/TRAINING PROGRAM

## 2023-10-30 DIAGNOSIS — I25.118 CORONARY ARTERY DISEASE OF NATIVE ARTERY OF NATIVE HEART WITH STABLE ANGINA PECTORIS (H): ICD-10-CM

## 2023-10-31 RX ORDER — METOPROLOL SUCCINATE 50 MG/1
50 TABLET, EXTENDED RELEASE ORAL 2 TIMES DAILY
Qty: 180 TABLET | Refills: 3 | OUTPATIENT
Start: 2023-10-31

## 2023-11-01 DIAGNOSIS — Z91.09 OTHER ALLERGY, OTHER THAN TO MEDICINAL AGENTS: ICD-10-CM

## 2023-11-01 RX ORDER — MONTELUKAST SODIUM 10 MG/1
TABLET ORAL
Qty: 90 TABLET | Refills: 3 | Status: SHIPPED | OUTPATIENT
Start: 2023-11-01

## 2023-11-02 ENCOUNTER — TELEPHONE (OUTPATIENT)
Dept: CARDIOLOGY | Facility: CLINIC | Age: 63
End: 2023-11-02
Payer: OTHER GOVERNMENT

## 2023-11-02 DIAGNOSIS — I25.118 CORONARY ARTERY DISEASE OF NATIVE ARTERY OF NATIVE HEART WITH STABLE ANGINA PECTORIS (H): ICD-10-CM

## 2023-11-02 RX ORDER — METOPROLOL SUCCINATE 50 MG/1
50 TABLET, EXTENDED RELEASE ORAL DAILY
Qty: 90 TABLET | Refills: 0 | Status: SHIPPED | OUTPATIENT
Start: 2023-11-02 | End: 2024-01-30

## 2023-11-02 NOTE — TELEPHONE ENCOUNTER
[facility] Call Center    Phone Message    May a detailed message be left on voicemail: yes     Reason for Call: Medication Refill Request    Has the patient contacted the pharmacy for the refill? Yes   Name of medication being requested: metoprolol succinate ER (TOPROL XL) 50 MG 24 hr tablet   Provider who prescribed the medication:   Pharmacy:  [facility] - [address]      Date medication is needed: 11/3   Pt curently needs refill.    Action Taken: Other: Cardiology    Travel Screening: Not Applicable    Thank you!  [facility]

## 2023-11-24 ENCOUNTER — HOSPITAL ENCOUNTER (OUTPATIENT)
Dept: MRI IMAGING | Facility: CLINIC | Age: 63
Discharge: HOME | End: 2023-11-24
Attending: STUDENT IN AN ORGANIZED HEALTH CARE EDUCATION/TRAINING PROGRAM | Admitting: STUDENT IN AN ORGANIZED HEALTH CARE EDUCATION/TRAINING PROGRAM
Payer: OTHER GOVERNMENT

## 2023-11-24 DIAGNOSIS — K74.60 CIRRHOSIS OF LIVER WITHOUT ASCITES, UNSPECIFIED HEPATIC CIRRHOSIS TYPE (H): ICD-10-CM

## 2023-11-24 DIAGNOSIS — K76.9 LIVER LESION: ICD-10-CM

## 2023-11-24 PROCEDURE — 74183 MRI ABD W/O CNTR FLWD CNTR: CPT

## 2023-11-24 PROCEDURE — 255N000002 HC RX 255 OP 636: Mod: JZ | Performed by: STUDENT IN AN ORGANIZED HEALTH CARE EDUCATION/TRAINING PROGRAM

## 2023-11-24 PROCEDURE — A9585 GADOBUTROL INJECTION: HCPCS | Mod: JZ | Performed by: STUDENT IN AN ORGANIZED HEALTH CARE EDUCATION/TRAINING PROGRAM

## 2023-11-24 RX ORDER — GADOBUTROL 604.72 MG/ML
10 INJECTION INTRAVENOUS ONCE
Status: COMPLETED | OUTPATIENT
Start: 2023-11-24 | End: 2023-11-24

## 2023-11-24 RX ADMIN — GADOBUTROL 10 ML: 604.72 INJECTION INTRAVENOUS at 12:38

## 2023-11-28 ENCOUNTER — HOSPITAL ENCOUNTER (OUTPATIENT)
Facility: CLINIC | Age: 63
End: 2023-11-28
Attending: INTERNAL MEDICINE | Admitting: INTERNAL MEDICINE
Payer: OTHER GOVERNMENT

## 2023-11-28 ENCOUNTER — TELEPHONE (OUTPATIENT)
Dept: GASTROENTEROLOGY | Facility: CLINIC | Age: 63
End: 2023-11-28
Payer: OTHER GOVERNMENT

## 2023-11-28 NOTE — TELEPHONE ENCOUNTER
"Endoscopy Scheduling Screen    Have you had a positive Covid test in the last 14 days?  No    Are you active on MyChart?   Yes    What insurance is in the chart?  Other:      Ordering/Referring Provider:   ENRRIQUE MCCORD        (If ordering provider performs procedure, schedule with ordering provider unless otherwise instructed. )    BMI: Estimated body mass index is 32.87 kg/m  as calculated from the following:    Height as of 10/2/23: 1.736 m (5' 8.35\").    Weight as of 10/2/23: 99.1 kg (218 lb 6.4 oz).     Sedation Ordered  moderate sedation.   If patient BMI > 50 do not schedule in ASC.    If patient BMI > 45 do not schedule at [facility].    Are you taking methadone or Suboxone?  No    Are you taking any prescription medications for pain 3 or more times per week?   No    Do you have a history of malignant hyperthermia or adverse reaction to anesthesia?  No     Have you been diagnosed or told you have pulmonary hypertension?   No    Do you have an LVAD?  No    Have you been told you have moderate to severe sleep apnea?  No    Have you been told you have COPD, asthma, or any other lung disease?  No    Do you have any heart conditions?  Yes     In the past 6 months, have you had any hospitalizations for heart related issues including cardiomyopathy, heart attack, or stent placement?  No    Do you have any implantable devices in your body (pacemaker, ICD)?  No    Do you take nitroglycerine?  No    Have you ever had an organ transplant?   No    Have you ever had or are you awaiting a heart or lung transplant?   No    Have you had a stroke or transient ischemic attack (TIA aka \"mini stroke\" in the last 6 months?   No    Have you been diagnosed with or been told you have cirrhosis of the liver?   Yes (RN Review required for scheduling unless scheduling in Hospital.)    Are you currently on dialysis?   No    Do you need assistance transferring?   No    BMI: Estimated body mass index is 32.87 kg/m  as calculated from " "the following:    Height as of 10/2/23: 1.736 m (5' 8.35\").    Weight as of 10/2/23: 99.1 kg (218 lb 6.4 oz).     Is patients BMI > 40 and scheduling location [facility]?  No    Do you take an injectable medication for weight loss or diabetes (excluding insulin)?  Yes, hold time can be up to 7 days. Please check with you prescribing provider for recommendation.     Do you take the medication Naltrexone?  No    Do you take blood thinners?  No       Prep   Are you currently on dialysis or do you have chronic kidney disease?  No    Do you have a diagnosis of diabetes?  Yes (Golytely Prep)    Do you have a diagnosis of cystic fibrosis (CF)?  No    On a regular basis do you go 3 -5 days between bowel movements?      BMI > 40?      Preferred Pharmacy:    [id number] - [place], [place] - [address] [place] [address] AT [address] [address]  Phone: [phone] Fax: [phone]    Final Scheduling Details   Colonoscopy prep sent?  N/A    Procedure scheduled  Upper endoscopy (EGD)    Surgeon:  LEVENTHAL     Date of procedure:  01/22/2024     Pre-OP / PAC:   No - Not required for this site.    Location  [facility] - Per exclusion criteria.    Sedation   Moderate Sedation - Per order.      Patient Reminders:   You will receive a call from a Nurse to review instructions and health history.  This assessment must be completed prior to your procedure.  Failure to complete the Nurse assessment may result in the procedure being cancelled.      On the day of your procedure, please designate an adult(s) who can drive you home stay with you for the next 24 hours. The medicines used in the exam will make you sleepy. You will not be able to drive.      You cannot take public transportation, ride share services, or non-medical taxi service without a responsible caregiver.  Medical transport services are allowed with the requirement that a responsible caregiver will receive you at your " destination.  We require that drivers and caregivers are confirmed prior to your procedure.

## 2023-12-11 ENCOUNTER — MYC REFILL (OUTPATIENT)
Dept: FAMILY MEDICINE | Facility: CLINIC | Age: 63
End: 2023-12-11
Payer: OTHER GOVERNMENT

## 2023-12-11 DIAGNOSIS — E66.811 CLASS 1 OBESITY DUE TO EXCESS CALORIES WITH SERIOUS COMORBIDITY AND BODY MASS INDEX (BMI) OF 32.0 TO 32.9 IN ADULT: ICD-10-CM

## 2023-12-11 DIAGNOSIS — Z95.1 STATUS POST CORONARY ARTERY BYPASS GRAFT: ICD-10-CM

## 2023-12-11 DIAGNOSIS — E11.9 TYPE 2 DIABETES MELLITUS WITHOUT COMPLICATION, WITHOUT LONG-TERM CURRENT USE OF INSULIN (H): ICD-10-CM

## 2023-12-11 DIAGNOSIS — E66.09 CLASS 1 OBESITY DUE TO EXCESS CALORIES WITH SERIOUS COMORBIDITY AND BODY MASS INDEX (BMI) OF 32.0 TO 32.9 IN ADULT: ICD-10-CM

## 2023-12-11 DIAGNOSIS — I50.20 HEART FAILURE WITH REDUCED EJECTION FRACTION (H): ICD-10-CM

## 2024-01-01 DIAGNOSIS — I10 ESSENTIAL HYPERTENSION: ICD-10-CM

## 2024-01-02 RX ORDER — HYDROCHLOROTHIAZIDE 12.5 MG/1
1 CAPSULE ORAL DAILY
Qty: 90 CAPSULE | Refills: 3 | Status: SHIPPED | OUTPATIENT
Start: 2024-01-02

## 2024-01-08 ENCOUNTER — TELEPHONE (OUTPATIENT)
Dept: GASTROENTEROLOGY | Facility: CLINIC | Age: 64
End: 2024-01-08
Payer: OTHER GOVERNMENT

## 2024-01-08 NOTE — TELEPHONE ENCOUNTER
Pre assessment completed for upcoming procedure.   (Please see previous telephone encounter notes for complete details)    Patient  returned call.       Procedure details:    Arrival time and facility location reviewed.    Pre op exam needed? N/A    Designated  policy reviewed. Instructed to have someone stay 6 hours post procedure.     COVID policy reviewed.      Medication review:    Medications reviewed. Please see supporting documentation below. Holding recommendations discussed (if applicable).       Prep for procedure:     Procedure prep instructions reviewed.        Additional information needed?  N/A      Patient  verbalized understanding and had no questions or concerns at this time.      Louisa Chase RN  Endoscopy Procedure Pre Assessment RN  [phone] option 4

## 2024-01-08 NOTE — TELEPHONE ENCOUNTER
Attempted to contact patient in order to complete pre assessment questions.     Patient states they are busy and will return call to [phone] option 4      Procedure details:    Patient scheduled for Upper endoscopy (EGD) on 1/22/24.     Arrival time: 0930. Procedure time 1030    Pre op exam needed? N/A    Facility location: [facility]; [address], [place] [address]    Sedation type: Conscious sedation     Indication for procedure:     CHF 45%, cirrhosis varieal screening         Chart review:     Electronic implanted devices? No    Recent diagnosis of diverticulitis within the last 6 weeks? No    Diabetic? Yes. See medication holding recommendations     Diabetic medication HOLDING recommendations: (if applicable)  Oral diabetic medications: No  Diabetic injectables: Yes- Ozempic (Semaglutide). Weekly dosing of medication.  Hold 7 days before procedure.  Follow up with managing provider.   Insulin: No      Medication review:    Anticoagulants? No    NSAIDS? No    Other medication HOLDING recommendations:  N/A      Prep for procedure:     Bowel prep recommendation: N/A    Prep instructions sent via [employer].       Louisa De La O RN  Endoscopy Procedure Pre Assessment RN

## 2024-01-09 DIAGNOSIS — Z95.1 STATUS POST CORONARY ARTERY BYPASS GRAFT: ICD-10-CM

## 2024-01-09 DIAGNOSIS — E66.811 CLASS 1 OBESITY DUE TO EXCESS CALORIES WITH SERIOUS COMORBIDITY AND BODY MASS INDEX (BMI) OF 32.0 TO 32.9 IN ADULT: ICD-10-CM

## 2024-01-09 DIAGNOSIS — I50.20 HEART FAILURE WITH REDUCED EJECTION FRACTION (H): ICD-10-CM

## 2024-01-09 DIAGNOSIS — E11.9 TYPE 2 DIABETES MELLITUS WITHOUT COMPLICATION, WITHOUT LONG-TERM CURRENT USE OF INSULIN (H): ICD-10-CM

## 2024-01-09 DIAGNOSIS — E66.09 CLASS 1 OBESITY DUE TO EXCESS CALORIES WITH SERIOUS COMORBIDITY AND BODY MASS INDEX (BMI) OF 32.0 TO 32.9 IN ADULT: ICD-10-CM

## 2024-01-10 NOTE — TELEPHONE ENCOUNTER
Pending Prescriptions:                       Disp   Refills    semaglutide (OZEMPIC) 2 MG/3ML pen        6 mL   0            Sig: Inject 0.5 mg Subcutaneous every 7 days

## 2024-01-16 ENCOUNTER — TELEPHONE (OUTPATIENT)
Dept: GASTROENTEROLOGY | Facility: CLINIC | Age: 64
End: 2024-01-16
Payer: OTHER GOVERNMENT

## 2024-01-16 NOTE — TELEPHONE ENCOUNTER
Caller: Mir Treviño Sr.   Reason for Reschedule/Cancellation (please be detailed, any staff messages or encounters to note?):     Per pt -- change date & Location     **Per RN Review (OK) for MW       Prior to reschedule please review:  Ordering Provider:Kelly Bazan MD    Sedation per order:moderate   Does patient have any ASC Exclusions, please identify?: n       Notes on Cancelled Procedure:  Procedure:Upper Endoscopy [EGD]   Date: 01/22/2024  Location:[facility]; [address], [place] [address]  Surgeon: Leventhal         Rescheduled: yes   Procedure: Upper Endoscopy [EGD]  Date: 05/08/2024  Location:[facility]; [address], [address], [place] [address]  Surgeon: daniel   Sedation Level Scheduled  mac          Reason for Sedation Level per loc   Prep/Instructions updated and sent: Arradiance        Send In - basket message to Panc - Daniel Pool if [facility] procedure is canceled or rescheduled: [ N/A, YES or NO] n/a

## 2024-01-16 NOTE — TELEPHONE ENCOUNTER
Pre Assessment RN Review    Focused Assessments      Cirrhosis Assessment    No results found for requested labs within last 90 days.       MELD 3.0: 8 at 10/2/2023  3:47 PM  MELD-Na: 9 at 10/2/2023  3:47 PM  Calculated from:  Serum Creatinine: 0.81 mg/dL (Using min of 1 mg/dL) at 10/2/2023  3:47 PM  Serum Sodium: 143 mmol/L (Using max of 137 mmol/L) at 10/2/2023  3:47 PM  Total Bilirubin: 0.8 mg/dL (Using min of 1 mg/dL) at 10/2/2023  3:47 PM  Serum Albumin: 4.2 g/dL (Using max of 3.5 g/dL) at 10/2/2023  3:47 PM  INR(ratio): 1.26 at 10/2/2023  3:47 PM  Age at listing (hypothetical): 63 years  Sex: Male at 10/2/2023  3:47 PM      INR <= 2.0*  and  Hgb >= 9 g/dL  and  Plt >= 50 10^9/L INR > 2.0   OR  Hgb < 9 g/dL   OR  Plt < 50 10^9/L   No Scheduling Restrictions Hospital Only   *Exception for patients on anticoagulation therapy.    Hx of variceal bleeding? No. (no scheduling restrictions)    Paracentesis in the last month? No      Scheduling Status & Recommendations    Location Type: ASC - Per RN assessment.

## 2024-01-24 DIAGNOSIS — I10 ESSENTIAL HYPERTENSION: ICD-10-CM

## 2024-01-24 RX ORDER — LISINOPRIL 10 MG/1
10 TABLET ORAL DAILY
Qty: 90 TABLET | Refills: 3 | Status: SHIPPED | OUTPATIENT
Start: 2024-01-24

## 2024-01-28 ENCOUNTER — HEALTH MAINTENANCE LETTER (OUTPATIENT)
Age: 64
End: 2024-01-28

## 2024-01-29 DIAGNOSIS — I25.118 CORONARY ARTERY DISEASE OF NATIVE ARTERY OF NATIVE HEART WITH STABLE ANGINA PECTORIS (H): ICD-10-CM

## 2024-01-30 RX ORDER — METOPROLOL SUCCINATE 50 MG/1
50 TABLET, EXTENDED RELEASE ORAL DAILY
Qty: 90 TABLET | Refills: 0 | Status: SHIPPED | OUTPATIENT
Start: 2024-01-30 | End: 2024-05-28

## 2024-01-30 NOTE — TELEPHONE ENCOUNTER
Pt last seen 2/1/23 - rec follow-up one year.  Order extended.  Message sent to scheduling to reach out to pt for appt.  90 day supply sent.  -ral

## 2024-02-16 ENCOUNTER — TRANSFERRED RECORDS (OUTPATIENT)
Dept: MULTI SPECIALTY CLINIC | Facility: CLINIC | Age: 64
End: 2024-02-16

## 2024-02-16 LAB — RETINOPATHY: NORMAL

## 2024-03-25 DIAGNOSIS — I25.118 CORONARY ARTERY DISEASE OF NATIVE ARTERY OF NATIVE HEART WITH STABLE ANGINA PECTORIS (H): ICD-10-CM

## 2024-03-25 RX ORDER — ROSUVASTATIN CALCIUM 40 MG/1
40 TABLET, COATED ORAL DAILY
Qty: 90 TABLET | Refills: 0 | Status: SHIPPED | OUTPATIENT
Start: 2024-03-25 | End: 2024-06-21

## 2024-04-02 ASSESSMENT — ASTHMA QUESTIONNAIRES
QUESTION_1 LAST FOUR WEEKS HOW MUCH OF THE TIME DID YOUR ASTHMA KEEP YOU FROM GETTING AS MUCH DONE AT WORK, SCHOOL OR AT HOME: NONE OF THE TIME
QUESTION_5 LAST FOUR WEEKS HOW WOULD YOU RATE YOUR ASTHMA CONTROL: COMPLETELY CONTROLLED
ACT_TOTALSCORE: 25
QUESTION_3 LAST FOUR WEEKS HOW OFTEN DID YOUR ASTHMA SYMPTOMS (WHEEZING, COUGHING, SHORTNESS OF BREATH, CHEST TIGHTNESS OR PAIN) WAKE YOU UP AT NIGHT OR EARLIER THAN USUAL IN THE MORNING: NOT AT ALL
QUESTION_2 LAST FOUR WEEKS HOW OFTEN HAVE YOU HAD SHORTNESS OF BREATH: NOT AT ALL
QUESTION_4 LAST FOUR WEEKS HOW OFTEN HAVE YOU USED YOUR RESCUE INHALER OR NEBULIZER MEDICATION (SUCH AS ALBUTEROL): NOT AT ALL
ACT_TOTALSCORE: 25

## 2024-04-03 ENCOUNTER — ANCILLARY PROCEDURE (OUTPATIENT)
Dept: ULTRASOUND IMAGING | Facility: CLINIC | Age: 64
End: 2024-04-03
Attending: STUDENT IN AN ORGANIZED HEALTH CARE EDUCATION/TRAINING PROGRAM
Payer: OTHER GOVERNMENT

## 2024-04-03 DIAGNOSIS — K74.60 CIRRHOSIS OF LIVER WITHOUT ASCITES, UNSPECIFIED HEPATIC CIRRHOSIS TYPE (H): ICD-10-CM

## 2024-04-03 DIAGNOSIS — K76.0 HEPATIC STEATOSIS: ICD-10-CM

## 2024-04-03 DIAGNOSIS — I85.00 IDIOPATHIC ESOPHAGEAL VARICES WITHOUT BLEEDING (H): ICD-10-CM

## 2024-04-03 PROCEDURE — 76705 ECHO EXAM OF ABDOMEN: CPT

## 2024-04-04 DIAGNOSIS — K76.9 LIVER LESION: Primary | ICD-10-CM

## 2024-04-05 ENCOUNTER — TELEPHONE (OUTPATIENT)
Dept: GASTROENTEROLOGY | Facility: CLINIC | Age: 64
End: 2024-04-05
Payer: OTHER GOVERNMENT

## 2024-04-29 DIAGNOSIS — I25.118 CORONARY ARTERY DISEASE OF NATIVE ARTERY OF NATIVE HEART WITH STABLE ANGINA PECTORIS (H): ICD-10-CM

## 2024-04-30 RX ORDER — METOPROLOL SUCCINATE 50 MG/1
TABLET, EXTENDED RELEASE ORAL
Qty: 90 TABLET | Refills: 3 | OUTPATIENT
Start: 2024-04-30

## 2024-04-30 NOTE — TELEPHONE ENCOUNTER
Pt declined further follow up with Dr. Lezama per [employer] messages and last seen over a year ago. Writer cannot refill per protocol.

## 2024-05-01 NOTE — TELEPHONE ENCOUNTER
Informed pt of info and he put the phone on speaker, so his wife could hear the message as well. They stated they did not refused to see Dr Lezama and is willing to see anyone close to home that is willing to see pt. Advised them to schedule follow up appt with Cardiology and if with same group, they should be able to fill his metoprolol until he is seen.    Advised them to call back if they got cardiology appt scheduled, but still having issues with getting med refilled and writer can check to see if Dr Murphy can bridge med until pt is seen by cardiology. They both verbalized understanding.    Marisol Levy, FARHANAN, RN  [facility]

## 2024-05-07 ENCOUNTER — ANESTHESIA EVENT (OUTPATIENT)
Dept: SURGERY | Facility: AMBULATORY SURGERY CENTER | Age: 64
End: 2024-05-07
Payer: OTHER GOVERNMENT

## 2024-05-07 ENCOUNTER — HOSPITAL ENCOUNTER (OUTPATIENT)
Dept: MRI IMAGING | Facility: HOSPITAL | Age: 64
Discharge: HOME | End: 2024-05-07
Attending: STUDENT IN AN ORGANIZED HEALTH CARE EDUCATION/TRAINING PROGRAM | Admitting: STUDENT IN AN ORGANIZED HEALTH CARE EDUCATION/TRAINING PROGRAM
Payer: OTHER GOVERNMENT

## 2024-05-07 DIAGNOSIS — K76.9 LIVER LESION: ICD-10-CM

## 2024-05-07 PROCEDURE — 255N000002 HC RX 255 OP 636: Performed by: STUDENT IN AN ORGANIZED HEALTH CARE EDUCATION/TRAINING PROGRAM

## 2024-05-07 PROCEDURE — A9581 GADOXETATE DISODIUM INJ: HCPCS | Performed by: STUDENT IN AN ORGANIZED HEALTH CARE EDUCATION/TRAINING PROGRAM

## 2024-05-07 PROCEDURE — 74183 MRI ABD W/O CNTR FLWD CNTR: CPT

## 2024-05-07 RX ADMIN — GADOXETATE DISODIUM 20 ML: 181.43 INJECTION, SOLUTION INTRAVENOUS at 19:09

## 2024-05-08 ENCOUNTER — HOSPITAL ENCOUNTER (OUTPATIENT)
Facility: AMBULATORY SURGERY CENTER | Age: 64
Discharge: HOME | End: 2024-05-08
Attending: SURGERY
Payer: OTHER GOVERNMENT

## 2024-05-08 ENCOUNTER — ANESTHESIA (OUTPATIENT)
Dept: SURGERY | Facility: AMBULATORY SURGERY CENTER | Age: 64
End: 2024-05-08
Payer: OTHER GOVERNMENT

## 2024-05-08 VITALS
SYSTOLIC BLOOD PRESSURE: 125 MMHG | OXYGEN SATURATION: 99 % | BODY MASS INDEX: 31.99 KG/M2 | HEART RATE: 51 BPM | TEMPERATURE: 97.3 F | HEIGHT: 69 IN | DIASTOLIC BLOOD PRESSURE: 65 MMHG | RESPIRATION RATE: 16 BRPM | WEIGHT: 216 LBS

## 2024-05-08 DIAGNOSIS — I85.00 IDIOPATHIC ESOPHAGEAL VARICES WITHOUT BLEEDING (H): ICD-10-CM

## 2024-05-08 DIAGNOSIS — K76.0 HEPATIC STEATOSIS: ICD-10-CM

## 2024-05-08 DIAGNOSIS — K74.60 CIRRHOSIS OF LIVER WITHOUT ASCITES, UNSPECIFIED HEPATIC CIRRHOSIS TYPE (H): ICD-10-CM

## 2024-05-08 LAB — UPPER GI ENDOSCOPY: NORMAL

## 2024-05-08 RX ORDER — ONDANSETRON 4 MG/1
4 TABLET, ORALLY DISINTEGRATING ORAL EVERY 30 MIN PRN
Status: CANCELLED | OUTPATIENT
Start: 2024-05-08

## 2024-05-08 RX ORDER — SODIUM CHLORIDE, SODIUM LACTATE, POTASSIUM CHLORIDE, CALCIUM CHLORIDE 600; 310; 30; 20 MG/100ML; MG/100ML; MG/100ML; MG/100ML
INJECTION, SOLUTION INTRAVENOUS CONTINUOUS
Status: DISCONTINUED | OUTPATIENT
Start: 2024-05-08 | End: 2024-05-09 | Stop reason: HOSPADM

## 2024-05-08 RX ORDER — LIDOCAINE HYDROCHLORIDE 20 MG/ML
INJECTION, SOLUTION INFILTRATION; PERINEURAL PRN
Status: DISCONTINUED | OUTPATIENT
Start: 2024-05-08 | End: 2024-05-08

## 2024-05-08 RX ORDER — NALOXONE HYDROCHLORIDE 0.4 MG/ML
0.1 INJECTION, SOLUTION INTRAMUSCULAR; INTRAVENOUS; SUBCUTANEOUS
Status: CANCELLED | OUTPATIENT
Start: 2024-05-08

## 2024-05-08 RX ORDER — ONDANSETRON 2 MG/ML
4 INJECTION INTRAMUSCULAR; INTRAVENOUS EVERY 30 MIN PRN
Status: CANCELLED | OUTPATIENT
Start: 2024-05-08

## 2024-05-08 RX ORDER — OXYCODONE HYDROCHLORIDE 5 MG/1
5 TABLET ORAL
Status: CANCELLED | OUTPATIENT
Start: 2024-05-08

## 2024-05-08 RX ORDER — LIDOCAINE 40 MG/G
CREAM TOPICAL
Status: DISCONTINUED | OUTPATIENT
Start: 2024-05-08 | End: 2024-05-09 | Stop reason: HOSPADM

## 2024-05-08 RX ORDER — OXYCODONE HYDROCHLORIDE 10 MG/1
10 TABLET ORAL
Status: CANCELLED | OUTPATIENT
Start: 2024-05-08

## 2024-05-08 RX ORDER — PROPOFOL 10 MG/ML
INJECTION, EMULSION INTRAVENOUS CONTINUOUS PRN
Status: DISCONTINUED | OUTPATIENT
Start: 2024-05-08 | End: 2024-05-08

## 2024-05-08 RX ORDER — DEXAMETHASONE SODIUM PHOSPHATE 4 MG/ML
4 INJECTION, SOLUTION INTRA-ARTICULAR; INTRALESIONAL; INTRAMUSCULAR; INTRAVENOUS; SOFT TISSUE
Status: CANCELLED | OUTPATIENT
Start: 2024-05-08

## 2024-05-08 RX ORDER — PROPOFOL 10 MG/ML
INJECTION, EMULSION INTRAVENOUS PRN
Status: DISCONTINUED | OUTPATIENT
Start: 2024-05-08 | End: 2024-05-08

## 2024-05-08 RX ADMIN — PROPOFOL 200 MCG/KG/MIN: 10 INJECTION, EMULSION INTRAVENOUS at 12:48

## 2024-05-08 RX ADMIN — SODIUM CHLORIDE, SODIUM LACTATE, POTASSIUM CHLORIDE, CALCIUM CHLORIDE: 600; 310; 30; 20 INJECTION, SOLUTION INTRAVENOUS at 11:25

## 2024-05-08 RX ADMIN — PROPOFOL 20 MG: 10 INJECTION, EMULSION INTRAVENOUS at 12:50

## 2024-05-08 RX ADMIN — PROPOFOL 50 MG: 10 INJECTION, EMULSION INTRAVENOUS at 12:48

## 2024-05-08 RX ADMIN — LIDOCAINE HYDROCHLORIDE 5 ML: 20 INJECTION, SOLUTION INFILTRATION; PERINEURAL at 12:48

## 2024-05-08 RX ADMIN — PROPOFOL 20 MG: 10 INJECTION, EMULSION INTRAVENOUS at 12:49

## 2024-05-08 NOTE — H&P
General Surgery H&P  Mir Treviño Sr. MRN# [id number]   Age/Sex: 64 year old male YOB: 1960     Reason for visit: EGD       Referring physician: Dr. Bazan                    Assessment and Plan:   Assessment:   Hepatic steatosis   Esophageal varices  Liver cirrhosis    Plan:  - To the OR today for EGD  - The risks and benefits of the procedure were explained detail to the patient. The risks include infection, bleeding, damage to the surrounding structures. Patient verbalized understanding provided consent to undergo the procedure above.            Chief Complaint:   Here for surgery     History is obtained from the patient    HPI:   Mir Treviño Sr. is a 64 year old male who presents for EGD due to history of esophageal varices from liver cirrhosis.  No new complaints.          Past Medical History:     Past Medical History:   Diagnosis Date    Antiplatelet or antithrombotic long-term use     Cirrhosis of liver (H)     with gastrosplenic varices    Coronary atherosclerosis     Created by Conversion [facility] Annotation: Jul 16 2010  5:35PM - Nik Boyd: MI 7/10  Replacement Utility updated for latest IMO load    Diabetes (H)     Essential hypertension     Created by Conversion  Replacement Utility updated for latest IMO load    Heart attack (H)     History of angina     Pure hypercholesterolemia     Created by Conversion     Stented coronary artery     Uncomplicated asthma               Past Surgical History:     Past Surgical History:   Procedure Laterality Date    BYPASS GRAFT ARTERY CORONARY N/A 5/10/2022    Procedure: CORONARY ARTERY BYPASS GRAFT X3 ,  LEFT LEG ENDOSCOPIC VESSEL PROCUREMENT, LEFT INTERNAL MAMMARY ARTERY HARVEST, ANESTHESIA TRANSESOPHAGEAL ECHOCARDIOGRAM. EPIAORTIC ULTRASOUND.;  Surgeon: Nik Marcus MD;  Location: [facility] Main OR    CV CORONARY ANGIOGRAM N/A 4/8/2022    Procedure: Coronary Angiogram;  Surgeon: Ty Grace MD;  Location: [facility] CATH LAB CV     CV CORONARY ANGIOGRAM N/A 1/19/2023    Procedure: Coronary Angiogram;  Surgeon: Zoe Mancilla MD;  Location: [facility] CV    CV FRACTIONAL FLOW RATIO WIRE N/A 1/19/2023    Procedure: Fractional Flow Ratio Wire;  Surgeon: Zoe Mancilla MD;  Location: [facility] CATH LAB CV    CV LEFT HEART CATH N/A 4/8/2022    Procedure: Left Heart Catheterization;  Surgeon: Ty Grace MD;  Location: [facility] CATH LAB CV    CV LEFT HEART CATH N/A 1/19/2023    Procedure: Left Heart Catheterization;  Surgeon: Zoe Mancilla MD;  Location: [facility] CV    LAPAROSCOPIC CHOLECYSTECTOMY N/A 6/7/2022    Procedure: CHOLECYSTECTOMY, LAPAROSCOPIC;  Surgeon: Franck Flowers MD;  Location: [facility] [place] OR    OTHER SURGICAL HISTORY      CORONARY STENTSTwo coronary artery stents in 2010             Social History:    reports that he has never smoked. He has never used smokeless tobacco. He reports current alcohol use. He reports that he does not use drugs.           Family History:     Family History   Problem Relation Age of Onset    No Known Problems Mother     No Known Problems Father     No Known Problems Brother     No Known Problems Brother     No Known Problems Sister     No Known Problems Son     No Known Problems Son     No Known Problems Son     No Known Problems Daughter     Anesthesia Reaction No family hx of     Malignant Hyperthermia No family hx of               Allergies:   No Known Allergies           Medications:     Prior to Admission medications    Medication Sig Start Date End Date Taking? Authorizing Provider   acetaminophen (TYLENOL) 325 MG tablet Take 2 tablets (650 mg) by mouth every 4 hours as needed for other, headaches or fever (For optimal non-opioid multimodal pain management to improve pain control.) 5/15/22  Yes Julio Glover PA-C   albuterol (ACCUNEB) 1.25 MG/3ML neb solution Take 1 vial (1.25 mg) by nebulization every 6 hours as needed 7/14/23  Yes Olegario Bailon MD  "  albuterol (PROAIR HFA/PROVENTIL HFA/VENTOLIN HFA) 108 (90 Base) MCG/ACT inhaler Inhale 2 puffs into the lungs every 6 hours as needed 7/15/23  Yes Roberto Murphy MD   aspirin (ASA) 81 MG EC tablet Take 1 tablet (81 mg) by mouth daily Start tomorrow morning. 1/20/23  Yes Zoe Mancilla MD   coenzyme Q-10 capsule Take 2 capsules (200 mg) by mouth daily 4/6/22  Yes Jermaine Schafer MD   fluticasone propionate (FLONASE) 50 mcg/actuation nasal spray Spray 1 spray in nostril daily as needed (seasonal) 2/2/19  Yes Provider, Historical   hydrochlorothiazide (MICROZIDE) 12.5 MG capsule TAKE 1 CAPSULE DAILY 1/2/24  Yes Jak Lezama MD   lisinopril (ZESTRIL) 10 MG tablet TAKE 1 TABLET DAILY 1/24/24  Yes Roberto Murphy MD   metoprolol succinate ER (TOPROL XL) 50 MG 24 hr tablet Take 1 tablet (50 mg) by mouth daily Hold for heart rate less than 60.  Due for cardiology follow-up visit. 1/30/24  Yes Jak Lezama MD   montelukast (SINGULAIR) 10 MG tablet TAKE 1 TABLET DAILY EVERY NIGHT AT BEDTIME (SINGULAIR) 11/1/23  Yes Roberto Murphy MD   rosuvastatin (CRESTOR) 40 MG tablet TAKE 1 TABLET DAILY 3/25/24  Yes Roberto Murphy MD   nitroGLYcerin (NITROSTAT) 0.4 MG sublingual tablet One tablet under the tongue every 5 minutes if needed for chest pain. May repeat every 5 minutes for a maximum of 3 doses in 15 minutes\" 1/19/23   Zoe Mancilla MD   nitroGLYcerin (NITROSTAT) 0.4 MG sublingual tablet For chest pain place 1 tablet under the tongue every 5 minutes for 3 doses. If symptoms persist 5 minutes after 1st dose call 911. 4/6/22   Jermaine Schafer MD   semaglutide (OZEMPIC) 2 MG/3ML pen Inject 0.5 mg Subcutaneous every 7 days 1/11/24   Roberto Murphy MD   atenolol (TENORMIN) 25 MG tablet [ATENOLOL (TENORMIN) 25 MG TABLET] TAKE 1 TABLET BY MOUTH EVERY DAY 2/6/18 5/15/22  Geoff Devine MD              Review of Systems:   A 12 point Review of Systems is negative other than noted in the " HPI            Physical Exam:   No data found.     No intake or output data in the 24 hours ending 05/08/24 0919   Constitutional:   awake, alert, cooperative, no apparent distress, and appears stated age       Eyes:   PERRL, conjunctiva/corneas clear, EOM's intact; no scleral edema or icterus noted        ENT:   Normocephalic, without obvious abnormality, atraumatic, Lips, mucosa, and tongue normal        Hematologic / Lymphatic:   No lymphadenopathy       Lungs:   Normal respiratory effort, no accessory muscle use       Cardiovascular:   Regular rate and rhythm       Abdomen:   Soft, nondistended, nontender to palpation       Musculoskeletal:   No obvious swelling, bruising or deformity       Skin:   Skin color and texture normal for patient, no rashes or lesions              Data:            Naun Ramires DO  General Surgeon  [facility]  Surgery [facility] - [address] [address]?  Office: [phone]  Employed by - [facility] [employer]  Pager: [phone]

## 2024-05-08 NOTE — ANESTHESIA CARE TRANSFER NOTE
Patient: Mir Treviño Sr.    Procedure: Procedure(s):  ESOPHAGOGASTRODUODENOSCOPY, WITH BIOPSY       Diagnosis: Idiopathic esophageal varices without bleeding (H) [I85.00]  Hepatic steatosis [K76.0]  Cirrhosis of liver without ascites, unspecified hepatic cirrhosis type (H) [K74.60]  Diagnosis Additional Information: No value filed.    Anesthesia Type:   MAC     Note:    Oropharynx: oropharynx clear of all foreign objects and spontaneously breathing  Level of Consciousness: drowsy  Oxygen Supplementation: face mask  Level of Supplemental Oxygen (L/min / FiO2): 6  Independent Airway: airway patency satisfactory and stable  Dentition: dentition unchanged  Vital Signs Stable: post-procedure vital signs reviewed and stable  Report to RN Given: handoff report given  Patient transferred to: Phase II    Handoff Report: Identifed the Patient, Identified the Reponsible Provider, Reviewed the pertinent medical history, Discussed the surgical course, Reviewed Intra-OP anesthesia mangement and issues during anesthesia, Set expectations for post-procedure period and Allowed opportunity for questions and acknowledgement of understanding      Vitals:  Vitals Value Taken Time   BP 95/55 05/08/24 1304   Temp 97.3  F (36.3  C) 05/08/24 1304   Pulse 71 05/08/24 1304   Resp 18 05/08/24 1304   SpO2 99 % 05/08/24 1304       Electronically Signed By: JASON Hsieh CRNA  May 8, 2024  1:07 PM

## 2024-05-08 NOTE — DISCHARGE INSTRUCTIONS
"Esophageal Varices: Care Instructions  Overview     Esophageal varices (say \"ee-sof-uh-ARTURO-ul LXZI-dr-xrre\") are veins in your esophagus that are bigger than normal. Your esophagus is a tube. It carries food from your throat to your stomach.  These veins get big because of pressure in the veins that filter blood from the intestines through the liver. This pressure makes the walls of the veins weak. Then they can rupture and cause very serious bleeding.  This problem is usually found in people who have serious liver disease.  Treatments include medicines and procedures to help lower the pressure in the veins. Talk with your doctor about the best treatment for you.  If you have bleeding from this problem, there is a risk that it will happen again. In this case, it's important to go back and follow up with your doctor.  Follow-up care is a key part of your treatment and safety. Be sure to make and go to all appointments, and call your doctor if you are having problems. It's also a good idea to know your test results and keep a list of the medicines you take.  How can you care for yourself at home?  Be safe with medicines. Take your medicines exactly as prescribed. Call your doctor if you think you are having a problem with your medicine. You will get more details on the specific medicines your doctor prescribes.  Talk to your doctor before you take any other medicines. These include over-the-counter medicines, vitamins, and herbal products.  Avoid aspirin, ibuprofen (Advil, Motrin), and naproxen (Aleve). These can cause sores in your stomach or esophagus. They can also increase the risk for bleeding.  Do not drink alcohol. It increases your risk of bleeding. It can also make liver damage worse. Tell your doctor if you need help to quit. Counseling, support groups, and sometimes medicines can help you stay sober.  When should you call for help?   Call 911 anytime you think you may need emergency care. For example, call " "if:    You have trouble breathing.     You vomit blood or what looks like coffee grounds.   Call your doctor now or seek immediate medical care if:    You feel very sleepy or confused.     You have new or worse belly pain.     You have a fever.     There is a new or increasing yellow tint to your skin or the whites of your eyes.     You have any abnormal bleeding, such as:  Nosebleeds.  Vaginal bleeding that is different (heavier, more frequent, at a different time of the month) than what you are used to.  Bloody or black stools, or rectal bleeding.  Bloody or pink urine.   Watch closely for changes in your health, and be sure to contact your doctor if:    You have any problems.     Your belly is getting bigger.     You are gaining weight.   Where can you learn more?  Go to https://www.[employer].net/patiented  Enter Y918 in the search box to learn more about \"Esophageal Varices: Care Instructions.\"  Current as of: October 19, 2023               Content Version: 14.0    5868-2209 [employer].   Care instructions adapted under license by your healthcare professional. If you have questions about a medical condition or this instruction, always ask your healthcare professional. [employer] disclaims any warranty or liability for your use of this information.    Upper GI Endoscopy: What to Expect at Home  Your Recovery  You had an upper GI endoscopy. Your doctor used a thin, lighted tube that bends to look at the inside of your esophagus, your stomach, and the first part of the small intestine, called the duodenum.  After you have an endoscopy, you will stay at the hospital or clinic for 1 to 2 hours. This will allow the medicine to wear off. You will be able to go home after your doctor or nurse checks to make sure that you're not having any problems.  You may have to stay overnight if you had treatment during the test. You may have a sore throat for a day or two after the test.  This care " "sheet gives you a general idea about what to expect after the test.  How can you care for yourself at home?  Activity   Rest as much as you need to after you go home.  You should be able to go back to your usual activities the day after the test.  Diet   Follow your doctor's directions for eating after the test.  Drink plenty of fluids (unless your doctor has told you not to).  Medications   If you have a sore throat the day after the test, use an over-the-counter spray to numb your throat.  Follow-up care is a key part of your treatment and safety. Be sure to make and go to all appointments, and call your doctor if you are having problems. It's also a good idea to know your test results and keep a list of the medicines you take.  When should you call for help?   Call 911 anytime you think you may need emergency care. For example, call if:    You passed out (lost consciousness).     You have trouble breathing.     You pass maroon or bloody stools.   Call your doctor now or seek immediate medical care if:    You have pain that does not get better after your take pain medicine.     You have new or worse belly pain.     You have blood in your stools.     You are sick to your stomach and cannot keep fluids down.     You have a fever.     You cannot pass stools or gas.   Watch closely for changes in your health, and be sure to contact your doctor if:    Your throat still hurts after a day or two.     You do not get better as expected.   Where can you learn more?  Go to https://www.[employer].net/patiented  Enter J454 in the search box to learn more about \"Upper GI Endoscopy: What to Expect at Home.\"  Current as of: October 19, 2023               Content Version: 14.0    8501-4627 [employer].   Care instructions adapted under license by your healthcare professional. If you have questions about a medical condition or this instruction, always ask your healthcare professional. [employer] disclaims " any warranty or liability for your use of this information.          If you have any questions or concerns regarding your procedure, please contact Dr. Ramires, his office number is [phone].       Same-Day Surgery - Adult Discharge Orders & Instructions     For 24 hours after surgery    Get plenty of rest.  A responsible adult must stay with you for at least 24 hours after you leave the hospital.     Do not drive or use heavy equipment.  If you have weakness or tingling, don't drive or use heavy equipment until this feeling goes away.    Do not drink alcohol.    Avoid strenuous or risky activities.  Ask for help when climbing stairs.     You may feel lightheaded.  If so, sit for a few minutes before standing.  Have someone help you get up.     You may have a slight fever. Call the doctor if your fever is over 100 F (37.7 C) (taken under the tongue) or lasts longer than 24 hours.    You may have a dry mouth, a sore throat, muscle aches or trouble sleeping.  These should go away after 24 hours.    Do not make important or legal decisions.    Based on the surgery/procedure that you had today, we do not expect that you will have any problems.  However, we want you to know what to do if you have pain, nausea, bleeding, or infection:    To control pain:  Take medicines your physician has prescribed or or over-the counter medicine he or she advises.  Ice packs and periods of rest are often helpful.  For surgery on an arm or leg, raise it on a pillow to ease swelling.  If your pain is not managed with the above methods, contact your physician.    Copyright [employer], Licensed under [employer]    To control nausea:  Take anti-nausea medicine approved by your physician.  Drink clear liquids such as apple juice, ginger ale, broth or 7-Up. Be sure to drink enough fluids.  Move to a regular diet as you feel able.  Rest may also help.    Bleeding:  You may see a little blood on your dressing, about the size of a  quarter in the first 24 hours.  If you see this, there is no reason to be alarmed.  However, if this continues to increase in size, apply pressure if able, and notify your physician.    Copyright [employer], Licensed under [employer]    Infection: Please contact your physician if you have any of the following signs:  redness, swelling, heat, increasing pain or foul-smelling drainage at your surgery site, fever or chills.    Call your doctor for any of the followin.  It has been over 8 to 10 hours since surgery and you are still not able to urinate (pass water).    2.  Headache for over 24 hours.    3.  Numbness, tingling or weakness in your legs the day after surgery (if you had spinal anesthesia).    Impression:         -  Duodenitis, characterized by erythema Biopsied.          -  Acute gastritis, characterized by erythema.  Biopsied.          -  Type 1 gastroesophageal varices (GOV1, esophageal varices which extend             along the lesser curvature) , without bleeding.          -  Grade I esophageal varices.          -  Grade II esophageal varices.          - distal varices grade I to II   Recommendation:         -  Await pathology results.          -  Follow an antireflux regimen.          -  Continue present medications.          - avoid medicines or food that would irritate the stomach lining.

## 2024-05-08 NOTE — ANESTHESIA POSTPROCEDURE EVALUATION
Patient: Mir Treviño Sr.    Procedure: Procedure(s):  ESOPHAGOGASTRODUODENOSCOPY, WITH BIOPSY       Anesthesia Type:  MAC    Note:  Disposition: Outpatient   Postop Pain Control: Uneventful            Sign Out: Well controlled pain   PONV: No   Neuro/Psych: Uneventful            Sign Out: Acceptable/Baseline neuro status   Airway/Respiratory: Uneventful            Sign Out: Acceptable/Baseline resp. status   CV/Hemodynamics: Uneventful            Sign Out: Acceptable CV status; No obvious hypovolemia; No obvious fluid overload   Other NRE:    DID A NON-ROUTINE EVENT OCCUR?        Last vitals:  Vitals Value Taken Time   /65 05/08/24 1345   Temp 97.3  F (36.3  C) 05/08/24 1304   Pulse 52 05/08/24 1350   Resp 16 05/08/24 1345   SpO2 99 % 05/08/24 1350   Vitals shown include unfiled device data.    Electronically Signed By: Shaun Finn MD  May 8, 2024  2:57 PM

## 2024-05-08 NOTE — ANESTHESIA PREPROCEDURE EVALUATION
Anesthesia Pre-Procedure Evaluation    Patient: Mir Treviño Sr.   MRN: [id number] : 1960        Procedure : Procedure(s):  ESOPHAGOGASTRODUODENOSCOPY, WITH BIOPSY          Past Medical History:   Diagnosis Date     Antiplatelet or antithrombotic long-term use      Cirrhosis of liver (H)     with gastrosplenic varices     Coronary atherosclerosis     Created by Conversion [facility] Annotation: 2010  5:35PM - Nik Boyd: [place] 7/10  Replacement Utility updated for latest IMO load     Diabetes (H)      Essential hypertension     Created by Conversion  Replacement Utility updated for latest IMO load     Heart attack (H)      History of angina      Obese      Pure hypercholesterolemia     Created by Conversion      Stented coronary artery      Uncomplicated asthma       Past Surgical History:   Procedure Laterality Date     BYPASS GRAFT ARTERY CORONARY N/A 5/10/2022    Procedure: CORONARY ARTERY BYPASS GRAFT X3 ,  LEFT LEG ENDOSCOPIC VESSEL PROCUREMENT, LEFT INTERNAL MAMMARY ARTERY HARVEST, ANESTHESIA TRANSESOPHAGEAL ECHOCARDIOGRAM. EPIAORTIC ULTRASOUND.;  Surgeon: Nik Marcus MD;  Location: [facility] Main OR     CV CORONARY ANGIOGRAM N/A 2022    Procedure: Coronary Angiogram;  Surgeon: Ty Grace MD;  Location: [facility] CV     CV CORONARY ANGIOGRAM N/A 2023    Procedure: Coronary Angiogram;  Surgeon: Zoe Mancilla MD;  Location: [facility] CV     CV FRACTIONAL FLOW RATIO WIRE N/A 2023    Procedure: Fractional Flow Ratio Wire;  Surgeon: Zoe Mancilla MD;  Location: [facility]     CV LEFT HEART CATH N/A 2022    Procedure: Left Heart Catheterization;  Surgeon: Ty Grace MD;  Location: [facility]     CV LEFT HEART CATH N/A 2023    Procedure: Left Heart Catheterization;  Surgeon: oZe Mnacilla MD;  Location: [facility]     LAPAROSCOPIC CHOLECYSTECTOMY N/A 2022    Procedure: CHOLECYSTECTOMY, LAPAROSCOPIC;   "Surgeon: Franck Flowers MD;  Location: [facility] Main OR     OTHER SURGICAL HISTORY      CORONARY STENTSTwo coronary artery stents in 2010      No Known Allergies   Social History     Tobacco Use     Smoking status: Never     Smokeless tobacco: Never   Substance Use Topics     Alcohol use: Yes     Comment: Alcoholic Drinks/day: \"Once a month, maybe.\"      Wt Readings from Last 1 Encounters:   05/08/24 98 kg (216 lb)        Anesthesia Evaluation            ROS/MED HX  ENT/Pulmonary:     (+)           allergic rhinitis,          Intermittent, asthma                  Neurologic:  - neg neurologic ROS     Cardiovascular: Comment: Interpretation Summary     The visual ejection fraction is 45-50%.  There is septal akinesis.  There is apical dyskinesis.  There is mild anterior wall hypokinesis.  Right ventricular function cannot be assessed due to poor image quality.  No significant valve disease.      (+) Dyslipidemia hypertension- -  CAD angina-  CABG- -                                 Previous cardiac testing     METS/Exercise Tolerance: >4 METS    Hematologic:  - neg hematologic  ROS     Musculoskeletal:  - neg musculoskeletal ROS     GI/Hepatic:    Liver disease: Assessment: 1.  Hepatic steatosis  2. Esophageal varices 3. Liver cirrhosis.   Renal/Genitourinary:  - neg Renal ROS     Endo:  - neg endo ROS   (+)  type II DM,             Obesity,       Psychiatric/Substance Use:  - neg psychiatric ROS     Infectious Disease:  - neg infectious disease ROS     Malignancy:  - neg malignancy ROS     Other:  - neg other ROS          Physical Exam    Airway  airway exam normal      Mallampati: II       Respiratory Devices and Support         Dental  no notable dental history         Cardiovascular   cardiovascular exam normal       Rhythm and rate: regular and normal     Pulmonary   pulmonary exam normal        breath sounds clear to auscultation       OUTSIDE LABS:  CBC:   Lab Results   Component Value Date    WBC 3.9 (L) " "10/02/2023    WBC 3.9 (L) 01/18/2023    HGB 14.9 10/02/2023    HGB 13.6 01/18/2023    HCT 44.1 10/02/2023    HCT 40.0 01/18/2023     (L) 10/02/2023     (L) 01/18/2023     BMP:   Lab Results   Component Value Date     10/02/2023     09/19/2023    POTASSIUM 3.7 10/02/2023    POTASSIUM 4.1 09/19/2023    CHLORIDE 109 (H) 10/02/2023    CHLORIDE 105 09/19/2023    CO2 22 10/02/2023    CO2 24 09/19/2023    BUN 12.3 10/02/2023    BUN 18.0 09/19/2023    CR 0.81 10/02/2023    CR 0.89 09/19/2023     (H) 10/02/2023     (H) 09/19/2023     COAGS:   Lab Results   Component Value Date    PTT 33 05/10/2022    INR 1.26 (H) 10/02/2023    FIBR 189 05/10/2022     POC: No results found for: \"BGM\", \"HCG\", \"HCGS\"  HEPATIC:   Lab Results   Component Value Date    ALBUMIN 4.2 10/02/2023    PROTTOTAL 7.5 10/02/2023    ALT 53 10/02/2023    AST 59 (H) 10/02/2023    ALKPHOS 72 10/02/2023    BILITOTAL 0.8 10/02/2023     OTHER:   Lab Results   Component Value Date    PH 7.34 (L) 05/13/2022    LACT 2.1 (H) 05/10/2022    A1C 7.4 (H) 09/19/2023    JAMIE 9.0 10/02/2023    PHOS 2.6 05/15/2022    MAG 2.4 05/15/2022    LIPASE 34 06/06/2022    TSH 2.12 09/19/2023       Anesthesia Plan    ASA Status:  3       Anesthesia Type: MAC.   Induction: Intravenous, Propofol.   Maintenance: TIVA.        Consents    Anesthesia Plan(s) and associated risks, benefits, and realistic alternatives discussed. Questions answered and patient/representative(s) expressed understanding.     - Discussed:     - Discussed with:  Patient, Spouse      - Extended Intubation/Ventilatory Support Discussed: No.      - Patient is DNR/DNI Status: No     Use of blood products discussed: No .     Postoperative Care    Pain management: IV analgesics.   PONV prophylaxis: Ondansetron (or other 5HT-3), Dexamethasone or Solumedrol     Comments:    Other Comments: Propofol infusion           Shaun Finn MD    I have reviewed the pertinent notes and labs " "in the chart from the past 30 days and (re)examined the patient.  Any updates or changes from those notes are reflected in this note.             # Drug Induced Platelet Defect: home medication list includes an antiplatelet medication  # Obesity: Estimated body mass index is 31.9 kg/m  as calculated from the following:    Height as of this encounter: 1.753 m (5' 9\").    Weight as of this encounter: 98 kg (216 lb).      "

## 2024-05-10 ENCOUNTER — OFFICE VISIT (OUTPATIENT)
Dept: FAMILY MEDICINE | Facility: CLINIC | Age: 64
End: 2024-05-10
Payer: OTHER GOVERNMENT

## 2024-05-10 VITALS
RESPIRATION RATE: 19 BRPM | DIASTOLIC BLOOD PRESSURE: 80 MMHG | OXYGEN SATURATION: 98 % | WEIGHT: 214 LBS | HEART RATE: 65 BPM | BODY MASS INDEX: 31.7 KG/M2 | SYSTOLIC BLOOD PRESSURE: 120 MMHG | HEIGHT: 69 IN | TEMPERATURE: 97.2 F

## 2024-05-10 DIAGNOSIS — I10 ESSENTIAL HYPERTENSION: Primary | ICD-10-CM

## 2024-05-10 DIAGNOSIS — I50.20 HEART FAILURE WITH REDUCED EJECTION FRACTION (H): ICD-10-CM

## 2024-05-10 DIAGNOSIS — K74.60 CIRRHOSIS OF LIVER WITHOUT ASCITES, UNSPECIFIED HEPATIC CIRRHOSIS TYPE (H): ICD-10-CM

## 2024-05-10 DIAGNOSIS — I85.00 IDIOPATHIC ESOPHAGEAL VARICES WITHOUT BLEEDING (H): ICD-10-CM

## 2024-05-10 DIAGNOSIS — E11.9 TYPE 2 DIABETES MELLITUS WITHOUT COMPLICATION, WITHOUT LONG-TERM CURRENT USE OF INSULIN (H): ICD-10-CM

## 2024-05-10 DIAGNOSIS — K76.0 HEPATIC STEATOSIS: ICD-10-CM

## 2024-05-10 DIAGNOSIS — I25.118 CORONARY ARTERY DISEASE OF NATIVE ARTERY OF NATIVE HEART WITH STABLE ANGINA PECTORIS (H): ICD-10-CM

## 2024-05-10 DIAGNOSIS — Z12.11 SCREENING FOR COLON CANCER: ICD-10-CM

## 2024-05-10 PROBLEM — H90.5 SENSORINEURAL HEARING LOSS (SNHL) OF LEFT EAR: Status: RESOLVED | Noted: 2022-06-02 | Resolved: 2024-05-10

## 2024-05-10 LAB
ERYTHROCYTE [DISTWIDTH] IN BLOOD BY AUTOMATED COUNT: 11.9 % (ref 10–15)
HBA1C MFR BLD: 10.5 % (ref 0–5.6)
HCT VFR BLD AUTO: 42 % (ref 40–53)
HGB BLD-MCNC: 15.2 G/DL (ref 13.3–17.7)
INR PPP: 1.07 (ref 0.85–1.15)
MCH RBC QN AUTO: 31.5 PG (ref 26.5–33)
MCHC RBC AUTO-ENTMCNC: 36.2 G/DL (ref 31.5–36.5)
MCV RBC AUTO: 87 FL (ref 78–100)
PLATELET # BLD AUTO: 105 10E3/UL (ref 150–450)
RBC # BLD AUTO: 4.83 10E6/UL (ref 4.4–5.9)
WBC # BLD AUTO: 4.4 10E3/UL (ref 4–11)

## 2024-05-10 PROCEDURE — 82105 ALPHA-FETOPROTEIN SERUM: CPT | Performed by: STUDENT IN AN ORGANIZED HEALTH CARE EDUCATION/TRAINING PROGRAM

## 2024-05-10 PROCEDURE — 85027 COMPLETE CBC AUTOMATED: CPT | Performed by: STUDENT IN AN ORGANIZED HEALTH CARE EDUCATION/TRAINING PROGRAM

## 2024-05-10 PROCEDURE — 85610 PROTHROMBIN TIME: CPT | Performed by: STUDENT IN AN ORGANIZED HEALTH CARE EDUCATION/TRAINING PROGRAM

## 2024-05-10 PROCEDURE — 91320 SARSCV2 VAC 30MCG TRS-SUC IM: CPT | Performed by: STUDENT IN AN ORGANIZED HEALTH CARE EDUCATION/TRAINING PROGRAM

## 2024-05-10 PROCEDURE — 82043 UR ALBUMIN QUANTITATIVE: CPT | Performed by: STUDENT IN AN ORGANIZED HEALTH CARE EDUCATION/TRAINING PROGRAM

## 2024-05-10 PROCEDURE — 99214 OFFICE O/P EST MOD 30 MIN: CPT | Mod: 25 | Performed by: STUDENT IN AN ORGANIZED HEALTH CARE EDUCATION/TRAINING PROGRAM

## 2024-05-10 PROCEDURE — 82570 ASSAY OF URINE CREATININE: CPT | Performed by: STUDENT IN AN ORGANIZED HEALTH CARE EDUCATION/TRAINING PROGRAM

## 2024-05-10 PROCEDURE — 83036 HEMOGLOBIN GLYCOSYLATED A1C: CPT | Performed by: STUDENT IN AN ORGANIZED HEALTH CARE EDUCATION/TRAINING PROGRAM

## 2024-05-10 PROCEDURE — 83721 ASSAY OF BLOOD LIPOPROTEIN: CPT | Mod: 59 | Performed by: STUDENT IN AN ORGANIZED HEALTH CARE EDUCATION/TRAINING PROGRAM

## 2024-05-10 PROCEDURE — 99207 PR FOOT EXAM NO CHARGE: CPT | Performed by: STUDENT IN AN ORGANIZED HEALTH CARE EDUCATION/TRAINING PROGRAM

## 2024-05-10 PROCEDURE — 36415 COLL VENOUS BLD VENIPUNCTURE: CPT | Performed by: STUDENT IN AN ORGANIZED HEALTH CARE EDUCATION/TRAINING PROGRAM

## 2024-05-10 PROCEDURE — 90480 ADMN SARSCOV2 VAC 1/ONLY CMP: CPT | Performed by: STUDENT IN AN ORGANIZED HEALTH CARE EDUCATION/TRAINING PROGRAM

## 2024-05-10 PROCEDURE — 80053 COMPREHEN METABOLIC PANEL: CPT | Performed by: STUDENT IN AN ORGANIZED HEALTH CARE EDUCATION/TRAINING PROGRAM

## 2024-05-10 PROCEDURE — 80061 LIPID PANEL: CPT | Performed by: STUDENT IN AN ORGANIZED HEALTH CARE EDUCATION/TRAINING PROGRAM

## 2024-05-10 ASSESSMENT — PAIN SCALES - GENERAL: PAINLEVEL: NO PAIN (0)

## 2024-05-10 NOTE — PROGRESS NOTES
Diabetes Check-up      ASSESSMENT AND PLAN:  64-year-old male with past no history of type 2 diabetes, HFrEF, hypertension, CAD, obesity who presents for diabetic follow-up.  Patient has been out of control over the last 6 months or so.  Difficulty with insurance issues are on Jardiance and diarrhea around metformin.  He is now on Ozempic at 0.5 mg weekly.  He has not been checking his blood sugars.  I recommended checking his A1c along with kidney, cholesterol, microalbumin today.  If A1c is not at goal I would recommend increasing his Ozempic.  He is really not lost much weight either on Ozempic so may increase dose for weight loss component.  Will decide based off results.  He is due for colonoscopy and placed referral as well today.  I recommended a physical in September.  As he will be due then.    1. Hypertension  4. Coronary artery disease of native artery of native heart with stable angina pectoris (H24)  5. Heart failure with reduced ejection fraction (H)  2. Type 2 diabetes mellitus without complication, without long-term current use of insulin (H)  - Hemoglobin A1c; Future  - Comprehensive metabolic panel (BMP + Alb, Alk Phos, ALT, AST, Total. Bili, TP); Future  - Lipid panel reflex to direct LDL Non-fasting; Future  - Albumin Random Urine Quantitative with Creat Ratio; Future    3. Screening for colon cancer  - Colonoscopy Screening  Referral; Future    Follow up in 4 months for physical    Options for treatment and follow-up care were reviewed with the patient. Mir Treviño Sr.  engaged in the decision making process and verbalized understanding of the options discussed and agreed with the final plan.    Dr. Roberto Bates       SUBJECTIVE  Mir Treviño Sr. is a 64 year old who presents today for follow up of DIABETES MELLITUS .       5/24:  Hasn't been checking blood sugars    1/24:  was on metformin but significant GI side effect     9/23: jardiance stopped due to polyuria and  trialed on wegovy per pharm recommendations. Unable to see pharmacist due to insurance plan. Eventually got on ozempic fall 2023.    Diagnosed in 2020  Most recent HgbA1c:   Lab Results   Component Value Date    A1C 7.4 09/19/2023    A1C 8.4 05/22/2023    A1C 6.9 01/16/2023    A1C 6.4 07/18/2022    A1C 7.8 04/29/2022       Current medication regimen:  ozempic    ASA: 81 mg daily  Statin: Rosuvastatin 20 mg daily    Complications:  - Retinopathy: Last ophthalmology appointment-   - Nephropathy:  Last Comprehensive Metabolic Panel:  Lab Results   Component Value Date     10/02/2023    POTASSIUM 3.7 10/02/2023    CHLORIDE 109 (H) 10/02/2023    CO2 22 10/02/2023    ANIONGAP 12 10/02/2023     (H) 10/02/2023    BUN 12.3 10/02/2023    CR 0.81 10/02/2023    GFRESTIMATED >90 10/02/2023    JAMIE 9.0 10/02/2023     - Neuropathy: foot exam       BP Readings from Last 3 Encounters:   05/10/24 120/80   05/08/24 125/65   10/02/23 125/77       Recent Labs   Lab Test 01/19/23  0715 07/18/22  0736   CHOL 107 111   HDL 36* 42   LDL 44 50   TRIG 137 94       Wt Readings from Last 3 Encounters:   05/10/24 97.1 kg (214 lb)   05/08/24 98 kg (216 lb)   10/02/23 99.1 kg (218 lb 6.4 oz)       Current Outpatient Medications   Medication Sig Dispense Refill    acetaminophen (TYLENOL) 325 MG tablet Take 2 tablets (650 mg) by mouth every 4 hours as needed for other, headaches or fever (For optimal non-opioid multimodal pain management to improve pain control.) 30 tablet 0    albuterol (ACCUNEB) 1.25 MG/3ML neb solution Take 1 vial (1.25 mg) by nebulization every 6 hours as needed 90 mL 1    albuterol (PROAIR HFA/PROVENTIL HFA/VENTOLIN HFA) 108 (90 Base) MCG/ACT inhaler Inhale 2 puffs into the lungs every 6 hours as needed 18 g 11    aspirin (ASA) 81 MG EC tablet Take 1 tablet (81 mg) by mouth daily Start tomorrow morning. 90 tablet 3    coenzyme Q-10 capsule Take 2 capsules (200 mg) by mouth daily      fluticasone propionate (FLONASE)  "50 mcg/actuation nasal spray Spray 1 spray in nostril daily as needed (seasonal)      hydrochlorothiazide (MICROZIDE) 12.5 MG capsule TAKE 1 CAPSULE DAILY 90 capsule 3    lisinopril (ZESTRIL) 10 MG tablet TAKE 1 TABLET DAILY 90 tablet 3    metoprolol succinate ER (TOPROL XL) 50 MG 24 hr tablet Take 1 tablet (50 mg) by mouth daily Hold for heart rate less than 60.  Due for cardiology follow-up visit. 90 tablet 0    montelukast (SINGULAIR) 10 MG tablet TAKE 1 TABLET DAILY EVERY NIGHT AT BEDTIME (SINGULAIR) 90 tablet 3    nitroGLYcerin (NITROSTAT) 0.4 MG sublingual tablet One tablet under the tongue every 5 minutes if needed for chest pain. May repeat every 5 minutes for a maximum of 3 doses in 15 minutes\" 25 tablet 3    nitroGLYcerin (NITROSTAT) 0.4 MG sublingual tablet For chest pain place 1 tablet under the tongue every 5 minutes for 3 doses. If symptoms persist 5 minutes after 1st dose call 911. 45 tablet 11    rosuvastatin (CRESTOR) 40 MG tablet TAKE 1 TABLET DAILY 90 tablet 0    semaglutide (OZEMPIC) 2 MG/3ML pen Inject 0.5 mg Subcutaneous every 7 days 9 mL 1       Histories reviewed and updated in Epic.    OBJECTIVE:  Vitals: /80   Pulse 65   Temp 97.2  F (36.2  C)   Resp 19   Ht 1.753 m (5' 9\")   Wt 97.1 kg (214 lb)   SpO2 98%   BMI 31.60 kg/m    BMIE= Body mass index is 31.6 kg/m .    General appearance: Alert, cooperative, no distress, appears stated age  Head: Normocephalic, atraumatic, without obvious abnormality  Eyes: Pupils equal round, reactive.  Conjunctiva clear.  Nose: Nares normal, no drainage.  Throat: Lips, mucosa, tongue normal mucosa pink and moist  Neck: Supple, symmetric, trachea midline  Lungs: Clear to auscultation bilaterally, no wheezing or crackles present.  Respirations unlabored  Heart: Regular rate and rhythm, normal S1 and S2, no murmur, rub or gallop.  Extremities: Extremities normal, atraumatic.  No cyanosis or edema.  Skin: Skin color, texture, turgor normal no rashes " or lesions on limited skin exam      Diabetic Foot Screen:  Any complaints of increased pain or numbness ? No- some tingling  Is there a foot ulcer now or a history of foot ulcer? No  Does the foot have an abnormal shape? No  Are the nails thick, too long or ingrown? No  Are there any redness or open areas? No         Sensation Testing done at all points on the diagram with monofilament     Right Foot: Sensation Normal at all points  Left Foot: Sensation Normal at all points     Risk Category: 0- No loss of protective sensation

## 2024-05-11 LAB
AFP SERPL-MCNC: 3.5 NG/ML
ALBUMIN SERPL BCG-MCNC: 4.1 G/DL (ref 3.5–5.2)
ALP SERPL-CCNC: 93 U/L (ref 40–150)
ALT SERPL W P-5'-P-CCNC: 65 U/L (ref 0–70)
ANION GAP SERPL CALCULATED.3IONS-SCNC: 5 MMOL/L (ref 7–15)
AST SERPL W P-5'-P-CCNC: 65 U/L (ref 0–45)
BILIRUB SERPL-MCNC: 1 MG/DL
BUN SERPL-MCNC: 13.5 MG/DL (ref 8–23)
CALCIUM SERPL-MCNC: 8.9 MG/DL (ref 8.8–10.2)
CHLORIDE SERPL-SCNC: 100 MMOL/L (ref 98–107)
CHOLEST SERPL-MCNC: 117 MG/DL
CREAT SERPL-MCNC: 0.92 MG/DL (ref 0.67–1.17)
CREAT UR-MCNC: 28.5 MG/DL
DEPRECATED HCO3 PLAS-SCNC: 30 MMOL/L (ref 22–29)
EGFRCR SERPLBLD CKD-EPI 2021: >90 ML/MIN/1.73M2
FASTING STATUS PATIENT QL REPORTED: ABNORMAL
FASTING STATUS PATIENT QL REPORTED: ABNORMAL
GLUCOSE SERPL-MCNC: 470 MG/DL (ref 70–99)
HDLC SERPL-MCNC: 33 MG/DL
LDLC SERPL CALC-MCNC: ABNORMAL MG/DL
LDLC SERPL DIRECT ASSAY-MCNC: 25 MG/DL
MICROALBUMIN UR-MCNC: <12 MG/L
MICROALBUMIN/CREAT UR: NORMAL MG/G{CREAT}
NONHDLC SERPL-MCNC: 84 MG/DL
POTASSIUM SERPL-SCNC: 4.5 MMOL/L (ref 3.4–5.3)
PROT SERPL-MCNC: 7.5 G/DL (ref 6.4–8.3)
SODIUM SERPL-SCNC: 135 MMOL/L (ref 135–145)
TRIGL SERPL-MCNC: 520 MG/DL

## 2024-05-13 ENCOUNTER — TELEPHONE (OUTPATIENT)
Dept: FAMILY MEDICINE | Facility: CLINIC | Age: 64
End: 2024-05-13
Payer: OTHER GOVERNMENT

## 2024-05-13 DIAGNOSIS — Z95.1 STATUS POST CORONARY ARTERY BYPASS GRAFT: ICD-10-CM

## 2024-05-13 DIAGNOSIS — I50.20 HEART FAILURE WITH REDUCED EJECTION FRACTION (H): ICD-10-CM

## 2024-05-13 DIAGNOSIS — E66.09 CLASS 1 OBESITY DUE TO EXCESS CALORIES WITH SERIOUS COMORBIDITY AND BODY MASS INDEX (BMI) OF 32.0 TO 32.9 IN ADULT: ICD-10-CM

## 2024-05-13 DIAGNOSIS — E66.811 CLASS 1 OBESITY DUE TO EXCESS CALORIES WITH SERIOUS COMORBIDITY AND BODY MASS INDEX (BMI) OF 32.0 TO 32.9 IN ADULT: ICD-10-CM

## 2024-05-13 DIAGNOSIS — E11.9 TYPE 2 DIABETES MELLITUS WITHOUT COMPLICATION, WITHOUT LONG-TERM CURRENT USE OF INSULIN (H): ICD-10-CM

## 2024-05-13 NOTE — RESULT ENCOUNTER NOTE
Please call patient and inform,    Mir Treviño Sr.  Your results from your recent clinic visit show:  Your A1C has worsened to 10.5. I recommend increasing your ozempic to 1 mg weekly and seeing you in 3 months for recheck of DM2.  Your other results were good.    If you have more questions please call the clinic at [phone] or send me a [employer] message    Dr. Roberto Bates

## 2024-05-13 NOTE — TELEPHONE ENCOUNTER
----- Message from Roberto Murphy MD sent at 5/13/2024  2:29 PM CDT -----  Please call patient and inform,    Mir Treviño Sr.  Your results from your recent clinic visit show:  Your A1C has worsened to 10.5. I recommend increasing your ozempic to 1 mg weekly and seeing you in 3 months for recheck of DM2.  Your other results were good.    If you have more questions please call the clinic at [phone] or send me a [employer] message    Dr. Roberto Murphy

## 2024-05-13 NOTE — TELEPHONE ENCOUNTER
Attempted to call patient to relay results but for some reason it did not give an option to leave a VM.     Will send a [employer] message as well.     Nik Roque RN  [facility]

## 2024-05-14 DIAGNOSIS — A04.8 H. PYLORI INFECTION: Primary | ICD-10-CM

## 2024-05-14 NOTE — RESULT ENCOUNTER NOTE
Called patient with results.  Patient's EGD results revealed that he does have H. pylori identified on immunostaining.  The patient is currently doing well.  Patient and his wife are currently in [facility] at this time.  We will contact [facility] to discuss with patient on medications for eradication of H. pylori.    Naun Ramires DO  General Surgeon  [facility]  Surgery [address] [address]?  Office: [phone]  Employed by - [employer]  Pager: [phone]

## 2024-05-25 ENCOUNTER — MYC MEDICAL ADVICE (OUTPATIENT)
Dept: FAMILY MEDICINE | Facility: CLINIC | Age: 64
End: 2024-05-25
Payer: OTHER GOVERNMENT

## 2024-05-25 DIAGNOSIS — I25.118 CORONARY ARTERY DISEASE OF NATIVE ARTERY OF NATIVE HEART WITH STABLE ANGINA PECTORIS (H): ICD-10-CM

## 2024-05-27 NOTE — PROGRESS NOTES
Medication Therapy Management (MTM) Encounter    ASSESSMENT:                            Medication Adherence/Access: No issues identified    H. Pylori:  Mir would benefit from first-line therapy for treatment of H. Pylori. Given liver impairment, I recommend High Dose Dual therapy. No adjustments for renal or hepatic function required. No clinically significant drug-drug interactions present. They will be due for eradication testing no sooner than 4 weeks after completion of their antibiotics. They should not take PPIs for at least 1-2 weeks prior to eradication testing.    PLAN:                            I recommend treatment with High Dose dual therapy for 14 days:  -  Amoxicillin 1,000 mg three times daily  -  Esomeprazole 20 mg three daily     You will be due for stool testing to make sure the H. Pylori infection is gone no sooner than 4 weeks after completion of your antibiotics.   - You must be off esomeprazole for at least 1-2 weeks prior to eradication testing.  - May use Over The Counter Pepcid (famotidine) for breakthrough heartburn symptoms    Contact Gastroenterology clinic if having any allergic or severe side effects, phone number [phone]  - May also send [employer] message      Call Jennifer if any issues getting medications or for any medication questions [phone]  - May also send a [employer] message    Follow-up: 7 day check in, 6/5/24 at 11 am     SUBJECTIVE/OBJECTIVE:                          Mir Treviño is a 64 year old male called for an initial visit. He was referred to me from Naun Ramires DO. Patient was accompanied by Sofia, spouse.     Reason for visit: H. Pylori treatment.    Allergies/ADRs: Reviewed in chart  Past Medical History: Reviewed in chart  Tobacco: He reports that he has never smoked. He has never used smokeless tobacco.  Alcohol: Less than 1 beverage / month (1 every 2-3 months)     Medication Adherence/Access: no issues reported    H. Pylori:  High Dose Dual:  -   Amoxicillin 1,000 mg three times daily  -  Esomeprazole 20 mg three daily   Duration of Therapy:  14 days   Would like orders sent to: [facility], [address]     Started having full feeling and nausea after starting Ozempic.  Was having liver abnormalities, investigation to source of liver concerns resulted in H. Pylori positive result, but does not really have stomach symptoms.  Feels he understands each medication, it's role in therapy, side effects, and agrees to call if any questions or adverse effects experienced.  Confirms no current or previous or kidney issues, however is being evaluated for liver impairment.     Diagnosis by stomach biopsy on 5/8/24   Previous treatment regimens: none  Previous Macrolide exposure: none   Penicillin Allergy: none  KIDNEY: No known chronic kidney disease  LIVER: Hepatic stenosis   - His liver disease is likely secondary to metabolic associated steatotic liver disease given his other risk factors per Kelly Bazan MD  Drug-drug interactions: No dairy, multivitamins or antacids     Lab Results   Component Value Date    GFRESTIMATED >90 05/10/2024    AST 65 (H) 05/10/2024    ALT 65 05/10/2024    ALKPHOS 93 05/10/2024    BILITOTAL 1.0 05/10/2024     ----------------    I spent 46 minutes with this patient today. All changes were made via verbal agreement with Naun Ramires. A copy of the visit note was provided to the patient's provider(s).    A summary of these recommendations was sent via [employer].    Jennifer Meza, PharmD    Medication Therapy Management Pharmacist  [facility] Gastroenterology   [phone]    Telemedicine Visit Details  Type of service:  Telephone visit  Start Time:  9:02 am   End Time: 9:48 AM     Medication Therapy Recommendations  Helicobacter pylori gastritis    Rationale: Untreated condition - Needs additional medication therapy - Indication   Recommendation: AMOXICILLIN   Status: Accepted per Provider   Note: Recommend High dose  dual therapy

## 2024-05-28 ENCOUNTER — VIRTUAL VISIT (OUTPATIENT)
Dept: GASTROENTEROLOGY | Facility: CLINIC | Age: 64
End: 2024-05-28
Attending: SURGERY
Payer: OTHER GOVERNMENT

## 2024-05-28 DIAGNOSIS — K29.70 HELICOBACTER PYLORI GASTRITIS: Primary | ICD-10-CM

## 2024-05-28 DIAGNOSIS — B96.81 HELICOBACTER PYLORI GASTRITIS: Primary | ICD-10-CM

## 2024-05-28 RX ORDER — METOPROLOL SUCCINATE 50 MG/1
50 TABLET, EXTENDED RELEASE ORAL DAILY
Qty: 90 TABLET | Refills: 0 | Status: SHIPPED | OUTPATIENT
Start: 2024-05-28 | End: 2024-09-11

## 2024-05-28 RX ORDER — AMOXICILLIN 500 MG/1
1000 CAPSULE ORAL 3 TIMES DAILY
Qty: 84 CAPSULE | Refills: 0 | Status: SHIPPED | OUTPATIENT
Start: 2024-05-28 | End: 2024-06-11

## 2024-05-28 NOTE — PATIENT INSTRUCTIONS
"Recommendations from today's MTM visit:                                                      I recommend treatment with High Dose dual therapy for 14 days:  -  Amoxicillin 1,000 mg three times daily  -  Esomeprazole 20 mg three daily     You will be due for stool testing to make sure the H. Pylori infection is gone no sooner than 4 weeks after completion of your antibiotics.   - You must be off esomeprazole for at least 1-2 weeks prior to eradication testing.  - May use Over The Counter Pepcid (famotidine) for breakthrough heartburn symptoms    Contact Gastroenterology clinic if having any allergic or severe side effects, phone number [phone]  - May also send [employer] message      Call Jennifer if any issues getting medications or for any medication questions [phone]  - May also send a [employer] message    Follow-up: 7 day check in, 6/5/24 at 11 am     It was great speaking with you today.  I value your experience and would be very thankful for your time in providing feedback in our clinic survey. In the next few days, you may receive an email or text message from [employer] with a link to a survey related to your  clinical pharmacist.\"     To schedule another MTM appointment, please call the clinic directly or you may call the MTM scheduling line at [phone] or toll-free at [phone].     My Clinical Pharmacist's contact information:                                                      Please feel free to contact me with any questions or concerns you have.      Jennifer Meza, PharmD    Medication Therapy Management Pharmacist  [facility] Gastroenterology   [phone]     "

## 2024-05-28 NOTE — Clinical Note
5/28/2024         RE: Mir Treviño Sr.  [address] [address]        Dear Colleague,    Thank you for referring your patient, Mir Treviño Sr., to the [facility]. Please see a copy of my visit note below.    Medication Therapy Management (MTM) Encounter    ASSESSMENT:                            Medication Adherence/Access: {adherencechoices:361681}    H. Pylori:  *** would benefit from first-line *** salvage therapy for treatment of H. Pylori. Given ***, I recommend ***. No adjustments for renal or hepatic function required. No clinically significant drug-drug interactions present. They will be due for eradication testing no sooner than 4 weeks after completion of their antibiotics. They should not take PPIs for at least 1-2 weeks prior to eradication testing.    ***      PLAN:                            I recommend treatment with High Dose dual therapy for 14 days:  -  Amoxicillin 1,000 mg three times daily  -  Esomeprazole 20 mg three daily     You will be due for stool testing to make sure the H. Pylori infection is gone no sooner than 4 weeks after completion of your antibiotics.   - You must be off *** for at least 1-2 weeks prior to eradication testing.  - May use Over The Counter Pepcid (famotidine) for breakthrough heartburn symptoms    Contact Gastroenterology clinic if having any allergic or severe side effects, phone number [phone]  - May also send [employer] message      Call Jennifer if any issues getting medications or for any medication questions [phone]  - May also send a [employer] message    Follow-up: 7 day check in,     SUBJECTIVE/OBJECTIVE:                          Mir Treviño is a 64 year old male { :984757} for an initial visit. He was referred to me from Naun Ramires DO. {mtmvisitdetails:363304}     Reason for visit: H. Pylori treatment.    Allergies/ADRs: {1/2/3/4/5:837245}  Past Medical History: {1/2/3/4/5:835399}  Tobacco: He  reports that he has never smoked. He has never used smokeless tobacco.  Alcohol: {ALCOHOL CONSUMPTION HX:059844}    Medication Adherence/Access: {[facility]:210407}    H. Pylori:  High Dose Dual:  -  Amoxicillin 1,000 mg three times daily  -  Esomeprazole 20 mg three daily   Duration of Therapy:  14 days   Would like orders sent to: ***    Feels *** understands each medication, it's role in therapy, side effects, and agrees to call if any questions or adverse effects experienced.  Confirms no current or previous liver *** or kidney issues.     Diagnosis by stomach biopsy on 5/8/24   Previous treatment regimens: none  Previous Macrolide exposure: none   Penicillin Allergy: none  KIDNEY: No known chronic kidney disease  LIVER: Hepatic stenosis   - His liver disease is likely secondary to metabolic associated steatotic liver disease given his other risk factors per Kelly Bazan MD  Drug-drug interactions: No dairy, multivitamins or antacids     Lab Results   Component Value Date    GFRESTIMATED >90 05/10/2024    AST 65 (H) 05/10/2024    ALT 65 05/10/2024    ALKPHOS 93 05/10/2024    BILITOTAL 1.0 05/10/2024     Drug-Disease and Drug-Drug Interactions:    Bismuth- gout, diabetes, bleeding, ulcers, renal insufficiency    Tetracycline/Doxycycline- pregnancy, infancy, childhood up to 8 years; severe liver disease, severe renal disease; intraacranial hypertension; myasthenia gravis; drug interactions    Metronidazole- severe kidney impairment, class C hepatic impairment, active neurologic disorders, hypothyroidism, hypoadrenalism    Levofloxacin - Older adult: Adverse effects (eg, hepatotoxicity, tendon rupture, QT changes, aortic dissection) may be increased in the elderly, renal impairment (increased seizure risk), history of peripheral neuropathy; avoid use, psychiatric concerns, and CNS concerns.    Clarithromycin: Hepatotoxicity (eg, increased liver enzymes, and hepatocellular and/or cholestatic hepatitis, with or  "without jaundice), Increased risk of cardiovascular problems or death in patients with heart disease, QT prolongation, arrhythmia, and cases of torsades de pointes, including fatalities, have been reported; increased risk in elderly patients, severe renal impairment     ***    ----------------    I spent {mt total time 3:076707} with this patient today. { :919158}. A copy of the visit note was provided to the patient's provider(s).    A summary of these recommendations {GIVEN/NOT GIVEN:480964}.    ***    Telemedicine Visit Details  Type of service:  {telemedvisitmtm:418561::\"Telephone visit\"}  Start Time: {video/phone visit start time:152948}  End Time: {video/phone visit end time:152948}     Medication Therapy Recommendations  No medication therapy recommendations to display       Medication Therapy Management (MTM) Encounter    ASSESSMENT:                            Medication Adherence/Access: No issues identified    H. Pylori:  Mir would benefit from first-line therapy for treatment of H. Pylori. Given liver impairment, I recommend High Dose Dual therapy. No adjustments for renal or hepatic function required. No clinically significant drug-drug interactions present. They will be due for eradication testing no sooner than 4 weeks after completion of their antibiotics. They should not take PPIs for at least 1-2 weeks prior to eradication testing.    PLAN:                            I recommend treatment with High Dose dual therapy for 14 days:  -  Amoxicillin 1,000 mg three times daily  -  Esomeprazole 20 mg three daily     You will be due for stool testing to make sure the H. Pylori infection is gone no sooner than 4 weeks after completion of your antibiotics.   - You must be off esomeprazole for at least 1-2 weeks prior to eradication testing.  - May use Over The Counter Pepcid (famotidine) for breakthrough heartburn symptoms    Contact Gastroenterology clinic if having any allergic or severe side effects, " phone number [phone]  - May also send [employer] message      Call Jennifer if any issues getting medications or for any medication questions [phone]  - May also send a [employer] message    Follow-up: 7 day check in, 6/5/24 at 11 am     SUBJECTIVE/OBJECTIVE:                          Mir Treviño is a 64 year old male called for an initial visit. He was referred to me from Naun Ramires DO. Patient was accompanied by Sofia, spouse.     Reason for visit: H. Pylori treatment.    Allergies/ADRs: Reviewed in chart  Past Medical History: Reviewed in chart  Tobacco: He reports that he has never smoked. He has never used smokeless tobacco.  Alcohol: Less than 1 beverage / month (1 every 2-3 months)     Medication Adherence/Access: no issues reported    H. Pylori:  High Dose Dual:  -  Amoxicillin 1,000 mg three times daily  -  Esomeprazole 20 mg three daily   Duration of Therapy:  14 days   Would like orders sent to: [facility], [address]     Starting having full feeling and nausea after starting Ozempic.  Was having liver abnormalities, investigation to source of liver concerns resulted in H. Pylori positive result, but does not really have stomach issues.  Feels he understands each medication, it's role in therapy, side effects, and agrees to call if any questions or adverse effects experienced.  Confirms no current or previous or kidney issues, how.     Diagnosis by stomach biopsy on 5/8/24   Previous treatment regimens: none  Previous Macrolide exposure: none   Penicillin Allergy: none  KIDNEY: No known chronic kidney disease  LIVER: Hepatic stenosis   - His liver disease is likely secondary to metabolic associated steatotic liver disease given his other risk factors per Kelly Bazan MD  Drug-drug interactions: No dairy, multivitamins or antacids     Lab Results   Component Value Date    GFRESTIMATED >90 05/10/2024    AST 65 (H) 05/10/2024    ALT 65 05/10/2024    ALKPHOS 93 05/10/2024    BILITOTAL 1.0  05/10/2024     Drug-Disease and Drug-Drug Interactions:    Bismuth- gout, diabetes, bleeding, ulcers, renal insufficiency    Tetracycline/Doxycycline- pregnancy, infancy, childhood up to 8 years; severe liver disease, severe renal disease; intraacranial hypertension; myasthenia gravis; drug interactions    Metronidazole- severe kidney impairment, class C hepatic impairment, active neurologic disorders, hypothyroidism, hypoadrenalism    Levofloxacin - Older adult: Adverse effects (eg, hepatotoxicity, tendon rupture, QT changes, aortic dissection) may be increased in the elderly, renal impairment (increased seizure risk), history of peripheral neuropathy; avoid use, psychiatric concerns, and CNS concerns.    Clarithromycin: Hepatotoxicity (eg, increased liver enzymes, and hepatocellular and/or cholestatic hepatitis, with or without jaundice), Increased risk of cardiovascular problems or death in patients with heart disease, QT prolongation, arrhythmia, and cases of torsades de pointes, including fatalities, have been reported; increased risk in elderly patients, severe renal impairment     ***    ----------------    I spent {[facility] total time 3:139335} with this patient today. { :474217}. A copy of the visit note was provided to the patient's provider(s).    A summary of these recommendations {GIVEN/NOT GIVEN:333251}.    ***    Telemedicine Visit Details  Type of service:  Telephone visit  Start Time:  9:02 am   End Time: 9:48 AM     Medication Therapy Recommendations  No medication therapy recommendations to display         Again, thank you for allowing me to participate in the care of your patient.        Sincerely,        Jennifer Meza RPH

## 2024-06-04 NOTE — PROGRESS NOTES
Medication Therapy Management (MTM) Encounter    ASSESSMENT:                            Medication Adherence/Access: No issues identified    H. Pylori:  Mir would benefit from completing High Dose Dual therapy for treatment of H. Pylori. He was unable to pick and start taking esomeprazole due to insurance coverage.  Sent new order for omeprazole 40 mg twice daily, Mir will reach out if he has any barriers to getting this medication.  He will be due for eradication testing no sooner than 7/10/24, 4 weeks after completion of his antibiotics. He should not take PPIs for at least 1-2 weeks prior to eradication testing.    PLAN:                            I recommend completing High Dose dual therapy for 14 days total:  -  Amoxicillin 1,000 mg three times daily  -  Esomeprazole 20 mg three daily    - changed to omeprazole due to coverage    You will be due for stool testing to make sure the H. Pylori infection is gone no sooner than 4 weeks after completion of your antibiotics.   - You must be off esomeprazole for at least 1-2 weeks prior to eradication testing.  - May use Over The Counter Pepcid (famotidine) for breakthrough heartburn symptoms, stop at least 24 hours prior to test    Contact Gastroenterology clinic if having any allergic or severe side effects, phone number [phone]  - May also send [employer] message      Call Jennifer if any issues getting medications or for any medication questions [phone]  - May also send a [employer] message    5.  Order for H. Pylori eradication test kit has been placed - You can  from any [facility] lab at your convenience but do not test until on or after 7/10/24     6.   omeprazole and start taking today, if you have any issues getting this medication please call Jennifer.    Follow-up: [employer] eradication test reminder 7/10/24     SUBJECTIVE/OBJECTIVE:                          Mir Treviño is a 64 year old male called for an initial visit. He was  referred to me from Naun Ramires DO. Patient was accompanied by Sofia, spouse.      Reason for visit: H. Pylori treatment.    Allergies/ADRs: Reviewed in chart  Past Medical History: Reviewed in chart  Tobacco: He reports that he has never smoked. He has never used smokeless tobacco.  Alcohol: Less than 1 beverage / month (1 every 2-3 months)     Medication Adherence/Access: no issues reported  Reports no missed doses since starting treatment.     H. Pylori:  High Dose Dual:  -  Amoxicillin 1,000 mg three times daily  -  Esomeprazole 20 mg three daily    - was not covered, did not  and start  - will order omeprazole 40 mg twice daily with note okay to sub to 20 mg twice daily per coverage  Duration of Therapy:  14 days   Would like orders sent to: [facility], [address]     Meds started:  5/29/24    Compete regimen: 6/12/24    Soonest Test: 7/10/24     Reports no side effects, does not feel any different, no change in BM frequency or consistency.     ----------------    I spent 20 minutes with this patient today. All changes were made via collaborative practice agreement with Naun Ramires DO.  A copy of the visit note was provided to the patient's provider(s).    A summary of these recommendations was sent via [employer].    Jennifer Meza, PharmD    Medication Therapy Management Pharmacist  [facility] Gastroenterology   [phone]    Telemedicine Visit Details  Type of service:  Telephone visit  Start Time:  11:00 am  End Time:  11:20 am     Medication Therapy Recommendations  Helicobacter pylori gastritis    Rationale: Synergistic therapy - Needs additional medication therapy - Indication   Recommendation: Start Medication - omeprazole 40 MG DR capsule   Status: Accepted per CPA   Note: Esomeprazole not covered, recommend omeprazole to replace

## 2024-06-05 ENCOUNTER — VIRTUAL VISIT (OUTPATIENT)
Dept: GASTROENTEROLOGY | Facility: CLINIC | Age: 64
End: 2024-06-05
Attending: SURGERY
Payer: OTHER GOVERNMENT

## 2024-06-05 DIAGNOSIS — K29.70 HELICOBACTER PYLORI GASTRITIS: Primary | ICD-10-CM

## 2024-06-05 DIAGNOSIS — B96.81 HELICOBACTER PYLORI GASTRITIS: Primary | ICD-10-CM

## 2024-06-05 RX ORDER — OMEPRAZOLE 40 MG/1
40 CAPSULE, DELAYED RELEASE ORAL 2 TIMES DAILY
Qty: 16 CAPSULE | Refills: 0 | Status: SHIPPED | OUTPATIENT
Start: 2024-06-05 | End: 2024-06-13

## 2024-06-05 NOTE — Clinical Note
6/5/2024      Mir Treviño Sr.  [address] [address]      Dear Colleague,    Thank you for referring your patient, Mir Treviño Sr., to the [facility]. Please see a copy of my visit note below.    Medication Therapy Management (MTM) Encounter    ASSESSMENT:                            Medication Adherence/Access: No issues identified    H. Pylori:  Mir would benefit from completing fHigh Dose Dual therapy for treatment of H. Pylori. He will be due for eradication testing no sooner than ***, 4 weeks after completion of his antibiotics. He should not take PPIs for at least 1-2 weeks prior to eradication testing.    PLAN:                            I recommend completing High Dose dual therapy for 14 days total:  -  Amoxicillin 1,000 mg three times daily  -  Esomeprazole 20 mg three daily     You will be due for stool testing to make sure the H. Pylori infection is gone no sooner than 4 weeks after completion of your antibiotics.   - You must be off esomeprazole for at least 1-2 weeks prior to eradication testing.  - May use Over The Counter Pepcid (famotidine) for breakthrough heartburn symptoms, stop at least 24 hours prior to test    Contact Gastroenterology clinic if having any allergic or severe side effects, phone number [phone]  - May also send [employer] message      Call Jennifer if any issues getting medications or for any medication questions [phone]  - May also send a [employer] message    5.  Order for H. Pylori eradication test kit has been placed - You can  from any [facility] lab at your convenience but do not test until on or after ***      Follow-up: [employer] eradication test reminder ***     SUBJECTIVE/OBJECTIVE:                          Mir Treviño is a 64 year old male called for an initial visit. He was referred to me from Naun Ramires DO. Patient was accompanied by Sofia, spouse. ***     Reason for visit: H. Pylori  treatment.    Allergies/ADRs: Reviewed in chart  Past Medical History: Reviewed in chart  Tobacco: He reports that he has never smoked. He has never used smokeless tobacco.  Alcohol: Less than 1 beverage / month (1 every 2-3 months)     Medication Adherence/Access: no issues reported  Reports *** missed doses     H. Pylori:  High Dose Dual:  -  Amoxicillin 1,000 mg three times daily  -  Esomeprazole 20 mg three daily   Duration of Therapy:  14 days   Would like orders sent to: [facility], [address]     ***    Started having full feeling and nausea after starting Ozempic.  Was having liver abnormalities, investigation to source of liver concerns resulted in H. Pylori positive result, but does not really have stomach symptoms.  Feels he understands each medication, it's role in therapy, side effects, and agrees to call if any questions or adverse effects experienced.  Confirms no current or previous or kidney issues, however is being evaluated for liver impairment.     Diagnosis by stomach biopsy on 5/8/24   Previous treatment regimens: none  Previous Macrolide exposure: none   Penicillin Allergy: none  KIDNEY: No known chronic kidney disease  LIVER: Hepatic stenosis   - His liver disease is likely secondary to metabolic associated steatotic liver disease given his other risk factors per Kelly Bazan MD  Drug-drug interactions: No dairy, multivitamins or antacids     Lab Results   Component Value Date    GFRESTIMATED >90 05/10/2024    AST 65 (H) 05/10/2024    ALT 65 05/10/2024    ALKPHOS 93 05/10/2024    BILITOTAL 1.0 05/10/2024     ----------------    I spent {[facility] total time 3:048460} with this patient today. All changes were made via collaborative practice agreement with Naun Ramires DO.  A copy of the visit note was provided to the patient's provider(s).    A summary of these recommendations {GIVEN/NOT GIVEN:251177}.    ***    Telemedicine Visit Details  Type of service:   "[facility]:210393::\"Telephone visit\"}  Start Time: {video/phone visit start time:152948}  End Time: {video/phone visit end time:152948}     Medication Therapy Recommendations  No medication therapy recommendations to display        Medication Therapy Management (MTM) Encounter    ASSESSMENT:                            Medication Adherence/Access: No issues identified    H. Pylori:  Mir would benefit from completing High Dose Dual therapy for treatment of H. Pylori. He was unable to pick and start taking esomeprazole due to insurance coverage.  Sent new order for omeprazole 40 mg twice daily, Mir will reach out if he has any barriers to getting this medication.  He will be due for eradication testing no sooner than 7/10/24, 4 weeks after completion of his antibiotics. He should not take PPIs for at least 1-2 weeks prior to eradication testing.    PLAN:                            I recommend completing High Dose dual therapy for 14 days total:  -  Amoxicillin 1,000 mg three times daily  -  Esomeprazole 20 mg three daily     You will be due for stool testing to make sure the H. Pylori infection is gone no sooner than 4 weeks after completion of your antibiotics.   - You must be off esomeprazole for at least 1-2 weeks prior to eradication testing.  - May use Over The Counter Pepcid (famotidine) for breakthrough heartburn symptoms, stop at least 24 hours prior to test    Contact Gastroenterology clinic if having any allergic or severe side effects, phone number [phone]  - May also send [employer] message      Call Jennifer if any issues getting medications or for any medication questions [phone]  - May also send a [employer] message    5.  Order for H. Pylori eradication test kit has been placed - You can  from any [facility] lab at your convenience but do not test until on or after 7/10/24     Follow-up: [employer] eradication test reminder 7/10/24     SUBJECTIVE/OBJECTIVE:                        "   Mir Treviño is a 64 year old male called for an initial visit. He was referred to me from Naun Ramires DO. Patient was accompanied by Sofia, spouse.      Reason for visit: H. Pylori treatment.    Allergies/ADRs: Reviewed in chart  Past Medical History: Reviewed in chart  Tobacco: He reports that he has never smoked. He has never used smokeless tobacco.  Alcohol: Less than 1 beverage / month (1 every 2-3 months)     Medication Adherence/Access: no issues reported  Reports no missed doses since starting treatment.     H. Pylori:  High Dose Dual:  -  Amoxicillin 1,000 mg three times daily  -  Esomeprazole 20 mg three daily    - was not covered, did not  and take it  - will order omeprazole 40 mg twice daily with note okay to sub to 20 mg twice daily per coverage  Duration of Therapy:  14 days   Would like orders sent to: [facility], [address]   Meds started:  5/29/24    Compete regimen: 6/12/24    Soonest Test: 7/10/24     Reports no side effects, does not feel any different, no change in BM frequency or consistency.     Diagnosis by stomach biopsy on 5/8/24   Previous treatment regimens: none  Previous Macrolide exposure: none   Penicillin Allergy: none  KIDNEY: No known chronic kidney disease  LIVER: Hepatic stenosis   - His liver disease is likely secondary to metabolic associated steatotic liver disease given his other risk factors per Kelly Bazan MD  Drug-drug interactions: No dairy, multivitamins or antacids     Lab Results   Component Value Date    GFRESTIMATED >90 05/10/2024    AST 65 (H) 05/10/2024    ALT 65 05/10/2024    ALKPHOS 93 05/10/2024    BILITOTAL 1.0 05/10/2024     ----------------    I spent 20 minutes with this patient today. All changes were made via collaborative practice agreement with Naun Ramires DO.  A copy of the visit note was provided to the patient's provider(s).    A summary of these recommendations was sent via [employer].    Jennifer Meza PharmD    Medication  Therapy Management Pharmacist  [facility] Gastroenterology   [phone]    Telemedicine Visit Details  Type of service:  Telephone visit  Start Time:  11:00 am  End Time:  11:20 am     Medication Therapy Recommendations  No medication therapy recommendations to display          Again, thank you for allowing me to participate in the care of your patient.        Sincerely,        Jennifer Meza RPH

## 2024-06-05 NOTE — PATIENT INSTRUCTIONS
"Recommendations from today's MTM visit:                                                      I recommend completing High Dose dual therapy for 14 days total:  -  Amoxicillin 1,000 mg three times daily  -  Esomeprazole 20 mg three daily    - changed to omeprazole due to coverage    You will be due for stool testing to make sure the H. Pylori infection is gone no sooner than 4 weeks after completion of your antibiotics.   - You must be off esomeprazole for at least 1-2 weeks prior to eradication testing.  - May use Over The Counter Pepcid (famotidine) for breakthrough heartburn symptoms, stop at least 24 hours prior to test    Contact Gastroenterology clinic if having any allergic or severe side effects, phone number [phone]  - May also send [employer] message      Call [employer] if any issues getting medications or for any medication questions [phone]  - May also send a [employer] message    5.  Order for H. Pylori eradication test kit has been placed - You can  from any [facility] lab at your convenience but do not test until on or after 7/10/24     6.   omeprazole and start taking today, if you have any issues getting this medication please call Jennifer.    Follow-up: [employer] eradication test reminder 7/10/24     It was great speaking with you today.  I value your experience and would be very thankful for your time in providing feedback in our clinic survey. In the next few days, you may receive an email or text message from [employer] with a link to a survey related to your  clinical pharmacist.\"     To schedule another [facility] appointment, please call the clinic directly or you may call the MT scheduling line at [phone] or toll-free at [phone].     My Clinical Pharmacist's contact information:                                                      Please feel free to contact me with any questions or concerns you have.      Jennifer Meza, PharmD    Medication Therapy Management " [facility] Gastroenterology   [phone]

## 2024-06-21 DIAGNOSIS — I25.118 CORONARY ARTERY DISEASE OF NATIVE ARTERY OF NATIVE HEART WITH STABLE ANGINA PECTORIS (H): ICD-10-CM

## 2024-06-21 RX ORDER — ROSUVASTATIN CALCIUM 40 MG/1
40 TABLET, COATED ORAL DAILY
Qty: 90 TABLET | Refills: 0 | Status: SHIPPED | OUTPATIENT
Start: 2024-06-21 | End: 2024-09-19

## 2024-07-12 ENCOUNTER — MYC REFILL (OUTPATIENT)
Dept: FAMILY MEDICINE | Facility: CLINIC | Age: 64
End: 2024-07-12
Payer: OTHER GOVERNMENT

## 2024-07-12 DIAGNOSIS — I50.20 HEART FAILURE WITH REDUCED EJECTION FRACTION (H): ICD-10-CM

## 2024-07-12 DIAGNOSIS — Z95.1 STATUS POST CORONARY ARTERY BYPASS GRAFT: ICD-10-CM

## 2024-07-12 DIAGNOSIS — E66.811 CLASS 1 OBESITY DUE TO EXCESS CALORIES WITH SERIOUS COMORBIDITY AND BODY MASS INDEX (BMI) OF 32.0 TO 32.9 IN ADULT: ICD-10-CM

## 2024-07-12 DIAGNOSIS — E66.09 CLASS 1 OBESITY DUE TO EXCESS CALORIES WITH SERIOUS COMORBIDITY AND BODY MASS INDEX (BMI) OF 32.0 TO 32.9 IN ADULT: ICD-10-CM

## 2024-07-12 DIAGNOSIS — E11.9 TYPE 2 DIABETES MELLITUS WITHOUT COMPLICATION, WITHOUT LONG-TERM CURRENT USE OF INSULIN (H): ICD-10-CM

## 2024-08-13 ENCOUNTER — APPOINTMENT (OUTPATIENT)
Dept: LAB | Facility: CLINIC | Age: 64
End: 2024-08-13
Payer: OTHER GOVERNMENT

## 2024-08-25 ENCOUNTER — HEALTH MAINTENANCE LETTER (OUTPATIENT)
Age: 64
End: 2024-08-25

## 2024-09-05 ENCOUNTER — OFFICE VISIT (OUTPATIENT)
Dept: CARDIOLOGY | Facility: CLINIC | Age: 64
End: 2024-09-05
Payer: OTHER GOVERNMENT

## 2024-09-05 VITALS
WEIGHT: 214 LBS | HEART RATE: 66 BPM | BODY MASS INDEX: 31.6 KG/M2 | DIASTOLIC BLOOD PRESSURE: 70 MMHG | RESPIRATION RATE: 14 BRPM | SYSTOLIC BLOOD PRESSURE: 110 MMHG

## 2024-09-05 DIAGNOSIS — I25.118 CORONARY ARTERY DISEASE OF NATIVE ARTERY OF NATIVE HEART WITH STABLE ANGINA PECTORIS (H): ICD-10-CM

## 2024-09-05 DIAGNOSIS — I50.20 HEART FAILURE WITH REDUCED EJECTION FRACTION (H): Primary | ICD-10-CM

## 2024-09-05 PROCEDURE — G2211 COMPLEX E/M VISIT ADD ON: HCPCS | Performed by: INTERNAL MEDICINE

## 2024-09-05 PROCEDURE — 99214 OFFICE O/P EST MOD 30 MIN: CPT | Performed by: INTERNAL MEDICINE

## 2024-09-05 NOTE — LETTER
9/5/2024    Roberto Murphy MD  [address] [place] [address]    RE: Mir Treviño Sr.       Dear Colleague,     I had the pleasure of seeing Mir Treviño Sr. in the [facility].      Cardiology Progress Note     Assessment:  Coronary artery disease history of anterior MI and stenting of LAD in 2010, status post coronary artery bypass graft surgery May 2022, no angina  Ischemic cardiomyopathy, mild  Chronic systolic heart failure, compensated  Hypertension, good control  Diabetes mellitus  Hypercholesterolemia on high-dose of statin, good control      Plan:  Echo to reassess LV function     Continue current cardiac medications      Encouraged him to stay physically active    Follow-up in 1 year    Subjective:   This is 64 year old male who comes in today for follow-up visit.  I have not seen him for about a year and a half.  He has done well.  He denies any chest pain or shortness of breath.  He stays physically active.  He likes traveling throughout [place] [facility].  He feels that physically he cannot do anything he wants.  He has not had heart palpitation or syncope.  He tolerates all medications well      Review of Systems:   Negative other than history of present illness    Objective:   /70 (BP Location: Right arm, Patient Position: Sitting, Cuff Size: Adult Large)   Pulse 66   Resp 14   Wt 97.1 kg (214 lb)   BMI 31.60 kg/m    Physical Exam:  GENERAL: no distress  NECK: No JVD  LUNGS: Clear to auscultation.  CARDIAC: regular rhythm, S1 & S2 normal.  No heaves, thrills, gallops or murmurs.  ABDOMEN: flat, negative hepatosplenomegaly, soft and non-tender.  EXTREMITIES: No evidence of cyanosis, clubbing or edema.    Current Outpatient Medications   Medication Sig Dispense Refill     acetaminophen (TYLENOL) 325 MG tablet Take 2 tablets (650 mg) by mouth every 4 hours as needed for other, headaches or fever (For optimal non-opioid multimodal pain management to improve pain  "control.) 30 tablet 0     albuterol (ACCUNEB) 1.25 MG/3ML neb solution Take 1 vial (1.25 mg) by nebulization every 6 hours as needed 90 mL 1     albuterol (PROAIR HFA/PROVENTIL HFA/VENTOLIN HFA) 108 (90 Base) MCG/ACT inhaler Inhale 2 puffs into the lungs every 6 hours as needed 18 g 11     aspirin (ASA) 81 MG EC tablet Take 1 tablet (81 mg) by mouth daily Start tomorrow morning. 90 tablet 3     coenzyme Q-10 capsule Take 2 capsules (200 mg) by mouth daily       fluticasone propionate (FLONASE) 50 mcg/actuation nasal spray Spray 1 spray in nostril daily as needed (seasonal)       hydrochlorothiazide (MICROZIDE) 12.5 MG capsule TAKE 1 CAPSULE DAILY 90 capsule 3     lisinopril (ZESTRIL) 10 MG tablet TAKE 1 TABLET DAILY 90 tablet 3     metoprolol succinate ER (TOPROL XL) 50 MG 24 hr tablet Take 1 tablet (50 mg) by mouth daily 90 tablet 0     montelukast (SINGULAIR) 10 MG tablet TAKE 1 TABLET DAILY EVERY NIGHT AT BEDTIME (SINGULAIR) 90 tablet 3     Multiple Vitamins-Minerals (ONE DAILY MENS HEALTH PO) Take 1 tablet by mouth daily       nitroGLYcerin (NITROSTAT) 0.4 MG sublingual tablet One tablet under the tongue every 5 minutes if needed for chest pain. May repeat every 5 minutes for a maximum of 3 doses in 15 minutes\" 25 tablet 3     rosuvastatin (CRESTOR) 40 MG tablet TAKE 1 TABLET DAILY 90 tablet 0     Semaglutide, 1 MG/DOSE, (OZEMPIC) 4 MG/3ML pen Inject 1 mg subcutaneously every 7 days 9 mL 0       Cardiographics:    ECG: Read by me 1/19/2023 normal sinus rhythm anteroseptal myocardial infarction, old, nonspecific ST-T abnormalities, normal QRS duration     Echocardiogram: January 2023  The visual ejection fraction is 45-50%.  There is septal akinesis.  There is apical dyskinesis.  There is mild anterior wall hypokinesis.  Right ventricular function cannot be assessed due to poor image quality.  No significant valve disease.     Coronary angio: January 2023  Exertional/persistent chest pain and dyspnea in a " "diabetic man with 3 vessel CABG in May 2022.  Grossly normal left main.  Severe proximal LAD with a mid LAD occlusion and severe distal disease. Patent LIMA to the apical LAD which backfills the distal LAD. Patent SVG to the first diagonal which backfills the mid LAD.  The circumflex feeds a large OM-2 and moderate OM-3. The SVG to the distal portion of OM-3 is widely patent.  The RCA had moderate ostial and distal narrowing, dFR was 1.0. The lesions improved with intracoronary nitroglycerin.  LV EDP moderately elevated. LV-Ao pressure gradient 5mmHg.     Lab Results    Chemistry/lipid CBC Cardiac Enzymes/BNP/TSH/INR   Recent Labs   Lab Test 05/10/24  1044   CHOL 117   HDL 33*   LDL 25   TRIG 520*     Recent Labs   Lab Test 05/10/24  1044 01/19/23  0715 07/18/22  0736   LDL 25 44 50     Recent Labs   Lab Test 05/10/24  1044      POTASSIUM 4.5   CHLORIDE 100   CO2 30*   *   BUN 13.5   CR 0.92   GFRESTIMATED >90   JAMIE 8.9     Recent Labs   Lab Test 05/10/24  1044 10/02/23  1547 09/19/23  0814   CR 0.92 0.81 0.89     Recent Labs   Lab Test 05/10/24  1044 09/19/23  0814 05/22/23  0742   A1C 10.5* 7.4* 8.4*          Recent Labs   Lab Test 05/10/24  1044   WBC 4.4   HGB 15.2   HCT 42.0   MCV 87   *     Recent Labs   Lab Test 05/10/24  1044 10/02/23  1547 01/18/23  1423   HGB 15.2 14.9 13.6    No results for input(s): \"TROPONINI\" in the last 02283 hours.  Recent Labs   Lab Test 01/16/23  0929   NTBNP 87     Recent Labs   Lab Test 09/19/23  0814   TSH 2.12     Recent Labs   Lab Test 05/10/24  1042 10/02/23  1547 01/19/23  0715   INR 1.07 1.26* 1.17*                         Thank you for allowing me to participate in the care of your patient.      Sincerely,     Marcus Lezama MD     [facility]  cc:   Parmjit Tanner, DO  [address] [place] [address]      "

## 2024-09-05 NOTE — PROGRESS NOTES
Cardiology Progress Note     Assessment:  Coronary artery disease history of anterior MI and stenting of LAD in 2010, status post coronary artery bypass graft surgery May 2022, no angina  Ischemic cardiomyopathy, mild  Chronic systolic heart failure, compensated  Hypertension, good control  Diabetes mellitus  Hypercholesterolemia on high-dose of statin, good control      Plan:  Echo to reassess LV function     Continue current cardiac medications      Encouraged him to stay physically active    Follow-up in 1 year    Subjective:   This is 64 year old male who comes in today for follow-up visit.  I have not seen him for about a year and a half.  He has done well.  He denies any chest pain or shortness of breath.  He stays physically active.  He likes traveling throughout [place] [facility].  He feels that physically he cannot do anything he wants.  He has not had heart palpitation or syncope.  He tolerates all medications well      Review of Systems:   Negative other than history of present illness    Objective:   /70 (BP Location: Right arm, Patient Position: Sitting, Cuff Size: Adult Large)   Pulse 66   Resp 14   Wt 97.1 kg (214 lb)   BMI 31.60 kg/m    Physical Exam:  GENERAL: no distress  NECK: No JVD  LUNGS: Clear to auscultation.  CARDIAC: regular rhythm, S1 & S2 normal.  No heaves, thrills, gallops or murmurs.  ABDOMEN: flat, negative hepatosplenomegaly, soft and non-tender.  EXTREMITIES: No evidence of cyanosis, clubbing or edema.    Current Outpatient Medications   Medication Sig Dispense Refill    acetaminophen (TYLENOL) 325 MG tablet Take 2 tablets (650 mg) by mouth every 4 hours as needed for other, headaches or fever (For optimal non-opioid multimodal pain management to improve pain control.) 30 tablet 0    albuterol (ACCUNEB) 1.25 MG/3ML neb solution Take 1 vial (1.25 mg) by nebulization every 6 hours as needed 90 mL 1    albuterol (PROAIR HFA/PROVENTIL HFA/VENTOLIN HFA) 108 (90 Base) MCG/ACT  "inhaler Inhale 2 puffs into the lungs every 6 hours as needed 18 g 11    aspirin (ASA) 81 MG EC tablet Take 1 tablet (81 mg) by mouth daily Start tomorrow morning. 90 tablet 3    coenzyme Q-10 capsule Take 2 capsules (200 mg) by mouth daily      fluticasone propionate (FLONASE) 50 mcg/actuation nasal spray Spray 1 spray in nostril daily as needed (seasonal)      hydrochlorothiazide (MICROZIDE) 12.5 MG capsule TAKE 1 CAPSULE DAILY 90 capsule 3    lisinopril (ZESTRIL) 10 MG tablet TAKE 1 TABLET DAILY 90 tablet 3    metoprolol succinate ER (TOPROL XL) 50 MG 24 hr tablet Take 1 tablet (50 mg) by mouth daily 90 tablet 0    montelukast (SINGULAIR) 10 MG tablet TAKE 1 TABLET DAILY EVERY NIGHT AT BEDTIME (SINGULAIR) 90 tablet 3    Multiple Vitamins-Minerals (ONE DAILY MENS HEALTH PO) Take 1 tablet by mouth daily      nitroGLYcerin (NITROSTAT) 0.4 MG sublingual tablet One tablet under the tongue every 5 minutes if needed for chest pain. May repeat every 5 minutes for a maximum of 3 doses in 15 minutes\" 25 tablet 3    rosuvastatin (CRESTOR) 40 MG tablet TAKE 1 TABLET DAILY 90 tablet 0    Semaglutide, 1 MG/DOSE, (OZEMPIC) 4 MG/3ML pen Inject 1 mg subcutaneously every 7 days 9 mL 0       Cardiographics:    ECG: Read by me 1/19/2023 normal sinus rhythm anteroseptal myocardial infarction, old, nonspecific ST-T abnormalities, normal QRS duration     Echocardiogram: January 2023  The visual ejection fraction is 45-50%.  There is septal akinesis.  There is apical dyskinesis.  There is mild anterior wall hypokinesis.  Right ventricular function cannot be assessed due to poor image quality.  No significant valve disease.     Coronary angio: January 2023  Exertional/persistent chest pain and dyspnea in a diabetic man with 3 vessel CABG in May 2022.  Grossly normal left main.  Severe proximal LAD with a mid LAD occlusion and severe distal disease. Patent LIMA to the apical LAD which backfills the distal LAD. Patent SVG to the first " "diagonal which backfills the mid LAD.  The circumflex feeds a large OM-2 and moderate OM-3. The SVG to the distal portion of OM-3 is widely patent.  The RCA had moderate ostial and distal narrowing, dFR was 1.0. The lesions improved with intracoronary nitroglycerin.  LV EDP moderately elevated. LV-Ao pressure gradient 5mmHg.     Lab Results    Chemistry/lipid CBC Cardiac Enzymes/BNP/TSH/INR   Recent Labs   Lab Test 05/10/24  1044   CHOL 117   HDL 33*   LDL 25   TRIG 520*     Recent Labs   Lab Test 05/10/24  1044 01/19/23  0715 07/18/22  0736   LDL 25 44 50     Recent Labs   Lab Test 05/10/24  1044      POTASSIUM 4.5   CHLORIDE 100   CO2 30*   *   BUN 13.5   CR 0.92   GFRESTIMATED >90   JAMIE 8.9     Recent Labs   Lab Test 05/10/24  1044 10/02/23  1547 09/19/23  0814   CR 0.92 0.81 0.89     Recent Labs   Lab Test 05/10/24  1044 09/19/23  0814 05/22/23  0742   A1C 10.5* 7.4* 8.4*          Recent Labs   Lab Test 05/10/24  1044   WBC 4.4   HGB 15.2   HCT 42.0   MCV 87   *     Recent Labs   Lab Test 05/10/24  1044 10/02/23  1547 01/18/23  1423   HGB 15.2 14.9 13.6    No results for input(s): \"TROPONINI\" in the last 38766 hours.  Recent Labs   Lab Test 01/16/23  0929   NTBNP 87     Recent Labs   Lab Test 09/19/23  0814   TSH 2.12     Recent Labs   Lab Test 05/10/24  1042 10/02/23  1547 01/19/23  0715   INR 1.07 1.26* 1.17*                     "

## 2024-09-11 DIAGNOSIS — I25.118 CORONARY ARTERY DISEASE OF NATIVE ARTERY OF NATIVE HEART WITH STABLE ANGINA PECTORIS (H): ICD-10-CM

## 2024-09-11 RX ORDER — METOPROLOL SUCCINATE 50 MG/1
50 TABLET, EXTENDED RELEASE ORAL DAILY
Qty: 90 TABLET | Refills: 3 | Status: SHIPPED | OUTPATIENT
Start: 2024-09-11

## 2024-09-13 ENCOUNTER — HOSPITAL ENCOUNTER (OUTPATIENT)
Dept: CARDIOLOGY | Facility: CLINIC | Age: 64
Discharge: HOME | End: 2024-09-13
Attending: INTERNAL MEDICINE | Admitting: INTERNAL MEDICINE
Payer: OTHER GOVERNMENT

## 2024-09-13 DIAGNOSIS — I25.118 CORONARY ARTERY DISEASE OF NATIVE ARTERY OF NATIVE HEART WITH STABLE ANGINA PECTORIS (H): ICD-10-CM

## 2024-09-13 DIAGNOSIS — I50.20 HEART FAILURE WITH REDUCED EJECTION FRACTION (H): ICD-10-CM

## 2024-09-13 LAB — BI-PLANE LVEF ECHO: NORMAL

## 2024-09-13 PROCEDURE — 999N000208 ECHOCARDIOGRAM COMPLETE

## 2024-09-13 PROCEDURE — 255N000002 HC RX 255 OP 636: Performed by: INTERNAL MEDICINE

## 2024-09-13 PROCEDURE — C8929 TTE W OR WO FOL WCON,DOPPLER: HCPCS

## 2024-09-13 PROCEDURE — 93306 TTE W/DOPPLER COMPLETE: CPT | Mod: 26 | Performed by: INTERNAL MEDICINE

## 2024-09-13 RX ADMIN — PERFLUTREN 3 ML: 6.52 INJECTION, SUSPENSION INTRAVENOUS at 14:18

## 2024-09-16 DIAGNOSIS — E66.09 CLASS 1 OBESITY DUE TO EXCESS CALORIES WITH SERIOUS COMORBIDITY AND BODY MASS INDEX (BMI) OF 32.0 TO 32.9 IN ADULT: ICD-10-CM

## 2024-09-16 DIAGNOSIS — I50.20 HEART FAILURE WITH REDUCED EJECTION FRACTION (H): ICD-10-CM

## 2024-09-16 DIAGNOSIS — Z95.1 STATUS POST CORONARY ARTERY BYPASS GRAFT: ICD-10-CM

## 2024-09-16 DIAGNOSIS — E66.811 CLASS 1 OBESITY DUE TO EXCESS CALORIES WITH SERIOUS COMORBIDITY AND BODY MASS INDEX (BMI) OF 32.0 TO 32.9 IN ADULT: ICD-10-CM

## 2024-09-16 DIAGNOSIS — E11.9 TYPE 2 DIABETES MELLITUS WITHOUT COMPLICATION, WITHOUT LONG-TERM CURRENT USE OF INSULIN (H): ICD-10-CM

## 2024-09-16 RX ORDER — SEMAGLUTIDE 1.34 MG/ML
1 INJECTION, SOLUTION SUBCUTANEOUS
Qty: 9 ML | Refills: 0 | Status: SHIPPED | OUTPATIENT
Start: 2024-09-16

## 2024-09-16 NOTE — TELEPHONE ENCOUNTER
Patient is due for a physical. Refilled Ozempic for 90 days. Please call and inform them and help make physical in this time for further refills.    Roberto Bates MD

## 2024-09-19 DIAGNOSIS — I25.118 CORONARY ARTERY DISEASE OF NATIVE ARTERY OF NATIVE HEART WITH STABLE ANGINA PECTORIS (H): ICD-10-CM

## 2024-09-19 RX ORDER — ROSUVASTATIN CALCIUM 40 MG/1
40 TABLET, COATED ORAL DAILY
Qty: 90 TABLET | Refills: 1 | Status: SHIPPED | OUTPATIENT
Start: 2024-09-19

## 2024-09-23 ENCOUNTER — OFFICE VISIT (OUTPATIENT)
Dept: FAMILY MEDICINE | Facility: CLINIC | Age: 64
End: 2024-09-23
Payer: OTHER GOVERNMENT

## 2024-09-23 VITALS
SYSTOLIC BLOOD PRESSURE: 110 MMHG | HEART RATE: 69 BPM | TEMPERATURE: 97.7 F | OXYGEN SATURATION: 100 % | RESPIRATION RATE: 17 BRPM | BODY MASS INDEX: 31.14 KG/M2 | WEIGHT: 210.25 LBS | DIASTOLIC BLOOD PRESSURE: 60 MMHG | HEIGHT: 69 IN

## 2024-09-23 DIAGNOSIS — Z00.00 HEALTHCARE MAINTENANCE: ICD-10-CM

## 2024-09-23 DIAGNOSIS — I50.20 HEART FAILURE WITH REDUCED EJECTION FRACTION (H): ICD-10-CM

## 2024-09-23 DIAGNOSIS — Z12.5 SCREENING FOR PROSTATE CANCER: ICD-10-CM

## 2024-09-23 DIAGNOSIS — Z12.11 SCREEN FOR COLON CANCER: ICD-10-CM

## 2024-09-23 DIAGNOSIS — E11.9 TYPE 2 DIABETES MELLITUS WITHOUT COMPLICATION, WITHOUT LONG-TERM CURRENT USE OF INSULIN (H): Primary | ICD-10-CM

## 2024-09-23 DIAGNOSIS — I25.118 CORONARY ARTERY DISEASE OF NATIVE ARTERY OF NATIVE HEART WITH STABLE ANGINA PECTORIS (H): ICD-10-CM

## 2024-09-23 DIAGNOSIS — E11.42 DIABETIC POLYNEUROPATHY ASSOCIATED WITH TYPE 2 DIABETES MELLITUS (H): ICD-10-CM

## 2024-09-23 DIAGNOSIS — I10 ESSENTIAL HYPERTENSION: ICD-10-CM

## 2024-09-23 DIAGNOSIS — J45.20 MILD INTERMITTENT ASTHMA WITHOUT COMPLICATION: ICD-10-CM

## 2024-09-23 DIAGNOSIS — K76.0 HEPATIC STEATOSIS: ICD-10-CM

## 2024-09-23 DIAGNOSIS — E66.09 CLASS 1 OBESITY DUE TO EXCESS CALORIES WITH SERIOUS COMORBIDITY AND BODY MASS INDEX (BMI) OF 31.0 TO 31.9 IN ADULT: ICD-10-CM

## 2024-09-23 DIAGNOSIS — E66.811 CLASS 1 OBESITY DUE TO EXCESS CALORIES WITH SERIOUS COMORBIDITY AND BODY MASS INDEX (BMI) OF 31.0 TO 31.9 IN ADULT: ICD-10-CM

## 2024-09-23 LAB
EST. AVERAGE GLUCOSE BLD GHB EST-MCNC: 143 MG/DL
HBA1C MFR BLD: 6.6 % (ref 0–5.6)
PSA SERPL DL<=0.01 NG/ML-MCNC: 1.24 NG/ML (ref 0–4.5)

## 2024-09-23 PROCEDURE — 99396 PREV VISIT EST AGE 40-64: CPT | Mod: 25 | Performed by: STUDENT IN AN ORGANIZED HEALTH CARE EDUCATION/TRAINING PROGRAM

## 2024-09-23 PROCEDURE — 90471 IMMUNIZATION ADMIN: CPT | Performed by: STUDENT IN AN ORGANIZED HEALTH CARE EDUCATION/TRAINING PROGRAM

## 2024-09-23 PROCEDURE — 90673 RIV3 VACCINE NO PRESERV IM: CPT | Performed by: STUDENT IN AN ORGANIZED HEALTH CARE EDUCATION/TRAINING PROGRAM

## 2024-09-23 PROCEDURE — 91320 SARSCV2 VAC 30MCG TRS-SUC IM: CPT | Performed by: STUDENT IN AN ORGANIZED HEALTH CARE EDUCATION/TRAINING PROGRAM

## 2024-09-23 PROCEDURE — G0103 PSA SCREENING: HCPCS | Performed by: STUDENT IN AN ORGANIZED HEALTH CARE EDUCATION/TRAINING PROGRAM

## 2024-09-23 PROCEDURE — 90480 ADMN SARSCOV2 VAC 1/ONLY CMP: CPT | Performed by: STUDENT IN AN ORGANIZED HEALTH CARE EDUCATION/TRAINING PROGRAM

## 2024-09-23 PROCEDURE — 83036 HEMOGLOBIN GLYCOSYLATED A1C: CPT | Performed by: STUDENT IN AN ORGANIZED HEALTH CARE EDUCATION/TRAINING PROGRAM

## 2024-09-23 PROCEDURE — 36415 COLL VENOUS BLD VENIPUNCTURE: CPT | Performed by: STUDENT IN AN ORGANIZED HEALTH CARE EDUCATION/TRAINING PROGRAM

## 2024-09-23 ASSESSMENT — ASTHMA QUESTIONNAIRES
QUESTION_4 LAST FOUR WEEKS HOW OFTEN HAVE YOU USED YOUR RESCUE INHALER OR NEBULIZER MEDICATION (SUCH AS ALBUTEROL): NOT AT ALL
QUESTION_3 LAST FOUR WEEKS HOW OFTEN DID YOUR ASTHMA SYMPTOMS (WHEEZING, COUGHING, SHORTNESS OF BREATH, CHEST TIGHTNESS OR PAIN) WAKE YOU UP AT NIGHT OR EARLIER THAN USUAL IN THE MORNING: NOT AT ALL
QUESTION_2 LAST FOUR WEEKS HOW OFTEN HAVE YOU HAD SHORTNESS OF BREATH: NOT AT ALL
QUESTION_1 LAST FOUR WEEKS HOW MUCH OF THE TIME DID YOUR ASTHMA KEEP YOU FROM GETTING AS MUCH DONE AT WORK, SCHOOL OR AT HOME: NONE OF THE TIME
ACT_TOTALSCORE: 24
QUESTION_5 LAST FOUR WEEKS HOW WOULD YOU RATE YOUR ASTHMA CONTROL: WELL CONTROLLED
ACT_TOTALSCORE: 24

## 2024-09-23 ASSESSMENT — PAIN SCALES - GENERAL: PAINLEVEL: NO PAIN (0)

## 2024-09-23 NOTE — PROGRESS NOTES
Assessment/ Plan   64-year-old male with past with history of CAD, HFrEF, type 2 diabetes, obesity, hepatic steatosis/cirrhosis with associated esophageal varices, hypertension who presents for annual physical exam    1. Diabetic polyneuropathy associated with type 2 diabetes mellitus (H)  2. Type 2 diabetes mellitus without complication, without long-term current use of insulin (H)  3. Class 1 obesity due to excess calories with serious comorbidity and body mass index (BMI) of 31.0 to 31.9 in adult   A1C improved to 6.6 from 10.5 with increase of ozempic from 0.5 to 1.     Diagnosed in 2020  Most recent HgbA1c:   Lab Results   Component Value Date    A1C 6.6 09/23/2024    A1C 10.5 05/10/2024    A1C 7.4 09/19/2023    A1C 8.4 05/22/2023    A1C 6.9 01/16/2023     Current medication regimen:  ozempic    ASA: 81 mg daily  Statin: Rosuvastatin 20 mg daily    Complications:  - Retinopathy: Last ophthalmology appointment-   - Nephropathy:  Last Comprehensive Metabolic Panel:  Last Comprehensive Metabolic Panel:  Lab Results   Component Value Date     05/10/2024    POTASSIUM 4.5 05/10/2024    CHLORIDE 100 05/10/2024    CO2 30 (H) 05/10/2024    ANIONGAP 5 (L) 05/10/2024     (H) 05/10/2024    BUN 13.5 05/10/2024    CR 0.92 05/10/2024    GFRESTIMATED >90 05/10/2024    JAMIE 8.9 05/10/2024     - Neuropathy: foot exam- 9/24    - Hemoglobin A1c; Future  - Hemoglobin A1c      4. Hypertension  - BP goal: <140/90  - Currently taking meds: 12.5 mg daily hydrochlorothiazide, 10 mg daily lisinopril, 50 mg XR daily metorporlol  - Any side effects: None  - Last BMP:   Last Comprehensive Metabolic Panel:  Lab Results   Component Value Date     05/10/2024    POTASSIUM 4.5 05/10/2024    CHLORIDE 100 05/10/2024    CO2 30 (H) 05/10/2024    ANIONGAP 5 (L) 05/10/2024     (H) 05/10/2024    BUN 13.5 05/10/2024    CR 0.92 05/10/2024    GFRESTIMATED >90 05/10/2024    JAMIE 8.9 05/10/2024     - Any symptoms of HTN such as chest  pain, headaches, vision changes, nausea: None    BP Readings from Last 6 Encounters:   09/23/24 110/60   09/05/24 110/70   05/10/24 120/80   05/08/24 125/65   10/02/23 125/77   09/29/23 123/69     5. Coronary artery disease of native artery of native heart with stable angina pectoris (H24)  7. Heart failure with reduced ejection fraction (H)  On magnesium and aspirin.  Heart attack in 2010. PCI with stenting. A few years later having chest humaira with exertion and significant disease. Had a triple bypass 5/10/22. On baby aspirin and statin daily. Lisinopril and metoprolol for heart failure as well. Sees cardiology regularly.    6. Mild intermittent asthma without complication  Seasonal, exacerbated by allergies and pollen. Only uses albuterol as needed and hasn't needed it since spring. Taking montelukast daily.         7/9/2023     6:22 PM 4/2/2024     7:46 AM 9/23/2024    11:00 AM   ACT Total Scores   ACT TOTAL SCORE (Goal Greater than or Equal to 20) 25 25    25 24   In the past 12 months, how many times did you visit the emergency room for your asthma without being admitted to the hospital? 0 0 0   In the past 12 months, how many times were you hospitalized overnight because of your asthma? 0 0 0       8. Healthcare maintenance    9. Screen for colon cancer  - Colonoscopy Screening  Referral; Future    10. Hepatic steatosis  Sees GI regularly. EGD 5/24 showing esophageal varices.    11. Screening for prostate cancer  - PSA, screen; Future    Follow-up in: 6-12 months for diabetic follow up    Roberto Bates MD    Counseling  Appropriate preventive services were discussed with this patient, including applicable screening as appropriate for fall prevention, nutrition, physical activity, Tobacco-use cessation, weight loss and cognition    Subjective:     Mir Camaraquivel Sr. is a 64 year old male who presents for an annual exam.     Chief Complaint   Patient presents with    Physical        Colonoscopy:  PSA:  Prostate Specific Antigen Screen   Date Value Ref Range Status   09/19/2023 1.01 0.00 - 4.50 ng/mL Final   05/18/2017 1.9 0.0 - 3.5 ng/mL Final       Immunization History   Administered Date(s) Administered    Adeno T4 05/15/1987    Adeno T7 05/15/1987    Anthrax 04/20/2007, 05/07/2007, 06/18/2007, 11/20/2007, 07/24/2012    COVID-19 12+ (Pfizer) 05/10/2024    COVID-19 Bivalent 18+ (Moderna) 07/10/2023    COVID-19 Monovalent 18+ (Moderna) 04/10/2021, 05/08/2021, 01/25/2022    COVID-19 Monovalent Booster 18+ (Moderna) 07/14/2022    DT (PEDS <7y) 01/01/2005    Flu, Unspecified 09/09/2013, 01/01/2014, 11/01/2020, 10/01/2021    Influenza (H1N1) 02/07/2010    Influenza (IIV3) PF 10/01/2015, 10/01/2016, 10/01/2017, 09/01/2018    Influenza Intranasal Vaccine 11/20/2007    Influenza Vaccine 18-64 (Flublok) 09/19/2023    Influenza Vaccine >6 months,quad, PF 10/02/2016    Influenza, seasonal, injectable, PF 09/18/2011, 09/19/2012    MMR 01/28/1995, 07/13/2015    Pneumococcal 20 valent Conjugate (Prevnar 20) 08/17/2022    Pneumococcal 23 valent 06/12/2013    Pneumococcal, Unspecified 07/01/2012    Polio, Unspecified 05/15/1987    Rubella 05/27/1987    Small Pox (Vaccinia) 06/18/2007    TDAP (Adacel,Boostrix) 01/01/2007, 07/24/2012, 08/21/2021    Td (Adult), Adsorbed 01/28/1995, 06/07/2005, 12/02/2005    Td Tetanus Not Adsbed Adult  01/01/2015    Twinrix A/B 01/04/2004, 06/07/2005, 12/02/2005    Typhoid IM 01/04/2004, 04/20/2007, 07/24/2012    Typhoid, Unspecified Formulation 01/04/2004, 04/20/2007, 07/24/2012    Yellow Fever 01/28/1995    Zoster recombinant adjuvanted (SHINGRIX) 01/11/2021, 04/26/2021     Immunization status: due today.     Current Outpatient Medications   Medication Sig Dispense Refill    acetaminophen (TYLENOL) 325 MG tablet Take 2 tablets (650 mg) by mouth every 4 hours as needed for other, headaches or fever (For optimal non-opioid multimodal pain management to improve pain  "control.) 30 tablet 0    albuterol (ACCUNEB) 1.25 MG/3ML neb solution Take 1 vial (1.25 mg) by nebulization every 6 hours as needed 90 mL 1    albuterol (PROAIR HFA/PROVENTIL HFA/VENTOLIN HFA) 108 (90 Base) MCG/ACT inhaler Inhale 2 puffs into the lungs every 6 hours as needed 18 g 11    aspirin (ASA) 81 MG EC tablet Take 1 tablet (81 mg) by mouth daily Start tomorrow morning. 90 tablet 3    coenzyme Q-10 capsule Take 2 capsules (200 mg) by mouth daily      fluticasone propionate (FLONASE) 50 mcg/actuation nasal spray Spray 1 spray in nostril daily as needed (seasonal)      hydrochlorothiazide (MICROZIDE) 12.5 MG capsule TAKE 1 CAPSULE DAILY 90 capsule 3    lisinopril (ZESTRIL) 10 MG tablet TAKE 1 TABLET DAILY 90 tablet 3    metoprolol succinate ER (TOPROL XL) 50 MG 24 hr tablet Take 1 tablet (50 mg) by mouth daily. 90 tablet 3    montelukast (SINGULAIR) 10 MG tablet TAKE 1 TABLET DAILY EVERY NIGHT AT BEDTIME (SINGULAIR) 90 tablet 3    Multiple Vitamins-Minerals (ONE DAILY MENS HEALTH PO) Take 1 tablet by mouth daily      nitroGLYcerin (NITROSTAT) 0.4 MG sublingual tablet One tablet under the tongue every 5 minutes if needed for chest pain. 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    Coronary atherosclerosis     Created by [facility] Annotation: Jul 16 2010  5:35PM - Nik Boyd: [place] 7/10  Replacement Utility updated for latest IMO load    Diabetes (H)     Essential hypertension     Created by Conversion  Replacement Utility updated for latest IMO load    Heart attack (H)     History of angina     Obese     Pure hypercholesterolemia     Created by " Conversion     Stented coronary artery     Uncomplicated asthma      Past Surgical History:   Procedure Laterality Date    BYPASS GRAFT ARTERY CORONARY N/A 5/10/2022    Procedure: CORONARY ARTERY BYPASS GRAFT X3 ,  LEFT LEG ENDOSCOPIC VESSEL PROCUREMENT, LEFT INTERNAL MAMMARY ARTERY HARVEST, ANESTHESIA TRANSESOPHAGEAL ECHOCARDIOGRAM. EPIAORTIC ULTRASOUND.;  Surgeon: Nik Marcus MD;  [place] [facility] [place]    CV CORONARY ANGIOGRAM N/A 4/8/2022    Procedure: Coronary Angiogram;  Surgeon: Ty Grace MD;  Location: [facility] LAB CV    CV CORONARY ANGIOGRAM N/A 1/19/2023    Procedure: Coronary Angiogram;  Surgeon: Zoe Mancilla MD;  Location: [facility]    CV FRACTIONAL FLOW RATIO WIRE N/A 1/19/2023    Procedure: Fractional Flow Ratio Wire;  Surgeon: Zoe Mancilla MD;  Location: [facility]    CV LEFT HEART CATH N/A 4/8/2022    Procedure: Left Heart Catheterization;  Surgeon: Ty Grace MD;  Location: [facility] LAB CV    CV LEFT HEART CATH N/A 1/19/2023    Procedure: Left Heart Catheterization;  Surgeon: Zoe Mancilla MD;  Location: [facility] CV    ESOPHAGOSCOPY, GASTROSCOPY, DUODENOSCOPY (EGD), COMBINED N/A 5/8/2024    Procedure: ESOPHAGOGASTRODUODENOSCOPY, WITH BIOPSY;  Surgeon: Naun Ramires DO;  Location: [facility]    LAPAROSCOPIC CHOLECYSTECTOMY N/A 6/7/2022    Procedure: CHOLECYSTECTOMY, LAPAROSCOPIC;  Surgeon: Franck Flowers MD;  [place] [facility] [place]    OTHER SURGICAL HISTORY      CORONARY STENTSTwo coronary artery stents in 2010     Definity [perflutren lipid microsphere]  Family History   Problem Relation Age of Onset    No Known Problems Mother     No Known Problems Father     No Known Problems Brother     No Known Problems Brother     No Known Problems Sister     No Known Problems Son     No Known Problems Son     No Known Problems Son     No Known Problems Daughter     Anesthesia Reaction No family hx of     Malignant  "Hyperthermia No family hx of      Social History     Socioeconomic History    Marital status:      Spouse name: Not on file    Number of children: Not on file    Years of education: Not on file    Highest education level: Not on file   Occupational History    Not on file   Tobacco Use    Smoking status: Never    Smokeless tobacco: Never   Vaping Use    Vaping status: Never Used   Substance and Sexual Activity    Alcohol use: Yes     Comment: Alcoholic Drinks/day: \"Once a month, maybe.\"    Drug use: No    Sexual activity: Yes     Partners: Female     Comment: wife   Other Topics Concern    Not on file   Social History Narrative    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  Marisa Diego       Social Determinants of Health     Financial Resource Strain: Low Risk  (9/23/2024)    Financial Resource Strain     Within the past 12 months, have you or your family members you live with been unable to get utilities (heat, electricity) when it was really needed?: No   Food Insecurity: Low Risk  (9/23/2024)    Food Insecurity     Within the past 12 months, did you worry that your food would run out before you got money to buy more?: No     Within the past 12 months, did the food you bought just not last and you didn t have money to get more?: No   Transportation Needs: Low Risk  (9/23/2024)    Transportation Needs     Within the past 12 months, has lack of transportation kept you from medical appointments, getting your medicines, non-medical meetings or appointments, work, or from getting things that you need?: No   Physical Activity: Insufficiently Active (9/23/2024)    Exercise Vital Sign     Days of Exercise per Week: 3 days     Minutes of Exercise per Session: 20 min   Stress: No Stress Concern Present (9/23/2024)    [employer] - Occupational Stress Questionnaire     Feeling of Stress : Not at all   Social Connections: Unknown (9/23/2024) " "   Social Connection and Isolation Panel [NHANES]     Frequency of Communication with Friends and Family: Not on file     Frequency of Social Gatherings with Friends and Family: Patient declined     Attends Sikhism Services: Not on file     Active Member of Clubs or Organizations: Not on file     Attends Club or Organization Meetings: Not on file     Marital Status: Not on file   Interpersonal Safety: Low Risk  (9/23/2024)    Interpersonal Safety     Do you feel physically and emotionally safe where you currently live?: Yes     Within the past 12 months, have you been hit, slapped, kicked or otherwise physically hurt by someone?: No     Within the past 12 months, have you been humiliated or emotionally abused in other ways by your partner or ex-partner?: No   Housing Stability: Low Risk  (9/23/2024)    Housing Stability     Do you have housing? : Yes     Are you worried about losing your housing?: No       Review of Systems  Complete ROS negative except as noted in the HPI    Objective:      Vitals:    09/23/24 1152   BP: 110/60   Pulse: 69   Resp: 17   Temp: 97.7  F (36.5  C)   SpO2: 100%   Weight: 95.4 kg (210 lb 4 oz)   Height: 1.753 m (5' 9\")       General appearance: Alert, cooperative, no distress, appears stated age, overweight  Head: Normocephalic, atraumatic, without obvious abnormality  EARS: TM's gray dull with structures seen bilaterally  Eyes: Pupils equal round, reactive.  Conjunctiva clear.  Nose: Nares normal, no drainage.  Throat: Lips, mucosa, tongue normal mucosa pink and moist  Neck: Supple, symmetric, trachea midline, no adenopathy.  No thyroid enlargement, tenderness or nodules.    Lungs: Clear to auscultation bilaterally, no wheezing or crackles present.  Respirations unlabored  Heart: Regular rate and rhythm, normal S1 and S2, no murmur, rub or gallop.  Abdomen: Soft, nontender, nondistended.  Bowel sounds active in all 4 quadrants.  No masses or organomegaly.  Extremities: Extremities " normal, atraumatic.  No cyanosis or edema.  Skin: Skin color, texture, turgor normal no rashes or lesions on limited skin exam  Neurologic: CN II through XII intact, normal strength.      Roberto Murphy MD

## 2024-09-24 NOTE — RESULT ENCOUNTER NOTE
Mir Treviño Sr.  Your results from your recent clinic visit show:  Your A1C is great as we discussed at 6.6  Your PSA, which is a screen for prostate cancer, was normal    If you have more questions please call the clinic at [phone] or send me a [employer] message    Dr. Roberto Bates

## 2024-10-25 DIAGNOSIS — Z91.09 OTHER ALLERGY, OTHER THAN TO MEDICINAL AGENTS: ICD-10-CM

## 2024-10-25 RX ORDER — MONTELUKAST SODIUM 10 MG/1
TABLET ORAL
Qty: 90 TABLET | Refills: 3 | Status: SHIPPED | OUTPATIENT
Start: 2024-10-25

## 2024-12-09 ENCOUNTER — OFFICE VISIT (OUTPATIENT)
Dept: FAMILY MEDICINE | Facility: CLINIC | Age: 64
End: 2024-12-09
Payer: OTHER GOVERNMENT

## 2024-12-09 VITALS
WEIGHT: 216 LBS | DIASTOLIC BLOOD PRESSURE: 80 MMHG | SYSTOLIC BLOOD PRESSURE: 130 MMHG | BODY MASS INDEX: 31.99 KG/M2 | OXYGEN SATURATION: 99 % | HEIGHT: 69 IN | TEMPERATURE: 97.2 F | HEART RATE: 64 BPM | RESPIRATION RATE: 19 BRPM

## 2024-12-09 DIAGNOSIS — Z12.11 SCREENING FOR COLON CANCER: ICD-10-CM

## 2024-12-09 DIAGNOSIS — E66.09 CLASS 1 OBESITY DUE TO EXCESS CALORIES WITH SERIOUS COMORBIDITY AND BODY MASS INDEX (BMI) OF 31.0 TO 31.9 IN ADULT: ICD-10-CM

## 2024-12-09 DIAGNOSIS — I50.20 HEART FAILURE WITH REDUCED EJECTION FRACTION (H): ICD-10-CM

## 2024-12-09 DIAGNOSIS — Z95.1 STATUS POST CORONARY ARTERY BYPASS GRAFT: ICD-10-CM

## 2024-12-09 DIAGNOSIS — I10 ESSENTIAL HYPERTENSION: ICD-10-CM

## 2024-12-09 DIAGNOSIS — Z01.818 PREOP GENERAL PHYSICAL EXAM: Primary | ICD-10-CM

## 2024-12-09 DIAGNOSIS — J45.20 MILD INTERMITTENT ASTHMA WITHOUT COMPLICATION: ICD-10-CM

## 2024-12-09 DIAGNOSIS — E66.811 CLASS 1 OBESITY DUE TO EXCESS CALORIES WITH SERIOUS COMORBIDITY AND BODY MASS INDEX (BMI) OF 31.0 TO 31.9 IN ADULT: ICD-10-CM

## 2024-12-09 DIAGNOSIS — I25.118 CORONARY ARTERY DISEASE OF NATIVE ARTERY OF NATIVE HEART WITH STABLE ANGINA PECTORIS (H): ICD-10-CM

## 2024-12-09 DIAGNOSIS — E11.9 TYPE 2 DIABETES MELLITUS WITHOUT COMPLICATION, WITHOUT LONG-TERM CURRENT USE OF INSULIN (H): ICD-10-CM

## 2024-12-09 LAB
ANION GAP SERPL CALCULATED.3IONS-SCNC: 10 MMOL/L (ref 7–15)
ATRIAL RATE - MUSE: 62 BPM
BUN SERPL-MCNC: 11.9 MG/DL (ref 8–23)
CALCIUM SERPL-MCNC: 9 MG/DL (ref 8.8–10.4)
CHLORIDE SERPL-SCNC: 104 MMOL/L (ref 98–107)
CREAT SERPL-MCNC: 0.75 MG/DL (ref 0.67–1.17)
DIASTOLIC BLOOD PRESSURE - MUSE: NORMAL MMHG
EGFRCR SERPLBLD CKD-EPI 2021: >90 ML/MIN/1.73M2
ERYTHROCYTE [DISTWIDTH] IN BLOOD BY AUTOMATED COUNT: 12.1 % (ref 10–15)
GLUCOSE SERPL-MCNC: 128 MG/DL (ref 70–99)
HCO3 SERPL-SCNC: 24 MMOL/L (ref 22–29)
HCT VFR BLD AUTO: 42 % (ref 40–53)
HGB BLD-MCNC: 15 G/DL (ref 13.3–17.7)
INTERPRETATION ECG - MUSE: NORMAL
MCH RBC QN AUTO: 31.4 PG (ref 26.5–33)
MCHC RBC AUTO-ENTMCNC: 35.7 G/DL (ref 31.5–36.5)
MCV RBC AUTO: 88 FL (ref 78–100)
P AXIS - MUSE: 36 DEGREES
PLATELET # BLD AUTO: 101 10E3/UL (ref 150–450)
POTASSIUM SERPL-SCNC: 3.6 MMOL/L (ref 3.4–5.3)
PR INTERVAL - MUSE: 156 MS
QRS DURATION - MUSE: 114 MS
QT - MUSE: 416 MS
QTC - MUSE: 422 MS
R AXIS - MUSE: -57 DEGREES
RBC # BLD AUTO: 4.77 10E6/UL (ref 4.4–5.9)
SODIUM SERPL-SCNC: 138 MMOL/L (ref 135–145)
SYSTOLIC BLOOD PRESSURE - MUSE: NORMAL MMHG
T AXIS - MUSE: 122 DEGREES
VENTRICULAR RATE- MUSE: 62 BPM
WBC # BLD AUTO: 4 10E3/UL (ref 4–11)

## 2024-12-09 PROCEDURE — 36415 COLL VENOUS BLD VENIPUNCTURE: CPT | Performed by: STUDENT IN AN ORGANIZED HEALTH CARE EDUCATION/TRAINING PROGRAM

## 2024-12-09 PROCEDURE — 93010 ELECTROCARDIOGRAM REPORT: CPT | Performed by: INTERNAL MEDICINE

## 2024-12-09 PROCEDURE — 93005 ELECTROCARDIOGRAM TRACING: CPT | Performed by: STUDENT IN AN ORGANIZED HEALTH CARE EDUCATION/TRAINING PROGRAM

## 2024-12-09 PROCEDURE — 80048 BASIC METABOLIC PNL TOTAL CA: CPT | Performed by: STUDENT IN AN ORGANIZED HEALTH CARE EDUCATION/TRAINING PROGRAM

## 2024-12-09 PROCEDURE — 85027 COMPLETE CBC AUTOMATED: CPT | Performed by: STUDENT IN AN ORGANIZED HEALTH CARE EDUCATION/TRAINING PROGRAM

## 2024-12-09 PROCEDURE — 99214 OFFICE O/P EST MOD 30 MIN: CPT | Performed by: STUDENT IN AN ORGANIZED HEALTH CARE EDUCATION/TRAINING PROGRAM

## 2024-12-09 ASSESSMENT — PAIN SCALES - GENERAL: PAINLEVEL_OUTOF10: NO PAIN (0)

## 2024-12-09 NOTE — H&P (VIEW-ONLY)
[facility]  [address]  [facility] [address]  Phone: [phone]  Fax: [phone]  Primary Provider: Yarely Murphy  Pre-op Performing Provider: YARELY MURPHY          12/5/2024   Surgical Information   What procedure is being done? pre-op vist    Facility or Hospital where procedure/surgery will be performed: [facility]    Who is doing the procedure / surgery? Dr Jamir Couch    Date of surgery / procedure: 10 DEC 2024 / Colonoscopy    Time of surgery / procedure: 8:00 AM / Colonoscopy    Where do you plan to recover after surgery? at home with family        Patient-reported       Fax number: NA    Assessment & Plan   64-year-old male with relevant past medical history of CAD status post coronary artery bypass, heart failure with reduced ejection fraction, hypertension, asthma, obesity, type 2 diabetes not on insulin who presents for preop evaluation before colonoscopy for colon cancer screening.  Having this in the hospital due to patient's risk factors.  I would say he is moderate risk as his current risk factors are well-controlled.  Procedure is low risk.  He has not had labs done since May and I recommended doing some basic lab test as well as an EKG.  He did have an echo 3 months ago which looks stable from previous.  As long as no critical values on these labs or EKG patient is approved to proceed.    1. Preop general physical exam (Primary)  - Basic metabolic panel  (Ca, Cl, CO2, Creat, Gluc, K, Na, BUN); Future  - CBC with platelets; Future    2. Screening for colon cancer    3. Type 2 diabetes mellitus without complication, without long-term current use of insulin (H)    4. Mild intermittent asthma without complication    5. Class 1 obesity due to excess calories with serious comorbidity and body mass index (BMI) of 31.0 to 31.9 in adult    6. Coronary artery disease of native artery of native heart with stable angina pectoris (H)  - EKG  12-lead, tracing only    7. Status post coronary artery bypass graft    8. Hypertension    9. Heart failure with reduced ejection fraction (H)    The proposed surgical procedure is considered LOW risk.    Antiplatelet or Anticoagulation Medication Instructions   - aspirin: Bleeding risk is low for this procedure and daily aspirin may be continued without modification.     Additional Medication Instructions  Hold ozempic but take all other medications as normal    RECOMMENDATION:  Approval given to proceed with proposed procedure pending review of diagnostic evaluation.    Subjective     HPI related to upcoming procedure: colonsocpy screening for colon cancer          12/5/2024     6:06 PM   Preop Questions   Have you ever had a heart attack or stroke? Yes   Have you ever had surgery on your heart or blood vessels, such as a stent placement, a coronary artery bypass, or surgery on an artery in your head, neck, heart, or legs? Yes   Do you have chest pain with activity? No   Do you have a history of heart failure? No   Do you currently have a cold, bronchitis or symptoms of other infection? No   Do you have a cough, shortness of breath, or wheezing? No   Do you or anyone in your family have previous history of blood clots? No   Do you or does anyone in your family have a serious bleeding problem such as prolonged bleeding following surgeries or cuts? No   Have you ever had problems with anemia or been told to take iron pills? No   Have you had any abnormal blood loss such as black, tarry or bloody stools? No   Have you ever had a blood transfusion? No   Are you willing to have a blood transfusion if it is medically needed before, during, or after your surgery? Yes   Have you or any of your relatives ever had problems with anesthesia? No   Do you have sleep apnea, excessive snoring or daytime drowsiness? No   Do you have any artifical heart valves or other implanted medical devices like a pacemaker, defibrillator, or  continuous glucose monitor? No   Do you have artificial joints? No   Are you allergic to latex? No       METS >4? YES    Caprini score- NA    Greater than 65? No      Health Care Directive:  Patient does not have a Health Care Directive or Living Will: Full COde    Preoperative Review of :   reviewed - no record of controlled substances prescribed.      Review of Systems  Complete ROS is negative except as noted in HPI    Patient Active Problem List    Diagnosis Date Noted    Hepatic steatosis 07/10/2023     Priority: Medium     4. Idiopathic esophageal varices without bleeding (H)  5. Hepatic steatosis  Patient has a Hx of hepatic steatosis and varices. Unknown etiology for this. I recommended workup though GI. He is wondering if this could be combat related.   - Adult GI  Referral - Consult Only; Future 7/23    EXAM: CT ABDOMEN PELVIS W CONTRAST  LOCATION: [facility]  DATE/TIME: 7/3/2022 9:04 AM     INDICATION:  Cirrhosis of liver without ascites, unspecified hepatic cirrhosis type (H)  COMPARISON: 06/06/2022  TECHNIQUE: CT scan of the abdomen and pelvis was performed following injection of IV contrast. Multiplanar reformats were obtained. Dose reduction techniques were used.  CONTRAST: Isovue 370     75ml     FINDINGS:   LOWER CHEST: There is a small pericardial effusion inferiorly  and o the right, increased from before. This measures 2.3 cm dependently. Resolution of the small left effusion.      HEPATOBILIARY: Interval cholecystectomy. Diffuse fatty infiltration of the liver with slight nodularity of the liver surface. No focal mass.     PANCREAS: Normal.     SPLEEN: Normal size. There are gastrosplenic varices.     ADRENAL GLANDS: Normal.     KIDNEYS/BLADDER: Normal.     BOWEL: Redundant colon. No free fluid or inflammatory changes. Few distal diverticuli.     LYMPH NODES: Normal.     VASCULATURE: Minimal arterial calcifications. Circumaortic left renal vein.     PELVIC  ORGANS: Normal. Prior vasectomy.     MUSCULOSKELETAL: Poststernotomy changes. Sternotomy has not fused. Minimal stranding of the overlying subcutaneous fat.                                                                      IMPRESSION:   1.  Hepatic steatosis with a slightly nodular liver contour. No focal mass.  2.  Gastrosplenic varices without splenomegaly or ascites.  3.  Recent median sternotomy with small, eccentric pericardial effusion, measuring 2.3 cm dependently. This has slightly increased from before.  4.  Interval cholecystectomy.      5/24:  Indications:         -  Cirrhosis with suspected esophageal varices   Complications:         -  No immediate complications.   Procedure:         - The  was introduced through the mouth and advanced to the second    part             of the duodenum.          -  The upper GI endoscopy was accomplished without difficulty.          -  The patient tolerated the procedure well.   Findings:         -  Diffuse moderate inflammation characterized by erythema was found in   the            duodenal bulb.  Biopsies were taken with a cold forceps for histology.          -  Diffuse severe inflammation characterized by erythema was found in the             stomach.  Biopsies were taken with a cold forceps for Helicobacter   pylori            testing.  Estimated blood loss was minimal.          -  Type 1 gastroesophageal varices (GOV1, esophageal varices which extend             along the lesser curvature) with no bleeding were found in the gastric    body.             There were no stigmata of recent bleeding.  They were small in largest             diameter.          -  Grade I varices were found in the middle third of the esophagus.          -  Grade II varices were found in the lower third of the esophagus.  They    were             medium in size.          - distal varices grade I to II   Impression:         -  Duodenitis, characterized by erythema Biopsied.           -  Acute gastritis, characterized by erythema.  Biopsied.          -  Type 1 gastroesophageal varices (GOV1, esophageal varices which extend             along the lesser curvature) , without bleeding.          -  Grade I esophageal varices.          -  Grade II esophageal varices.          - distal varices grade I to II   Recommendation:         -  Await pathology results.          -  Follow an antireflux regimen.          -  Continue present medications.          - avoid medicines or food that would irritate the stomach lining.       Diabetic polyneuropathy associated with type 2 diabetes mellitus (H) 07/10/2023     Priority: Medium     Just at night.  Mild and he does no want to take medicines right now for it 7/23      Healthcare maintenance 07/14/2022     Priority: Medium     Colonoscopy 2014- repeat in 10 years  Prostate Specific Antigen Screen   Date Value Ref Range Status   09/19/2023 1.01 0.00 - 4.50 ng/mL Final   05/18/2017 1.9 0.0 - 3.5 ng/mL Final           Status post coronary artery bypass graft 06/06/2022     Priority: Medium    Type 2 diabetes mellitus without complication, without long-term current use of insulin (H) 06/02/2022     Priority: Medium     5/24  A1C worsened to 10.5. I will increase ozempic to 1 mg from 0.5.  I recommend repeat diabetic visit in 3 months.     1/24:  was on metformin but significant GI side effect     9/23: jardiance stopped due to polyuria and trialed on wegovy per pharm recommendations. Unable to see pharmacist due to insurance plan. Eventually got on ozempic fall 2023.    9/24:  A1C improved to 6.6 from 10.5 with increase of ozempic from 0.5 to 1.     Diagnosed in 2020  Most recent HgbA1c:   Lab Results   Component Value Date    A1C 6.6 09/23/2024    A1C 10.5 05/10/2024    A1C 7.4 09/19/2023    A1C 8.4 05/22/2023    A1C 6.9 01/16/2023     Current medication regimen:  ozempic    ASA: 81 mg daily  Statin: Rosuvastatin 20 mg daily    Complications:  - Retinopathy: Last  ophthalmology appointment-   - Nephropathy:  Last Comprehensive Metabolic Panel:  Last Comprehensive Metabolic Panel:  Lab Results   Component Value Date     05/10/2024    POTASSIUM 4.5 05/10/2024    CHLORIDE 100 05/10/2024    CO2 30 (H) 05/10/2024    ANIONGAP 5 (L) 05/10/2024     (H) 05/10/2024    BUN 13.5 05/10/2024    CR 0.92 05/10/2024    GFRESTIMATED >90 05/10/2024    JAMIE 8.9 05/10/2024     - Neuropathy: foot exam- 9/24        Tinnitus 06/02/2022     Priority: Medium     Left ear severe, some on right. [facility] he doesn't use.       Heart failure with reduced ejection fraction (H) 05/10/2022     Priority: Medium     Echo 1/23:  Interpretation Summary     The visual ejection fraction is 45-50%.  There is septal akinesis.  There is apical dyskinesis.  There is mild anterior wall hypokinesis.  Right ventricular function cannot be assessed due to poor image quality.  No significant valve disease.      Coronary artery disease of native artery of native heart with stable angina pectoris (H) 05/10/2022     Priority: Medium     On magnesium and aspirin.  Heart attack in 2010. PCI with stenting. A few years later having chest humaira with exertion and significant disease. Had a triple bypass 5/10/22.  On baby aspirin and statin daily. Lisinopril and metoprolol for heart failure as well. Sees cardiology regularly.    Cardiology visit. 9/24  Assessment:  Coronary artery disease history of anterior MI and stenting of LAD in 2010, status post coronary artery bypass graft surgery May 2022, no angina  Ischemic cardiomyopathy, mild  Chronic systolic heart failure, compensated  Hypertension, good control  Diabetes mellitus  Hypercholesterolemia on high-dose of statin, good control        Plan:  Echo to reassess LV function      Continue current cardiac medications        Encouraged him to stay physically active     Follow-up in 1 year      Hypertension      Priority: Medium     - BP goal:  <140/90  - Currently taking meds: 12.5 mg daily hydrochlorothiazide, 10 mg daily lisinopril, 50 mg XR daily metorporlol  - Any side effects: None  - Last BMP:   Last Comprehensive Metabolic Panel:  Lab Results   Component Value Date     05/10/2024    POTASSIUM 4.5 05/10/2024    CHLORIDE 100 05/10/2024    CO2 30 (H) 05/10/2024    ANIONGAP 5 (L) 05/10/2024     (H) 05/10/2024    BUN 13.5 05/10/2024    CR 0.92 05/10/2024    GFRESTIMATED >90 05/10/2024    JAMIE 8.9 05/10/2024     - Any symptoms of HTN such as chest pain, headaches, vision changes, nausea: None    BP Readings from Last 6 Encounters:   09/23/24 110/60   09/05/24 110/70   05/10/24 120/80   05/08/24 125/65   10/02/23 125/77   09/29/23 123/69         Obesity 07/11/2016     Priority: Medium    Allergies      Priority: Medium     Created by Conversion        TB lung, latent      Priority: Medium     [facility]  4:51PM - Jenniffer Germain: CXR and INH   X9mo        Mild intermittent asthma 05/23/2010     Priority: Medium     Seasonal, exacerbated by allergies and pollen. Only uses albuterol as needed and hasn't needed it since spring. Taking montelukast daily.         7/9/2023     6:22 PM 4/2/2024     7:46 AM 9/23/2024    11:00 AM   ACT Total Scores   ACT TOTAL SCORE (Goal Greater than or Equal to 20) 25 25    25 24   In the past 12 months, how many times did you visit the emergency room for your asthma without being admitted to the hospital? 0 0 0   In the past 12 months, how many times were you hospitalized overnight because of your asthma? 0 0 0              Past Surgical History:   Procedure Laterality Date    BYPASS GRAFT ARTERY CORONARY N/A 5/10/2022    Procedure: CORONARY ARTERY BYPASS GRAFT X3 ,  LEFT LEG ENDOSCOPIC VESSEL PROCUREMENT, LEFT INTERNAL MAMMARY ARTERY HARVEST, ANESTHESIA TRANSESOPHAGEAL ECHOCARDIOGRAM. EPIAORTIC ULTRASOUND.;  Surgeon: Nik Marcus MD;  Location: [facility]  OR    CV CORONARY ANGIOGRAM N/A 4/8/2022    Procedure: Coronary Angiogram;  Surgeon: Ty Grace MD;  Location: [facility] CV    CV CORONARY ANGIOGRAM N/A 1/19/2023    Procedure: Coronary Angiogram;  Surgeon: Zoe Mancilla MD;  Location: [facility] LAB CV    CV FRACTIONAL FLOW RATIO WIRE N/A 1/19/2023    Procedure: Fractional Flow Ratio Wire;  Surgeon: Zoe Mancilla MD;  Location: [facility] LAB CV    CV LEFT HEART CATH N/A 4/8/2022    Procedure: Left Heart Catheterization;  Surgeon: Ty Grace MD;  Location: [facility] CATH LAB CV    CV LEFT HEART CATH N/A 1/19/2023    Procedure: Left Heart Catheterization;  Surgeon: Zoe Mancilla MD;  Location: [facility] CV    ESOPHAGOSCOPY, GASTROSCOPY, DUODENOSCOPY (EGD), COMBINED N/A 5/8/2024    Procedure: ESOPHAGOGASTRODUODENOSCOPY, WITH BIOPSY;  Surgeon: Naun Ramires DO;  Location: [facility] OR    LAPAROSCOPIC CHOLECYSTECTOMY N/A 6/7/2022    Procedure: CHOLECYSTECTOMY, LAPAROSCOPIC;  Surgeon: Franck Flowers MD;  Location: [facility] OR    OTHER SURGICAL HISTORY      CORONARY STENTSTwo coronary artery stents in 2010       Current Outpatient Medications   Medication Sig Dispense Refill    acetaminophen (TYLENOL) 325 MG tablet Take 2 tablets (650 mg) by mouth every 4 hours as needed for other, headaches or fever (For optimal non-opioid multimodal pain management to improve pain control.) 30 tablet 0    albuterol (ACCUNEB) 1.25 MG/3ML neb solution Take 1 vial (1.25 mg) by nebulization every 6 hours as needed 90 mL 1    albuterol (PROAIR HFA/PROVENTIL HFA/VENTOLIN HFA) 108 (90 Base) MCG/ACT inhaler Inhale 2 puffs into the lungs every 6 hours as needed 18 g 11    aspirin (ASA) 81 MG EC tablet Take 1 tablet (81 mg) by mouth daily Start tomorrow morning. 90 tablet 3    coenzyme Q-10 capsule Take 2 capsules (200 mg) by mouth daily      fluticasone propionate (FLONASE) 50 mcg/actuation nasal spray Spray 1 spray in nostril daily as  "needed (seasonal)      hydrochlorothiazide (MICROZIDE) 12.5 MG capsule TAKE 1 CAPSULE DAILY 90 capsule 3    lisinopril (ZESTRIL) 10 MG tablet TAKE 1 TABLET DAILY 90 tablet 3    metoprolol succinate ER (TOPROL XL) 50 MG 24 hr tablet Take 1 tablet (50 mg) by mouth daily. 90 tablet 3    montelukast (SINGULAIR) 10 MG tablet TAKE 1 TABLET DAILY EVERY NIGHT AT BEDTIME (SINGULAIR) 90 tablet 3    Multiple Vitamins-Minerals (ONE DAILY [employer] PO) Take 1 tablet by mouth daily      nitroGLYcerin (NITROSTAT) 0.4 MG sublingual tablet One tablet under the tongue every 5 minutes if needed for chest pain. May repeat every 5 minutes for a maximum of 3 doses in 15 minutes\" 25 tablet 3    rosuvastatin (CRESTOR) 40 MG tablet TAKE 1 TABLET DAILY 90 tablet 1    Semaglutide, 1 MG/DOSE, (OZEMPIC, 1 MG/DOSE,) 4 MG/3ML pen INJECT 1 MG UNDER THE SKIN EVERY 7 DAYS 9 mL 0     No current facility-administered medications for this visit.          Allergies   Allergen Reactions    Definity [Perflutren Lipid Microsphere] Other (See Comments)     Moderate back spasms and flushing        Social History     Socioeconomic History    Marital status:      Spouse name: Not on file    Number of children: Not on file    Years of education: Not on file    Highest education level: Not on file   Occupational History    Not on file   Tobacco Use    Smoking status: Never    Smokeless tobacco: Never   Vaping Use    Vaping status: Never Used   Substance and Sexual Activity    Alcohol use: Yes     Comment: Alcoholic Drinks/day: \"Once a month, maybe.\"    Drug use: No    Sexual activity: Yes     Partners: Female     Comment: wife   Other Topics Concern    Not on file   Social History Narrative    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  Marisa Diego       Social Drivers of Health     Financial Resource Strain: Low Risk  (9/23/2024)    Financial Resource Strain     Within the past 12 months, have you or " your family members you live with been unable to get utilities (heat, electricity) when it was really needed?: No   Food Insecurity: Low Risk  (9/23/2024)    Food Insecurity     Within the past 12 months, did you worry that your food would run out before you got money to buy more?: No     Within the past 12 months, did the food you bought just not last and you didn t have money to get more?: No   Transportation Needs: Low Risk  (9/23/2024)    Transportation Needs     Within the past 12 months, has lack of transportation kept you from medical appointments, getting your medicines, non-medical meetings or appointments, work, or from getting things that you need?: No   Physical Activity: Insufficiently Active (9/23/2024)    Exercise Vital Sign     Days of Exercise per Week: 3 days     Minutes of Exercise per Session: 20 min   Stress: No Stress Concern Present (9/23/2024)    [place] [employer] - Occupational Stress Questionnaire     Feeling of Stress : Not at all   Social Connections: Unknown (9/23/2024)    Social Connection and Isolation Panel [NHANES]     Frequency of Communication with Friends and Family: Not on file     Frequency of Social Gatherings with Friends and Family: Patient declined     Attends Faith Services: Not on file     Active Member of Clubs or Organizations: Not on file     Attends Club or Organization Meetings: Not on file     Marital Status: Not on file   Interpersonal Safety: Low Risk  (9/23/2024)    Interpersonal Safety     Do you feel physically and emotionally safe where you currently live?: Yes     Within the past 12 months, have you been hit, slapped, kicked or otherwise physically hurt by someone?: No     Within the past 12 months, have you been humiliated or emotionally abused in other ways by your partner or ex-partner?: No   Housing Stability: Low Risk  (9/23/2024)    Housing Stability     Do you have housing? : Yes     Are you worried about losing your housing?:  "No       Family History   Problem Relation Age of Onset    No Known Problems Mother     No Known Problems Father     No Known Problems Brother     No Known Problems Brother     No Known Problems Sister     No Known Problems Son     No Known Problems Son     No Known Problems Son     No Known Problems Daughter     Anesthesia Reaction No family hx of     Malignant Hyperthermia No family hx of          Objective   /80   Pulse 64   Temp 97.2  F (36.2  C)   Resp 19   Ht 1.753 m (5' 9\")   Wt 98 kg (216 lb)   SpO2 99%   BMI 31.90 kg/m      General appearance: Alert, cooperative, no distress, appears stated age  Head: Normocephalic, atraumatic, without obvious abnormality  Eyes: Pupils equal round, reactive.  Conjunctiva clear.  Nose: Nares normal, no drainage.  Throat: Lips, mucosa, tongue normal mucosa pink and moist  Neck: Supple, symmetric, trachea midline  Lungs: Clear to auscultation bilaterally, no wheezing or crackles present.  Respirations unlabored  Heart: Regular rate and rhythm, normal S1 and S2, no murmur, rub or gallop.  Extremities: Extremities normal, atraumatic.  No cyanosis or edema.  Skin: Skin color, texture, turgor normal no rashes or lesions on limited skin exam  Neurologic: CN II through XII intact, normal strength.    Diagnostics:  Labs pending at this time.  Results will be reviewed when available.   EKG required for known coronary heart disease and not completed in the last 90 days.     Revised Cardiac Risk Index (RCRI):  The patient has the following serious cardiovascular risks for perioperative complications:   - Coronary Artery Disease (MI, positive stress test, angina, Qs on EKG) = 1 point   - Congestive Heart Failure (pulmonary edema, PND, s3 cat, CXR with pulmonary congestion, basilar rales) = 1 point     RCRI Interpretation: 2 points: Class III (moderate risk - 6.6% complication rate)     Estimated Functional Capacity: Performs 4 METS exercise without symptoms (e.g., light " housework, stairs, 4 mph walk, 7 mph bike, slow step dance)       Signed Electronically by: Roberto Murphy MD  Copy of this evaluation report is provided to requesting physician.}

## 2024-12-09 NOTE — PROGRESS NOTES
[facility]  [address]  [facility] [address]  Phone: [phone]  Fax: [phone]  Primary Provider: Yarely Murphy  Pre-op Performing Provider: YARELY MURPHY          12/5/2024   Surgical Information   What procedure is being done? pre-op vist    Facility or Hospital where procedure/surgery will be performed: [facility]    Who is doing the procedure / surgery? Dr Jamir Couch    Date of surgery / procedure: 10 DEC 2024 / Colonoscopy    Time of surgery / procedure: 8:00 AM / Colonoscopy    Where do you plan to recover after surgery? at home with family        Patient-reported       Fax number: NA    Assessment & Plan   64-year-old male with relevant past medical history of CAD status post coronary artery bypass, heart failure with reduced ejection fraction, hypertension, asthma, obesity, type 2 diabetes not on insulin who presents for preop evaluation before colonoscopy for colon cancer screening.  Having this in the hospital due to patient's risk factors.  I would say he is moderate risk as his current risk factors are well-controlled.  Procedure is low risk.  He has not had labs done since May and I recommended doing some basic lab test as well as an EKG.  He did have an echo 3 months ago which looks stable from previous.  As long as no critical values on these labs or EKG patient is approved to proceed.    1. Preop general physical exam (Primary)  - Basic metabolic panel  (Ca, Cl, CO2, Creat, Gluc, K, Na, BUN); Future  - CBC with platelets; Future    2. Screening for colon cancer    3. Type 2 diabetes mellitus without complication, without long-term current use of insulin (H)    4. Mild intermittent asthma without complication    5. Class 1 obesity due to excess calories with serious comorbidity and body mass index (BMI) of 31.0 to 31.9 in adult    6. Coronary artery disease of native artery of native heart with stable angina pectoris (H)  - EKG  12-lead, tracing only    7. Status post coronary artery bypass graft    8. Hypertension    9. Heart failure with reduced ejection fraction (H)    The proposed surgical procedure is considered LOW risk.    Antiplatelet or Anticoagulation Medication Instructions   - aspirin: Bleeding risk is low for this procedure and daily aspirin may be continued without modification.     Additional Medication Instructions  Hold ozempic but take all other medications as normal    RECOMMENDATION:  Approval given to proceed with proposed procedure pending review of diagnostic evaluation.    Subjective     HPI related to upcoming procedure: colonsocpy screening for colon cancer          12/5/2024     6:06 PM   Preop Questions   Have you ever had a heart attack or stroke? Yes   Have you ever had surgery on your heart or blood vessels, such as a stent placement, a coronary artery bypass, or surgery on an artery in your head, neck, heart, or legs? Yes   Do you have chest pain with activity? No   Do you have a history of heart failure? No   Do you currently have a cold, bronchitis or symptoms of other infection? No   Do you have a cough, shortness of breath, or wheezing? No   Do you or anyone in your family have previous history of blood clots? No   Do you or does anyone in your family have a serious bleeding problem such as prolonged bleeding following surgeries or cuts? No   Have you ever had problems with anemia or been told to take iron pills? No   Have you had any abnormal blood loss such as black, tarry or bloody stools? No   Have you ever had a blood transfusion? No   Are you willing to have a blood transfusion if it is medically needed before, during, or after your surgery? Yes   Have you or any of your relatives ever had problems with anesthesia? No   Do you have sleep apnea, excessive snoring or daytime drowsiness? No   Do you have any artifical heart valves or other implanted medical devices like a pacemaker, defibrillator, or  continuous glucose monitor? No   Do you have artificial joints? No   Are you allergic to latex? No       METS >4? YES    Caprini score- NA    Greater than 65? No      Health Care Directive:  Patient does not have a Health Care Directive or Living Will: Full COde    Preoperative Review of :   reviewed - no record of controlled substances prescribed.      Review of Systems  Complete ROS is negative except as noted in HPI    Patient Active Problem List    Diagnosis Date Noted    Hepatic steatosis 07/10/2023     Priority: Medium     4. Idiopathic esophageal varices without bleeding (H)  5. Hepatic steatosis  Patient has a Hx of hepatic steatosis and varices. Unknown etiology for this. I recommended workup though GI. He is wondering if this could be combat related.   - Adult GI  Referral - Consult Only; Future 7/23    EXAM: CT ABDOMEN PELVIS W CONTRAST  LOCATION: [facility]  DATE/TIME: 7/3/2022 9:04 AM     INDICATION:  Cirrhosis of liver without ascites, unspecified hepatic cirrhosis type (H)  COMPARISON: 06/06/2022  TECHNIQUE: CT scan of the abdomen and pelvis was performed following injection of IV contrast. Multiplanar reformats were obtained. Dose reduction techniques were used.  CONTRAST: Isovue 370     75ml     FINDINGS:   LOWER CHEST: There is a small pericardial effusion inferiorly  and o the right, increased from before. This measures 2.3 cm dependently. Resolution of the small left effusion.      HEPATOBILIARY: Interval cholecystectomy. Diffuse fatty infiltration of the liver with slight nodularity of the liver surface. No focal mass.     PANCREAS: Normal.     SPLEEN: Normal size. There are gastrosplenic varices.     ADRENAL GLANDS: Normal.     KIDNEYS/BLADDER: Normal.     BOWEL: Redundant colon. No free fluid or inflammatory changes. Few distal diverticuli.     LYMPH NODES: Normal.     VASCULATURE: Minimal arterial calcifications. Circumaortic left renal vein.     PELVIC  ORGANS: Normal. Prior vasectomy.     MUSCULOSKELETAL: Poststernotomy changes. Sternotomy has not fused. Minimal stranding of the overlying subcutaneous fat.                                                                      IMPRESSION:   1.  Hepatic steatosis with a slightly nodular liver contour. No focal mass.  2.  Gastrosplenic varices without splenomegaly or ascites.  3.  Recent median sternotomy with small, eccentric pericardial effusion, measuring 2.3 cm dependently. This has slightly increased from before.  4.  Interval cholecystectomy.      5/24:  Indications:         -  Cirrhosis with suspected esophageal varices   Complications:         -  No immediate complications.   Procedure:         - The  was introduced through the mouth and advanced to the second    part             of the duodenum.          -  The upper GI endoscopy was accomplished without difficulty.          -  The patient tolerated the procedure well.   Findings:         -  Diffuse moderate inflammation characterized by erythema was found in   the            duodenal bulb.  Biopsies were taken with a cold forceps for histology.          -  Diffuse severe inflammation characterized by erythema was found in the             stomach.  Biopsies were taken with a cold forceps for Helicobacter   pylori            testing.  Estimated blood loss was minimal.          -  Type 1 gastroesophageal varices (GOV1, esophageal varices which extend             along the lesser curvature) with no bleeding were found in the gastric    body.             There were no stigmata of recent bleeding.  They were small in largest             diameter.          -  Grade I varices were found in the middle third of the esophagus.          -  Grade II varices were found in the lower third of the esophagus.  They    were             medium in size.          - distal varices grade I to II   Impression:         -  Duodenitis, characterized by erythema Biopsied.           -  Acute gastritis, characterized by erythema.  Biopsied.          -  Type 1 gastroesophageal varices (GOV1, esophageal varices which extend             along the lesser curvature) , without bleeding.          -  Grade I esophageal varices.          -  Grade II esophageal varices.          - distal varices grade I to II   Recommendation:         -  Await pathology results.          -  Follow an antireflux regimen.          -  Continue present medications.          - avoid medicines or food that would irritate the stomach lining.       Diabetic polyneuropathy associated with type 2 diabetes mellitus (H) 07/10/2023     Priority: Medium     Just at night.  Mild and he does no want to take medicines right now for it 7/23      Healthcare maintenance 07/14/2022     Priority: Medium     Colonoscopy 2014- repeat in 10 years  Prostate Specific Antigen Screen   Date Value Ref Range Status   09/19/2023 1.01 0.00 - 4.50 ng/mL Final   05/18/2017 1.9 0.0 - 3.5 ng/mL Final           Status post coronary artery bypass graft 06/06/2022     Priority: Medium    Type 2 diabetes mellitus without complication, without long-term current use of insulin (H) 06/02/2022     Priority: Medium     5/24  A1C worsened to 10.5. I will increase ozempic to 1 mg from 0.5.  I recommend repeat diabetic visit in 3 months.     1/24:  was on metformin but significant GI side effect     9/23: jardiance stopped due to polyuria and trialed on wegovy per pharm recommendations. Unable to see pharmacist due to insurance plan. Eventually got on ozempic fall 2023.    9/24:  A1C improved to 6.6 from 10.5 with increase of ozempic from 0.5 to 1.     Diagnosed in 2020  Most recent HgbA1c:   Lab Results   Component Value Date    A1C 6.6 09/23/2024    A1C 10.5 05/10/2024    A1C 7.4 09/19/2023    A1C 8.4 05/22/2023    A1C 6.9 01/16/2023     Current medication regimen:  ozempic    ASA: 81 mg daily  Statin: Rosuvastatin 20 mg daily    Complications:  - Retinopathy: Last  ophthalmology appointment-   - Nephropathy:  Last Comprehensive Metabolic Panel:  Last Comprehensive Metabolic Panel:  Lab Results   Component Value Date     05/10/2024    POTASSIUM 4.5 05/10/2024    CHLORIDE 100 05/10/2024    CO2 30 (H) 05/10/2024    ANIONGAP 5 (L) 05/10/2024     (H) 05/10/2024    BUN 13.5 05/10/2024    CR 0.92 05/10/2024    GFRESTIMATED >90 05/10/2024    JAMIE 8.9 05/10/2024     - Neuropathy: foot exam- 9/24        Tinnitus 06/02/2022     Priority: Medium     Left ear severe, some on right. [facility] he [facility]'t use.       Heart failure with reduced ejection fraction (H) 05/10/2022     Priority: Medium     Echo 1/23:  Interpretation Summary     The visual ejection fraction is 45-50%.  There is septal akinesis.  There is apical dyskinesis.  There is mild anterior wall hypokinesis.  Right ventricular function cannot be assessed due to poor image quality.  No significant valve disease.      Coronary artery disease of native artery of native heart with stable angina pectoris (H) 05/10/2022     Priority: Medium     On magnesium and aspirin.  Heart attack in 2010. PCI with stenting. A few years later having chest humaira with exertion and significant disease. Had a triple bypass 5/10/22.  On baby aspirin and statin daily. Lisinopril and metoprolol for heart failure as well. Sees cardiology regularly.    Cardiology visit. 9/24  Assessment:  Coronary artery disease history of anterior MI and stenting of LAD in 2010, status post coronary artery bypass graft surgery May 2022, no angina  Ischemic cardiomyopathy, mild  Chronic systolic heart failure, compensated  Hypertension, good control  Diabetes mellitus  Hypercholesterolemia on high-dose of statin, good control        Plan:  Echo to reassess LV function      Continue current cardiac medications        Encouraged him to stay physically active     Follow-up in 1 year      Hypertension      Priority: Medium     - BP goal:  <140/90  - Currently taking meds: 12.5 mg daily hydrochlorothiazide, 10 mg daily lisinopril, 50 mg XR daily metorporlol  - Any side effects: None  - Last BMP:   Last Comprehensive Metabolic Panel:  Lab Results   Component Value Date     05/10/2024    POTASSIUM 4.5 05/10/2024    CHLORIDE 100 05/10/2024    CO2 30 (H) 05/10/2024    ANIONGAP 5 (L) 05/10/2024     (H) 05/10/2024    BUN 13.5 05/10/2024    CR 0.92 05/10/2024    GFRESTIMATED >90 05/10/2024    JAMIE 8.9 05/10/2024     - Any symptoms of HTN such as chest pain, headaches, vision changes, nausea: None    BP Readings from Last 6 Encounters:   09/23/24 110/60   09/05/24 110/70   05/10/24 120/80   05/08/24 125/65   10/02/23 125/77   09/29/23 123/69         Obesity 07/11/2016     Priority: Medium    Allergies      Priority: Medium     Created by Conversion        TB lung, latent      Priority: Medium     [facility] Annotation: Nov 24 2008  4:51PM - Jenniffer Germain: CXR and INH   X9mo        Mild intermittent asthma 05/23/2010     Priority: Medium     Seasonal, exacerbated by allergies and pollen. Only uses albuterol as needed and hasn't needed it since spring. Taking montelukast daily.         7/9/2023     6:22 PM 4/2/2024     7:46 AM 9/23/2024    11:00 AM   ACT Total Scores   ACT TOTAL SCORE (Goal Greater than or Equal to 20) 25 25    25 24   In the past 12 months, how many times did you visit the emergency room for your asthma without being admitted to the hospital? 0 0 0   In the past 12 months, how many times were you hospitalized overnight because of your asthma? 0 0 0              Past Surgical History:   Procedure Laterality Date    BYPASS GRAFT ARTERY CORONARY N/A 5/10/2022    Procedure: CORONARY ARTERY BYPASS GRAFT X3 ,  LEFT LEG ENDOSCOPIC VESSEL PROCUREMENT, LEFT INTERNAL MAMMARY ARTERY HARVEST, ANESTHESIA TRANSESOPHAGEAL ECHOCARDIOGRAM. EPIAORTIC ULTRASOUND.;  Surgeon: Nik Marcus MD;  Location: [facility]  OR    CV CORONARY ANGIOGRAM N/A 4/8/2022    Procedure: Coronary Angiogram;  Surgeon: Ty Grace MD;  Location: [facility] CV    CV CORONARY ANGIOGRAM N/A 1/19/2023    Procedure: Coronary Angiogram;  Surgeon: Zoe Mancilla MD;  Location: [facility] LAB CV    CV FRACTIONAL FLOW RATIO WIRE N/A 1/19/2023    Procedure: Fractional Flow Ratio Wire;  Surgeon: Zoe Mancilla MD;  Location: [facility] LAB CV    CV LEFT HEART CATH N/A 4/8/2022    Procedure: Left Heart Catheterization;  Surgeon: Ty Grace MD;  Location: [facility] CATH LAB CV    CV LEFT HEART CATH N/A 1/19/2023    Procedure: Left Heart Catheterization;  Surgeon: Zoe Mancilla MD;  Location: [facility] CV    ESOPHAGOSCOPY, GASTROSCOPY, DUODENOSCOPY (EGD), COMBINED N/A 5/8/2024    Procedure: ESOPHAGOGASTRODUODENOSCOPY, WITH BIOPSY;  Surgeon: Naun Ramires DO;  Location: [facility] OR    LAPAROSCOPIC CHOLECYSTECTOMY N/A 6/7/2022    Procedure: CHOLECYSTECTOMY, LAPAROSCOPIC;  Surgeon: Franck Flowers MD;  Location: [facility] OR    OTHER SURGICAL HISTORY      CORONARY STENTSTwo coronary artery stents in 2010       Current Outpatient Medications   Medication Sig Dispense Refill    acetaminophen (TYLENOL) 325 MG tablet Take 2 tablets (650 mg) by mouth every 4 hours as needed for other, headaches or fever (For optimal non-opioid multimodal pain management to improve pain control.) 30 tablet 0    albuterol (ACCUNEB) 1.25 MG/3ML neb solution Take 1 vial (1.25 mg) by nebulization every 6 hours as needed 90 mL 1    albuterol (PROAIR HFA/PROVENTIL HFA/VENTOLIN HFA) 108 (90 Base) MCG/ACT inhaler Inhale 2 puffs into the lungs every 6 hours as needed 18 g 11    aspirin (ASA) 81 MG EC tablet Take 1 tablet (81 mg) by mouth daily Start tomorrow morning. 90 tablet 3    coenzyme Q-10 capsule Take 2 capsules (200 mg) by mouth daily      fluticasone propionate (FLONASE) 50 mcg/actuation nasal spray Spray 1 spray in nostril daily as  "needed (seasonal)      hydrochlorothiazide (MICROZIDE) 12.5 MG capsule TAKE 1 CAPSULE DAILY 90 capsule 3    lisinopril (ZESTRIL) 10 MG tablet TAKE 1 TABLET DAILY 90 tablet 3    metoprolol succinate ER (TOPROL XL) 50 MG 24 hr tablet Take 1 tablet (50 mg) by mouth daily. 90 tablet 3    montelukast (SINGULAIR) 10 MG tablet TAKE 1 TABLET DAILY EVERY NIGHT AT BEDTIME (SINGULAIR) 90 tablet 3    Multiple Vitamins-Minerals (ONE DAILY M[employer] HEALTH PO) Take 1 tablet by mouth daily      nitroGLYcerin (NITROSTAT) 0.4 MG sublingual tablet One tablet under the tongue every 5 minutes if needed for chest pain. May repeat every 5 minutes for a maximum of 3 doses in 15 minutes\" 25 tablet 3    rosuvastatin (CRESTOR) 40 MG tablet TAKE 1 TABLET DAILY 90 tablet 1    Semaglutide, 1 MG/DOSE, (OZEMPIC, 1 MG/DOSE,) 4 MG/3ML pen INJECT 1 MG UNDER THE SKIN EVERY 7 DAYS 9 mL 0     No current facility-administered medications for this visit.          Allergies   Allergen Reactions    Definity [Perflutren Lipid Microsphere] Other (See Comments)     Moderate back spasms and flushing        Social History     Socioeconomic History    Marital status:      Spouse name: Not on file    Number of children: Not on file    Years of education: Not on file    Highest education level: Not on file   Occupational History    Not on file   Tobacco Use    Smoking status: Never    Smokeless tobacco: Never   Vaping Use    Vaping status: Never Used   Substance and Sexual Activity    Alcohol use: Yes     Comment: Alcoholic Drinks/day: \"Once a month, maybe.\"    Drug use: No    Sexual activity: Yes     Partners: Female     Comment: wife   Other Topics Concern    Not on file   Social History Narrative    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  Marisa Diego       Social Drivers of Health     Financial Resource Strain: Low Risk  (9/23/2024)    Financial Resource Strain     Within the past 12 months, have you or " your family members you live with been unable to get utilities (heat, electricity) when it was really needed?: No   Food Insecurity: Low Risk  (9/23/2024)    Food Insecurity     Within the past 12 months, did you worry that your food would run out before you got money to buy more?: No     Within the past 12 months, did the food you bought just not last and you didn t have money to get more?: No   Transportation Needs: Low Risk  (9/23/2024)    Transportation Needs     Within the past 12 months, has lack of transportation kept you from medical appointments, getting your medicines, non-medical meetings or appointments, work, or from getting things that you need?: No   Physical Activity: Insufficiently Active (9/23/2024)    Exercise Vital Sign     Days of Exercise per Week: 3 days     Minutes of Exercise per Session: 20 min   Stress: No Stress Concern Present (9/23/2024)    [place] [employer] - Occupational Stress Questionnaire     Feeling of Stress : Not at all   Social Connections: Unknown (9/23/2024)    Social Connection and Isolation Panel [NHANES]     Frequency of Communication with Friends and Family: Not on file     Frequency of Social Gatherings with Friends and Family: Patient declined     Attends [employer] Services: Not on file     Active Member of Clubs or Organizations: Not on file     Attends Club or Organization Meetings: Not on file     Marital Status: Not on file   Interpersonal Safety: Low Risk  (9/23/2024)    Interpersonal Safety     Do you feel physically and emotionally safe where you currently live?: Yes     Within the past 12 months, have you been hit, slapped, kicked or otherwise physically hurt by someone?: No     Within the past 12 months, have you been humiliated or emotionally abused in other ways by your partner or ex-partner?: No   Housing Stability: Low Risk  (9/23/2024)    Housing Stability     Do you have housing? : Yes     Are you worried about losing your housing?:  "No       Family History   Problem Relation Age of Onset    No Known Problems Mother     No Known Problems Father     No Known Problems Brother     No Known Problems Brother     No Known Problems Sister     No Known Problems Son     No Known Problems Son     No Known Problems Son     No Known Problems Daughter     Anesthesia Reaction No family hx of     Malignant Hyperthermia No family hx of          Objective   /80   Pulse 64   Temp 97.2  F (36.2  C)   Resp 19   Ht 1.753 m (5' 9\")   Wt 98 kg (216 lb)   SpO2 99%   BMI 31.90 kg/m      General appearance: Alert, cooperative, no distress, appears stated age  Head: Normocephalic, atraumatic, without obvious abnormality  Eyes: Pupils equal round, reactive.  Conjunctiva clear.  Nose: Nares normal, no drainage.  Throat: Lips, mucosa, tongue normal mucosa pink and moist  Neck: Supple, symmetric, trachea midline  Lungs: Clear to auscultation bilaterally, no wheezing or crackles present.  Respirations unlabored  Heart: Regular rate and rhythm, normal S1 and S2, no murmur, rub or gallop.  Extremities: Extremities normal, atraumatic.  No cyanosis or edema.  Skin: Skin color, texture, turgor normal no rashes or lesions on limited skin exam  Neurologic: CN II through XII intact, normal strength.    Diagnostics:  Labs pending at this time.  Results will be reviewed when available.   EKG required for known coronary heart disease and not completed in the last 90 days.     Revised Cardiac Risk Index (RCRI):  The patient has the following serious cardiovascular risks for perioperative complications:   - Coronary Artery Disease (MI, positive stress test, angina, Qs on EKG) = 1 point   - Congestive Heart Failure (pulmonary edema, PND, s3 cat, CXR with pulmonary congestion, basilar rales) = 1 point     RCRI Interpretation: 2 points: Class III (moderate risk - 6.6% complication rate)     Estimated Functional Capacity: Performs 4 METS exercise without symptoms (e.g., light " housework, stairs, 4 mph walk, 7 mph bike, slow step dance)       Signed Electronically by: Roberto Murphy MD  Copy of this evaluation report is provided to requesting physician.}

## 2024-12-10 ENCOUNTER — ANESTHESIA (OUTPATIENT)
Dept: SURGERY | Facility: CLINIC | Age: 64
End: 2024-12-10
Payer: OTHER GOVERNMENT

## 2024-12-10 ENCOUNTER — ANESTHESIA EVENT (OUTPATIENT)
Dept: SURGERY | Facility: CLINIC | Age: 64
End: 2024-12-10
Payer: OTHER GOVERNMENT

## 2024-12-10 ENCOUNTER — HOSPITAL ENCOUNTER (OUTPATIENT)
Facility: CLINIC | Age: 64
Discharge: HOME | End: 2024-12-10
Attending: INTERNAL MEDICINE | Admitting: INTERNAL MEDICINE
Payer: OTHER GOVERNMENT

## 2024-12-10 VITALS
DIASTOLIC BLOOD PRESSURE: 62 MMHG | OXYGEN SATURATION: 100 % | HEART RATE: 68 BPM | TEMPERATURE: 97 F | RESPIRATION RATE: 18 BRPM | BODY MASS INDEX: 31.7 KG/M2 | HEIGHT: 69 IN | SYSTOLIC BLOOD PRESSURE: 101 MMHG | WEIGHT: 214 LBS

## 2024-12-10 LAB
COLONOSCOPY: NORMAL
GLUCOSE BLDC GLUCOMTR-MCNC: 110 MG/DL (ref 70–99)

## 2024-12-10 PROCEDURE — 88305 TISSUE EXAM BY PATHOLOGIST: CPT | Mod: TC | Performed by: INTERNAL MEDICINE

## 2024-12-10 PROCEDURE — 250N000011 HC RX IP 250 OP 636: Performed by: NURSE ANESTHETIST, CERTIFIED REGISTERED

## 2024-12-10 PROCEDURE — 360N000075 HC SURGERY LEVEL 2, PER MIN: Performed by: INTERNAL MEDICINE

## 2024-12-10 PROCEDURE — 272N000001 HC OR GENERAL SUPPLY STERILE: Performed by: INTERNAL MEDICINE

## 2024-12-10 PROCEDURE — 710N000012 HC RECOVERY PHASE 2, PER MINUTE: Performed by: INTERNAL MEDICINE

## 2024-12-10 PROCEDURE — 370N000017 HC ANESTHESIA TECHNICAL FEE, PER MIN: Performed by: INTERNAL MEDICINE

## 2024-12-10 PROCEDURE — 88305 TISSUE EXAM BY PATHOLOGIST: CPT | Mod: 26 | Performed by: PATHOLOGY

## 2024-12-10 PROCEDURE — 999N000141 HC STATISTIC PRE-PROCEDURE NURSING ASSESSMENT: Performed by: INTERNAL MEDICINE

## 2024-12-10 PROCEDURE — 82962 GLUCOSE BLOOD TEST: CPT

## 2024-12-10 PROCEDURE — 250N000009 HC RX 250: Performed by: NURSE ANESTHETIST, CERTIFIED REGISTERED

## 2024-12-10 RX ORDER — ONDANSETRON 2 MG/ML
INJECTION INTRAMUSCULAR; INTRAVENOUS PRN
Status: DISCONTINUED | OUTPATIENT
Start: 2024-12-10 | End: 2024-12-10

## 2024-12-10 RX ORDER — LIDOCAINE HYDROCHLORIDE 10 MG/ML
INJECTION, SOLUTION INFILTRATION; PERINEURAL PRN
Status: DISCONTINUED | OUTPATIENT
Start: 2024-12-10 | End: 2024-12-10

## 2024-12-10 RX ORDER — DEXAMETHASONE SODIUM PHOSPHATE 10 MG/ML
4 INJECTION, SOLUTION INTRAMUSCULAR; INTRAVENOUS
Status: DISCONTINUED | OUTPATIENT
Start: 2024-12-10 | End: 2024-12-10 | Stop reason: HOSPADM

## 2024-12-10 RX ORDER — NALOXONE HYDROCHLORIDE 0.4 MG/ML
0.1 INJECTION, SOLUTION INTRAMUSCULAR; INTRAVENOUS; SUBCUTANEOUS
Status: DISCONTINUED | OUTPATIENT
Start: 2024-12-10 | End: 2024-12-10 | Stop reason: HOSPADM

## 2024-12-10 RX ORDER — PROPOFOL 10 MG/ML
INJECTION, EMULSION INTRAVENOUS CONTINUOUS PRN
Status: DISCONTINUED | OUTPATIENT
Start: 2024-12-10 | End: 2024-12-10

## 2024-12-10 RX ORDER — LIDOCAINE 40 MG/G
CREAM TOPICAL
Status: DISCONTINUED | OUTPATIENT
Start: 2024-12-10 | End: 2024-12-10 | Stop reason: HOSPADM

## 2024-12-10 RX ORDER — ONDANSETRON 2 MG/ML
4 INJECTION INTRAMUSCULAR; INTRAVENOUS EVERY 30 MIN PRN
Status: DISCONTINUED | OUTPATIENT
Start: 2024-12-10 | End: 2024-12-10 | Stop reason: HOSPADM

## 2024-12-10 RX ORDER — OXYCODONE HYDROCHLORIDE 5 MG/1
5 TABLET ORAL
Status: DISCONTINUED | OUTPATIENT
Start: 2024-12-10 | End: 2024-12-10 | Stop reason: HOSPADM

## 2024-12-10 RX ORDER — OXYCODONE HYDROCHLORIDE 5 MG/1
10 TABLET ORAL
Status: DISCONTINUED | OUTPATIENT
Start: 2024-12-10 | End: 2024-12-10 | Stop reason: HOSPADM

## 2024-12-10 RX ORDER — ONDANSETRON 4 MG/1
4 TABLET, ORALLY DISINTEGRATING ORAL EVERY 30 MIN PRN
Status: DISCONTINUED | OUTPATIENT
Start: 2024-12-10 | End: 2024-12-10 | Stop reason: HOSPADM

## 2024-12-10 RX ORDER — PROPOFOL 10 MG/ML
INJECTION, EMULSION INTRAVENOUS PRN
Status: DISCONTINUED | OUTPATIENT
Start: 2024-12-10 | End: 2024-12-10

## 2024-12-10 RX ADMIN — PROPOFOL 30 MG: 10 INJECTION, EMULSION INTRAVENOUS at 07:41

## 2024-12-10 RX ADMIN — ONDANSETRON 4 MG: 2 INJECTION INTRAMUSCULAR; INTRAVENOUS at 07:59

## 2024-12-10 RX ADMIN — LIDOCAINE HYDROCHLORIDE 40 MG: 10 INJECTION, SOLUTION INFILTRATION; PERINEURAL at 07:41

## 2024-12-10 RX ADMIN — PROPOFOL 130 MCG/KG/MIN: 10 INJECTION, EMULSION INTRAVENOUS at 07:40

## 2024-12-10 ASSESSMENT — ACTIVITIES OF DAILY LIVING (ADL)
ADLS_ACUITY_SCORE: 52
ADLS_ACUITY_SCORE: 52

## 2024-12-10 NOTE — INTERVAL H&P NOTE
"I have reviewed the surgical (or preoperative) H&P that is linked to this encounter, and examined the patient. There are no significant changes    Clinical Conditions Present on Arrival:  Clinically Significant Risk Factors Present on Admission                  # Drug Induced Platelet Defect: home medication list includes an antiplatelet medication      # DMII: A1C = 6.6 % (Ref range: 0.0 - 5.6 %) within past 6 months  # Obesity: Estimated body mass index is 31.6 kg/m  as calculated from the following:    Height as of this encounter: 1.753 m (5' 9\").    Weight as of this encounter: 97.1 kg (214 lb).       "

## 2024-12-10 NOTE — ANESTHESIA PREPROCEDURE EVALUATION
Anesthesia Pre-Procedure Evaluation    Patient: Mir Treviño Sr.   MRN: [id number] : 1960        Procedure : Procedure(s):  COLONOSCOPY          Past Medical History:   Diagnosis Date    Antiplatelet or antithrombotic long-term use     Cirrhosis of liver (H)     with gastrosplenic varices    Coronary atherosclerosis     Created by Conversion [facility] Annotation: 2010  5:35PM - Nik Boyd: MI 7/10  Replacement Utility updated for latest IMO load    Diabetes (H)     Essential hypertension     Created by Conversion  Replacement Utility updated for latest IMO load    Heart attack (H)     History of angina     Obese     Pure hypercholesterolemia     Created by Conversion     Stented coronary artery     Uncomplicated asthma       Past Surgical History:   Procedure Laterality Date    BYPASS GRAFT ARTERY CORONARY N/A 5/10/2022    Procedure: CORONARY ARTERY BYPASS GRAFT X3 ,  LEFT LEG ENDOSCOPIC VESSEL PROCUREMENT, LEFT INTERNAL MAMMARY ARTERY HARVEST, ANESTHESIA TRANSESOPHAGEAL ECHOCARDIOGRAM. EPIAORTIC ULTRASOUND.;  Surgeon: Nik Marcus MD;  Location: [facility] Main OR    CV CORONARY ANGIOGRAM N/A 2022    Procedure: Coronary Angiogram;  Surgeon: Ty Grace MD;  Location: [facility] LAB CV    CV CORONARY ANGIOGRAM N/A 2023    Procedure: Coronary Angiogram;  Surgeon: Zoe Mancilla MD;  Location: [facility] LAB CV    CV FRACTIONAL FLOW RATIO WIRE N/A 2023    Procedure: Fractional Flow Ratio Wire;  Surgeon: Zoe Mancilla MD;  Location: [facility] LAB CV    CV LEFT HEART CATH N/A 2022    Procedure: Left Heart Catheterization;  Surgeon: Ty Grace MD;  Location: [facility] CATH LAB CV    CV LEFT HEART CATH N/A 2023    Procedure: Left Heart Catheterization;  Surgeon: Zoe Mancilla MD;  Location: [facility] LAB CV    ESOPHAGOSCOPY, GASTROSCOPY, DUODENOSCOPY (EGD), COMBINED N/A 2024    Procedure: ESOPHAGOGASTRODUODENOSCOPY, WITH BIOPSY;  Surgeon:  "Naun Ramires, DO;  Location: [place] Main OR    LAPAROSCOPIC CHOLECYSTECTOMY N/A 6/7/2022    Procedure: CHOLECYSTECTOMY, LAPAROSCOPIC;  Surgeon: Franck Flowers MD;  Location: [facility] Main OR    OTHER SURGICAL HISTORY      CORONARY STENTSTwo coronary artery stents in 2010      Allergies   Allergen Reactions    Definity [Perflutren Lipid Microsphere] Other (See Comments)     Moderate back spasms and flushing      Social History     Tobacco Use    Smoking status: Never    Smokeless tobacco: Never   Substance Use Topics    Alcohol use: Yes     Comment: Alcoholic Drinks/day: \"Once a month, maybe.\"      Wt Readings from Last 1 Encounters:   12/10/24 97.1 kg (214 lb)        Anesthesia Evaluation   Pt has had prior anesthetic.     No history of anesthetic complications       ROS/MED HX  ENT/Pulmonary:     (+)           allergic rhinitis,          Intermittent, asthma                  Neurologic:  - neg neurologic ROS     Cardiovascular: Comment: Interpretation Summary     The visual ejection fraction is 45-50%.  There is septal akinesis.  There is apical dyskinesis.  There is mild anterior wall hypokinesis.  Right ventricular function cannot be assessed due to poor image quality.  No significant valve disease.      (+) Dyslipidemia hypertension- -  CAD angina-  CABG- -                                 Previous cardiac testing     METS/Exercise Tolerance: >4 METS    Hematologic:  - neg hematologic  ROS     Musculoskeletal:  - neg musculoskeletal ROS     GI/Hepatic:     (+)             liver disease (Assessment: 1.  Hepatic steatosis  2. Esophageal varices 3. Liver cirrhosis),       Renal/Genitourinary:  - neg Renal ROS     Endo:  - neg endo ROS   (+)  type II DM,             Obesity,       Psychiatric/Substance Use:  - neg psychiatric ROS     Infectious Disease:  - neg infectious disease ROS     Malignancy:  - neg malignancy ROS     Other:  - neg other ROS          Physical Exam    Airway        Mallampati: II   TM " "distance: > 3 FB   Neck ROM: full   Mouth opening: > 3 cm    Respiratory Devices and Support         Dental  no notable dental history     (+) Minor Abnormalities - some fillings, tiny chips      Cardiovascular   cardiovascular exam normal          Pulmonary   pulmonary exam normal              OUTSIDE LABS:  CBC:   Lab Results   Component Value Date    WBC 4.0 12/09/2024    WBC 4.4 05/10/2024    HGB 15.0 12/09/2024    HGB 15.2 05/10/2024    HCT 42.0 12/09/2024    HCT 42.0 05/10/2024     (L) 12/09/2024     (L) 05/10/2024     BMP:   Lab Results   Component Value Date     12/09/2024     05/10/2024    POTASSIUM 3.6 12/09/2024    POTASSIUM 4.5 05/10/2024    CHLORIDE 104 12/09/2024    CHLORIDE 100 05/10/2024    CO2 24 12/09/2024    CO2 30 (H) 05/10/2024    BUN 11.9 12/09/2024    BUN 13.5 05/10/2024    CR 0.75 12/09/2024    CR 0.92 05/10/2024     (H) 12/10/2024     (H) 12/09/2024     COAGS:   Lab Results   Component Value Date    PTT 33 05/10/2022    INR 1.07 05/10/2024    FIBR 189 05/10/2022     POC: No results found for: \"BGM\", \"HCG\", \"HCGS\"  HEPATIC:   Lab Results   Component Value Date    ALBUMIN 4.1 05/10/2024    PROTTOTAL 7.5 05/10/2024    ALT 65 05/10/2024    AST 65 (H) 05/10/2024    ALKPHOS 93 05/10/2024    BILITOTAL 1.0 05/10/2024     OTHER:   Lab Results   Component Value Date    PH 7.34 (L) 05/13/2022    LACT 2.1 (H) 05/10/2022    A1C 6.6 (H) 09/23/2024    JAMIE 9.0 12/09/2024    PHOS 2.6 05/15/2022    MAG 2.4 05/15/2022    LIPASE 34 06/06/2022    TSH 2.12 09/19/2023       Anesthesia Plan    ASA Status:  3    NPO Status:  NPO Appropriate    Anesthesia Type: MAC.              Consents    Anesthesia Plan(s) and associated risks, benefits, and realistic alternatives discussed. Questions answered and patient/representative(s) expressed understanding.     - Discussed: Risks, Benefits and Alternatives for the PROCEDURE were discussed     - Discussed with:  Patient, Spouse        " "    Postoperative Care       PONV prophylaxis: Ondansetron (or other 5HT-3)     Comments:               Zain Gates MD    I have reviewed the pertinent notes and labs in the chart from the past 30 days and (re)examined the patient.  Any updates or changes from those notes are reflected in this note.              # Drug Induced Platelet Defect: home medication list includes an antiplatelet medication  # DMII: A1C = 6.6 % (Ref range: 0.0 - 5.6 %) within past 6 months  # Obesity: Estimated body mass index is 31.6 kg/m  as calculated from the following:    Height as of this encounter: 1.753 m (5' 9\").    Weight as of this encounter: 97.1 kg (214 lb).      "

## 2024-12-10 NOTE — ANESTHESIA CARE TRANSFER NOTE
Patient: Mir Treviño Sr.    Procedure: Procedure(s):  COLONOSCOPY WITH POLYPECTOMY       Diagnosis: Screening for colon cancer [Z12.11]  Diagnosis Additional Information: No value filed.    Anesthesia Type:   MAC     Note:    Oropharynx: oropharynx clear of all foreign objects and spontaneously breathing  Level of Consciousness: drowsy  Oxygen Supplementation: face mask  Level of Supplemental Oxygen (L/min / FiO2): 6  Independent Airway: airway patency satisfactory and stable  Dentition: dentition unchanged  Vital Signs Stable: post-procedure vital signs reviewed and stable  Report to RN Given: handoff report given  Patient transferred to: Phase II  Comments: Pt exchanging well. Report given to RN at bedside.   Handoff Report: Identifed the Patient, Identified the Reponsible Provider, Reviewed the pertinent medical history, Discussed the surgical course, Reviewed Intra-OP anesthesia mangement and issues during anesthesia, Set expectations for post-procedure period and Allowed opportunity for questions and acknowledgement of understanding      Vitals:  Vitals Value Taken Time   BP 99/57 12/10/24 0807   Temp 36.2  C (97.1  F) 12/10/24 0806   Pulse 64 12/10/24 0806   Resp 18 12/10/24 0806   SpO2 100 % 12/10/24 0806   Vitals shown include unfiled device data.    Electronically Signed By: JASON Willis CRNA  December 10, 2024  8:08 AM

## 2024-12-10 NOTE — ANESTHESIA POSTPROCEDURE EVALUATION
Patient: Mir Treviño Sr.    Procedure: Procedure(s):  COLONOSCOPY WITH POLYPECTOMY       Anesthesia Type:  MAC    Note:     Postop Pain Control: Uneventful            Sign Out: Well controlled pain   PONV: No   Neuro/Psych: Uneventful            Sign Out: Acceptable/Baseline neuro status   Airway/Respiratory: Uneventful            Sign Out: Acceptable/Baseline resp. status   CV/Hemodynamics: Uneventful            Sign Out: Acceptable CV status; No obvious hypovolemia; No obvious fluid overload   Other NRE: NONE   DID A NON-ROUTINE EVENT OCCUR? No           Last vitals:  Vitals Value Taken Time   /67 12/10/24 0820   Temp 36.1  C (97  F) 12/10/24 0820   Pulse 68 12/10/24 0820   Resp 18 12/10/24 0820   SpO2 100 % 12/10/24 0820   Vitals shown include unfiled device data.    Electronically Signed By: Zain Gates MD

## 2024-12-10 NOTE — BRIEF OP NOTE
[facility]    Brief Operative Note    Pre-operative diagnosis: Screening for colon cancer [Z12.11]  Post-operative diagnosis Colon Polyp, diverticulosis, internal hemorrhoids    Procedure: COLONOSCOPY WITH POLYPECTOMY, N/A - Rectum    Surgeon: Surgeons and Role:     * Jamir Couch MD - Primary  Anesthesia: MAC   Estimated Blood Loss: None    Drains: None  Specimens:   ID Type Source Tests Collected by Time Destination   1 :  Polyp Large Intestine, Colon, Cecum SURGICAL PATHOLOGY EXAM Jamir Couch MD 12/10/2024  7:53 AM      Findings:     1.  Cecal polyp, removed and retrieved  2.  Left colonic diverticulosis  3.  Internal hemorrhoids    Complications: None.  Implants: * No implants in log *

## 2024-12-11 NOTE — RESULT ENCOUNTER NOTE
Mir Treviño Sr.  Your results from your recent clinic visit show:  Your BMP was normal with normal electrolytes and kidney function  Your CBC is normal with no anemia or signs of infection seen    If you have more questions please call the clinic at [phone] or send me a [employer] message    Dr. Roberto Bates

## 2024-12-29 ENCOUNTER — HEALTH MAINTENANCE LETTER (OUTPATIENT)
Age: 64
End: 2024-12-29

## 2025-01-14 ENCOUNTER — MYC REFILL (OUTPATIENT)
Dept: FAMILY MEDICINE | Facility: CLINIC | Age: 65
End: 2025-01-14
Payer: COMMERCIAL

## 2025-01-14 ENCOUNTER — MYC MEDICAL ADVICE (OUTPATIENT)
Dept: FAMILY MEDICINE | Facility: CLINIC | Age: 65
End: 2025-01-14
Payer: COMMERCIAL

## 2025-01-14 DIAGNOSIS — E66.811 CLASS 1 OBESITY DUE TO EXCESS CALORIES WITH SERIOUS COMORBIDITY AND BODY MASS INDEX (BMI) OF 32.0 TO 32.9 IN ADULT: ICD-10-CM

## 2025-01-14 DIAGNOSIS — E66.09 CLASS 1 OBESITY DUE TO EXCESS CALORIES WITH SERIOUS COMORBIDITY AND BODY MASS INDEX (BMI) OF 32.0 TO 32.9 IN ADULT: ICD-10-CM

## 2025-01-14 DIAGNOSIS — I50.20 HEART FAILURE WITH REDUCED EJECTION FRACTION (H): ICD-10-CM

## 2025-01-14 DIAGNOSIS — Z95.1 STATUS POST CORONARY ARTERY BYPASS GRAFT: ICD-10-CM

## 2025-01-14 DIAGNOSIS — E11.9 TYPE 2 DIABETES MELLITUS WITHOUT COMPLICATION, WITHOUT LONG-TERM CURRENT USE OF INSULIN (H): ICD-10-CM

## 2025-01-15 RX ORDER — SEMAGLUTIDE 1.34 MG/ML
1 INJECTION, SOLUTION SUBCUTANEOUS
Qty: 9 ML | Refills: 0 | Status: SHIPPED | OUTPATIENT
Start: 2025-01-15

## 2025-01-15 RX ORDER — SEMAGLUTIDE 1.34 MG/ML
1 INJECTION, SOLUTION SUBCUTANEOUS
Qty: 9 ML | Refills: 0 | Status: SHIPPED | OUTPATIENT
Start: 2025-01-15 | End: 2025-01-15

## 2025-01-20 DIAGNOSIS — I10 ESSENTIAL HYPERTENSION: ICD-10-CM

## 2025-01-20 RX ORDER — LISINOPRIL 10 MG/1
10 TABLET ORAL DAILY
Qty: 90 TABLET | Refills: 2 | Status: SHIPPED | OUTPATIENT
Start: 2025-01-20

## 2025-02-10 ENCOUNTER — VIRTUAL VISIT (OUTPATIENT)
Dept: FAMILY MEDICINE | Facility: CLINIC | Age: 65
End: 2025-02-10
Payer: COMMERCIAL

## 2025-02-10 DIAGNOSIS — J01.90 ACUTE SINUSITIS WITH SYMPTOMS > 10 DAYS: Primary | ICD-10-CM

## 2025-02-10 PROCEDURE — 98005 SYNCH AUDIO-VIDEO EST LOW 20: CPT | Performed by: NURSE PRACTITIONER

## 2025-02-10 ASSESSMENT — ENCOUNTER SYMPTOMS: COUGH: 1

## 2025-02-10 NOTE — PROGRESS NOTES
"Mir is a 65 year old who is being evaluated via a billable video visit.    What phone number would you like to be contacted at? [phone]  How would you like to obtain your AVS? MyChart      Assessment & Plan     Acute sinusitis with symptoms > 10 days  I will treat patient for sinus infection since it has been close to a month of symptoms.  Will treat with Augmentin for 10 days.  He may continue with over-the-counter medications as needed, rest and hydration.  We reviewed symptoms that would warrant follow-up evaluation.  He will notify us of any ongoing or new concerns.  - amoxicillin-clavulanate (AUGMENTIN) 875-125 MG tablet  Dispense: 20 tablet; Refill: 0        BMI  Estimated body mass index is 31.6 kg/m  as calculated from the following:    Height as of 12/10/24: 1.753 m (5' 9\").    Weight as of 12/10/24: 97.1 kg (214 lb).       Subjective   Mir is a 65 year old, presenting for the following health issues:  Cough (cough with green mucus)      2/10/2025     1:56 PM   Additional Questions   Roomed by gus Stevens    History of Present Illness       Reason for visit:  Cough with green mucus, for over a month  Symptom onset:  More than a month  Symptoms include:  Productive cough  Symptom intensity:  Moderate  Symptom progression:  Staying the same  Had these symptoms before:  Yes  Has tried/received treatment for these symptoms:  No  What makes it worse:  No  What makes it better:  Warm liquid   He is taking medications regularly.     Patient presents today for video visit to discuss concerns of sinus drainage and cough ongoing for over a month.  He states that him and his wife are both sick around New Year's this year.  He thought that symptoms were getting better however he continues to have a lot of green drainage from his nose and postnasal drainage that is contributing to a cough.  The cough comes and goes.  He does not feel that it is \"in his lungs\".  Sometimes the cough goes away for a few hours or " "a day and then returns.  He does feel consistent sinus drainage.  Denies any shortness of breath or difficulty breathing.  No systemic symptoms or concerns.      Review of Systems - pertinent positives noted in HPI, otherwise ROS is negative.        Objective    Vitals - Patient Reported  Weight (Patient Reported): 96.2 kg (212 lb)  Height (Patient Reported): 175.3 cm (5' 9\")  BMI (Based on Pt Reported Ht/Wt): 31.31  Pain Score: No Pain (0)        Physical Exam   GENERAL: alert and no distress  EYES: Eyes grossly normal to inspection.  No discharge or erythema, or obvious scleral/conjunctival abnormalities.  RESP: No audible wheeze, cough, or visible cyanosis.    SKIN: Visible skin clear. No significant rash, abnormal pigmentation or lesions.  NEURO: Cranial nerves grossly intact.  Mentation and speech appropriate for age.  PSYCH: Appropriate affect, tone, and pace of words          Video-Visit Details    Type of service:  Video Visit   Originating Location (pt. Location): Home    Distant Location (provider location):  On-site  Platform used for Video Visit: Sakina  Signed Electronically by: JASON Agrawal CNP    "

## 2025-02-12 DIAGNOSIS — I10 ESSENTIAL HYPERTENSION: ICD-10-CM

## 2025-02-12 RX ORDER — HYDROCHLOROTHIAZIDE 12.5 MG/1
1 CAPSULE ORAL DAILY
Qty: 90 CAPSULE | Refills: 3 | Status: SHIPPED | OUTPATIENT
Start: 2025-02-12

## 2025-02-18 ENCOUNTER — TRANSFERRED RECORDS (OUTPATIENT)
Dept: MULTI SPECIALTY CLINIC | Facility: CLINIC | Age: 65
End: 2025-02-18
Payer: COMMERCIAL

## 2025-02-18 LAB — RETINOPATHY: NORMAL

## 2025-03-18 DIAGNOSIS — I25.118 CORONARY ARTERY DISEASE OF NATIVE ARTERY OF NATIVE HEART WITH STABLE ANGINA PECTORIS: ICD-10-CM

## 2025-03-18 RX ORDER — ROSUVASTATIN CALCIUM 40 MG/1
40 TABLET, COATED ORAL DAILY
Qty: 90 TABLET | Refills: 1 | Status: SHIPPED | OUTPATIENT
Start: 2025-03-18

## 2025-04-19 ENCOUNTER — HEALTH MAINTENANCE LETTER (OUTPATIENT)
Age: 65
End: 2025-04-19

## 2025-05-12 ENCOUNTER — MYC REFILL (OUTPATIENT)
Dept: FAMILY MEDICINE | Facility: CLINIC | Age: 65
End: 2025-05-12
Payer: COMMERCIAL

## 2025-05-12 DIAGNOSIS — E66.09 CLASS 1 OBESITY DUE TO EXCESS CALORIES WITH SERIOUS COMORBIDITY AND BODY MASS INDEX (BMI) OF 32.0 TO 32.9 IN ADULT: ICD-10-CM

## 2025-05-12 DIAGNOSIS — E11.9 TYPE 2 DIABETES MELLITUS WITHOUT COMPLICATION, WITHOUT LONG-TERM CURRENT USE OF INSULIN (H): ICD-10-CM

## 2025-05-12 DIAGNOSIS — Z95.1 STATUS POST CORONARY ARTERY BYPASS GRAFT: ICD-10-CM

## 2025-05-12 DIAGNOSIS — E66.811 CLASS 1 OBESITY DUE TO EXCESS CALORIES WITH SERIOUS COMORBIDITY AND BODY MASS INDEX (BMI) OF 32.0 TO 32.9 IN ADULT: ICD-10-CM

## 2025-05-12 DIAGNOSIS — I50.20 HEART FAILURE WITH REDUCED EJECTION FRACTION (H): ICD-10-CM

## 2025-05-13 RX ORDER — SEMAGLUTIDE 1.34 MG/ML
1 INJECTION, SOLUTION SUBCUTANEOUS
Qty: 9 ML | Refills: 3 | Status: SHIPPED | OUTPATIENT
Start: 2025-05-13 | End: 2025-05-14

## 2025-05-14 ENCOUNTER — OFFICE VISIT (OUTPATIENT)
Dept: FAMILY MEDICINE | Facility: CLINIC | Age: 65
End: 2025-05-14
Payer: COMMERCIAL

## 2025-05-14 ENCOUNTER — TELEPHONE (OUTPATIENT)
Dept: FAMILY MEDICINE | Facility: CLINIC | Age: 65
End: 2025-05-14

## 2025-05-14 VITALS
SYSTOLIC BLOOD PRESSURE: 127 MMHG | WEIGHT: 215 LBS | TEMPERATURE: 97 F | RESPIRATION RATE: 20 BRPM | HEART RATE: 60 BPM | DIASTOLIC BLOOD PRESSURE: 76 MMHG | BODY MASS INDEX: 31.84 KG/M2 | HEIGHT: 69 IN | OXYGEN SATURATION: 98 %

## 2025-05-14 DIAGNOSIS — I11.0 HYPERTENSIVE HEART DISEASE WITH HEART FAILURE (H): ICD-10-CM

## 2025-05-14 DIAGNOSIS — I50.22 CHRONIC SYSTOLIC HEART FAILURE (H): ICD-10-CM

## 2025-05-14 DIAGNOSIS — E11.42 DIABETIC POLYNEUROPATHY ASSOCIATED WITH TYPE 2 DIABETES MELLITUS (H): ICD-10-CM

## 2025-05-14 DIAGNOSIS — I50.20 HEART FAILURE WITH REDUCED EJECTION FRACTION (H): ICD-10-CM

## 2025-05-14 DIAGNOSIS — E11.9 TYPE 2 DIABETES MELLITUS WITHOUT COMPLICATION, WITHOUT LONG-TERM CURRENT USE OF INSULIN (H): Primary | ICD-10-CM

## 2025-05-14 LAB
ALBUMIN SERPL BCG-MCNC: 3.9 G/DL (ref 3.5–5.2)
ALP SERPL-CCNC: 103 U/L (ref 40–150)
ALT SERPL W P-5'-P-CCNC: 62 U/L (ref 0–70)
ANION GAP SERPL CALCULATED.3IONS-SCNC: 10 MMOL/L (ref 7–15)
AST SERPL W P-5'-P-CCNC: 70 U/L (ref 0–45)
BILIRUB SERPL-MCNC: 0.9 MG/DL
BUN SERPL-MCNC: 18 MG/DL (ref 8–23)
CALCIUM SERPL-MCNC: 9 MG/DL (ref 8.8–10.4)
CHLORIDE SERPL-SCNC: 101 MMOL/L (ref 98–107)
CHOLEST SERPL-MCNC: 105 MG/DL
CREAT SERPL-MCNC: 0.89 MG/DL (ref 0.67–1.17)
EGFRCR SERPLBLD CKD-EPI 2021: >90 ML/MIN/1.73M2
EST. AVERAGE GLUCOSE BLD GHB EST-MCNC: 212 MG/DL
FASTING STATUS PATIENT QL REPORTED: NO
FASTING STATUS PATIENT QL REPORTED: NO
GLUCOSE SERPL-MCNC: 292 MG/DL (ref 70–99)
HBA1C MFR BLD: 9 % (ref 0–5.6)
HCO3 SERPL-SCNC: 25 MMOL/L (ref 22–29)
HDLC SERPL-MCNC: 40 MG/DL
LDLC SERPL CALC-MCNC: 21 MG/DL
NONHDLC SERPL-MCNC: 65 MG/DL
POTASSIUM SERPL-SCNC: 4.1 MMOL/L (ref 3.4–5.3)
PROT SERPL-MCNC: 7.2 G/DL (ref 6.4–8.3)
SODIUM SERPL-SCNC: 136 MMOL/L (ref 135–145)
TRIGL SERPL-MCNC: 219 MG/DL

## 2025-05-14 PROCEDURE — 80053 COMPREHEN METABOLIC PANEL: CPT | Performed by: STUDENT IN AN ORGANIZED HEALTH CARE EDUCATION/TRAINING PROGRAM

## 2025-05-14 PROCEDURE — 36415 COLL VENOUS BLD VENIPUNCTURE: CPT | Performed by: STUDENT IN AN ORGANIZED HEALTH CARE EDUCATION/TRAINING PROGRAM

## 2025-05-14 PROCEDURE — 82570 ASSAY OF URINE CREATININE: CPT | Performed by: STUDENT IN AN ORGANIZED HEALTH CARE EDUCATION/TRAINING PROGRAM

## 2025-05-14 PROCEDURE — 83036 HEMOGLOBIN GLYCOSYLATED A1C: CPT | Performed by: STUDENT IN AN ORGANIZED HEALTH CARE EDUCATION/TRAINING PROGRAM

## 2025-05-14 PROCEDURE — 3078F DIAST BP <80 MM HG: CPT | Performed by: STUDENT IN AN ORGANIZED HEALTH CARE EDUCATION/TRAINING PROGRAM

## 2025-05-14 PROCEDURE — 3074F SYST BP LT 130 MM HG: CPT | Performed by: STUDENT IN AN ORGANIZED HEALTH CARE EDUCATION/TRAINING PROGRAM

## 2025-05-14 PROCEDURE — 99214 OFFICE O/P EST MOD 30 MIN: CPT | Performed by: STUDENT IN AN ORGANIZED HEALTH CARE EDUCATION/TRAINING PROGRAM

## 2025-05-14 PROCEDURE — 1126F AMNT PAIN NOTED NONE PRSNT: CPT | Performed by: STUDENT IN AN ORGANIZED HEALTH CARE EDUCATION/TRAINING PROGRAM

## 2025-05-14 PROCEDURE — 82043 UR ALBUMIN QUANTITATIVE: CPT | Performed by: STUDENT IN AN ORGANIZED HEALTH CARE EDUCATION/TRAINING PROGRAM

## 2025-05-14 PROCEDURE — 80061 LIPID PANEL: CPT | Performed by: STUDENT IN AN ORGANIZED HEALTH CARE EDUCATION/TRAINING PROGRAM

## 2025-05-14 ASSESSMENT — ASTHMA QUESTIONNAIRES
QUESTION_2 LAST FOUR WEEKS HOW OFTEN HAVE YOU HAD SHORTNESS OF BREATH: NOT AT ALL
ACT_TOTALSCORE: 25
QUESTION_3 LAST FOUR WEEKS HOW OFTEN DID YOUR ASTHMA SYMPTOMS (WHEEZING, COUGHING, SHORTNESS OF BREATH, CHEST TIGHTNESS OR PAIN) WAKE YOU UP AT NIGHT OR EARLIER THAN USUAL IN THE MORNING: NOT AT ALL
QUESTION_5 LAST FOUR WEEKS HOW WOULD YOU RATE YOUR ASTHMA CONTROL: COMPLETELY CONTROLLED
QUESTION_4 LAST FOUR WEEKS HOW OFTEN HAVE YOU USED YOUR RESCUE INHALER OR NEBULIZER MEDICATION (SUCH AS ALBUTEROL): NOT AT ALL
QUESTION_1 LAST FOUR WEEKS HOW MUCH OF THE TIME DID YOUR ASTHMA KEEP YOU FROM GETTING AS MUCH DONE AT WORK, SCHOOL OR AT HOME: NONE OF THE TIME

## 2025-05-14 ASSESSMENT — PAIN SCALES - GENERAL: PAINLEVEL_OUTOF10: NO PAIN (0)

## 2025-05-14 NOTE — TELEPHONE ENCOUNTER
[facility] asking to confirm which dose of Ozempic pt is supposed to receive.     Routine refill approved 5/13/25 for 1mg weekly.   New rx today 5/14/25 was sent during office visit.   Provider confirmed he should be taking the 2mg going forward. Pharmacist notified.       No further action necessary.

## 2025-05-14 NOTE — PROGRESS NOTES
Diabetes Check-up      ASSESSMENT AND PLAN:  65 year old Male who presents for diabetic follow up. Doing well. Will increase the dose of his ozempic from 1 to 2 to try and get further weight loss. Check some labs as below.     1. Type 2 diabetes mellitus without complication, without long-term current use of insulin (H) (Primary)  - Semaglutide, 2 MG/DOSE, (OZEMPIC) 8 MG/3ML pen; Inject 2 mg subcutaneously every 7 days.  Dispense: 9 mL; Refill: 3    2. Chronic systolic heart failure (H)    3. Hypertensive heart disease with heart failure (H)    4. Heart failure with reduced ejection fraction (H)    5. Diabetic polyneuropathy associated with type 2 diabetes mellitus (H)  - Albumin Random Urine Quantitative with Creat Ratio; Future  - HEMOGLOBIN A1C; Future  - Comprehensive metabolic panel (BMP + Alb, Alk Phos, ALT, AST, Total. Bili, TP); Future  - Lipid panel reflex to direct LDL Non-fasting; Future      Follow up in 6 months, sooner as needed    Dr. Roberto Bates    The longitudinal plan of care for the diagnosis(es)/condition(s) as documented were addressed during this visit. Due to the added complexity in care, I will continue to support Mir in the subsequent management and with ongoing continuity of care.     SUBJECTIVE  Mir ALVAREZ Hudson Sr. is a 65 year old who presents today for follow up of DIABETES MELLITUS .       5/24  A1C worsened to 10.5. I will increase ozempic to 1 mg from 0.5.  I recommend repeat diabetic visit in 3 months.     1/24:  was on metformin but significant GI side effect     9/23: jardiance stopped due to polyuria and trialed on wegovy per pharm recommendations. Unable to see pharmacist due to insurance plan. Eventually got on ozempic fall 2023.    9/24:  A1C improved to 6.6 from 10.5 with increase of ozempic from 0.5 to 1.     Diagnosed in 2020  Most recent HgbA1c:   Lab Results   Component Value Date    A1C 6.6 09/23/2024    A1C 10.5 05/10/2024    A1C 7.4 09/19/2023    A1C 8.4  05/22/2023    A1C 6.9 01/16/2023     Current medication regimen:  ozempic    ASA: 81 mg daily  Statin: Rosuvastatin 20 mg daily    Complications:  - Retinopathy: Last ophthalmology appointment-   - Nephropathy:  Last Comprehensive Metabolic Panel:  Last Comprehensive Metabolic Panel:  Lab Results   Component Value Date     12/09/2024    POTASSIUM 3.6 12/09/2024    CHLORIDE 104 12/09/2024    CO2 24 12/09/2024    ANIONGAP 10 12/09/2024     (H) 12/10/2024    BUN 11.9 12/09/2024    CR 0.75 12/09/2024    GFRESTIMATED >90 12/09/2024    JAMIE 9.0 12/09/2024     - Neuropathy: foot exam- 9/24      BP Readings from Last 3 Encounters:   05/14/25 127/76   12/10/24 101/62   12/09/24 130/80       Recent Labs   Lab Test 05/10/24  1044 01/19/23  0715   CHOL 117 107   HDL 33* 36*   LDL 25 44   TRIG 520* 137       Wt Readings from Last 3 Encounters:   05/14/25 97.5 kg (215 lb)   12/10/24 97.1 kg (214 lb)   12/09/24 98 kg (216 lb)       Current Outpatient Medications   Medication Sig Dispense Refill    acetaminophen (TYLENOL) 325 MG tablet Take 2 tablets (650 mg) by mouth every 4 hours as needed for other, headaches or fever (For optimal non-opioid multimodal pain management to improve pain control.) 30 tablet 0    albuterol (ACCUNEB) 1.25 MG/3ML neb solution Take 1 vial (1.25 mg) by nebulization every 6 hours as needed 90 mL 1    albuterol (PROAIR HFA/PROVENTIL HFA/VENTOLIN HFA) 108 (90 Base) MCG/ACT inhaler Inhale 2 puffs into the lungs every 6 hours as needed 18 g 11    aspirin (ASA) 81 MG EC tablet Take 1 tablet (81 mg) by mouth daily Start tomorrow morning. 90 tablet 3    coenzyme Q-10 capsule Take 2 capsules (200 mg) by mouth daily      fluticasone propionate (FLONASE) 50 mcg/actuation nasal spray Spray 1 spray in nostril daily as needed (seasonal)      hydrochlorothiazide (MICROZIDE) 12.5 MG capsule TAKE 1 CAPSULE DAILY 90 capsule 3    lisinopril (ZESTRIL) 10 MG tablet TAKE 1 TABLET DAILY 90 tablet 2    metoprolol  "succinate ER (TOPROL XL) 50 MG 24 hr tablet Take 1 tablet (50 mg) by mouth daily. 90 tablet 3    montelukast (SINGULAIR) 10 MG tablet TAKE 1 TABLET DAILY EVERY NIGHT AT BEDTIME (SINGULAIR) 90 tablet 3    Multiple Vitamins-Minerals (ONE DAILY MENS HEALTH PO) Take 1 tablet by mouth daily      nitroGLYcerin (NITROSTAT) 0.4 MG sublingual tablet One tablet under the tongue every 5 minutes if needed for chest pain. May repeat every 5 minutes for a maximum of 3 doses in 15 minutes\" 25 tablet 3    rosuvastatin (CRESTOR) 40 MG tablet TAKE 1 TABLET DAILY 90 tablet 1    Semaglutide, 1 MG/DOSE, (OZEMPIC, 1 MG/DOSE,) 4 MG/3ML pen Inject 1 mg subcutaneously every 7 days. 9 mL 3       Histories reviewed and updated in Epic.    OBJECTIVE:  Vitals: /76   Pulse 60   Temp 97  F (36.1  C)   Resp 20   Ht 1.753 m (5' 9\")   Wt 97.5 kg (215 lb)   SpO2 98%   BMI 31.75 kg/m    BMIE= Body mass index is 31.75 kg/m .    General appearance: Alert, cooperative, no distress, appears stated age  Head: Normocephalic, atraumatic, without obvious abnormality  Eyes: Pupils equal round, reactive.  Conjunctiva clear.  Nose: Nares normal, no drainage.  Throat: Lips, mucosa, tongue normal mucosa pink and moist  Neck: Supple, symmetric, trachea midline  Lungs: Clear to auscultation bilaterally, no wheezing or crackles present.  Respirations unlabored  Heart: Regular rate and rhythm, normal S1 and S2, no murmur, rub or gallop.      "

## 2025-05-15 LAB
CREAT UR-MCNC: 123 MG/DL
MICROALBUMIN UR-MCNC: <12 MG/L
MICROALBUMIN/CREAT UR: NORMAL MG/G{CREAT}

## 2025-05-16 ENCOUNTER — RESULTS FOLLOW-UP (OUTPATIENT)
Dept: FAMILY MEDICINE | Facility: CLINIC | Age: 65
End: 2025-05-16

## 2025-05-16 NOTE — RESULT ENCOUNTER NOTE
I recommend then he start the 2 mg ozempic and then follow up in 3 months for diabetic check    Roberto Bates MD

## 2025-05-16 NOTE — TELEPHONE ENCOUNTER
See lab result note, from the PCP, to the patient, on 5/16/25.    Writer called the patient and relayed the above lab result note to the patient, who verbalized understanding.    Stated that he ran out of his Ozempic ran out 2 weeks ago and missed two doses so far.    Also reports that the morning of 5/14/25, when he had blood drawn, he had eaten cookies and coffee prior to arrival to the clinic.    Stated that his Ozempic was sent out in the mail yesterday, so he should be receiving the Ozempic soon.    Reports that he is trying to test his BG in the morning prior to eating and his BG has been around 122-130 mg/dl on average.    Patient did not test his BG this morning.    Writer will route the above information to the PCP to review and advise next steps, if any.    Imelda Mathews RN, BSN  [facility]

## 2025-05-16 NOTE — RESULT ENCOUNTER NOTE
Please call patient and inform them,    Mir Treviño Sr.  Your results from your recent clinic visit show:  Your CMP was normal with normal electrolytes, kidney function, and liver function  Your diabetes has gotten out of control with an A1C of 9. Your blood sugar was almost 300 in clinic. Any idea why this could be? Are you taking your medicine as prescribed? If we do not have a reasonfor this I think we should have a follow up ASAP to discuss more  Your cholesterol looks good on your medication  Your kidneys are not leaking protein (albumin)    If you have more questions please call the clinic at [phone] or send me a [employer] message    Dr. Roberto Bates

## 2025-08-13 ENCOUNTER — OFFICE VISIT (OUTPATIENT)
Dept: FAMILY MEDICINE | Facility: CLINIC | Age: 65
End: 2025-08-13
Payer: COMMERCIAL

## 2025-08-13 VITALS
OXYGEN SATURATION: 100 % | HEIGHT: 69 IN | DIASTOLIC BLOOD PRESSURE: 85 MMHG | SYSTOLIC BLOOD PRESSURE: 134 MMHG | BODY MASS INDEX: 31.55 KG/M2 | WEIGHT: 213 LBS | RESPIRATION RATE: 17 BRPM | HEART RATE: 61 BPM | TEMPERATURE: 97.1 F

## 2025-08-13 DIAGNOSIS — I25.118 CORONARY ARTERY DISEASE OF NATIVE ARTERY OF NATIVE HEART WITH STABLE ANGINA PECTORIS: ICD-10-CM

## 2025-08-13 DIAGNOSIS — I10 ESSENTIAL HYPERTENSION: ICD-10-CM

## 2025-08-13 DIAGNOSIS — E11.42 DIABETIC POLYNEUROPATHY ASSOCIATED WITH TYPE 2 DIABETES MELLITUS (H): ICD-10-CM

## 2025-08-13 DIAGNOSIS — E11.9 TYPE 2 DIABETES MELLITUS WITHOUT COMPLICATION, WITHOUT LONG-TERM CURRENT USE OF INSULIN (H): Primary | ICD-10-CM

## 2025-08-13 DIAGNOSIS — I50.20 HEART FAILURE WITH REDUCED EJECTION FRACTION (H): ICD-10-CM

## 2025-08-13 LAB
EST. AVERAGE GLUCOSE BLD GHB EST-MCNC: 140 MG/DL
HBA1C MFR BLD: 6.5 % (ref 0–5.6)
HOLD SPECIMEN: NORMAL

## 2025-08-13 PROCEDURE — 99213 OFFICE O/P EST LOW 20 MIN: CPT | Performed by: STUDENT IN AN ORGANIZED HEALTH CARE EDUCATION/TRAINING PROGRAM

## 2025-08-13 PROCEDURE — 1126F AMNT PAIN NOTED NONE PRSNT: CPT | Performed by: STUDENT IN AN ORGANIZED HEALTH CARE EDUCATION/TRAINING PROGRAM

## 2025-08-13 PROCEDURE — 3075F SYST BP GE 130 - 139MM HG: CPT | Performed by: STUDENT IN AN ORGANIZED HEALTH CARE EDUCATION/TRAINING PROGRAM

## 2025-08-13 PROCEDURE — 3044F HG A1C LEVEL LT 7.0%: CPT | Performed by: STUDENT IN AN ORGANIZED HEALTH CARE EDUCATION/TRAINING PROGRAM

## 2025-08-13 PROCEDURE — 3079F DIAST BP 80-89 MM HG: CPT | Performed by: STUDENT IN AN ORGANIZED HEALTH CARE EDUCATION/TRAINING PROGRAM

## 2025-08-13 PROCEDURE — 36415 COLL VENOUS BLD VENIPUNCTURE: CPT | Performed by: STUDENT IN AN ORGANIZED HEALTH CARE EDUCATION/TRAINING PROGRAM

## 2025-08-13 PROCEDURE — 83036 HEMOGLOBIN GLYCOSYLATED A1C: CPT | Performed by: STUDENT IN AN ORGANIZED HEALTH CARE EDUCATION/TRAINING PROGRAM

## 2025-08-13 ASSESSMENT — PAIN SCALES - GENERAL: PAINLEVEL_OUTOF10: NO PAIN (0)

## 2025-08-25 ENCOUNTER — PATIENT OUTREACH (OUTPATIENT)
Dept: CARE COORDINATION | Facility: CLINIC | Age: 65
End: 2025-08-25
Payer: COMMERCIAL

## (undated) DEVICE — CATH ANGIO INFINITI JR4 4FRX100CM 538421

## (undated) DEVICE — PREP CHLORAPREP 26ML TINTED HI-LITE ORANGE 930815

## (undated) DEVICE — ENDO TROCAR SLEEVE KII Z-THREADED 05X100MM CTS02

## (undated) DEVICE — BONE WAX 2.5GM W31G

## (undated) DEVICE — SU VICRYL+ 0 27 UR6 VLT VCP603H

## (undated) DEVICE — SUTURE VICRYL+ 4-0 UNDYED PS-2 VCP496H

## (undated) DEVICE — Device

## (undated) DEVICE — SHEATH 4FR ULTIMUM 407840

## (undated) DEVICE — KIT HAND CONTROL ACIST 014644 AR-P54

## (undated) DEVICE — PLATE GROUNDING ADULT W/CORD 9165L

## (undated) DEVICE — DRSG DRAIN 4X4" 7086

## (undated) DEVICE — GLOVE PROTEXIS MICRO 5.5  2D73PM55

## (undated) DEVICE — GLOVE BIOGEL PI SZ 8.0 40880

## (undated) DEVICE — SU PROLENE 8-0 BV130-5DA 24" 8732H

## (undated) DEVICE — CATH ANGIO INFINITI JL4 4FRX100CM 538420

## (undated) DEVICE — CLIP HORIZON MULTI SM YELLOW 001204

## (undated) DEVICE — CATH DIAGNOSTIC RADIAL 5FR TIG 4.0

## (undated) DEVICE — CATH DIAG 4FR JR 5.0 538423

## (undated) DEVICE — GOWN IMPERVIOUS BREATHABLE SMART LG 89015

## (undated) DEVICE — SU MONOCRYL+ 4-0 18IN PS2 UND MCP496G

## (undated) DEVICE — ESU ELEC BLADE 2.75" COATED/INSULATED E1455

## (undated) DEVICE — CUSTOM PACK CORONARY SAN5BCRHEA

## (undated) DEVICE — SU PROLENE 7-0 BV-1DA 30" 8703H

## (undated) DEVICE — SUCTION MANIFOLD NEPTUNE 2 SYS 1 PORT 702-025-000

## (undated) DEVICE — LEAD PACER MYOCARDIAL BIPOLAR TEMPORARY 53CM 6495F

## (undated) DEVICE — TUBING SMOKE EVAC PNEUMOCLEAR HIGH FLOW 0620050250

## (undated) DEVICE — TUBING SUCTION MEDI-VAC 1/4"X20' N620A

## (undated) DEVICE — SHTH INTRO 0.021IN ID 6FR DIA

## (undated) DEVICE — CABLE MYO/LEAD PACING BLUE DISP 019-535

## (undated) DEVICE — SYR ANGIOGRAPHY MULTIUSE KIT ACIST 014612

## (undated) DEVICE — 4FR X 10CM SHEATH, MINI-STICK MAX COAXIAL VASCULAR ENTRY KIT, 0.018IN STAINLESS STEEL TIP GUIDEWIRE, 21G X 7CM ECHOGENIC NEEDLE

## (undated) DEVICE — SU STRATAFIX PDS PLUS 0 CT 45CM SXPP1A406

## (undated) DEVICE — CLIP SPRING FOGARTY SOFTJAW CSOFT6

## (undated) DEVICE — GUIDEWIRE VASCULAR COMET II 185CML PRESSURE H74939359110

## (undated) DEVICE — DECANTER VIAL 2006S

## (undated) DEVICE — SYR 30ML LL W/O NDL 302832

## (undated) DEVICE — CATH SUCTION 14FR W/O CTRL DYND41962

## (undated) DEVICE — TUBING SUCTION MEDI-VAC 1/4"X20' N620A - HE

## (undated) DEVICE — RX SURGIFLO HEMOSTATIC MATRIX W/THROMBIN 8ML 2994

## (undated) DEVICE — SOL WATER IRRIG 1000ML BOTTLE 2F7114

## (undated) DEVICE — EXCHANGE WIRE .035 260 STAR/JFC/035/260/ M001491681

## (undated) DEVICE — CABLE MYO/LEAD PACING WHITE DISP 019-530

## (undated) DEVICE — PREP DURAPREP 26ML APL 8630

## (undated) DEVICE — CLIP LIGACLIP LG YELLOW LT400

## (undated) DEVICE — CUSTOM PACK LAP CHOLE SBA5BLCHEA

## (undated) DEVICE — ENDO TROCAR FIRST ENTRY KII FIOS Z-THRD 05X100MM CTF03

## (undated) DEVICE — CATH ANGIO INFINITI IM 4FRX100CM 538460

## (undated) DEVICE — CUSTOM PACK CV ST JOES SCV5BCVHEA

## (undated) DEVICE — ENDO SHEARS RENEW LAP ENDOCUT SCISSOR TIP 16.5MM 3142

## (undated) DEVICE — SU PROLENE 4-0 RB-1DA 36" 8557H

## (undated) DEVICE — MANIFOLD KIT ANGIO AUTOMATED 014613

## (undated) DEVICE — BASIN EMESIS STERILE  SSK9005A

## (undated) DEVICE — CATH THORACIC RT ANGLE CLOTSTOP 28FR

## (undated) DEVICE — SU PROLENE 7-0 BV-1DA 4X30" M8703

## (undated) DEVICE — PITCHER STERILE 1000ML  SSK9004A

## (undated) DEVICE — BLANKET BAIR ADLT PLYMR 60X36IN 63000

## (undated) DEVICE — HEMOSTASIS BONE OSTENE 2.5G SYNTHETIC 1503832

## (undated) DEVICE — ENDO SNARE EXACTO COLD 9MM LOOP 2.4MMX230CM 00711115

## (undated) DEVICE — CATH ANGIO INFINITI MPA2 4FRX100CM 2 SH 538442

## (undated) DEVICE — SUCTION CANISTER MEDIVAC LINER 3000ML W/LID 65651-530

## (undated) DEVICE — KIT LG BORE TOUHY ACCESS PLUS MAP152

## (undated) DEVICE — SU STRATAFIX PDS PLUS 2-0 SPIRAL CT-1 30CM SXPP1B410

## (undated) DEVICE — CLIP HORIZON MULTI MED BLUE 002204

## (undated) DEVICE — CUSTOM PACK CAB SCV5BCBHEA

## (undated) DEVICE — PACK MINOR SINGLE BASIN SSK3001

## (undated) DEVICE — ELECTRODE ADULT PACING MULTI P-211-M1

## (undated) DEVICE — TAPE ADH MEDIPORE 6IN SOFT CLOTH 2866

## (undated) DEVICE — INTRO SHEATH 6FRX10CM PINNACLE RSS602

## (undated) DEVICE — LIGACLIP LARGE AESCULAP O4120-1

## (undated) DEVICE — DEVICE TISSUE STABILIZATION OCTOBASE 28707

## (undated) DEVICE — VESSEL LOOP BLUE MAXI 30-723

## (undated) DEVICE — SUTURE PROLENE 6-0 C-1 8307H

## (undated) DEVICE — CATH THORACIC STRAIGHT CLOTSTOP 32FR

## (undated) DEVICE — CATH LAUNCHER 6FR JR 4.0 LA6JR40

## (undated) DEVICE — GLOVE BIOGEL PI ULTRATOUCH G SZ 7.5 42175

## (undated) DEVICE — GLOVE UNDER INDICATOR PI SZ 6.5 LF 41665

## (undated) DEVICE — ADH SKIN CLOSURE PREMIERPRO EXOFIN 1.0ML 3470

## (undated) DEVICE — SLEEVE TR BAND RADIAL COMPRESSION DEVICE 24CM TRB24-REG

## (undated) DEVICE — SUCTION DRY CHEST DRAIN OASIS 3600-100

## (undated) DEVICE — SU ETHIBOND 3-0 BBDA 36" X588H

## (undated) DEVICE — KIT ENDO VASOVIEW HEMOPRO 2 VH-4000

## (undated) DEVICE — SOL NACL 0.9% IRRIG 1000ML BOTTLE 2F7124

## (undated) DEVICE — SU PLEDGET SOFT TFE 3/8"X3/26"X1/16" PCP40

## (undated) DEVICE — ENDO TROCAR FIRST ENTRY KII FIOS Z-THRD 11X100MM CTF33

## (undated) RX ORDER — FENTANYL CITRATE 50 UG/ML
INJECTION, SOLUTION INTRAMUSCULAR; INTRAVENOUS
Status: DISPENSED
Start: 2022-04-08

## (undated) RX ORDER — FENTANYL CITRATE-0.9 % NACL/PF 10 MCG/ML
PLASTIC BAG, INJECTION (ML) INTRAVENOUS
Status: DISPENSED
Start: 2022-05-10

## (undated) RX ORDER — PROPOFOL 10 MG/ML
INJECTION, EMULSION INTRAVENOUS
Status: DISPENSED
Start: 2022-06-07

## (undated) RX ORDER — FENTANYL CITRATE 50 UG/ML
INJECTION, SOLUTION INTRAMUSCULAR; INTRAVENOUS
Status: DISPENSED
Start: 2022-05-10

## (undated) RX ORDER — FENTANYL CITRATE 50 UG/ML
INJECTION, SOLUTION INTRAMUSCULAR; INTRAVENOUS
Status: DISPENSED
Start: 2022-06-07

## (undated) RX ORDER — GLYCOPYRROLATE 0.2 MG/ML
INJECTION INTRAMUSCULAR; INTRAVENOUS
Status: DISPENSED
Start: 2022-05-10

## (undated) RX ORDER — ONDANSETRON 2 MG/ML
INJECTION INTRAMUSCULAR; INTRAVENOUS
Status: DISPENSED
Start: 2022-05-10

## (undated) RX ORDER — CEFAZOLIN SODIUM 1 G/3ML
INJECTION, POWDER, FOR SOLUTION INTRAMUSCULAR; INTRAVENOUS
Status: DISPENSED
Start: 2022-05-10

## (undated) RX ORDER — ONDANSETRON 2 MG/ML
INJECTION INTRAMUSCULAR; INTRAVENOUS
Status: DISPENSED
Start: 2022-06-07

## (undated) RX ORDER — EPHEDRINE SULFATE 50 MG/ML
INJECTION, SOLUTION INTRAMUSCULAR; INTRAVENOUS; SUBCUTANEOUS
Status: DISPENSED
Start: 2022-05-10

## (undated) RX ORDER — LIDOCAINE HYDROCHLORIDE 10 MG/ML
INJECTION, SOLUTION EPIDURAL; INFILTRATION; INTRACAUDAL; PERINEURAL
Status: DISPENSED
Start: 2022-06-07

## (undated) RX ORDER — DIAZEPAM 5 MG
TABLET ORAL
Status: DISPENSED
Start: 2023-01-19

## (undated) RX ORDER — PROPOFOL 10 MG/ML
INJECTION, EMULSION INTRAVENOUS
Status: DISPENSED
Start: 2022-05-10

## (undated) RX ORDER — KETAMINE HYDROCHLORIDE 10 MG/ML
INJECTION INTRAMUSCULAR; INTRAVENOUS
Status: DISPENSED
Start: 2022-05-10

## (undated) RX ORDER — FENTANYL CITRATE-0.9 % NACL/PF 10 MCG/ML
PLASTIC BAG, INJECTION (ML) INTRAVENOUS
Status: DISPENSED
Start: 2022-06-07

## (undated) RX ORDER — DEXAMETHASONE SODIUM PHOSPHATE 10 MG/ML
INJECTION INTRAMUSCULAR; INTRAVENOUS
Status: DISPENSED
Start: 2022-06-07

## (undated) RX ORDER — VANCOMYCIN HYDROCHLORIDE 1 G/20ML
INJECTION, POWDER, LYOPHILIZED, FOR SOLUTION INTRAVENOUS
Status: DISPENSED
Start: 2022-05-10

## (undated) RX ORDER — BUPIVACAINE HYDROCHLORIDE AND EPINEPHRINE 2.5; 5 MG/ML; UG/ML
INJECTION, SOLUTION INFILTRATION; PERINEURAL
Status: DISPENSED
Start: 2022-06-07

## (undated) RX ORDER — DIAZEPAM 5 MG
TABLET ORAL
Status: DISPENSED
Start: 2022-04-08

## (undated) RX ORDER — LIDOCAINE HYDROCHLORIDE 10 MG/ML
INJECTION, SOLUTION EPIDURAL; INFILTRATION; INTRACAUDAL; PERINEURAL
Status: DISPENSED
Start: 2024-12-10

## (undated) RX ORDER — FENTANYL CITRATE 50 UG/ML
INJECTION, SOLUTION INTRAMUSCULAR; INTRAVENOUS
Status: DISPENSED
Start: 2023-01-19

## (undated) RX ORDER — LIDOCAINE HYDROCHLORIDE 10 MG/ML
INJECTION, SOLUTION INFILTRATION; PERINEURAL
Status: DISPENSED
Start: 2022-04-08

## (undated) RX ORDER — ONDANSETRON 2 MG/ML
INJECTION INTRAMUSCULAR; INTRAVENOUS
Status: DISPENSED
Start: 2024-12-10